# Patient Record
Sex: FEMALE | Race: BLACK OR AFRICAN AMERICAN | HISPANIC OR LATINO | Employment: FULL TIME | URBAN - METROPOLITAN AREA
[De-identification: names, ages, dates, MRNs, and addresses within clinical notes are randomized per-mention and may not be internally consistent; named-entity substitution may affect disease eponyms.]

---

## 2021-05-17 DIAGNOSIS — M54.16 RADICULOPATHY, LUMBAR REGION: Primary | ICD-10-CM

## 2021-05-18 ENCOUNTER — HOSPITAL ENCOUNTER (OUTPATIENT)
Dept: RADIOLOGY | Facility: HOSPITAL | Age: 46
Discharge: HOME | End: 2021-05-18
Attending: PAIN MEDICINE
Payer: COMMERCIAL

## 2021-05-18 DIAGNOSIS — M54.16 LUMBAR RADICULOPATHY: Primary | ICD-10-CM

## 2021-05-18 DIAGNOSIS — M54.16 LUMBAR RADICULOPATHY: ICD-10-CM

## 2021-05-18 PROCEDURE — 72100 X-RAY EXAM L-S SPINE 2/3 VWS: CPT | Mod: TC,PO

## 2021-08-19 DIAGNOSIS — M54.50 LUMBAGO: Primary | ICD-10-CM

## 2021-10-08 ENCOUNTER — HOSPITAL ENCOUNTER (OUTPATIENT)
Dept: RADIOLOGY | Facility: HOSPITAL | Age: 46
Discharge: HOME | End: 2021-10-08
Attending: PAIN MEDICINE
Payer: COMMERCIAL

## 2021-10-08 DIAGNOSIS — M54.50 LUMBAGO: ICD-10-CM

## 2021-10-08 PROCEDURE — 72148 MRI LUMBAR SPINE W/O DYE: CPT | Mod: TC,PO

## 2021-10-18 DIAGNOSIS — M54.16 LUMBAR RADICULOPATHY: Primary | ICD-10-CM

## 2021-10-28 ENCOUNTER — HOSPITAL ENCOUNTER (OUTPATIENT)
Dept: RADIOLOGY | Facility: HOSPITAL | Age: 46
Discharge: HOME | End: 2021-10-28
Attending: PAIN MEDICINE
Payer: COMMERCIAL

## 2021-10-28 DIAGNOSIS — M54.16 LUMBAR RADICULOPATHY: ICD-10-CM

## 2021-10-28 PROCEDURE — A9585 GADOBUTROL INJECTION: HCPCS | Mod: PO | Performed by: PAIN MEDICINE

## 2021-10-28 PROCEDURE — 72158 MRI LUMBAR SPINE W/O & W/DYE: CPT | Mod: TC,PO

## 2021-10-28 PROCEDURE — 25500020 PHARM REV CODE 255: Mod: PO | Performed by: PAIN MEDICINE

## 2021-10-28 RX ORDER — GADOBUTROL 604.72 MG/ML
9 INJECTION INTRAVENOUS
Status: COMPLETED | OUTPATIENT
Start: 2021-10-28 | End: 2021-10-28

## 2021-10-28 RX ADMIN — GADOBUTROL 9 ML: 604.72 INJECTION INTRAVENOUS at 03:10

## 2021-11-09 DIAGNOSIS — G95.9 SPINAL CORD LESION: Primary | ICD-10-CM

## 2021-11-23 ENCOUNTER — HOSPITAL ENCOUNTER (OUTPATIENT)
Dept: RADIOLOGY | Facility: HOSPITAL | Age: 46
Discharge: HOME | End: 2021-11-23
Attending: NEUROLOGICAL SURGERY
Payer: COMMERCIAL

## 2021-11-23 DIAGNOSIS — G95.9 SPINAL CORD LESION: ICD-10-CM

## 2021-11-23 PROCEDURE — 25500020 PHARM REV CODE 255: Mod: PO | Performed by: NEUROLOGICAL SURGERY

## 2021-11-23 PROCEDURE — 70553 MRI BRAIN STEM W/O & W/DYE: CPT | Mod: TC,PO

## 2021-11-23 PROCEDURE — A9585 GADOBUTROL INJECTION: HCPCS | Mod: PO | Performed by: NEUROLOGICAL SURGERY

## 2021-11-23 PROCEDURE — 72156 MRI NECK SPINE W/O & W/DYE: CPT | Mod: TC,PO

## 2021-11-23 RX ORDER — GADOBUTROL 604.72 MG/ML
9 INJECTION INTRAVENOUS
Status: COMPLETED | OUTPATIENT
Start: 2021-11-23 | End: 2021-11-23

## 2021-11-23 RX ADMIN — GADOBUTROL 9 ML: 604.72 INJECTION INTRAVENOUS at 09:11

## 2021-12-01 DIAGNOSIS — G95.9 DISEASE OF SPINAL CORD: Primary | ICD-10-CM

## 2021-12-08 ENCOUNTER — TELEPHONE (OUTPATIENT)
Dept: RADIOLOGY | Facility: HOSPITAL | Age: 46
End: 2021-12-08
Payer: COMMERCIAL

## 2021-12-08 DIAGNOSIS — G37.9 DEMYELINATING DISEASE: Primary | ICD-10-CM

## 2021-12-08 RX ORDER — GABAPENTIN 600 MG/1
600 TABLET ORAL DAILY
COMMUNITY

## 2021-12-08 RX ORDER — CYCLOBENZAPRINE HCL 10 MG
10 TABLET ORAL NIGHTLY
Status: ON HOLD | COMMUNITY
End: 2023-10-17 | Stop reason: HOSPADM

## 2021-12-08 RX ORDER — ACETAMINOPHEN 500 MG
1 TABLET ORAL DAILY
COMMUNITY

## 2021-12-09 DIAGNOSIS — G37.9 DEMYELINATING DISEASE OF CENTRAL NERVOUS SYSTEM, UNSPECIFIED: Primary | ICD-10-CM

## 2021-12-16 ENCOUNTER — HOSPITAL ENCOUNTER (OUTPATIENT)
Dept: RADIOLOGY | Facility: HOSPITAL | Age: 46
Discharge: HOME | End: 2021-12-16
Attending: PSYCHIATRY & NEUROLOGY
Payer: COMMERCIAL

## 2021-12-16 VITALS
TEMPERATURE: 99 F | SYSTOLIC BLOOD PRESSURE: 129 MMHG | HEART RATE: 74 BPM | DIASTOLIC BLOOD PRESSURE: 77 MMHG | RESPIRATION RATE: 17 BRPM

## 2021-12-16 VITALS
RESPIRATION RATE: 16 BRPM | SYSTOLIC BLOOD PRESSURE: 121 MMHG | OXYGEN SATURATION: 99 % | TEMPERATURE: 99 F | DIASTOLIC BLOOD PRESSURE: 77 MMHG | HEART RATE: 70 BPM

## 2021-12-16 DIAGNOSIS — G37.9 DEMYELINATING DISEASE OF CENTRAL NERVOUS SYSTEM, UNSPECIFIED: ICD-10-CM

## 2021-12-16 DIAGNOSIS — G37.9 DEMYELINATING DISEASE OF CENTRAL NERVOUS SYSTEM: Primary | ICD-10-CM

## 2021-12-16 DIAGNOSIS — G37.9 DEMYELINATING DISEASE: ICD-10-CM

## 2021-12-16 LAB
CLARITY CSF: ABNORMAL
COLOR CSF: COLORLESS
GLUCOSE CSF-MCNC: 51 MG/DL (ref 40–70)
LYMPHOCYTES NFR CSF MANUAL: 86 % (ref 40–80)
MISCELLANEOUS TEST NAME: NORMAL
MONOS+MACROS NFR CSF MANUAL: 1 % (ref 15–45)
NEUTROPHILS NFR CSF MANUAL: 13 % (ref 0–6)
PROT CSF-MCNC: 46 MG/DL (ref 15–40)
RBC # CSF: 2000 /CU MM
SARS-COV-2 RDRP RESP QL NAA+PROBE: NEGATIVE
SPECIMEN VOL CSF: 2.5 ML
WBC # CSF: 13 /CU MM (ref 0–5)

## 2021-12-16 PROCEDURE — 87529 HSV DNA AMP PROBE: CPT | Mod: 91 | Performed by: PSYCHIATRY & NEUROLOGY

## 2021-12-16 PROCEDURE — 82042 OTHER SOURCE ALBUMIN QUAN EA: CPT

## 2021-12-16 PROCEDURE — 82784 ASSAY IGA/IGD/IGG/IGM EACH: CPT

## 2021-12-16 PROCEDURE — 87070 CULTURE OTHR SPECIMN AEROBIC: CPT | Performed by: PSYCHIATRY & NEUROLOGY

## 2021-12-16 PROCEDURE — 89051 BODY FLUID CELL COUNT: CPT | Performed by: PSYCHIATRY & NEUROLOGY

## 2021-12-16 PROCEDURE — 36415 COLL VENOUS BLD VENIPUNCTURE: CPT | Performed by: PSYCHIATRY & NEUROLOGY

## 2021-12-16 PROCEDURE — 82040 ASSAY OF SERUM ALBUMIN: CPT | Mod: 91 | Performed by: PSYCHIATRY & NEUROLOGY

## 2021-12-16 PROCEDURE — 87205 SMEAR GRAM STAIN: CPT | Performed by: PSYCHIATRY & NEUROLOGY

## 2021-12-16 PROCEDURE — 25000003 PHARM REV CODE 250: Performed by: RADIOLOGY

## 2021-12-16 PROCEDURE — U0002 COVID-19 LAB TEST NON-CDC: HCPCS | Performed by: RADIOLOGY

## 2021-12-16 PROCEDURE — 83916 OLIGOCLONAL BANDS: CPT

## 2021-12-16 PROCEDURE — 86788 WEST NILE VIRUS AB IGM: CPT | Performed by: PSYCHIATRY & NEUROLOGY

## 2021-12-16 PROCEDURE — 30000890 LABCORP MISCELLANEOUS TEST: Performed by: PSYCHIATRY & NEUROLOGY

## 2021-12-16 PROCEDURE — 87496 CYTOMEG DNA AMP PROBE: CPT | Performed by: PSYCHIATRY & NEUROLOGY

## 2021-12-16 PROCEDURE — 87476 LYME DIS DNA AMP PROBE: CPT | Mod: 91 | Performed by: PSYCHIATRY & NEUROLOGY

## 2021-12-16 PROCEDURE — 82945 GLUCOSE OTHER FLUID: CPT | Performed by: PSYCHIATRY & NEUROLOGY

## 2021-12-16 PROCEDURE — 84157 ASSAY OF PROTEIN OTHER: CPT | Performed by: PSYCHIATRY & NEUROLOGY

## 2021-12-16 PROCEDURE — 30000890 HC MISC. SEND OUT TEST

## 2021-12-16 PROCEDURE — 87798 DETECT AGENT NOS DNA AMP: CPT | Mod: 91 | Performed by: PSYCHIATRY & NEUROLOGY

## 2021-12-16 PROCEDURE — 27201268 FL LUMBAR PUNCTURE DIAGNOSTIC WITH IMAGING

## 2021-12-16 PROCEDURE — 86592 SYPHILIS TEST NON-TREP QUAL: CPT | Performed by: PSYCHIATRY & NEUROLOGY

## 2021-12-16 RX ORDER — LIDOCAINE HYDROCHLORIDE 10 MG/ML
5 INJECTION INFILTRATION; PERINEURAL ONCE
Status: COMPLETED | OUTPATIENT
Start: 2021-12-16 | End: 2021-12-16

## 2021-12-16 RX ADMIN — LIDOCAINE HYDROCHLORIDE 5 ML: 10 INJECTION, SOLUTION EPIDURAL; INFILTRATION; INTRACAUDAL; PERINEURAL at 12:12

## 2021-12-18 LAB — REAGIN AB CSF QL: NON REACTIVE

## 2021-12-20 ENCOUNTER — HOSPITAL ENCOUNTER (OUTPATIENT)
Dept: RADIOLOGY | Facility: HOSPITAL | Age: 46
Discharge: HOME | End: 2021-12-20
Attending: NEUROLOGICAL SURGERY
Payer: COMMERCIAL

## 2021-12-20 DIAGNOSIS — G95.9 DISEASE OF SPINAL CORD: ICD-10-CM

## 2021-12-20 LAB
CMV SPEC QL SHELL VIAL CULT: NO GROWTH
GRAM STN SPEC: NORMAL
GRAM STN SPEC: NORMAL
LABCORP MISC TEST CODE: NORMAL
LABCORP MISC TEST NAME: NORMAL
LABCORP MISCELLANEOUS TEST: NORMAL

## 2021-12-20 PROCEDURE — 25500020 PHARM REV CODE 255: Mod: PO | Performed by: NEUROLOGICAL SURGERY

## 2021-12-20 PROCEDURE — 72157 MRI CHEST SPINE W/O & W/DYE: CPT | Mod: TC,PO

## 2021-12-20 PROCEDURE — A9585 GADOBUTROL INJECTION: HCPCS | Mod: PO | Performed by: NEUROLOGICAL SURGERY

## 2021-12-20 RX ORDER — GADOBUTROL 604.72 MG/ML
9.5 INJECTION INTRAVENOUS
Status: COMPLETED | OUTPATIENT
Start: 2021-12-20 | End: 2021-12-20

## 2021-12-20 RX ADMIN — GADOBUTROL 9.5 ML: 604.72 INJECTION INTRAVENOUS at 11:12

## 2021-12-22 LAB
LABCORP MISC TEST CODE: NORMAL
LABCORP MISC TEST NAME: NORMAL
LABCORP MISCELLANEOUS TEST: NORMAL

## 2021-12-23 LAB
LABCORP MISC TEST CODE: NORMAL
LABCORP MISC TEST NAME: NORMAL
LABCORP MISCELLANEOUS TEST: NORMAL

## 2021-12-24 LAB
LABCORP MISC TEST CODE: NORMAL
LABCORP MISC TEST NAME: NORMAL
LABCORP MISCELLANEOUS TEST: NORMAL

## 2021-12-27 LAB
LABCORP MISC TEST CODE: NORMAL
LABCORP MISC TEST NAME: NORMAL
LABCORP MISCELLANEOUS TEST: NORMAL
WNV IGG SPEC QL IA: NEGATIVE
WNV IGM CSF QL IA: NEGATIVE

## 2021-12-30 LAB
LABCORP MISC TEST CODE: NORMAL
LABCORP MISC TEST NAME: NORMAL
LABCORP MISCELLANEOUS TEST: NORMAL

## 2022-07-15 ENCOUNTER — TELEPHONE (OUTPATIENT)
Dept: NEUROSURGERY | Facility: CLINIC | Age: 47
End: 2022-07-15
Payer: COMMERCIAL

## 2022-07-15 NOTE — TELEPHONE ENCOUNTER
----- Message from Jenifer Mancuso MA sent at 7/15/2022  2:57 PM CDT -----  Regarding: appt  Contact: [phone]  Patient states she is returning a missed call from Koko to schedule an appointment  please call

## 2022-07-15 NOTE — TELEPHONE ENCOUNTER
"Per chart, pt has MS and "severe stenosis L5-S1"    S/W pt. She used to see Valdemar Gage. He was working her up for surgery L5/S1 but has now retired.    Both feet numb for over a year.    Will try to get updated MRI. Will bring EMG report. She Chose 8/31 to be able to get records together.    Appt made. Encouraged patient to call with any further questions or concerns.      ----- Message from Chani Louis sent at 7/15/2022 10:15 AM CDT -----  Regarding: APPOINTMENT  Contact: Self  Pt stated her doctor in [place] is retiring and would like to see Dr Flores for numbness in her legs and feet, trouble walking and nerve pain Provider is not populating in Epic to schedule Pt ask for a call to schedule      Contact info   [phone] (home)         "

## 2022-08-12 ENCOUNTER — TELEPHONE (OUTPATIENT)
Dept: NEUROSURGERY | Facility: CLINIC | Age: 47
End: 2022-08-12
Payer: COMMERCIAL

## 2022-08-12 ENCOUNTER — PATIENT MESSAGE (OUTPATIENT)
Dept: NEUROSURGERY | Facility: CLINIC | Age: 47
End: 2022-08-12
Payer: COMMERCIAL

## 2022-08-12 DIAGNOSIS — G95.9 SPINAL CORD LESION: Primary | ICD-10-CM

## 2022-08-16 DIAGNOSIS — M48.07 LUMBOSACRAL STENOSIS: ICD-10-CM

## 2022-08-16 DIAGNOSIS — M54.16 LUMBAR RADICULOPATHY: Primary | ICD-10-CM

## 2022-08-22 ENCOUNTER — PATIENT MESSAGE (OUTPATIENT)
Dept: NEUROSURGERY | Facility: CLINIC | Age: 47
End: 2022-08-22
Payer: COMMERCIAL

## 2022-08-25 ENCOUNTER — PATIENT MESSAGE (OUTPATIENT)
Dept: NEUROSURGERY | Facility: CLINIC | Age: 47
End: 2022-08-25
Payer: COMMERCIAL

## 2022-09-01 ENCOUNTER — HOSPITAL ENCOUNTER (OUTPATIENT)
Dept: RADIOLOGY | Facility: HOSPITAL | Age: 47
Discharge: HOME | End: 2022-09-01
Attending: FAMILY MEDICINE
Payer: COMMERCIAL

## 2022-09-01 DIAGNOSIS — M48.07 LUMBOSACRAL STENOSIS: ICD-10-CM

## 2022-09-01 DIAGNOSIS — M54.16 LUMBAR RADICULOPATHY: ICD-10-CM

## 2022-09-01 PROCEDURE — 72148 MRI LUMBAR SPINE W/O DYE: CPT | Mod: TC,PO

## 2022-09-14 ENCOUNTER — OFFICE VISIT (OUTPATIENT)
Dept: NEUROSURGERY | Facility: CLINIC | Age: 47
End: 2022-09-14
Payer: COMMERCIAL

## 2022-09-14 VITALS
HEIGHT: 64 IN | DIASTOLIC BLOOD PRESSURE: 79 MMHG | WEIGHT: 214.5 LBS | HEART RATE: 97 BPM | OXYGEN SATURATION: 100 % | BODY MASS INDEX: 36.62 KG/M2 | SYSTOLIC BLOOD PRESSURE: 133 MMHG

## 2022-09-14 DIAGNOSIS — M51.26 HERNIATED LUMBAR INTERVERTEBRAL DISC: Primary | ICD-10-CM

## 2022-09-14 PROCEDURE — 1159F MED LIST DOCD IN RCRD: CPT | Mod: CPTII,S$GLB,, | Performed by: NEUROLOGICAL SURGERY

## 2022-09-14 PROCEDURE — 99204 OFFICE O/P NEW MOD 45 MIN: CPT | Mod: S$GLB,,, | Performed by: NEUROLOGICAL SURGERY

## 2022-09-14 PROCEDURE — 3008F BODY MASS INDEX DOCD: CPT | Mod: CPTII,S$GLB,, | Performed by: NEUROLOGICAL SURGERY

## 2022-09-14 PROCEDURE — 99999 PR PBB SHADOW E&M-EST. PATIENT-LVL IV: ICD-10-PCS | Mod: PBBFAC,,, | Performed by: NEUROLOGICAL SURGERY

## 2022-09-14 PROCEDURE — 3075F SYST BP GE 130 - 139MM HG: CPT | Mod: CPTII,S$GLB,, | Performed by: NEUROLOGICAL SURGERY

## 2022-09-14 PROCEDURE — 99999 PR PBB SHADOW E&M-EST. PATIENT-LVL IV: CPT | Mod: PBBFAC,,, | Performed by: NEUROLOGICAL SURGERY

## 2022-09-14 PROCEDURE — 3078F DIAST BP <80 MM HG: CPT | Mod: CPTII,S$GLB,, | Performed by: NEUROLOGICAL SURGERY

## 2022-09-14 PROCEDURE — 1160F RVW MEDS BY RX/DR IN RCRD: CPT | Mod: CPTII,S$GLB,, | Performed by: NEUROLOGICAL SURGERY

## 2022-09-14 PROCEDURE — 1159F PR MEDICATION LIST DOCUMENTED IN MEDICAL RECORD: ICD-10-PCS | Mod: CPTII,S$GLB,, | Performed by: NEUROLOGICAL SURGERY

## 2022-09-14 PROCEDURE — 3008F PR BODY MASS INDEX (BMI) DOCUMENTED: ICD-10-PCS | Mod: CPTII,S$GLB,, | Performed by: NEUROLOGICAL SURGERY

## 2022-09-14 PROCEDURE — 3075F PR MOST RECENT SYSTOLIC BLOOD PRESS GE 130-139MM HG: ICD-10-PCS | Mod: CPTII,S$GLB,, | Performed by: NEUROLOGICAL SURGERY

## 2022-09-14 PROCEDURE — 1160F PR REVIEW ALL MEDS BY PRESCRIBER/CLIN PHARMACIST DOCUMENTED: ICD-10-PCS | Mod: CPTII,S$GLB,, | Performed by: NEUROLOGICAL SURGERY

## 2022-09-14 PROCEDURE — 3078F PR MOST RECENT DIASTOLIC BLOOD PRESSURE < 80 MM HG: ICD-10-PCS | Mod: CPTII,S$GLB,, | Performed by: NEUROLOGICAL SURGERY

## 2022-09-14 PROCEDURE — 99204 PR OFFICE/OUTPT VISIT, NEW, LEVL IV, 45-59 MIN: ICD-10-PCS | Mod: S$GLB,,, | Performed by: NEUROLOGICAL SURGERY

## 2022-09-14 RX ORDER — PHENTERMINE HYDROCHLORIDE 37.5 MG/1
37.5 TABLET ORAL DAILY
COMMUNITY
Start: 2022-09-06

## 2022-09-14 RX ORDER — OZANIMOD HYDROCHLORIDE 0.92 MG/1
1 CAPSULE ORAL DAILY
COMMUNITY
Start: 2022-02-01

## 2022-09-14 RX ORDER — AMITRIPTYLINE HYDROCHLORIDE 25 MG/1
25 TABLET, FILM COATED ORAL NIGHTLY
COMMUNITY
Start: 2022-06-02 | End: 2023-08-14 | Stop reason: CLARIF

## 2022-09-14 NOTE — PATIENT INSTRUCTIONS
I have personally reviewed the MRI brain with the pt which shows:  1. White matter hyperintensities in both cerebral hemispheres suspicious for demyelinating disease/multiple sclerosis.  2. A small periventricular white matter hyperintensity adjacent to the posterior horn of the left lateral ventricle is associated with mild contrast enhancement, indicating probable active demyelination.  3. Extensive chronic sinusitis.    I have personally reviewed the MRI lumbar with the pt which shows broad-based disc bulge with facet hypertrophy at L4-5 resulting in mild central canal stenosis and moderate lateral recess narrowing. Correlation with bilateral L5 radicular symptoms is recommended. Mild central canal stenosis and foraminal narrowing at L3-4 and mild foraminal narrowing at L5-S1. 16 mm left ovarian cyst.    I will schedule the patient for L3-4, L4-5, and L5-S1 IR discogram and follow up thereafter.

## 2022-09-14 NOTE — PROGRESS NOTES
Subjective:   I, Maria Ines He, attest that this documentation has been prepared under the direction and in the presence of Dennis Flores MD.     Patient ID: Shanthi Escoto is a 47 y.o. female     Chief Complaint: Lumbar Spine Pain (L-Spine)        HPI  Ms. Shanthi Escoto is a 47 y.o. woman recently diagnosed with MS, who presents today to [facility] care. This is a patient who presented to me with low back pain with associated numbness to her feet ongoing for 1.5 years. In March 2021, pt reports waking up with numbness to the lower half of her body. She contacted her PCP and was prescribed medications with no relief. She then consulted Dr. Natividad Hernandez, pain management, and underwent a XR and MRI with new findings of multiple enhancing lesions and posterior disc extrusion at L4-L5 causing severe spinal canal stenosis. She was then referred to neurosurgery and had an additional MRI with EMG nerve conduction study. At that point, pt was diagnosed with MS. Patient had consulted with Dr. Zhu, neurosurgery, to discuss surgical treatment options. However, by the time she was scheduled to undergo the procedure, the surgeon had retired. Since then, pt states she has progressively worsened. Patient reports difficulty with balance and endorses numbness and tingling to the bilateral feet. Described as pins and needles that are exacerbated with movement. Patient states inability to walk straight or stand for long periods of time. Prior to onset, pt reports she was actively exercising on a treadmill and riding a bike daily. She has now ceased exercising due to her pain and gained 40 lbs in weight as a result of her inactivity. She denies any weakness to the BLE. However, she does note numbness to the BLE upon lying down. Additionally, pt endorses increased frequency in urination. MRI brain suggests chronic sinusitis however pt reports she is asymptomatic at this time. Her MS is currently being managed with Dr. Khoobehi,  neurology.    Review of Systems   Constitutional:  Positive for activity change and unexpected weight change. Negative for appetite change, fatigue and fever.   HENT:  Negative for facial swelling.    Eyes: Negative.    Respiratory: Negative.     Cardiovascular: Negative.    Gastrointestinal:  Negative for diarrhea, nausea and vomiting.   Endocrine: Negative.    Genitourinary:  Positive for frequency.   Musculoskeletal:  Positive for arthralgias, back pain and gait problem. Negative for joint swelling, myalgias and neck pain.   Neurological:  Positive for numbness. Negative for dizziness, seizures, weakness and headaches.   Psychiatric/Behavioral: Negative.        Past Medical History:   Diagnosis Date    Demyelinating disease     Numbness and tingling of both feet        Objective:      Vitals:    09/14/22 1301   BP: 133/79   Pulse: 97      Physical Exam  Constitutional:       General: She is not in acute distress.     Appearance: Normal appearance.   HENT:      Head: Normocephalic and atraumatic.   Pulmonary:      Effort: Pulmonary effort is normal.   Musculoskeletal:      Cervical back: Neck supple.   Neurological:      Mental Status: She is alert and oriented to person, place, and time.      GCS: GCS eye subscore is 4. GCS verbal subscore is 5. GCS motor subscore is 6.      Cranial Nerves: No cranial nerve deficit.      Motor: No weakness.      Comments: Intact strength on knee extension. Intact strength on seated hip flexion. Pt cannot rise from a sitting position without pushing off.  Intact foot plantar flexion and extension.     IMAGING:  MRI Brain W WO Contrast (11/23/2022):  1. White matter hyperintensities in both cerebral hemispheres suspicious for demyelinating disease/multiple sclerosis.  2. A small periventricular white matter hyperintensity adjacent to the posterior horn of the left lateral ventricle is associated with mild contrast enhancement, indicating probable active demyelination.  3. Extensive  chronic sinusitis.    MRI Lumbar Spine WO Contrast (9/1/2022):  Broad-based disc bulge with facet hypertrophy at L4-5 resulting in mild central canal stenosis and moderate lateral recess narrowing. Correlation with bilateral L5 radicular symptoms is recommended. Mild central canal stenosis and foraminal narrowing at L3-4 and mild foraminal narrowing at L5-S1. 16 mm left ovarian cyst.    I have personally reviewed the images with the pt.      I, Dr. Dennis Flores, personally performed the services described in this documentation. All medical record entries made by the scribe, Maria Ines He, were at my direction and in my presence.  I have reviewed the chart and agree that the record reflects my personal performance and is accurate and complete. Dennis Flores MD. 09/14/2022    Assessment:       Herniated lumbar disc     Plan:   I have personally reviewed the MRI brain with the pt which shows:  1. White matter hyperintensities in both cerebral hemispheres suspicious for demyelinating disease/multiple sclerosis.  2. A small periventricular white matter hyperintensity adjacent to the posterior horn of the left lateral ventricle is associated with mild contrast enhancement, indicating probable active demyelination.  3. Extensive chronic sinusitis.    I have personally reviewed the MRI lumbar with the pt which shows broad-based disc bulge with facet hypertrophy at L4-5 resulting in mild central canal stenosis and moderate lateral recess narrowing. Correlation with bilateral L5 radicular symptoms is recommended. Mild central canal stenosis and foraminal narrowing at L3-4 and mild foraminal narrowing at L5-S1. 16 mm left ovarian cyst.    I will schedule the patient for L3-4, L4-5, and L5-S1 IR discogram and follow up thereafter.

## 2022-09-16 ENCOUNTER — TELEPHONE (OUTPATIENT)
Dept: NEUROSURGERY | Facility: CLINIC | Age: 47
End: 2022-09-16
Payer: COMMERCIAL

## 2022-09-16 ENCOUNTER — PATIENT MESSAGE (OUTPATIENT)
Dept: NEUROSURGERY | Facility: CLINIC | Age: 47
End: 2022-09-16
Payer: COMMERCIAL

## 2022-09-16 DIAGNOSIS — M51.26 HERNIATED LUMBAR INTERVERTEBRAL DISC: Primary | ICD-10-CM

## 2022-10-03 ENCOUNTER — CLINICAL SUPPORT (OUTPATIENT)
Dept: NEUROSURGERY | Facility: CLINIC | Age: 47
End: 2022-10-03
Payer: COMMERCIAL

## 2022-10-03 DIAGNOSIS — M51.26 HERNIATED LUMBAR INTERVERTEBRAL DISC: Primary | ICD-10-CM

## 2022-10-03 PROCEDURE — 99203 PR OFFICE/OUTPT VISIT, NEW, LEVL III, 30-44 MIN: ICD-10-PCS | Mod: 95,,,

## 2022-10-03 PROCEDURE — 99203 OFFICE O/P NEW LOW 30 MIN: CPT | Mod: 95,,,

## 2022-10-03 NOTE — PROGRESS NOTES
Subjective:       Patient ID: Shanthi Escoto is a 47 y.o. female.    Chief Complaint: Back Pain    Referred by Dr. Flores to discuss discogram.   48 yo F recently diagnosed with MS (Seeing Dr. Khoobehi, Neurology) reports with 1.5 years of LBP with radicular symptoms to the BLE. She reports her pain began after changing a light fixture in her home. Patient reports this is affecting her ability to walk. MRI with new findings of multiple enhancing lesions and posterior disc extrusion at L4-L5 causing spinal canal stenosis. She denies any bowel or bladder incontinence, chest pain, or SOB. After discussing discogram patient would like to proceed with scheduling.      Review of Systems   Constitutional:  Positive for activity change (due to pain). Negative for appetite change, fatigue and fever.   HENT:  Negative for ear pain and facial swelling.    Eyes:  Negative for pain and redness.   Respiratory:  Negative for cough, chest tightness and shortness of breath.    Cardiovascular:  Negative for chest pain and palpitations.   Gastrointestinal:  Negative for abdominal distention and abdominal pain.   Genitourinary:  Negative for bladder incontinence, difficulty urinating and hematuria.   Musculoskeletal:  Positive for arthralgias, back pain and gait problem. Negative for myalgias.   Neurological:  Positive for numbness (BLE). Negative for weakness.   Psychiatric/Behavioral:  Negative for behavioral problems and confusion.        Objective:      Physical Exam  Constitutional:       General: She is not in acute distress.     Appearance: Normal appearance.      Comments: Virtual visit.    HENT:      Head: Normocephalic and atraumatic.      Nose: Nose normal.   Eyes:      Conjunctiva/sclera: Conjunctivae normal.   Pulmonary:      Effort: Pulmonary effort is normal.   Neurological:      General: No focal deficit present.      Mental Status: She is alert.   Psychiatric:         Mood and Affect: Mood normal.         Behavior:  Behavior normal.         MRI L-Spine 9/1/20222    - disc bulge with facet hypertrophy at L4-5. Correlation with bilateral L5 radicular symptoms is recommended   - Mild central canal stenosis and foraminal narrowing at L3-4   - mild foraminal narrowing at L5-S1    Assessment and Plan       Herniated lumbar intervertebral disc  -     IR Discogram Lumbar; Future; Expected date: 10/03/2022    The patient location is: [place]   The chief complaint leading to consultation is: Back pain with radicular symptoms to the BLE.    Visit type: audiovisual    Face to Face time with patient: 20   30 minutes of total time spent on the encounter, which includes face to face time and non-face to face time preparing to see the patient (eg, review of tests), Obtaining and/or reviewing separately obtained history, Documenting clinical information in the electronic or other health record, Independently interpreting results (not separately reported) and communicating results to the patient/family/caregiver, or Care coordination (not separately reported).     Each patient to whom he or she provides medical services by telemedicine is:  (1) informed of the relationship between the physician and patient and the respective role of any other health care provider with respect to management of the patient; and (2) notified that he or she may decline to receive medical services by telemedicine and may withdraw from such care at any time.    Notes:    Discussed how the procedure will be performed, risks (including, but not limited to, pain, bleeding, infection, damage to nearby structures, and the need for additional procedures), benefits, possible complications, pre-post procedure expectations, and alternatives. The patient voices understanding and all questions have been answered.  The patient agrees to proceed as planned. Dr. Dee in room.     Esme Luna PA-C  Interventional Radiology  [phone]

## 2022-10-07 ENCOUNTER — PATIENT MESSAGE (OUTPATIENT)
Dept: NEUROSURGERY | Facility: CLINIC | Age: 47
End: 2022-10-07
Payer: COMMERCIAL

## 2022-10-18 ENCOUNTER — TELEPHONE (OUTPATIENT)
Dept: INTERVENTIONAL RADIOLOGY/VASCULAR | Facility: CLINIC | Age: 47
End: 2022-10-18
Payer: COMMERCIAL

## 2022-10-18 NOTE — TELEPHONE ENCOUNTER
Patient called to canceled her outpatient discogram schedule on 11/1 with Dr. Dee.  Patient stated she will call to r/s beginning of 2023 due to changing insurance company with a lower deductible.

## 2022-11-10 ENCOUNTER — PATIENT MESSAGE (OUTPATIENT)
Dept: NEUROSURGERY | Facility: CLINIC | Age: 47
End: 2022-11-10
Payer: COMMERCIAL

## 2022-12-12 ENCOUNTER — TELEPHONE (OUTPATIENT)
Dept: INTERVENTIONAL RADIOLOGY/VASCULAR | Facility: HOSPITAL | Age: 47
End: 2022-12-12
Payer: COMMERCIAL

## 2022-12-13 ENCOUNTER — HOSPITAL ENCOUNTER (OUTPATIENT)
Dept: INTERVENTIONAL RADIOLOGY/VASCULAR | Facility: HOSPITAL | Age: 47
Discharge: HOME | End: 2022-12-13
Payer: COMMERCIAL

## 2022-12-13 VITALS
SYSTOLIC BLOOD PRESSURE: 136 MMHG | BODY MASS INDEX: 34.15 KG/M2 | HEART RATE: 87 BPM | WEIGHT: 200 LBS | TEMPERATURE: 98 F | HEIGHT: 64 IN | RESPIRATION RATE: 20 BRPM | DIASTOLIC BLOOD PRESSURE: 91 MMHG | OXYGEN SATURATION: 98 %

## 2022-12-13 DIAGNOSIS — M51.26 HERNIATED LUMBAR INTERVERTEBRAL DISC: ICD-10-CM

## 2022-12-13 LAB
B-HCG UR QL: NEGATIVE
CTP QC/QA: YES

## 2022-12-13 PROCEDURE — 99152 MOD SED SAME PHYS/QHP 5/>YRS: CPT | Performed by: RADIOLOGY

## 2022-12-13 PROCEDURE — 99152 MOD SED SAME PHYS/QHP 5/>YRS: CPT | Mod: ,,, | Performed by: RADIOLOGY

## 2022-12-13 PROCEDURE — 25500020 PHARM REV CODE 255: Performed by: RADIOLOGY

## 2022-12-13 PROCEDURE — 99153 MOD SED SAME PHYS/QHP EA: CPT | Performed by: RADIOLOGY

## 2022-12-13 PROCEDURE — 62290 NJX PX DISCOGRAPHY LUMBAR: CPT | Mod: 59,,, | Performed by: RADIOLOGY

## 2022-12-13 PROCEDURE — 99152 PR MOD CONSCIOUS SEDATION, SAME PHYS, 5+ YRS, FIRST 15 MIN: ICD-10-PCS | Mod: ,,, | Performed by: RADIOLOGY

## 2022-12-13 PROCEDURE — A4550 SURGICAL TRAYS: HCPCS

## 2022-12-13 PROCEDURE — 63600175 PHARM REV CODE 636 W HCPCS: Performed by: STUDENT IN AN ORGANIZED HEALTH CARE EDUCATION/TRAINING PROGRAM

## 2022-12-13 PROCEDURE — 72295 X-RAY OF LOWER SPINE DISK: CPT | Mod: TC,59 | Performed by: RADIOLOGY

## 2022-12-13 PROCEDURE — 62290 PR INJECT DISKOGRAM,LUMBAR,EA LEVEL: ICD-10-PCS | Mod: 59,,, | Performed by: RADIOLOGY

## 2022-12-13 PROCEDURE — 81025 URINE PREGNANCY TEST: CPT | Performed by: FAMILY MEDICINE

## 2022-12-13 PROCEDURE — 72295 IR DISCOGRAM LUMBAR: ICD-10-PCS | Mod: 26,59,, | Performed by: RADIOLOGY

## 2022-12-13 PROCEDURE — 62290 NJX PX DISCOGRAPHY LUMBAR: CPT | Mod: 59 | Performed by: RADIOLOGY

## 2022-12-13 RX ORDER — ACETAMINOPHEN 325 MG/1
650 TABLET ORAL EVERY 4 HOURS PRN
Status: CANCELLED | OUTPATIENT
Start: 2022-12-13

## 2022-12-13 RX ORDER — MIDAZOLAM HYDROCHLORIDE 1 MG/ML
INJECTION INTRAMUSCULAR; INTRAVENOUS
Status: COMPLETED | OUTPATIENT
Start: 2022-12-13 | End: 2022-12-13

## 2022-12-13 RX ORDER — LIDOCAINE HYDROCHLORIDE 10 MG/ML
1 INJECTION, SOLUTION EPIDURAL; INFILTRATION; INTRACAUDAL; PERINEURAL ONCE
Status: DISCONTINUED | OUTPATIENT
Start: 2022-12-13 | End: 2022-12-14 | Stop reason: HOSPADM

## 2022-12-13 RX ORDER — SODIUM CHLORIDE 9 MG/ML
INJECTION, SOLUTION INTRAVENOUS CONTINUOUS
Status: DISCONTINUED | OUTPATIENT
Start: 2022-12-13 | End: 2022-12-14 | Stop reason: HOSPADM

## 2022-12-13 RX ORDER — OXYCODONE HYDROCHLORIDE 5 MG/1
5 TABLET ORAL EVERY 4 HOURS PRN
Status: CANCELLED | OUTPATIENT
Start: 2022-12-13

## 2022-12-13 RX ORDER — FENTANYL CITRATE 50 UG/ML
INJECTION, SOLUTION INTRAMUSCULAR; INTRAVENOUS
Status: COMPLETED | OUTPATIENT
Start: 2022-12-13 | End: 2022-12-13

## 2022-12-13 RX ORDER — ONDANSETRON 2 MG/ML
4 INJECTION INTRAMUSCULAR; INTRAVENOUS EVERY 6 HOURS PRN
Status: CANCELLED | OUTPATIENT
Start: 2022-12-13

## 2022-12-13 RX ADMIN — IOHEXOL 5 ML: 180 INJECTION INTRAVENOUS at 01:12

## 2022-12-13 RX ADMIN — FENTANYL CITRATE 25 MCG: 50 INJECTION, SOLUTION INTRAMUSCULAR; INTRAVENOUS at 12:12

## 2022-12-13 RX ADMIN — MIDAZOLAM HYDROCHLORIDE 0.5 MG: 1 INJECTION INTRAMUSCULAR; INTRAVENOUS at 01:12

## 2022-12-13 RX ADMIN — FENTANYL CITRATE 50 MCG: 50 INJECTION, SOLUTION INTRAMUSCULAR; INTRAVENOUS at 01:12

## 2022-12-13 RX ADMIN — MIDAZOLAM HYDROCHLORIDE 1 MG: 1 INJECTION INTRAMUSCULAR; INTRAVENOUS at 12:12

## 2022-12-13 NOTE — NURSING
Pt given discharge instructions and handout.  Family waiting in car outside.  Dsg to back CDI.  IV d/c'd with cath tip intact.  NAD noted.  Pt to DC home with family .

## 2022-12-13 NOTE — PROCEDURES
Radiology Post-Procedure Note    Pre Op Diagnosis: LBP    Post Op Diagnosis: Same    Procedure: Lumbar discogram     Procedure performed by: Myron Dee MD    Written Informed Consent Obtained: Yes    Specimen Removed: NO    Estimated Blood Loss: Minimal    Findings:     Lumbar discogram     Level injected: L1-2, L2-3, L3-4, L4-5, L5-S1  Needle used: 20/22 gauge    Concordant discogenic pain ellicited     Patient tolerated procedure well.        Salma Evans MD     Neuro Endovascular Surgery Fellow   [facility]

## 2022-12-13 NOTE — NURSING
Pt arrived to [facility] bay 6 s/p discogram.  NAD noted.  Report received from Tamica.  DSG to back CDI.  Will continue to monitor.

## 2022-12-13 NOTE — NURSING
Pt arrived to room 4 for discogram for diagnostic purposes. Pt oriented to unit and staff. Plan of care reviewed with patient, patient verbalizes understanding. Comfort measures utilized. Pt safely transferred from stretcher to procedural table. Fall risk reviewed with patient, fall risk interventions maintained with direct observation/attendance. positioner pillows utilized to minimize pressure points. Blankets applied. Pt prepped and draped utilizing standard sterile technique. Patient placed on continuous monitoring, as required by sedation policy. Timeouts completed utilizing standard universal time-out, per department and facility policy. RN to remain at bedside, continuous monitoring maintained. Pt resting comfortably. Denies pain/discomfort. Will continue to monitor. See flow sheets for monitoring, medication administration, and updates.

## 2022-12-13 NOTE — PLAN OF CARE
Discogram complete. Pt tolerated well. VSS. No signs or symptoms of distress noted.  Dressing to back CDI.  Pt will be transferred to ROCU bed and report to be given at bedside.

## 2022-12-13 NOTE — DISCHARGE INSTRUCTIONS
For immediate concerns that are not emergent, you may call our radiology clinic at: [phone]. After hours, for urgent concerns: ask for the radiology resident on call at [phone].    You may remove the dressing over your procedure site the day after your procedure. You may shower the day after your procedure with the procedure site covered, then uncover it after your shower. When not showing, keep the procedure site clean, dry, and open to air. Do not submerge in water (bath, pool, hot tub, ect.) until the site is completely healed.

## 2022-12-13 NOTE — H&P
Radiology History & Physical      SUBJECTIVE:     Chief Complaint: low back pain with radiculopathy     History of Present Illness:  Shanthi Escoto is a 47 y.o. female who presents for diagnostic lumbar discogram to assess origin of pain. Patient report 1.5 year hx of low back pain, which intermittent radiates down the posterior aspect of both legs to her feet. She report constant dull low back pain, 7/10 currently, which is worse with laying down. Patient reports constant numbness in her feet bilaterally.     Past Medical History:   Diagnosis Date    Demyelinating disease     MS (multiple sclerosis)     Numbness and tingling of both feet      Past Surgical History:   Procedure Laterality Date     SECTION      ENDOMETRIAL ABLATION         Home Meds:   Prior to Admission medications    Medication Sig Start Date End Date Taking? Authorizing Provider   Bacillus coagulans/inulin (PROBIOTIC WITH PREBIOTIC ORAL) Take 1 tablet by mouth once daily.   Yes Historical Provider   cholecalciferol, vitamin D3, (VITAMIN D3) 50 mcg (2,000 unit) Cap Take 1 capsule by mouth once daily.   Yes Historical Provider   cyclobenzaprine (FLEXERIL) 10 MG tablet Take 10 mg by mouth every evening.   Yes Historical Provider   gabapentin (NEURONTIN) 600 MG tablet Take 600 mg by mouth once daily.   Yes Historical Provider   ozanimod (ZEPOSIA) 0.92 mg Cap  22  Yes Historical Provider   phentermine (ADIPEX-P) 37.5 mg tablet Take 37.5 mg by mouth once daily. 22  Yes Historical Provider   amitriptyline (ELAVIL) 25 MG tablet Take 25 mg by mouth every evening. 22   Historical Provider     Anticoagulants/Antiplatelets: no anticoagulation    Allergies:   Review of patient's allergies indicates:   Allergen Reactions    Penicillins Anaphylaxis     As an infant     Sedation History:  have not been any systemic reactions    Review of Systems:   Hematological: negative  no known coagulopathies  Respiratory: no cough, shortness of breath,  or wheezing  no shortness of breath  Cardiovascular: no chest pain or dyspnea on exertion  no chest pain  Gastrointestinal: no abdominal pain, change in bowel habits, or black or bloody stools  no abdominal pain  Genito-Urinary: no dysuria, trouble voiding, or hematuria  no dysuria  Musculoskeletal: low back pain with radiculopathy  Neurological: no TIA or stroke symptoms, numbness in feet bilaterally         OBJECTIVE:     Vital Signs (Most Recent)  Temp: 97.3 °F (36.3 °C) (12/13/22 1048)  Pulse: 97 (12/13/22 1048)  Resp: 16 (12/13/22 1048)  BP: 132/83 (12/13/22 1049)  SpO2: 97 % (12/13/22 1048)    Physical Exam:  ASA: 2  Mallampati: 2    General: no acute distress  Mental Status: alert and oriented to person, place and time  HEENT: normocephalic, atraumatic  Chest: unlabored breathing  Heart: regular heart rate  Abdomen: nondistended  Extremity: moves all extremities    Laboratory  Lab Results   Component Value Date    INR 1.0 12/13/2022       Lab Results   Component Value Date    WBC 4.49 12/13/2022    HGB 12.6 12/13/2022    HCT 38.9 12/13/2022    MCV 86 12/13/2022     12/13/2022      Lab Results   Component Value Date    GLU 93 12/13/2022     12/13/2022    K 4.3 12/13/2022     12/13/2022    CO2 28 12/13/2022    BUN 7 12/13/2022    CREATININE 0.7 12/13/2022    CALCIUM 9.9 12/13/2022    ALT 15 12/13/2022    AST 16 12/13/2022    ALBUMIN 3.7 12/13/2022    BILITOT 0.5 12/13/2022     MRI L-Spine 9/1/20222    - disc bulge with facet hypertrophy at L4-5. Correlation with bilateral L5 radicular symptoms is recommended   - Mild central canal stenosis and foraminal narrowing at L3-4   - mild foraminal narrowing at L5-S1    ASSESSMENT/PLAN:     Sedation Plan: Up to moderate  Patient will undergo diagnostic lumbar discogram.      Nas Varela DO

## 2022-12-14 NOTE — DISCHARGE SUMMARY
Radiology Discharge Summary      Hospital Course: No complications    Admit Date: 12/13/2022  Discharge Date: 12/14/2022     Instructions Given to Patient: Yes  Diet: Resume prior diet  Activity: activity as tolerated    Description of Condition on Discharge: Stable  Vital Signs (Most Recent): Temp: 98.2 °F (36.8 °C) (12/13/22 1340)  Pulse: 87 (12/13/22 1440)  Resp: 20 (12/13/22 1440)  BP: (!) 136/91 (12/13/22 1440)  SpO2: 98 % (12/13/22 1440)    Discharge Disposition: Home    Discharge Diagnosis:     S/p Lumbar discogram   Level injected: L1-2, L2-3, L3-4, L4-5, L5-S1  Concordant discogenic pain ellicited      Follow-up: With primary neurosurgeon         Salma Evans MD     Neuro Endovascular Surgery Fellow   [facility]

## 2023-04-28 ENCOUNTER — PATIENT MESSAGE (OUTPATIENT)
Dept: NEUROSURGERY | Facility: CLINIC | Age: 48
End: 2023-04-28
Payer: COMMERCIAL

## 2023-05-31 ENCOUNTER — OFFICE VISIT (OUTPATIENT)
Dept: NEUROSURGERY | Facility: CLINIC | Age: 48
End: 2023-05-31
Payer: COMMERCIAL

## 2023-05-31 ENCOUNTER — TELEPHONE (OUTPATIENT)
Dept: NEUROSURGERY | Facility: CLINIC | Age: 48
End: 2023-05-31
Payer: COMMERCIAL

## 2023-05-31 ENCOUNTER — PATIENT MESSAGE (OUTPATIENT)
Dept: NEUROSURGERY | Facility: CLINIC | Age: 48
End: 2023-05-31

## 2023-05-31 DIAGNOSIS — M47.27 LUMBOSACRAL SPONDYLOSIS WITH RADICULOPATHY: Primary | ICD-10-CM

## 2023-05-31 DIAGNOSIS — M51.26 HERNIATED LUMBAR INTERVERTEBRAL DISC: Primary | ICD-10-CM

## 2023-05-31 DIAGNOSIS — M48.062 SPINAL STENOSIS, LUMBAR REGION, WITH NEUROGENIC CLAUDICATION: ICD-10-CM

## 2023-05-31 PROCEDURE — 1159F MED LIST DOCD IN RCRD: CPT | Mod: CPTII,95,, | Performed by: NEUROLOGICAL SURGERY

## 2023-05-31 PROCEDURE — 1159F PR MEDICATION LIST DOCUMENTED IN MEDICAL RECORD: ICD-10-PCS | Mod: CPTII,95,, | Performed by: NEUROLOGICAL SURGERY

## 2023-05-31 PROCEDURE — 1160F PR REVIEW ALL MEDS BY PRESCRIBER/CLIN PHARMACIST DOCUMENTED: ICD-10-PCS | Mod: CPTII,95,, | Performed by: NEUROLOGICAL SURGERY

## 2023-05-31 PROCEDURE — 99213 PR OFFICE/OUTPT VISIT, EST, LEVL III, 20-29 MIN: ICD-10-PCS | Mod: 95,,, | Performed by: NEUROLOGICAL SURGERY

## 2023-05-31 PROCEDURE — 99213 OFFICE O/P EST LOW 20 MIN: CPT | Mod: 95,,, | Performed by: NEUROLOGICAL SURGERY

## 2023-05-31 PROCEDURE — 1160F RVW MEDS BY RX/DR IN RCRD: CPT | Mod: CPTII,95,, | Performed by: NEUROLOGICAL SURGERY

## 2023-05-31 NOTE — PROGRESS NOTES
The patient location is: [place]  The chief complaint leading to consultation is: follow up    Visit type: audiovisual    Face to Face time with patient: 10 minutes of total time spent on the encounter, which includes face to face time and non-face to face time preparing to see the patient (eg, review of tests), Obtaining and/or reviewing separately obtained history, Documenting clinical information in the electronic or other health record, Independently interpreting results (not separately reported) and communicating results to the patient/family/caregiver, or Care coordination (not separately reported).         Each patient to whom he or she provides medical services by telemedicine is:  (1) informed of the relationship between the physician and patient and the respective role of any other health care provider with respect to management of the patient; and (2) notified that he or she may decline to receive medical services by telemedicine and may withdraw from such care at any time.    Notes:    Subjective:   Maria Ines CHANDRA, attest that this documentation has been prepared under the direction and in the presence of Dennis Flores MD.     Patient ID: Shanthi Escoto is a 48 y.o. female     Chief Complaint: No chief complaint on file.      HPI  Ms. Shanthi Escoto is a 48 y.o. woman recently diagnosed with MS, who presents today for follow up. This is a patient who presented to me with low back pain with BLE paresthesia. In March 2021, pt reports waking up with numbness to the lower half of her body. She was referred to pain management and had imaging with new findings of multiple enhancing lesions and posterior disc extrusion at L4-L5 causing severe spinal canal stenosis. She was then referred to neurosurgery and had an additional MRI with EMG nerve conduction study. At that point, pt was diagnosed with MS. Patient had consulted with  neurosurgery to discuss treatment options. However, by the time she was scheduled  for operation, the surgeon had retired. Since then, pt reports symptoms progressively worsened. Patient reports difficulty with balance and endorses numbness and tingling to the bilateral feet. Described as pins and needles sensation that are exacerbated with movement. Reports inability to walk straight or stand for long periods of time. She denies weakness to the BLE but endorses numbness. Additionally, pt reports increased frequency in urination.    Today the pt reports symptoms are unchanged from last visit. She continues to have back pain with associated BLE paresthesia. She reports her numbness is worsening in the feet. States her pain is rated 8/10 today. I reviewed the discogram with the patient today which was positive at L4/5 and L5/S1.    Review of Systems   Constitutional:  Negative for activity change, appetite change, fatigue, fever and unexpected weight change.   HENT:  Negative for facial swelling.    Eyes: Negative.    Respiratory: Negative.     Cardiovascular: Negative.    Gastrointestinal:  Negative for diarrhea, nausea and vomiting.   Endocrine: Negative.    Genitourinary: Negative.    Musculoskeletal:  Positive for back pain and gait problem. Negative for joint swelling, myalgias and neck pain.   Neurological:  Positive for numbness. Negative for dizziness, seizures, weakness and headaches.   Psychiatric/Behavioral: Negative.        Past Medical History:   Diagnosis Date    Demyelinating disease     MS (multiple sclerosis)     Numbness and tingling of both feet        Objective:    There were no vitals filed for this visit.   Physical Exam  Constitutional:       General: She is not in acute distress.     Appearance: Normal appearance.   HENT:      Head: Normocephalic and atraumatic.   Neurological:      Mental Status: She is alert and oriented to person, place, and time.       IMAGING:  IR Discogram Lumbar (12/13/2023):  Concordant discogenic pain of severity 10/10 elicited prominently at L4-L5  level > L5-S1 level.    I have personally reviewed the images with the pt.      I, Dr. Dennis Flores, personally performed the services described in this documentation. All medical record entries made by the scribe, Maria Ines He, were at my direction and in my presence.  I have reviewed the chart and agree that the record reflects my personal performance and is accurate and complete. Dennis Flores MD. 05/31/2023    Assessment:       Lumbar spondylosis.  HNP Lumbar spine.      Plan:   I have personally reviewed the IR discogram with the pt which shows concordant discogenic pain of severity 10/10 elicited prominently at L4-L5 level > L5-S1 level.    I recommend MIS decompression and fusion using TLIF. I have discussed the risks/benefits, indications, and alternatives for the proposed procedure in detail. I have answered all of their questions and patient wish to proceed with surgery. We will schedule patient.

## 2023-05-31 NOTE — PATIENT INSTRUCTIONS
I have personally reviewed the IR discogram with the pt which shows concordant discogenic pain of severity 10/10 elicited prominently at L4-L5 level > L5-S1 level.    I recommend MIS decompression and fusion. I have discussed the risks/benefits, indications, and alternatives for the proposed procedure in detail. I have answered all of their questions and patient wish to proceed with surgery. We will schedule patient.

## 2023-06-08 ENCOUNTER — TELEPHONE (OUTPATIENT)
Dept: NEUROSURGERY | Facility: CLINIC | Age: 48
End: 2023-06-08
Payer: COMMERCIAL

## 2023-06-08 DIAGNOSIS — Z98.1 STATUS POST LUMBAR SPINAL FUSION: Primary | ICD-10-CM

## 2023-07-31 ENCOUNTER — ANESTHESIA EVENT (OUTPATIENT)
Dept: SURGERY | Facility: HOSPITAL | Age: 48
End: 2023-07-31
Payer: COMMERCIAL

## 2023-08-14 ENCOUNTER — HOSPITAL ENCOUNTER (OUTPATIENT)
Dept: PREADMISSION TESTING | Facility: HOSPITAL | Age: 48
Discharge: HOME | End: 2023-08-14
Attending: NEUROLOGICAL SURGERY
Payer: COMMERCIAL

## 2023-08-14 ENCOUNTER — HOSPITAL ENCOUNTER (OUTPATIENT)
Dept: RADIOLOGY | Facility: HOSPITAL | Age: 48
Discharge: HOME | End: 2023-08-14
Attending: NEUROLOGICAL SURGERY
Payer: COMMERCIAL

## 2023-08-14 VITALS
OXYGEN SATURATION: 98 % | HEART RATE: 77 BPM | TEMPERATURE: 98 F | SYSTOLIC BLOOD PRESSURE: 131 MMHG | DIASTOLIC BLOOD PRESSURE: 89 MMHG | RESPIRATION RATE: 18 BRPM | HEIGHT: 64 IN | BODY MASS INDEX: 36.55 KG/M2 | WEIGHT: 214.06 LBS

## 2023-08-14 DIAGNOSIS — Z01.818 PREOPERATIVE TESTING: Primary | ICD-10-CM

## 2023-08-14 LAB
ALBUMIN SERPL BCP-MCNC: 3.6 G/DL (ref 3.5–5.2)
ALP SERPL-CCNC: 79 U/L (ref 55–135)
ALT SERPL W/O P-5'-P-CCNC: 17 U/L (ref 10–44)
ANION GAP SERPL CALC-SCNC: 8 MMOL/L (ref 8–16)
APTT PPP: 26.5 SEC (ref 21–32)
AST SERPL-CCNC: 19 U/L (ref 10–40)
BASOPHILS # BLD AUTO: 0.01 K/UL (ref 0–0.2)
BASOPHILS NFR BLD: 0.2 % (ref 0–1.9)
BILIRUB SERPL-MCNC: 0.4 MG/DL (ref 0.1–1)
BUN SERPL-MCNC: 7 MG/DL (ref 6–20)
CALCIUM SERPL-MCNC: 9.5 MG/DL (ref 8.7–10.5)
CHLORIDE SERPL-SCNC: 105 MMOL/L (ref 95–110)
CO2 SERPL-SCNC: 26 MMOL/L (ref 23–29)
CREAT SERPL-MCNC: 0.8 MG/DL (ref 0.5–1.4)
DIFFERENTIAL METHOD: ABNORMAL
EOSINOPHIL # BLD AUTO: 0.1 K/UL (ref 0–0.5)
EOSINOPHIL NFR BLD: 1.2 % (ref 0–8)
ERYTHROCYTE [DISTWIDTH] IN BLOOD BY AUTOMATED COUNT: 13 % (ref 11.5–14.5)
EST. GFR  (NO RACE VARIABLE): >60 ML/MIN/1.73 M^2
GLUCOSE SERPL-MCNC: 90 MG/DL (ref 70–110)
HCT VFR BLD AUTO: 39 % (ref 37–48.5)
HGB BLD-MCNC: 12.7 G/DL (ref 12–16)
IMM GRANULOCYTES # BLD AUTO: 0.03 K/UL (ref 0–0.04)
IMM GRANULOCYTES NFR BLD AUTO: 0.6 % (ref 0–0.5)
INR PPP: 1 (ref 0.8–1.2)
LYMPHOCYTES # BLD AUTO: 0.7 K/UL (ref 1–4.8)
LYMPHOCYTES NFR BLD: 13.5 % (ref 18–48)
MCH RBC QN AUTO: 28.8 PG (ref 27–31)
MCHC RBC AUTO-ENTMCNC: 32.6 G/DL (ref 32–36)
MCV RBC AUTO: 88 FL (ref 82–98)
MONOCYTES # BLD AUTO: 0.7 K/UL (ref 0.3–1)
MONOCYTES NFR BLD: 13.3 % (ref 4–15)
NEUTROPHILS # BLD AUTO: 3.5 K/UL (ref 1.8–7.7)
NEUTROPHILS NFR BLD: 71.2 % (ref 38–73)
NRBC BLD-RTO: 0 /100 WBC
PLATELET # BLD AUTO: 343 K/UL (ref 150–450)
PMV BLD AUTO: 10.3 FL (ref 9.2–12.9)
POTASSIUM SERPL-SCNC: 4.1 MMOL/L (ref 3.5–5.1)
PROT SERPL-MCNC: 7.3 G/DL (ref 6–8.4)
PROTHROMBIN TIME: 10.3 SEC (ref 9–12.5)
RBC # BLD AUTO: 4.41 M/UL (ref 4–5.4)
SODIUM SERPL-SCNC: 139 MMOL/L (ref 136–145)
WBC # BLD AUTO: 4.97 K/UL (ref 3.9–12.7)

## 2023-08-14 PROCEDURE — 36415 COLL VENOUS BLD VENIPUNCTURE: CPT | Performed by: NEUROLOGICAL SURGERY

## 2023-08-14 PROCEDURE — 93010 ELECTROCARDIOGRAM REPORT: CPT | Mod: ,,, | Performed by: INTERNAL MEDICINE

## 2023-08-14 PROCEDURE — 85610 PROTHROMBIN TIME: CPT | Performed by: NEUROLOGICAL SURGERY

## 2023-08-14 PROCEDURE — 71046 X-RAY EXAM CHEST 2 VIEWS: CPT | Mod: 26,,, | Performed by: INTERNAL MEDICINE

## 2023-08-14 PROCEDURE — 71046 XR CHEST PA AND LATERAL PRE-OP: ICD-10-PCS | Mod: 26,,, | Performed by: INTERNAL MEDICINE

## 2023-08-14 PROCEDURE — 80053 COMPREHEN METABOLIC PANEL: CPT | Performed by: NEUROLOGICAL SURGERY

## 2023-08-14 PROCEDURE — 93010 EKG 12-LEAD: ICD-10-PCS | Mod: ,,, | Performed by: INTERNAL MEDICINE

## 2023-08-14 PROCEDURE — 85730 THROMBOPLASTIN TIME PARTIAL: CPT | Performed by: NEUROLOGICAL SURGERY

## 2023-08-14 PROCEDURE — 93005 ELECTROCARDIOGRAM TRACING: CPT

## 2023-08-14 PROCEDURE — 85025 COMPLETE CBC W/AUTO DIFF WBC: CPT | Performed by: NEUROLOGICAL SURGERY

## 2023-08-14 PROCEDURE — 71046 X-RAY EXAM CHEST 2 VIEWS: CPT | Mod: TC,FY

## 2023-08-14 NOTE — DISCHARGE INSTRUCTIONS
Before 7 AM, enter through the Emergency Entrance..   After 7 AM enter through the Main Entrance.      Your procedure  is scheduled for _8/28/2023_________.    Call [phone] between 2pm and 5pm on __8/25/2023_____to find out your arrival time for the day of surgery.    You may have two visitors.  No children under 12 years old.     You will be going to the Same Day Surgery Unit on the 2nd floor of the hospital.    Important instructions:  Do not eat anything after midnight.  You may have plain water, non carbonated.  You may also have Gatorade or Powerade after midnight.    Stop all fluids 2 hours before your surgery.    It is okay to brush your teeth.  Do not have gum, candy or mints.    SEE MEDICATION SHEET.   TAKE MEDICATIONS AS DIRECTED WITH SIPS OF WATER.    STOP taking Aspirin, Ibuprofen,  Advil, Motrin, Mobic(meloxicam), Aleve (naproxen), Fish oil, and Vitamin E for at least 7 days before your surgery.     You may take Tylenol if needed which is not a blood thinner.    Please shower the night before and the morning of your surgery.      Use Chlorhexidine soap as instructed by your pre op nurse.   Please place clean linens on your bed the night before surgery. Please wear fresh clean clothing after each shower.    No shaving of procedural area at least 4-5 days before surgery due to increased risk of skin irritation and/or possible infection.    Female patients may be asked for a urine specimen on the morning of the surgery.  Please check with your nurse before using the restroom.    Contact lenses and removable denture work may not be worn during your procedure.    You may wear deodorant only. If you are having breast surgery, do not wear deodorant on the operative side.    Do not wear powder, body lotion, perfume/cologne or make-up.    Do not wear any jewelry or have any metal on your body.    You will be asked to remove any dentures or partials for the procedure.    If you are going home on the  same day of surgery, you must arrange for a family member or a friend to drive you home.  Public transportation is prohibited.  You will not be able to drive home if you were given anesthesia or sedation.    Patients who want to have their Post-op prescriptions filled from our in-house [facility], bring a Credit/Debit Card or cash with you. A co-pay may be required.  The pharmacy closes at 5:30 pm.    Wear loose fitting clothes allowing for bandages.    Please leave money and valuables home.      You may bring your cell phone.    Call the doctor if fever or illness should occur before your surgery.    Call [phone] to contact us here if needed.                            CLOTHES ON DAY OF SURGERY    SHOULDER surgery:  you must have a very oversized shirt.  Very, Very large.  You will probably have a large sling on with your arm strapped to your chest.  You will not be able to put the arm of the operated shoulder into a sleeve.  You can put the arm of the un-operated shoulder into the sleeve, but the shirt will need to be draped over the operated shoulder.       ARM or HAND surgery:  make sure that your sleeves are large and loose enough to pass over large dressings or cast.      BREAST or UNDERARM surgery:  wear a loose, button down shirt so that you can dress without raising your arms over your head.    ABDOMINAL surgery:  wear loose, comfortable clothing.  Nothing tight around the abdomen.  NO JEANS    PENIS or SCROTAL surgery:  loose comfortable clothing.  Large sweat pants, pajama pants or a robe.  ABSOLUTELY NO JEANS      LEG or FOOT surgery:  wear large loose pants that are able to pass over any large dressings or casts.  You could also wear loose shorts or a skirt.

## 2023-08-14 NOTE — ANESTHESIA PREPROCEDURE EVALUATION
2023  Shanthi Escoto is a 48 y.o., female   To undergo Procedure(s) (LRB):  FUSION, SPINE, LUMBAR, TLIF, MINIMALLY INVASIVE L4-5, L5-S1 Sarbjit CURRIE notified (Left)     Denies CP/SOB/GERD/MI/CVA/URI symptoms.  METS > 4  NPO > 8    Past Medical History:  Past Medical History:   Diagnosis Date    Demyelinating disease     MS (multiple sclerosis)     Numbness and tingling of both feet     Obesity, unspecified        Past Surgical History:  Past Surgical History:   Procedure Laterality Date     SECTION      ENDOMETRIAL ABLATION         Social History:  Social History     Socioeconomic History    Marital status: Single   Tobacco Use    Smoking status: Never    Smokeless tobacco: Never   Substance and Sexual Activity    Alcohol use: Yes     Comment: rarely    Drug use: Never       Medications:  No current facility-administered medications on file prior to encounter.     Current Outpatient Medications on File Prior to Encounter   Medication Sig Dispense Refill    Bacillus coagulans/inulin (PROBIOTIC WITH PREBIOTIC ORAL) Take 1 tablet by mouth once daily.      cholecalciferol, vitamin D3, (VITAMIN D3) 50 mcg (2,000 unit) Cap Take 1 capsule by mouth once daily.      cyclobenzaprine (FLEXERIL) 10 MG tablet Take 10 mg by mouth every evening.      gabapentin (NEURONTIN) 600 MG tablet Take 600 mg by mouth once daily.      ozanimod (ZEPOSIA) 0.92 mg Cap Take 1 capsule by mouth Daily.      phentermine (ADIPEX-P) 37.5 mg tablet Take 37.5 mg by mouth once daily.         Allergies:  Review of patient's allergies indicates:   Allergen Reactions    Penicillins Anaphylaxis     As an infant       Active Problems:  There is no problem list on file for this patient.      Diagnostic Studies:   Latest Reference Range & Units 10/09/23 10:30   WBC 3.90 - 12.70 K/uL 4.65   RBC 4.00 - 5.40 M/uL 4.38    Hemoglobin 12.0 - 16.0 g/dL 12.5   Hematocrit 37.0 - 48.5 % 39.0   MCV 82 - 98 fL 89   MCH 27.0 - 31.0 pg 28.5   MCHC 32.0 - 36.0 g/dL 32.1   RDW 11.5 - 14.5 % 12.5   Platelet Count 150 - 450 K/uL 356   MPV 9.2 - 12.9 fL 10.2   Gran % 38.0 - 73.0 % 67.7   Lymph % 18.0 - 48.0 % 17.8 (L)   Mono % 4.0 - 15.0 % 13.5   Eosinophil % 0.0 - 8.0 % 0.4   Basophil % 0.0 - 1.9 % 0.2   Immature Granulocytes 0.0 - 0.5 % 0.4   Gran # (ANC) 1.8 - 7.7 K/uL 3.1   Lymph # 1.0 - 4.8 K/uL 0.8 (L)   Mono # 0.3 - 1.0 K/uL 0.6   Eos # 0.0 - 0.5 K/uL 0.0   Baso # 0.00 - 0.20 K/uL 0.01   Immature Grans (Abs) 0.00 - 0.04 K/uL 0.02   nRBC 0 /100 WBC 0   Differential Method  Automated      Latest Reference Range & Units 10/09/23 10:30   Sodium 136 - 145 mmol/L 139   Potassium 3.5 - 5.1 mmol/L 4.1   Chloride 95 - 110 mmol/L 104   CO2 23 - 29 mmol/L 26   Anion Gap 8 - 16 mmol/L 9   BUN 6 - 20 mg/dL 8   Creatinine 0.5 - 1.4 mg/dL 0.7   eGFR >60 mL/min/1.73 m^2 >60   Glucose 70 - 110 mg/dL 88   Calcium 8.7 - 10.5 mg/dL 9.4     EKG (8/14/23):  NSR    24 Hour Vitals:  Temp:  [36.9 °C (98.4 °F)] 36.9 °C (98.4 °F)  Pulse:  [85] 85  Resp:  [16] 16  SpO2:  [100 %] 100 %  BP: (144)/(79) 144/79   See Nursing Charting For Additional Vitals    Pre-op Assessment    I have reviewed the Patient Summary Reports.     I have reviewed the Nursing Notes. I have reviewed the NPO Status.   I have reviewed the Medications.     Review of Systems  Anesthesia Hx:  No problems with previous Anesthesia  Denies Family Hx of Anesthesia complications.   Denies Personal Hx of Anesthesia complications.   Social:  Non-Smoker, Social Alcohol Use    Hematology/Oncology:  Hematology Normal   Oncology Normal     EENT/Dental:EENT/Dental Normal   Cardiovascular:   Exercise tolerance: good Denies Hypertension.  ECG has been reviewed.    Pulmonary:  Pulmonary Normal    Renal/:  Renal/ Normal     Hepatic/GI:  Hepatic/GI Normal    Musculoskeletal:  Spine Disorders: lumbar     Neurological:   MS, denies weakness or difficulty swallowing/breathing   Endocrine:  Obesity / BMI > 30  Dermatological:  Skin Normal    Psych:  Psychiatric Normal           Physical Exam  General: Well nourished and Cooperative    Airway:  Mallampati: II   Mouth Opening: Normal  TM Distance: Normal      Dental:  Intact    Chest/Lungs:  Clear to auscultation, Normal Respiratory Rate    Heart:  Rate: Normal  Rhythm: Regular Rhythm        Anesthesia Plan  Type of Anesthesia, risks & benefits discussed:    Anesthesia Type: Gen ETT  Intra-op Monitoring Plan: Standard ASA Monitors  Post Op Pain Control Plan: multimodal analgesia and IV/PO Opioids PRN  Induction:  IV  Airway Plan: Direct and Video, Post-Induction  Informed Consent: Informed consent signed with the Patient and all parties understand the risks and agree with anesthesia plan.  All questions answered. Patient consented to blood products? Yes  ASA Score: 2  Anesthesia Plan Notes:   GA with OETT  Standard ASA monitors  Recovery in PACU  PONV: 3    Ready For Surgery From Anesthesia Perspective.     .

## 2023-08-28 ENCOUNTER — ANESTHESIA (OUTPATIENT)
Dept: SURGERY | Facility: HOSPITAL | Age: 48
End: 2023-08-28
Payer: COMMERCIAL

## 2023-08-28 ENCOUNTER — PATIENT MESSAGE (OUTPATIENT)
Dept: NEUROSURGERY | Facility: CLINIC | Age: 48
End: 2023-08-28
Payer: COMMERCIAL

## 2023-10-09 ENCOUNTER — HOSPITAL ENCOUNTER (OUTPATIENT)
Dept: PREADMISSION TESTING | Facility: HOSPITAL | Age: 48
Discharge: HOME | End: 2023-10-09
Attending: NEUROLOGICAL SURGERY
Payer: COMMERCIAL

## 2023-10-09 VITALS
WEIGHT: 215.19 LBS | OXYGEN SATURATION: 100 % | RESPIRATION RATE: 18 BRPM | HEART RATE: 88 BPM | DIASTOLIC BLOOD PRESSURE: 82 MMHG | TEMPERATURE: 97 F | BODY MASS INDEX: 36.74 KG/M2 | SYSTOLIC BLOOD PRESSURE: 128 MMHG | HEIGHT: 64 IN

## 2023-10-09 DIAGNOSIS — Z01.818 PREOP TESTING: Primary | ICD-10-CM

## 2023-10-09 LAB
ANION GAP SERPL CALC-SCNC: 9 MMOL/L (ref 8–16)
APTT PPP: 26.6 SEC (ref 21–32)
BASOPHILS # BLD AUTO: 0.01 K/UL (ref 0–0.2)
BASOPHILS NFR BLD: 0.2 % (ref 0–1.9)
BUN SERPL-MCNC: 8 MG/DL (ref 6–20)
CALCIUM SERPL-MCNC: 9.4 MG/DL (ref 8.7–10.5)
CHLORIDE SERPL-SCNC: 104 MMOL/L (ref 95–110)
CO2 SERPL-SCNC: 26 MMOL/L (ref 23–29)
CREAT SERPL-MCNC: 0.7 MG/DL (ref 0.5–1.4)
DIFFERENTIAL METHOD: ABNORMAL
EOSINOPHIL # BLD AUTO: 0 K/UL (ref 0–0.5)
EOSINOPHIL NFR BLD: 0.4 % (ref 0–8)
ERYTHROCYTE [DISTWIDTH] IN BLOOD BY AUTOMATED COUNT: 12.5 % (ref 11.5–14.5)
EST. GFR  (NO RACE VARIABLE): >60 ML/MIN/1.73 M^2
GLUCOSE SERPL-MCNC: 88 MG/DL (ref 70–110)
HCT VFR BLD AUTO: 39 % (ref 37–48.5)
HGB BLD-MCNC: 12.5 G/DL (ref 12–16)
IMM GRANULOCYTES # BLD AUTO: 0.02 K/UL (ref 0–0.04)
IMM GRANULOCYTES NFR BLD AUTO: 0.4 % (ref 0–0.5)
INR PPP: 1 (ref 0.8–1.2)
LYMPHOCYTES # BLD AUTO: 0.8 K/UL (ref 1–4.8)
LYMPHOCYTES NFR BLD: 17.8 % (ref 18–48)
MCH RBC QN AUTO: 28.5 PG (ref 27–31)
MCHC RBC AUTO-ENTMCNC: 32.1 G/DL (ref 32–36)
MCV RBC AUTO: 89 FL (ref 82–98)
MONOCYTES # BLD AUTO: 0.6 K/UL (ref 0.3–1)
MONOCYTES NFR BLD: 13.5 % (ref 4–15)
NEUTROPHILS # BLD AUTO: 3.1 K/UL (ref 1.8–7.7)
NEUTROPHILS NFR BLD: 67.7 % (ref 38–73)
NRBC BLD-RTO: 0 /100 WBC
PLATELET # BLD AUTO: 356 K/UL (ref 150–450)
PMV BLD AUTO: 10.2 FL (ref 9.2–12.9)
POTASSIUM SERPL-SCNC: 4.1 MMOL/L (ref 3.5–5.1)
PROTHROMBIN TIME: 10.3 SEC (ref 9–12.5)
RBC # BLD AUTO: 4.38 M/UL (ref 4–5.4)
SODIUM SERPL-SCNC: 139 MMOL/L (ref 136–145)
WBC # BLD AUTO: 4.65 K/UL (ref 3.9–12.7)

## 2023-10-09 PROCEDURE — 80048 BASIC METABOLIC PNL TOTAL CA: CPT | Performed by: NEUROLOGICAL SURGERY

## 2023-10-09 PROCEDURE — 85730 THROMBOPLASTIN TIME PARTIAL: CPT | Performed by: NEUROLOGICAL SURGERY

## 2023-10-09 PROCEDURE — 85025 COMPLETE CBC W/AUTO DIFF WBC: CPT | Performed by: NEUROLOGICAL SURGERY

## 2023-10-09 PROCEDURE — 36415 COLL VENOUS BLD VENIPUNCTURE: CPT | Performed by: NEUROLOGICAL SURGERY

## 2023-10-09 PROCEDURE — 85610 PROTHROMBIN TIME: CPT | Performed by: NEUROLOGICAL SURGERY

## 2023-10-09 RX ORDER — ELAGOLIX AND ESTRADIOL AND NORETHISTERONE 300-1-0.5
KIT ORAL
COMMUNITY
Start: 2022-10-01

## 2023-10-09 NOTE — PRE-PROCEDURE INSTRUCTIONS
Before 7 AM, enter through the Emergency Entrance..   After 7 AM enter through the Main Entrance.      Your procedure  is scheduled for __________.    Call [phone] between 2pm and 5pm on _______to find out your arrival time for the day of surgery.    You may use the main entrance to the hospital on the [facility] side, or the entrance that is next to the [facility].    You may have one visitor.  No children allowed.     You will be going to the Same Day Surgery Unit on the 2nd floor of the hospital.    Important instructions:  Do not eat anything after midnight.  You may have plain water, non carbonated.  You may also have Gatorade or Powerade after midnight.    Stop all fluids 2 hours before your surgery.    It is okay to brush your teeth.  Do not have gum, candy or mints.    SEE MEDICATION SHEET.   TAKE MEDICATIONS AS DIRECTED WITH SIPS OF WATER.      Do not take any diabetic medication on the morning of surgery unless instructed to do so by your doctor or pre op nurse.    STOP taking Aspirin, Ibuprofen,  Advil, Motrin, Mobic(meloxicam), Aleve (naproxen), Fish oil, and Vitamin E for at least 7 days before your surgery.     You may take Tylenol if needed which is not a blood thinner.    Please shower the night before and the morning of your surgery.      Follow any Prep Instructions given by your surgeon.    Use Chlorhexidine soap as instructed by your pre op nurse.   Please place clean linens on your bed the night before surgery. Please wear fresh clean clothing after each shower.    No shaving of procedural area at least 4-5 days before surgery due to increased risk of skin irritation and/or possible infection.    Female patients may be asked for a urine specimen on the morning of the surgery.  Please check with your nurse before using the restroom.    Contact lenses and removable denture work may not be worn during your procedure.    You may wear deodorant only. If you are having breast surgery,  do not wear deodorant on the operative side.    Do not wear powder, body lotion, perfume/cologne or make-up.    Do not wear any jewelry or have any metal on your body.    You will be asked to remove any dentures or partials for the procedure.    If you are going home on the same day of surgery, you must arrange for a family member or a friend to drive you home.  Public transportation is prohibited.  You will not be able to drive home if you were given anesthesia or sedation.    Patients who want to have their Post-op prescriptions filled from our in-house [facility], bring a Credit/Debit Card or cash with you. A co-pay may be required.  The pharmacy closes at 5:30 pm.    Wear loose fitting clothes allowing for bandages.    Please leave money and valuables home.      You may bring your cell phone.    Call the doctor if fever or illness should occur before your surgery.    Call [phone] to contact us here if needed.                            CLOTHES ON DAY OF SURGERY    SHOULDER surgery:  you must have a very oversized shirt.  Very, Very large.  You will probably have a large sling on with your arm strapped to your chest.  You will not be able to put the arm of the operated shoulder into a sleeve.  You can put the arm of the un-operated shoulder into the sleeve, but the shirt will need to be draped over the operated shoulder.       ARM or HAND surgery:  make sure that your sleeves are large and loose enough to pass over large dressings or cast.      BREAST or UNDERARM surgery:  wear a loose, button down shirt so that you can dress without raising your arms over your head.    ABDOMINAL surgery:  wear loose, comfortable clothing.  Nothing tight around the abdomen.  NO JEANS    PENIS or SCROTAL surgery:  loose comfortable clothing.  Large sweat pants, pajama pants or a robe.  ABSOLUTELY NO JEANS      LEG or FOOT surgery:  wear large loose pants that are able to pass over any large dressings or casts.  You could  also wear loose shorts or a skirt. Pre-operative instructions, medication directives and pain scales reviewed with patient. All questions the patient had were answered. Re-assurance about surgical procedure and day of surgery routine given as needed, patient verbalized understanding of the pre-op instructions.

## 2023-10-09 NOTE — DISCHARGE INSTRUCTIONS

## 2023-10-13 ENCOUNTER — TELEPHONE (OUTPATIENT)
Dept: SURGERY | Facility: HOSPITAL | Age: 48
End: 2023-10-13
Payer: COMMERCIAL

## 2023-10-16 ENCOUNTER — HOSPITAL ENCOUNTER (OUTPATIENT)
Facility: HOSPITAL | Age: 48
Discharge: HOME | End: 2023-10-17
Attending: NEUROLOGICAL SURGERY | Admitting: NEUROLOGICAL SURGERY
Payer: COMMERCIAL

## 2023-10-16 DIAGNOSIS — G35 MULTIPLE SCLEROSIS: ICD-10-CM

## 2023-10-16 DIAGNOSIS — Z98.1 S/P LUMBAR SPINAL FUSION: Primary | ICD-10-CM

## 2023-10-16 DIAGNOSIS — M48.061 LUMBAR STENOSIS: ICD-10-CM

## 2023-10-16 DIAGNOSIS — Z01.818 PREOPERATIVE TESTING: ICD-10-CM

## 2023-10-16 PROBLEM — M48.062 SPINAL STENOSIS, LUMBAR REGION, WITH NEUROGENIC CLAUDICATION: Status: ACTIVE | Noted: 2023-10-16

## 2023-10-16 LAB
ABO + RH BLD: NORMAL
ANION GAP SERPL CALC-SCNC: 9 MMOL/L (ref 8–16)
B-HCG UR QL: NEGATIVE
BASOPHILS # BLD AUTO: 0.01 K/UL (ref 0–0.2)
BASOPHILS NFR BLD: 0.1 % (ref 0–1.9)
BLD GP AB SCN CELLS X3 SERPL QL: NORMAL
BUN SERPL-MCNC: 6 MG/DL (ref 6–20)
CALCIUM SERPL-MCNC: 8.9 MG/DL (ref 8.7–10.5)
CHLORIDE SERPL-SCNC: 106 MMOL/L (ref 95–110)
CO2 SERPL-SCNC: 24 MMOL/L (ref 23–29)
CREAT SERPL-MCNC: 0.7 MG/DL (ref 0.5–1.4)
CTP QC/QA: YES
DIFFERENTIAL METHOD: ABNORMAL
EOSINOPHIL # BLD AUTO: 0 K/UL (ref 0–0.5)
EOSINOPHIL NFR BLD: 0 % (ref 0–8)
ERYTHROCYTE [DISTWIDTH] IN BLOOD BY AUTOMATED COUNT: 12.3 % (ref 11.5–14.5)
EST. GFR  (NO RACE VARIABLE): >60 ML/MIN/1.73 M^2
GLUCOSE SERPL-MCNC: 157 MG/DL (ref 70–110)
HCT VFR BLD AUTO: 35.9 % (ref 37–48.5)
HGB BLD-MCNC: 12 G/DL (ref 12–16)
IMM GRANULOCYTES # BLD AUTO: 0.08 K/UL (ref 0–0.04)
IMM GRANULOCYTES NFR BLD AUTO: 0.7 % (ref 0–0.5)
LYMPHOCYTES # BLD AUTO: 0.4 K/UL (ref 1–4.8)
LYMPHOCYTES NFR BLD: 3.7 % (ref 18–48)
MCH RBC QN AUTO: 28.4 PG (ref 27–31)
MCHC RBC AUTO-ENTMCNC: 33.4 G/DL (ref 32–36)
MCV RBC AUTO: 85 FL (ref 82–98)
MONOCYTES # BLD AUTO: 0.7 K/UL (ref 0.3–1)
MONOCYTES NFR BLD: 6.2 % (ref 4–15)
NEUTROPHILS # BLD AUTO: 10.2 K/UL (ref 1.8–7.7)
NEUTROPHILS NFR BLD: 89.3 % (ref 38–73)
NRBC BLD-RTO: 0 /100 WBC
PLATELET # BLD AUTO: 287 K/UL (ref 150–450)
PMV BLD AUTO: 9.7 FL (ref 9.2–12.9)
POTASSIUM SERPL-SCNC: 4 MMOL/L (ref 3.5–5.1)
RBC # BLD AUTO: 4.22 M/UL (ref 4–5.4)
SODIUM SERPL-SCNC: 139 MMOL/L (ref 136–145)
WBC # BLD AUTO: 11.38 K/UL (ref 3.9–12.7)

## 2023-10-16 PROCEDURE — 63600175 PHARM REV CODE 636 W HCPCS: Performed by: NURSE ANESTHETIST, CERTIFIED REGISTERED

## 2023-10-16 PROCEDURE — 63600175 PHARM REV CODE 636 W HCPCS: Performed by: PHYSICIAN ASSISTANT

## 2023-10-16 PROCEDURE — 25000003 PHARM REV CODE 250: Performed by: STUDENT IN AN ORGANIZED HEALTH CARE EDUCATION/TRAINING PROGRAM

## 2023-10-16 PROCEDURE — C1713 ANCHOR/SCREW BN/BN,TIS/BN: HCPCS | Performed by: NEUROLOGICAL SURGERY

## 2023-10-16 PROCEDURE — D9220A PRA ANESTHESIA: Mod: CRNA,,, | Performed by: NURSE ANESTHETIST, CERTIFIED REGISTERED

## 2023-10-16 PROCEDURE — 36000711: Performed by: NEUROLOGICAL SURGERY

## 2023-10-16 PROCEDURE — 25000003 PHARM REV CODE 250: Performed by: NURSE ANESTHETIST, CERTIFIED REGISTERED

## 2023-10-16 PROCEDURE — 71000033 HC RECOVERY, INTIAL HOUR: Performed by: NEUROLOGICAL SURGERY

## 2023-10-16 PROCEDURE — 63053 LAM FACTC/FRMT ARTHRD LUM EA: CPT | Mod: ,,, | Performed by: NEUROLOGICAL SURGERY

## 2023-10-16 PROCEDURE — 22842 INSERT SPINE FIXATION DEVICE: CPT | Mod: ,,, | Performed by: NEUROLOGICAL SURGERY

## 2023-10-16 PROCEDURE — 63600175 PHARM REV CODE 636 W HCPCS: Performed by: NEUROLOGICAL SURGERY

## 2023-10-16 PROCEDURE — 36415 COLL VENOUS BLD VENIPUNCTURE: CPT | Performed by: NEUROLOGICAL SURGERY

## 2023-10-16 PROCEDURE — 22853 INSJ BIOMECHANICAL DEVICE: CPT | Mod: ,,, | Performed by: NEUROLOGICAL SURGERY

## 2023-10-16 PROCEDURE — 22630 ARTHRD PST TQ 1NTRSPC LUM: CPT | Mod: 82,,, | Performed by: STUDENT IN AN ORGANIZED HEALTH CARE EDUCATION/TRAINING PROGRAM

## 2023-10-16 PROCEDURE — 22842 PR POSTERIOR SEGMENTAL INSTRUMENTATION 3-6 VRT SEG: ICD-10-PCS | Mod: ,,, | Performed by: NEUROLOGICAL SURGERY

## 2023-10-16 PROCEDURE — 85025 COMPLETE CBC W/AUTO DIFF WBC: CPT | Performed by: STUDENT IN AN ORGANIZED HEALTH CARE EDUCATION/TRAINING PROGRAM

## 2023-10-16 PROCEDURE — 22630 PR ARTHRODESIS POSTERIOR INTERBODY LUMBAR: ICD-10-PCS | Mod: ,,, | Performed by: NEUROLOGICAL SURGERY

## 2023-10-16 PROCEDURE — 20936 SP BONE AGRFT LOCAL ADD-ON: CPT | Mod: ,,, | Performed by: NEUROLOGICAL SURGERY

## 2023-10-16 PROCEDURE — 25000003 PHARM REV CODE 250: Performed by: PHYSICIAN ASSISTANT

## 2023-10-16 PROCEDURE — 71000039 HC RECOVERY, EACH ADD'L HOUR: Performed by: NEUROLOGICAL SURGERY

## 2023-10-16 PROCEDURE — 63052 PR LAMINECT/FACETECT/FORAMINOT, ARTHRODESIS, 1 VERTEBR SEGM: ICD-10-PCS | Mod: 82,,, | Performed by: STUDENT IN AN ORGANIZED HEALTH CARE EDUCATION/TRAINING PROGRAM

## 2023-10-16 PROCEDURE — C1769 GUIDE WIRE: HCPCS | Performed by: NEUROLOGICAL SURGERY

## 2023-10-16 PROCEDURE — 63052 LAM FACETC/FRMT ARTHRD LUM 1: CPT | Mod: ,,, | Performed by: NEUROLOGICAL SURGERY

## 2023-10-16 PROCEDURE — D9220A PRA ANESTHESIA: ICD-10-PCS | Mod: CRNA,,, | Performed by: NURSE ANESTHETIST, CERTIFIED REGISTERED

## 2023-10-16 PROCEDURE — 27800903 OPTIME MED/SURG SUP & DEVICES OTHER IMPLANTS: Performed by: NEUROLOGICAL SURGERY

## 2023-10-16 PROCEDURE — 22632 ARTHRD PST TQ 1NTRSPC LM EA: CPT | Mod: ,,, | Performed by: NEUROLOGICAL SURGERY

## 2023-10-16 PROCEDURE — 22842 PR POSTERIOR SEGMENTAL INSTRUMENTATION 3-6 VRT SEG: ICD-10-PCS | Mod: 82,,, | Performed by: STUDENT IN AN ORGANIZED HEALTH CARE EDUCATION/TRAINING PROGRAM

## 2023-10-16 PROCEDURE — 22630 ARTHRD PST TQ 1NTRSPC LUM: CPT | Mod: ,,, | Performed by: NEUROLOGICAL SURGERY

## 2023-10-16 PROCEDURE — 20936 PR AUTOGRAFT SPINE SURGERY LOCAL FROM SAME INCISION: ICD-10-PCS | Mod: ,,, | Performed by: NEUROLOGICAL SURGERY

## 2023-10-16 PROCEDURE — 25000003 PHARM REV CODE 250: Performed by: ANESTHESIOLOGY

## 2023-10-16 PROCEDURE — 22853 PR INSERT BIOMECH DEV W/INTERBODY ARTHRODESIS, EA CONTIGUOUS DEFECT: ICD-10-PCS | Mod: ,,, | Performed by: NEUROLOGICAL SURGERY

## 2023-10-16 PROCEDURE — 63053 PR LAMINECT/FACETECT/FORAMINOT, ARTHRODESIS, EA ADDTL VERTEBR SEGM: ICD-10-PCS | Mod: 82,,, | Performed by: STUDENT IN AN ORGANIZED HEALTH CARE EDUCATION/TRAINING PROGRAM

## 2023-10-16 PROCEDURE — 63052 LAM FACETC/FRMT ARTHRD LUM 1: CPT | Mod: 82,,, | Performed by: STUDENT IN AN ORGANIZED HEALTH CARE EDUCATION/TRAINING PROGRAM

## 2023-10-16 PROCEDURE — 37000009 HC ANESTHESIA EA ADD 15 MINS: Performed by: NEUROLOGICAL SURGERY

## 2023-10-16 PROCEDURE — 63600175 PHARM REV CODE 636 W HCPCS: Performed by: STUDENT IN AN ORGANIZED HEALTH CARE EDUCATION/TRAINING PROGRAM

## 2023-10-16 PROCEDURE — 63600175 PHARM REV CODE 636 W HCPCS: Performed by: ANESTHESIOLOGY

## 2023-10-16 PROCEDURE — 25000003 PHARM REV CODE 250: Performed by: NEUROLOGICAL SURGERY

## 2023-10-16 PROCEDURE — 27201423 OPTIME MED/SURG SUP & DEVICES STERILE SUPPLY: Performed by: NEUROLOGICAL SURGERY

## 2023-10-16 PROCEDURE — 22853 INSJ BIOMECHANICAL DEVICE: CPT | Mod: 82,,, | Performed by: STUDENT IN AN ORGANIZED HEALTH CARE EDUCATION/TRAINING PROGRAM

## 2023-10-16 PROCEDURE — 22632 ARTHRD PST TQ 1NTRSPC LM EA: CPT | Mod: 82,,, | Performed by: STUDENT IN AN ORGANIZED HEALTH CARE EDUCATION/TRAINING PROGRAM

## 2023-10-16 PROCEDURE — 63053 LAM FACTC/FRMT ARTHRD LUM EA: CPT | Mod: 82,,, | Performed by: STUDENT IN AN ORGANIZED HEALTH CARE EDUCATION/TRAINING PROGRAM

## 2023-10-16 PROCEDURE — 22632 PR ARTHRODESIS POSTERIOR INTERBODY EA ADDL: ICD-10-PCS | Mod: 82,,, | Performed by: STUDENT IN AN ORGANIZED HEALTH CARE EDUCATION/TRAINING PROGRAM

## 2023-10-16 PROCEDURE — 81025 URINE PREGNANCY TEST: CPT | Performed by: NEUROLOGICAL SURGERY

## 2023-10-16 PROCEDURE — 36000710: Performed by: NEUROLOGICAL SURGERY

## 2023-10-16 PROCEDURE — 63052 PR LAMINECT/FACETECT/FORAMINOT, ARTHRODESIS, 1 VERTEBR SEGM: ICD-10-PCS | Mod: ,,, | Performed by: NEUROLOGICAL SURGERY

## 2023-10-16 PROCEDURE — 22842 INSERT SPINE FIXATION DEVICE: CPT | Mod: 82,,, | Performed by: STUDENT IN AN ORGANIZED HEALTH CARE EDUCATION/TRAINING PROGRAM

## 2023-10-16 PROCEDURE — 80048 BASIC METABOLIC PNL TOTAL CA: CPT | Performed by: STUDENT IN AN ORGANIZED HEALTH CARE EDUCATION/TRAINING PROGRAM

## 2023-10-16 PROCEDURE — D9220A PRA ANESTHESIA: Mod: ANES,,, | Performed by: ANESTHESIOLOGY

## 2023-10-16 PROCEDURE — D9220A PRA ANESTHESIA: ICD-10-PCS | Mod: ANES,,, | Performed by: ANESTHESIOLOGY

## 2023-10-16 PROCEDURE — 22630 PR ARTHRODESIS POSTERIOR INTERBODY LUMBAR: ICD-10-PCS | Mod: 82,,, | Performed by: STUDENT IN AN ORGANIZED HEALTH CARE EDUCATION/TRAINING PROGRAM

## 2023-10-16 PROCEDURE — 63053 PR LAMINECT/FACETECT/FORAMINOT, ARTHRODESIS, EA ADDTL VERTEBR SEGM: ICD-10-PCS | Mod: ,,, | Performed by: NEUROLOGICAL SURGERY

## 2023-10-16 PROCEDURE — 37000008 HC ANESTHESIA 1ST 15 MINUTES: Performed by: NEUROLOGICAL SURGERY

## 2023-10-16 PROCEDURE — 86850 RBC ANTIBODY SCREEN: CPT | Performed by: NEUROLOGICAL SURGERY

## 2023-10-16 PROCEDURE — 22632 PR ARTHRODESIS POSTERIOR INTERBODY EA ADDL: ICD-10-PCS | Mod: ,,, | Performed by: NEUROLOGICAL SURGERY

## 2023-10-16 PROCEDURE — 22853 PR INSERT BIOMECH DEV W/INTERBODY ARTHRODESIS, EA CONTIGUOUS DEFECT: ICD-10-PCS | Mod: 82,,, | Performed by: STUDENT IN AN ORGANIZED HEALTH CARE EDUCATION/TRAINING PROGRAM

## 2023-10-16 PROCEDURE — 36415 COLL VENOUS BLD VENIPUNCTURE: CPT | Performed by: STUDENT IN AN ORGANIZED HEALTH CARE EDUCATION/TRAINING PROGRAM

## 2023-10-16 DEVICE — IMPLANTABLE DEVICE: Type: IMPLANTABLE DEVICE | Site: BACK | Status: FUNCTIONAL

## 2023-10-16 DEVICE — SCREW EXT TAB 6.5X45MM: Type: IMPLANTABLE DEVICE | Site: BACK | Status: FUNCTIONAL

## 2023-10-16 DEVICE — ROD SPINAL LORDOTIC 5.5X80MM: Type: IMPLANTABLE DEVICE | Site: BACK | Status: FUNCTIONAL

## 2023-10-16 DEVICE — CAGE SPINAL EXPAND 25X10X13: Type: IMPLANTABLE DEVICE | Site: SPINE LUMBAR | Status: FUNCTIONAL

## 2023-10-16 DEVICE — SCREW LOCKING EXTENDED TAB: Type: IMPLANTABLE DEVICE | Site: BACK | Status: FUNCTIONAL

## 2023-10-16 RX ORDER — SCOLOPAMINE TRANSDERMAL SYSTEM 1 MG/1
PATCH, EXTENDED RELEASE TRANSDERMAL
Status: DISCONTINUED | OUTPATIENT
Start: 2023-10-16 | End: 2023-10-16

## 2023-10-16 RX ORDER — MUPIROCIN 20 MG/G
OINTMENT TOPICAL
Status: DISCONTINUED | OUTPATIENT
Start: 2023-10-16 | End: 2023-10-16

## 2023-10-16 RX ORDER — HALOPERIDOL 5 MG/ML
0.5 INJECTION INTRAMUSCULAR EVERY 10 MIN PRN
Status: DISCONTINUED | OUTPATIENT
Start: 2023-10-16 | End: 2023-10-16 | Stop reason: HOSPADM

## 2023-10-16 RX ORDER — METHOCARBAMOL 750 MG/1
1500 TABLET, FILM COATED ORAL 4 TIMES DAILY
Status: DISCONTINUED | OUTPATIENT
Start: 2023-10-16 | End: 2023-10-17

## 2023-10-16 RX ORDER — METHYLPREDNISOLONE ACETATE 40 MG/ML
INJECTION, SUSPENSION INTRA-ARTICULAR; INTRALESIONAL; INTRAMUSCULAR; SOFT TISSUE
Status: DISCONTINUED | OUTPATIENT
Start: 2023-10-16 | End: 2023-10-16 | Stop reason: HOSPADM

## 2023-10-16 RX ORDER — ACETAMINOPHEN 500 MG
1000 TABLET ORAL ONCE
Status: DISCONTINUED | OUTPATIENT
Start: 2023-10-16 | End: 2023-10-16 | Stop reason: CLARIF

## 2023-10-16 RX ORDER — ACETAMINOPHEN 325 MG/1
650 TABLET ORAL EVERY 4 HOURS PRN
Status: DISCONTINUED | OUTPATIENT
Start: 2023-10-16 | End: 2023-10-17 | Stop reason: HOSPADM

## 2023-10-16 RX ORDER — ONDANSETRON 2 MG/ML
INJECTION INTRAMUSCULAR; INTRAVENOUS
Status: DISCONTINUED | OUTPATIENT
Start: 2023-10-16 | End: 2023-10-16

## 2023-10-16 RX ORDER — FENTANYL CITRATE 50 UG/ML
25 INJECTION, SOLUTION INTRAMUSCULAR; INTRAVENOUS EVERY 5 MIN PRN
Status: COMPLETED | OUTPATIENT
Start: 2023-10-16 | End: 2023-10-16

## 2023-10-16 RX ORDER — FENTANYL CITRATE 50 UG/ML
INJECTION, SOLUTION INTRAMUSCULAR; INTRAVENOUS
Status: DISCONTINUED | OUTPATIENT
Start: 2023-10-16 | End: 2023-10-16

## 2023-10-16 RX ORDER — METOPROLOL TARTRATE 1 MG/ML
INJECTION, SOLUTION INTRAVENOUS
Status: DISCONTINUED | OUTPATIENT
Start: 2023-10-16 | End: 2023-10-16

## 2023-10-16 RX ORDER — GENTAMICIN SULFATE 40 MG/ML
INJECTION, SOLUTION INTRAMUSCULAR; INTRAVENOUS
Status: DISCONTINUED | OUTPATIENT
Start: 2023-10-16 | End: 2023-10-16 | Stop reason: HOSPADM

## 2023-10-16 RX ORDER — MIDAZOLAM HYDROCHLORIDE 1 MG/ML
INJECTION, SOLUTION INTRAMUSCULAR; INTRAVENOUS
Status: DISCONTINUED | OUTPATIENT
Start: 2023-10-16 | End: 2023-10-16

## 2023-10-16 RX ORDER — SODIUM CHLORIDE 0.9 % (FLUSH) 0.9 %
10 SYRINGE (ML) INJECTION
Status: DISCONTINUED | OUTPATIENT
Start: 2023-10-16 | End: 2023-10-16 | Stop reason: HOSPADM

## 2023-10-16 RX ORDER — HYDROMORPHONE HYDROCHLORIDE 1 MG/ML
1 INJECTION, SOLUTION INTRAMUSCULAR; INTRAVENOUS; SUBCUTANEOUS
Status: DISCONTINUED | OUTPATIENT
Start: 2023-10-16 | End: 2023-10-17 | Stop reason: HOSPADM

## 2023-10-16 RX ORDER — ACETAMINOPHEN 500 MG
1000 TABLET ORAL
Status: COMPLETED | OUTPATIENT
Start: 2023-10-16 | End: 2023-10-16

## 2023-10-16 RX ORDER — MUPIROCIN 20 MG/G
1 OINTMENT TOPICAL
Status: DISCONTINUED | OUTPATIENT
Start: 2023-10-16 | End: 2023-10-16

## 2023-10-16 RX ORDER — PROCHLORPERAZINE EDISYLATE 5 MG/ML
5 INJECTION INTRAMUSCULAR; INTRAVENOUS EVERY 6 HOURS PRN
Status: DISCONTINUED | OUTPATIENT
Start: 2023-10-16 | End: 2023-10-17 | Stop reason: HOSPADM

## 2023-10-16 RX ORDER — ONDANSETRON 8 MG/1
8 TABLET, ORALLY DISINTEGRATING ORAL EVERY 6 HOURS PRN
Status: DISCONTINUED | OUTPATIENT
Start: 2023-10-16 | End: 2023-10-17 | Stop reason: HOSPADM

## 2023-10-16 RX ORDER — SUCCINYLCHOLINE CHLORIDE 20 MG/ML
INJECTION INTRAMUSCULAR; INTRAVENOUS
Status: DISCONTINUED | OUTPATIENT
Start: 2023-10-16 | End: 2023-10-16

## 2023-10-16 RX ORDER — GABAPENTIN 300 MG/1
600 CAPSULE ORAL 3 TIMES DAILY
Status: DISCONTINUED | OUTPATIENT
Start: 2023-10-16 | End: 2023-10-17 | Stop reason: HOSPADM

## 2023-10-16 RX ORDER — LIDOCAINE HYDROCHLORIDE 10 MG/ML
INJECTION INFILTRATION; PERINEURAL
Status: DISCONTINUED | OUTPATIENT
Start: 2023-10-16 | End: 2023-10-16 | Stop reason: HOSPADM

## 2023-10-16 RX ORDER — PROPOFOL 10 MG/ML
VIAL (ML) INTRAVENOUS
Status: DISCONTINUED | OUTPATIENT
Start: 2023-10-16 | End: 2023-10-16

## 2023-10-16 RX ORDER — SODIUM CHLORIDE 9 MG/ML
INJECTION, SOLUTION INTRAVENOUS CONTINUOUS
Status: DISCONTINUED | OUTPATIENT
Start: 2023-10-16 | End: 2023-10-16

## 2023-10-16 RX ORDER — ONDANSETRON 2 MG/ML
4 INJECTION INTRAMUSCULAR; INTRAVENOUS DAILY PRN
Status: DISCONTINUED | OUTPATIENT
Start: 2023-10-16 | End: 2023-10-16 | Stop reason: HOSPADM

## 2023-10-16 RX ORDER — BUPIVACAINE HYDROCHLORIDE AND EPINEPHRINE 5; 5 MG/ML; UG/ML
INJECTION, SOLUTION EPIDURAL; INTRACAUDAL; PERINEURAL
Status: DISCONTINUED | OUTPATIENT
Start: 2023-10-16 | End: 2023-10-16 | Stop reason: HOSPADM

## 2023-10-16 RX ORDER — SODIUM CHLORIDE, SODIUM LACTATE, POTASSIUM CHLORIDE, CALCIUM CHLORIDE 600; 310; 30; 20 MG/100ML; MG/100ML; MG/100ML; MG/100ML
INJECTION, SOLUTION INTRAVENOUS CONTINUOUS
Status: DISCONTINUED | OUTPATIENT
Start: 2023-10-16 | End: 2023-10-16

## 2023-10-16 RX ORDER — VANCOMYCIN HYDROCHLORIDE 1 G/20ML
INJECTION, POWDER, LYOPHILIZED, FOR SOLUTION INTRAVENOUS
Status: DISCONTINUED | OUTPATIENT
Start: 2023-10-16 | End: 2023-10-16 | Stop reason: HOSPADM

## 2023-10-16 RX ORDER — AMOXICILLIN 250 MG
2 CAPSULE ORAL NIGHTLY PRN
Status: DISCONTINUED | OUTPATIENT
Start: 2023-10-16 | End: 2023-10-17 | Stop reason: HOSPADM

## 2023-10-16 RX ORDER — LIDOCAINE HYDROCHLORIDE 10 MG/ML
1 INJECTION, SOLUTION EPIDURAL; INFILTRATION; INTRACAUDAL; PERINEURAL ONCE
Status: DISCONTINUED | OUTPATIENT
Start: 2023-10-16 | End: 2023-10-16 | Stop reason: HOSPADM

## 2023-10-16 RX ORDER — PROPOFOL 10 MG/ML
VIAL (ML) INTRAVENOUS CONTINUOUS PRN
Status: DISCONTINUED | OUTPATIENT
Start: 2023-10-16 | End: 2023-10-16

## 2023-10-16 RX ORDER — HYDROMORPHONE HYDROCHLORIDE 2 MG/ML
0.2 INJECTION, SOLUTION INTRAMUSCULAR; INTRAVENOUS; SUBCUTANEOUS EVERY 5 MIN PRN
Status: COMPLETED | OUTPATIENT
Start: 2023-10-16 | End: 2023-10-16

## 2023-10-16 RX ORDER — DEXAMETHASONE SODIUM PHOSPHATE 4 MG/ML
INJECTION, SOLUTION INTRA-ARTICULAR; INTRALESIONAL; INTRAMUSCULAR; INTRAVENOUS; SOFT TISSUE
Status: DISCONTINUED | OUTPATIENT
Start: 2023-10-16 | End: 2023-10-16

## 2023-10-16 RX ORDER — LIDOCAINE HYDROCHLORIDE 20 MG/ML
INJECTION INTRAVENOUS
Status: DISCONTINUED | OUTPATIENT
Start: 2023-10-16 | End: 2023-10-16

## 2023-10-16 RX ORDER — GENTAMICIN SULFATE 40 MG/ML
240 INJECTION, SOLUTION INTRAMUSCULAR; INTRAVENOUS ONCE
Status: DISCONTINUED | OUTPATIENT
Start: 2023-10-16 | End: 2023-10-16 | Stop reason: HOSPADM

## 2023-10-16 RX ORDER — MAG HYDROX/ALUMINUM HYD/SIMETH 200-200-20
30 SUSPENSION, ORAL (FINAL DOSE FORM) ORAL EVERY 4 HOURS PRN
Status: DISCONTINUED | OUTPATIENT
Start: 2023-10-16 | End: 2023-10-17 | Stop reason: HOSPADM

## 2023-10-16 RX ORDER — MUPIROCIN 20 MG/G
OINTMENT TOPICAL 2 TIMES DAILY
Status: DISCONTINUED | OUTPATIENT
Start: 2023-10-16 | End: 2023-10-17 | Stop reason: HOSPADM

## 2023-10-16 RX ORDER — OXYCODONE AND ACETAMINOPHEN 5; 325 MG/1; MG/1
2 TABLET ORAL EVERY 4 HOURS PRN
Status: DISCONTINUED | OUTPATIENT
Start: 2023-10-16 | End: 2023-10-17

## 2023-10-16 RX ADMIN — HYDROMORPHONE HYDROCHLORIDE 1 MG: 1 INJECTION, SOLUTION INTRAMUSCULAR; INTRAVENOUS; SUBCUTANEOUS at 09:10

## 2023-10-16 RX ADMIN — METOPROLOL TARTRATE 2.5 MG: 5 INJECTION, SOLUTION INTRAVENOUS at 03:10

## 2023-10-16 RX ADMIN — REMIFENTANIL HYDROCHLORIDE 0.1 MCG/KG/MIN: 1 INJECTION, POWDER, LYOPHILIZED, FOR SOLUTION INTRAVENOUS at 11:10

## 2023-10-16 RX ADMIN — PROPOFOL 50 MG: 10 INJECTION, EMULSION INTRAVENOUS at 11:10

## 2023-10-16 RX ADMIN — FENTANYL CITRATE 25 MCG: 50 INJECTION, SOLUTION INTRAMUSCULAR; INTRAVENOUS at 02:10

## 2023-10-16 RX ADMIN — MIDAZOLAM HYDROCHLORIDE 2 MG: 1 INJECTION, SOLUTION INTRAMUSCULAR; INTRAVENOUS at 11:10

## 2023-10-16 RX ADMIN — ACETAMINOPHEN 1000 MG: 500 TABLET ORAL at 09:10

## 2023-10-16 RX ADMIN — MUPIROCIN: 20 OINTMENT TOPICAL at 09:10

## 2023-10-16 RX ADMIN — PROPOFOL 150 MG: 10 INJECTION, EMULSION INTRAVENOUS at 11:10

## 2023-10-16 RX ADMIN — FENTANYL CITRATE 25 MCG: 50 INJECTION, SOLUTION INTRAMUSCULAR; INTRAVENOUS at 05:10

## 2023-10-16 RX ADMIN — HYDROMORPHONE HYDROCHLORIDE 0.2 MG: 2 INJECTION INTRAMUSCULAR; INTRAVENOUS; SUBCUTANEOUS at 04:10

## 2023-10-16 RX ADMIN — FENTANYL CITRATE 25 MCG: 50 INJECTION, SOLUTION INTRAMUSCULAR; INTRAVENOUS at 03:10

## 2023-10-16 RX ADMIN — HYDROMORPHONE HYDROCHLORIDE 0.2 MG: 2 INJECTION INTRAMUSCULAR; INTRAVENOUS; SUBCUTANEOUS at 03:10

## 2023-10-16 RX ADMIN — SCOPALAMINE 1 PATCH: 1 PATCH, EXTENDED RELEASE TRANSDERMAL at 10:10

## 2023-10-16 RX ADMIN — OXYCODONE HYDROCHLORIDE AND ACETAMINOPHEN 2 TABLET: 5; 325 TABLET ORAL at 06:10

## 2023-10-16 RX ADMIN — PHENYLEPHRINE HYDROCHLORIDE 30 MCG/MIN: 10 INJECTION INTRAVENOUS at 12:10

## 2023-10-16 RX ADMIN — FENTANYL CITRATE 100 MCG: 50 INJECTION, SOLUTION INTRAMUSCULAR; INTRAVENOUS at 11:10

## 2023-10-16 RX ADMIN — DEXAMETHASONE SODIUM PHOSPHATE 4 MG: 4 INJECTION, SOLUTION INTRAMUSCULAR; INTRAVENOUS at 12:10

## 2023-10-16 RX ADMIN — LIDOCAINE HYDROCHLORIDE 100 MG: 20 INJECTION, SOLUTION INTRAVENOUS at 11:10

## 2023-10-16 RX ADMIN — METHOCARBAMOL 1500 MG: 750 TABLET ORAL at 09:10

## 2023-10-16 RX ADMIN — GLYCOPYRROLATE 0.2 MG: 0.2 INJECTION, SOLUTION INTRAMUSCULAR; INTRAVITREAL at 11:10

## 2023-10-16 RX ADMIN — ONDANSETRON 4 MG: 2 INJECTION, SOLUTION INTRAMUSCULAR; INTRAVENOUS at 03:10

## 2023-10-16 RX ADMIN — VANCOMYCIN HYDROCHLORIDE 1500 MG: 1.5 INJECTION, POWDER, LYOPHILIZED, FOR SOLUTION INTRAVENOUS at 11:10

## 2023-10-16 RX ADMIN — PROPOFOL 150 MCG/KG/MIN: 10 INJECTION, EMULSION INTRAVENOUS at 11:10

## 2023-10-16 RX ADMIN — GABAPENTIN 600 MG: 300 CAPSULE ORAL at 09:10

## 2023-10-16 RX ADMIN — SUCCINYLCHOLINE CHLORIDE 100 MG: 20 INJECTION, SOLUTION INTRAMUSCULAR; INTRAVENOUS at 11:10

## 2023-10-16 RX ADMIN — SODIUM CHLORIDE, SODIUM LACTATE, POTASSIUM CHLORIDE, AND CALCIUM CHLORIDE: .6; .31; .03; .02 INJECTION, SOLUTION INTRAVENOUS at 12:10

## 2023-10-16 NOTE — H&P
H&P completed on 5/31/2023 has been reviewed, the patient has been examined and:  I concur with the findings and no changes have occurred since H&P was written.    There are no hospital problems to display for this patient.

## 2023-10-16 NOTE — ANESTHESIA PROCEDURE NOTES
Intubation    Date/Time: 10/16/2023 11:47 AM    Performed by: Erinn Pitts  Authorized by: Demarcus Garcia MD    Intubation:     Induction:  Intravenous    Intubated:  Postinduction    Mask Ventilation:  Easy with oral airway    Attempts:  1    Attempted By:  Student    Method of Intubation:  Video laryngoscopy    Blade:  Steele 3    Laryngeal View Grade: Grade I - full view of cords      Difficult Airway Encountered?: No      Complications:  None    Airway Device:  Oral endotracheal tube    Airway Device Size:  7.0    Style/Cuff Inflation:  Cuffed (inflated to minimal occlusive pressure)    Tube secured:  21    Secured at:  The lips    Placement Verified By:  Capnometry    Complicating Factors:  Small mouth    Findings Post-Intubation:  BS equal bilateral and atraumatic/condition of teeth unchanged

## 2023-10-16 NOTE — PLAN OF CARE
Pre-op plan of care reviewed with patient.. Admit assessment complete. Questions encouraged and answered. Post-op education begun with pt. Pt ready to proceed.

## 2023-10-16 NOTE — OP NOTE
DATE OF PROCEDURE:  10/16/2023     SURGEON:  Dennis Flores M.D., Ph.D.     ASSISTANT:  Yuval Patel M.D.  (a qualified Neurosurgery resident was not available at the time of surgery)     PREOPERATIVE DIAGNOSES:  L4-S1 spondylosis with associated radiculopathy.   Intractable low back pain.    POSTOPERATIVE DIAGNOSES:  L4-S1 spondylosis with associated radiculopathy.   Intractable low back pain.     PROCEDURES PERFORMED:  1.  Minimally invasive approach.  2.  Left-sided L4-5 complete facetectomy, laminectomy and diskectomy with decompression of the thecal sac and nerve roots.  3.  Placement of expandable interbody cage (Leva, 25 x 13 x 10 mm) at L4-5.  4.  L4-5 autograft with associated fusion.  5.  Left-sided L5-S1 complete facetectomy, laminectomy and diskectomy with decompression of the thecal sac and nerve roots.  6.  Placement of expandable interbody cage (Leva, 25 x 12 x 10 mm) at L5-S1.  7.  L5-S1 autograft with associated fusion.  8.  Placement of percutaneous pedicle screws and rods from L4-S1.   9.  Neuromonitoring with SSEPs, EMG and pedicle screw stimulation.     INDICATIONS IN DETAIL:   Ms. Shanthi Escoto is a 48 y.o. woman recently diagnosed with MS, who presents today for follow up. This is a patient who presented to me with low back pain with BLE paresthesia. In March 2021, pt reports waking up with numbness to the lower half of her body. She was referred to pain management and had imaging with new findings of multiple enhancing lesions and posterior disc extrusion at L4-L5 causing severe spinal canal stenosis. She was then referred to neurosurgery and had an additional MRI with EMG nerve conduction study. At that point, pt was diagnosed with MS. Patient had consulted with  neurosurgery to discuss treatment options. However, by the time she was scheduled for operation, the surgeon had retired. Since then, pt reports symptoms progressively worsened. Patient reports difficulty with balance and  endorses numbness and tingling to the bilateral feet. Described as pins and needles sensation that are exacerbated with movement. Reports inability to walk straight or stand for long periods of time. She denies weakness to the BLE but endorses numbness. Additionally, pt reports increased frequency in urination.  She continues to have back pain with associated BLE paresthesia. She reports her numbness is worsening in the feet. States her pain is rated 8/10 today. I reviewed the discogram with the patient today which was positive at L4/5 and L5/S1.  I recommended MIS decompression and fusion using TLIF. I have discussed the risks/benefits, indications, and alternatives for the proposed procedure in detail. I have answered all of their questions and patient wished to proceed .       PROCEDURE IN DETAIL:    The patient was seen in the pretreatment area and the risks, benefits and alternatives were again discussed.  The patient wished to proceed.  The patient was brought to the Operating Room and a general anesthetic was administered.  All proper lines were placed.  The patient was placed in the prone position on a Adam table and her back was cleaned, prepped and draped in usual manner.  AP and lateral fluoroscopy was performed and the L4-5 level could be seen. A line of approximately 2.5 cm was drawn just lateral to the shadow of the facet at L4-5 on the left.  A lidocaine-bupivacaine mix was infiltrated under the skin. An incision was made through the skin and down to the fascia.  A minimally invasive approach was then made using the METRx set down to the junction of the lamina and the facet at L4-5. The soft tissue was removed using Bovie cautery and then using a high-speed Wriggle drill with a matchstick hira, a complete facetectomy was performed.  The bone dust from this drilling was collected using the Resonate bone press.  We then proceeded to drill the lamina and completely decompress from the foramen to the  thecal sac.  Once this was performed, we dissected and retracted the thecal sac and exiting nerve root.  Once this was medially retracted, the annulus of the L4-5 disk was cut and diskectomy was performed in the usual fashion using curettes and rongeurs.  Once this was performed, we then prepared the disk for fusion using disk luis.   We then placed a trial cage in this area and then placed a 13mm  Leva cage at this level. Upon placement of the cage, there was distraction of the disk.  Once the cage was in place, we then placed the bone from the Hensler bone press in the cage. This will allow for anterior fusion.  Once this was complete, the retractors were removed.  The wound was irrigated copiously and all bleeding points were coagulated.  We then made to the L5-S1 level using the exact same technique.  We performed laminectomy, facetectomy, and diskectomy as we had at the previous. A 12 mm Leva cage was placed and filled with bone graft.        We then turned our attention to placement of percutaneous pedicle screws.  Jamshidi needles were used to penetrate the pedicles at in the usual fashion.  This was done using AP fluoroscopy and the Jamshidi needles were placed in a lateral to medial fashion.  Once we had placed the Jamshidi needles, we then stimulated them.  All CMAPs were greater than 32 milliamperes.  Once this was performed, we then placed K-wires and tapped each of the pedicles.  Then, 6.5 x 45.0 mm screws were placed at L5 and 6.5 x 40.0 mm screws were placed at S1. We then obtained rods of the appropriate size and placed them into the towers.  The rods were reduced into the screw heads and end caps were placed.  Once this was complete, we final tightened the end caps.  The wound was irrigated copiously using a Pulsavac and the soft tissue was then closed in layers after vancomycin powder   was placed over the hardware.  Once this was complete, a clean dressing was placed.  The patient was turned to  the supine position on a gurney.  The patient was awakened by the Anesthesia staff.  At the point the patient was awake and following commands, she was extubated.     EBL was approximately 125 mL.     There were no intraprocedural complications.     All counts were correct at the end of surgery.     Dr. Dennis Flores was present during the entire procedure.

## 2023-10-16 NOTE — TRANSFER OF CARE
Anesthesia Transfer of Care Note    Patient: Shanthi Escoto    Procedure(s) Performed: Procedure(s) (LRB):  FUSION, SPINE, LUMBAR, TLIF, MINIMALLY INVASIVE L4-5, L5-S1 Sarbjit CURRIE notified (Left)    Patient location: PACU    Anesthesia Type: general    Transport from OR: Transported from OR on 100% O2 by closed face mask with adequate spontaneous ventilation    Post pain: adequate analgesia    Post assessment: no apparent anesthetic complications and tolerated procedure well    Post vital signs: stable    Level of consciousness: sedated and responds to stimulation    Nausea/Vomiting: no nausea/vomiting    Complications: none    Transfer of care protocol was followed      Last vitals:   Visit Vitals  /81 (BP Location: Right arm)   Pulse 90   Temp 36.7 °C (98.1 °F) (Temporal)   Resp 15   Wt 97.6 kg (215 lb 2 oz)   SpO2 99%   BMI 36.93 kg/m²

## 2023-10-17 VITALS
OXYGEN SATURATION: 95 % | DIASTOLIC BLOOD PRESSURE: 65 MMHG | BODY MASS INDEX: 37.26 KG/M2 | WEIGHT: 218.25 LBS | SYSTOLIC BLOOD PRESSURE: 124 MMHG | TEMPERATURE: 98 F | RESPIRATION RATE: 19 BRPM | HEIGHT: 64 IN | HEART RATE: 98 BPM

## 2023-10-17 PROBLEM — Z98.1 S/P LUMBAR SPINAL FUSION: Status: ACTIVE | Noted: 2023-10-17

## 2023-10-17 PROCEDURE — 97535 SELF CARE MNGMENT TRAINING: CPT

## 2023-10-17 PROCEDURE — 97165 OT EVAL LOW COMPLEX 30 MIN: CPT

## 2023-10-17 PROCEDURE — 97161 PT EVAL LOW COMPLEX 20 MIN: CPT

## 2023-10-17 PROCEDURE — 25000003 PHARM REV CODE 250: Performed by: PHYSICIAN ASSISTANT

## 2023-10-17 PROCEDURE — 25000003 PHARM REV CODE 250: Performed by: STUDENT IN AN ORGANIZED HEALTH CARE EDUCATION/TRAINING PROGRAM

## 2023-10-17 PROCEDURE — 94760 N-INVAS EAR/PLS OXIMETRY 1: CPT

## 2023-10-17 PROCEDURE — 63600175 PHARM REV CODE 636 W HCPCS: Performed by: STUDENT IN AN ORGANIZED HEALTH CARE EDUCATION/TRAINING PROGRAM

## 2023-10-17 RX ORDER — METHOCARBAMOL 750 MG/1
1500 TABLET, FILM COATED ORAL NIGHTLY
Status: DISCONTINUED | OUTPATIENT
Start: 2023-10-17 | End: 2023-10-17 | Stop reason: HOSPADM

## 2023-10-17 RX ORDER — TAMSULOSIN HYDROCHLORIDE 0.4 MG/1
0.8 CAPSULE ORAL DAILY
Qty: 14 CAPSULE | Refills: 0 | Status: SHIPPED | OUTPATIENT
Start: 2023-10-17 | End: 2024-10-16

## 2023-10-17 RX ORDER — HEPARIN SODIUM 5000 [USP'U]/ML
5000 INJECTION, SOLUTION INTRAVENOUS; SUBCUTANEOUS EVERY 8 HOURS
Status: DISCONTINUED | OUTPATIENT
Start: 2023-10-17 | End: 2023-10-17 | Stop reason: HOSPADM

## 2023-10-17 RX ORDER — TAMSULOSIN HYDROCHLORIDE 0.4 MG/1
0.8 CAPSULE ORAL DAILY
Status: DISCONTINUED | OUTPATIENT
Start: 2023-10-17 | End: 2023-10-17 | Stop reason: HOSPADM

## 2023-10-17 RX ORDER — OXYCODONE AND ACETAMINOPHEN 10; 325 MG/1; MG/1
1 TABLET ORAL EVERY 4 HOURS PRN
Status: DISCONTINUED | OUTPATIENT
Start: 2023-10-17 | End: 2023-10-17 | Stop reason: HOSPADM

## 2023-10-17 RX ORDER — OXYCODONE AND ACETAMINOPHEN 10; 325 MG/1; MG/1
1 TABLET ORAL EVERY 4 HOURS PRN
Qty: 42 TABLET | Refills: 0 | Status: SHIPPED | OUTPATIENT
Start: 2023-10-17 | End: 2023-10-24 | Stop reason: SDUPTHER

## 2023-10-17 RX ORDER — OXYCODONE AND ACETAMINOPHEN 5; 325 MG/1; MG/1
1 TABLET ORAL EVERY 4 HOURS PRN
Status: DISCONTINUED | OUTPATIENT
Start: 2023-10-17 | End: 2023-10-17 | Stop reason: HOSPADM

## 2023-10-17 RX ORDER — ONDANSETRON 4 MG/1
4 TABLET, ORALLY DISINTEGRATING ORAL EVERY 6 HOURS PRN
Qty: 15 TABLET | Refills: 0 | Status: SHIPPED | OUTPATIENT
Start: 2023-10-17

## 2023-10-17 RX ORDER — METHOCARBAMOL 750 MG/1
750 TABLET, FILM COATED ORAL EVERY 8 HOURS
Qty: 90 TABLET | Refills: 0 | Status: SHIPPED | OUTPATIENT
Start: 2023-10-17 | End: 2023-11-16 | Stop reason: SDUPTHER

## 2023-10-17 RX ORDER — POLYETHYLENE GLYCOL 3350 17 G/17G
17 POWDER, FOR SOLUTION ORAL DAILY
Status: DISCONTINUED | OUTPATIENT
Start: 2023-10-17 | End: 2023-10-17 | Stop reason: HOSPADM

## 2023-10-17 RX ORDER — METHOCARBAMOL 750 MG/1
750 TABLET, FILM COATED ORAL 2 TIMES DAILY WITH MEALS
Status: DISCONTINUED | OUTPATIENT
Start: 2023-10-17 | End: 2023-10-17 | Stop reason: HOSPADM

## 2023-10-17 RX ADMIN — MUPIROCIN: 20 OINTMENT TOPICAL at 10:10

## 2023-10-17 RX ADMIN — OXYCODONE AND ACETAMINOPHEN 1 TABLET: 10; 325 TABLET ORAL at 12:10

## 2023-10-17 RX ADMIN — GABAPENTIN 600 MG: 300 CAPSULE ORAL at 10:10

## 2023-10-17 RX ADMIN — VANCOMYCIN HYDROCHLORIDE 1000 MG: 1 INJECTION, POWDER, LYOPHILIZED, FOR SOLUTION INTRAVENOUS at 12:10

## 2023-10-17 RX ADMIN — HYDROMORPHONE HYDROCHLORIDE 1 MG: 1 INJECTION, SOLUTION INTRAMUSCULAR; INTRAVENOUS; SUBCUTANEOUS at 12:10

## 2023-10-17 RX ADMIN — METHOCARBAMOL 750 MG: 750 TABLET ORAL at 10:10

## 2023-10-17 RX ADMIN — TAMSULOSIN HYDROCHLORIDE 0.8 MG: 0.4 CAPSULE ORAL at 01:10

## 2023-10-17 RX ADMIN — HYDROMORPHONE HYDROCHLORIDE 1 MG: 1 INJECTION, SOLUTION INTRAMUSCULAR; INTRAVENOUS; SUBCUTANEOUS at 06:10

## 2023-10-17 RX ADMIN — GABAPENTIN 600 MG: 300 CAPSULE ORAL at 02:10

## 2023-10-17 RX ADMIN — POLYETHYLENE GLYCOL 3350 17 G: 17 POWDER, FOR SOLUTION ORAL at 10:10

## 2023-10-17 RX ADMIN — OXYCODONE HYDROCHLORIDE AND ACETAMINOPHEN 2 TABLET: 5; 325 TABLET ORAL at 12:10

## 2023-10-17 NOTE — PT/OT/SLP EVAL
Physical Therapy Evaluation    Patient Name:  Shanthi Escoto   MRN:  [id number]    Recommendations:     Discharge Recommendations: No Therapy Indicated; Home with caregiver support  Discharge Equipment Recommendations: walker, rolling   Barriers to discharge: None    Assessment:     Shanthi Escoto is a 48 y.o. female admitted with a medical diagnosis of Spinal stenosis, lumbar region, with neurogenic claudication.  She presents with the following impairments/functional limitations: weakness, impaired sensation, impaired self care skills, impaired functional mobility, gait instability, impaired balance, decreased lower extremity function, decreased safety awareness, pain, impaired skin, orthopedic precautions.    Rehab Prognosis: Good; patient would benefit from acute skilled PT services to address these deficits and reach maximum level of function.    Recent Surgery: Procedure(s) (LRB):  FUSION, SPINE, LUMBAR, TLIF, MINIMALLY INVASIVE L4-5, L5-S1 Sarbjit CURRIE notified (Left) 1 Day Post-Op    Plan:     During this hospitalization, patient to be seen daily to address the identified rehab impairments via gait training, therapeutic activities and progress toward the following goals:    Plan of Care Expires:  10/31/23    Subjective     Chief Complaint: back pain  Patient/Family Comments/goals: Pt agreeable to therapy, would like to go home today.   Pain/Comfort:  Pain Rating 1: 7/10  Location 1: back  Pain Addressed 1: Pre-medicate for activity, Reposition, Distraction, Cessation of Activity  Pain Rating Post-Intervention 1: 9/10  Pain Addressed 2: Nurse notified    Living Environment:  Pt lives with s.o. in a [facility] with no concerns.  Pt's dtr in [facility] from [place] to assist pt.   Prior to admission, patients level of function was independent, working, and driving.  Equipment at home: cane, straight, shower chair.  Upon discharge, patient will have assistance from s.o. and dtr.    Objective:     Patient found HOB elevated  with peripheral IV (POLI hose) upon PT entry to room.    General Precautions: Standard, fall  Orthopedic Precautions:spinal precautions   Braces: LSO  Respiratory Status: Room air    Exams:  Cognitive Exam:  Patient was able to follow multiple commands.   Gross Motor Coordination:  WFL  Postural Exam:  Patient presented with the following abnormalities:    -       No postural abnormalities identified  Sensation:    -       Intact light/touch BLE, except numbness to B feet.  Pt reported generalized numbness to BLE equally prior to spinal sx, stated that the numbness is now more isolated to her feet.    Skin Integrity/Edema:      -       Skin integrity: Visible skin intact and low back dressings with mod bloody drainage  -       Edema: None noted BLE  BLE ROM: WNL  BLE Strength: WNL; Pt reported no LE weakness prior or after spinal sx.      Functional Mobility:  Bed Mobility:     Rolling Right: stand by assistance and contact guard assistance  Scooting: stand by assistance and contact guard assistance  Supine to Sit: stand by assistance and contact guard assistance; Pt educated on donning of LSO brace while sitting EOB.    Transfers:     Sit to Stand: minimum assistance with rolling walker x2 trials   Bed to Chair: CGA with  rolling walker  using  Step Transfer  Gait: Pt ambulated ~250 ft with CGA using RW and LSO brace.  Pt with increased BUE weight bearing into RW, reported 2* back pain.  Pt with decreased step length and donnell.    Balance: Pt with fair dynamic standing balance.       AM-PAC 6 CLICK MOBILITY  Total Score:20       Treatment & Education:  Pt educated on spinal precautions via handout: LSO brace when OOB, log roll for bed mobility, no twisting, no bending, and no lifting >5-10lbs.  Pt encouraged OOB>chair ~1-2 hrs at a time with nursing assistance while in the hospital.  Pt verbalized good understanding.     Patient left up in chair reclined with all lines intact, call button in reach, and nurse  Veronika notified.  Tray table close by.     GOALS:   Multidisciplinary Problems       Physical Therapy Goals          Problem: Physical Therapy    Goal Priority Disciplines Outcome Goal Variances Interventions   Physical Therapy Goal     PT, PT/OT Ongoing, Progressing     Description: Goals to be met by: 10/31/23     Patient will increase functional independence with mobility by performin. Supine to sit with Modified [place]  2. Rolling to Left and Right with Modified [place]  3. Sit to stand transfer with Modified [place] using RW  4. Bed to chair transfer with Modified [place] using Rolling Walker  5. Gait x500 feet with Modified [place] using Rolling Walker and LSO brace                         History:     Past Medical History:   Diagnosis Date    Demyelinating disease     MS (multiple sclerosis)     Numbness and tingling of both feet     Obesity, unspecified        Past Surgical History:   Procedure Laterality Date     SECTION      ENDOMETRIAL ABLATION         Time Tracking:     PT Received On: 10/17/23  PT Start Time: 934     PT Stop Time: 958  PT Total Time (min): 24 min     Billable Minutes: Evaluation 16 min co-eval with OT      10/17/2023

## 2023-10-17 NOTE — PT/OT/SLP EVAL
Occupational Therapy   Evaluation    Name: Shanthi Escoto  MRN: [id number]  Admitting Diagnosis: Spinal stenosis, lumbar region, with neurogenic claudication  Recent Surgery: Procedure(s) (LRB):  FUSION, SPINE, LUMBAR, TLIF, MINIMALLY INVASIVE L4-5, L5-S1 Sarbjit ROSEY notified (Left) 1 Day Post-Op    Recommendations:     Discharge Recommendations: No Therapy Indicated (with caregiver support)  Discharge Equipment Recommendations:  walker, rolling  Barriers to discharge:  None    Assessment:     Shanthi Escoto is a 48 y.o. female with a medical diagnosis of Spinal stenosis, lumbar region, with neurogenic claudication. Performance deficits affecting function: weakness, orthopedic precautions, impaired self care skills, impaired functional mobility, decreased lower extremity function, impaired sensation, impaired balance, gait instability, pain, impaired skin.      Rehab Prognosis: Good; patient would benefit from acute skilled OT services to address these deficits and reach maximum level of function.       Plan:     Patient to be seen 3 x/week to address the above listed problems via self-care/home management, therapeutic activities, therapeutic exercises  Plan of Care Expires: 10/24/23  Plan of Care Reviewed with: patient    Subjective     Chief Complaint: pain, dry mouth   Patient/Family Comments/goals: agreeable to session; hoping to go home today; feels safer walking with RW     Occupational Profile:  Living Environment: pt lives with her significant other (and dtr came in from [place], [place] for recovery) in a [facility] with 0 REILLY. Bathroom set-up: walk-in shower with shower chair   Previous level of function: independent with ADLs and mobility. Pt reports pre-surgery, she was having numbness to B/L LE; post surgery, pt reports numbness has isolated to B/L feet.   Roles and Routines: works (office job); drives   Equipment Used at Home: cane, straight, shower chair  Assistance upon Discharge: significant other, daughter      Pain/Comfort:  Pain Rating 1: 7/10  Location 1: back  Pain Addressed 1: Pre-medicate for activity, Reposition, Distraction, Nurse notified, Cessation of Activity  Pain Rating Post-Intervention 1: 9/10    Patients cultural, spiritual, Oriental orthodox conflicts given the current situation: no    Objective:     Patient found HOB elevated with peripheral IV upon OT entry to room.    General Precautions: Standard, fall  Orthopedic Precautions: spinal precautions  Braces: LSO (when out of bed)  Respiratory Status: Room air    Occupational Performance:    Bed Mobility:    Patient completed Scooting anteriorly with stand by assistance  Patient completed Supine to Sit via log roll technique with stand by assistance, with side rail, and HOB elevated     Functional Mobility/Transfers:  Patient completed Sit <> Stand Transfer with minimum assistance  with  rolling walker from the bed and the chair   Functional Mobility: Gait belt donned prior to transfer for safety during mobility/transfers. LSO donned. Pt completed ~250 ft using RW with CGA. After seated rest break, pt completed in-room functional mobility to complete grooming ADLs standing at the sink with verbal cueing for RW placement and CGA.     Activities of Daily Living:  Feeding:  modified independence to drink water seated and standing   Grooming: contact guard assistance for dynamic standing balance while pt used BUE to open items then brush teeth, use mouthwash, clean face/hands   Upper Body Dressing: minimal assist to don back gown while seated unsupported at EOB; after demo of donning LSO, pt donned LSO brace with min A then re-adjusted brace with supervision seated     Lower Body Dressing: total assistance for socks     Cognitive/Visual Perceptual:  Cognitive/Psychosocial Skills:     -       Follows Commands/attention:Follows multistep  commands  -       Communication: clear/fluent  -       Memory: No Deficits noted  -       Safety awareness/insight to disability:  intact   -       Mood/Affect/Coping skills/emotional control: Cooperative and Pleasant  Visual/Perceptual:      -Intact  R/L discrimination      Physical Exam:  Balance:    -       seated: MOD I; standing: CGA with RW   Postural examination/scapula alignment:    -       Forward head  Skin integrity: B/L lower back incisional dressing with moderate bloody drainage   Edema:  no BUE edema noted   Sensation:    -       Intact  light/touch BUE  Dominant hand:    -       Right   Upper Extremity Range of Motion:     -       Right Upper Extremity: WFL  -       Left Upper Extremity: WFL  Upper Extremity Strength:    -       Right Upper Extremity: WFL  -       Left Upper Extremity: WFL   Strength:    -       Right Upper Extremity: WFL  -       Left Upper Extremity: WFL  Fine Motor Coordination:    -       Intact  Left hand, manipulation of objects and Right hand, manipulation of objects  Gross motor coordination:   WFL    AMPAC 6 Click ADL:  AMPAC Total Score: 22    Treatment & Education:  Pt educated on OT role/POC.   Importance of OOB activity with staff assistance. Edu on benefit of OOB for 6 hours/day but broken up into ~1-2 hour increments  Edu on importance of LSO on if OOB; demo on donning then practiced with emphasis to avoid twisting while completing   Edu with handout and demo on spinal precautions: no bending, twisting, nor lifting >5 lbs; edu with practice on log roll technique   Edu on benefit of long-handled sponge while seated on shower chair; discussed safety with ADLs   Safety during functional t/f and mobility   Multiple self-care tasks/functional mobility completed- assistance level noted above   All questions/concerns answered within OT scope of practice       Patient left  reclined in the chair wearing LSO with tray table in front of pt  with all lines intact, call button in reach, nurseVeronika, notified, and all needs met/within reach.     GOALS:   Multidisciplinary Problems       Occupational  Therapy Goals          Problem: Occupational Therapy    Goal Priority Disciplines Outcome Interventions   Occupational Therapy Goal     OT, PT/OT Ongoing, Progressing    Description: Goals to be met by: 10/24/23     Patient will increase functional independence with ADLs by performing:    UE Dressing with Modified [place].  LE Dressing with Supervision.  Grooming while standing at sink with Supervision.  Toileting from toilet with Supervision for hygiene and clothing management.   Supine to sit with Modified [place].  Step transfer with Supervision  Toilet transfer to toilet with Supervision.                         History:     Past Medical History:   Diagnosis Date    Demyelinating disease     MS (multiple sclerosis)     Numbness and tingling of both feet     Obesity, unspecified          Past Surgical History:   Procedure Laterality Date     SECTION      ENDOMETRIAL ABLATION         Time Tracking:     OT Date of Treatment: 10/17/23  OT Start Time: 934  OT Stop Time: 958  OT Total Time (min): 24 min    Billable Minutes:Evaluation 15 min  Self Care/Home Management 9 min  Total Time 24 min (co-eval with PT)    10/17/2023

## 2023-10-17 NOTE — ANESTHESIA POSTPROCEDURE EVALUATION
Anesthesia Post Evaluation    Patient: Shanthi Escoto    Procedure(s) Performed: Procedure(s) (LRB):  FUSION, SPINE, LUMBAR, TLIF, MINIMALLY INVASIVE L4-5, L5-S1 Sarbjit CURRIE notified (Left)    Final Anesthesia Type: general      Patient location during evaluation: [facility]  Patient participation: Yes- Able to Participate  Level of consciousness: awake and alert  Post-procedure vital signs: reviewed and stable  Pain management: adequate  Airway patency: patent    PONV status at discharge: No PONV  Anesthetic complications: no      Cardiovascular status: hemodynamically stable and blood pressure returned to baseline  Respiratory status: room air and spontaneous ventilation  Hydration status: euvolemic  Follow-up not needed.          Vitals Value Taken Time   /65 10/17/23 1107   Temp 36.8 °C (98.2 °F) 10/17/23 1107   Pulse 98 10/17/23 1107   Resp 19 10/17/23 1235   SpO2 95 % 10/17/23 1107         Event Time   Out of Recovery 19:10:04         Pain/Phi Score: Pain Rating Prior to Med Admin: 7 (10/17/2023 12:35 PM)  Pain Rating Post Med Admin: 3 (10/17/2023  1:28 AM)  Phi Score: 10 (10/17/2023 12:24 AM)

## 2023-10-17 NOTE — PLAN OF CARE
Problem: Physical Therapy  Goal: Physical Therapy Goal  Description: Goals to be met by: 10/31/23     Patient will increase functional independence with mobility by performin. Supine to sit with Modified [place]  2. Rolling to Left and Right with Modified [place]  3. Sit to stand transfer with Modified [place] using RW  4. Bed to chair transfer with Modified [place] using Rolling Walker  5. Gait x500 feet with Modified [place] using Rolling Walker and LSO brace    Outcome: Ongoing, Progressing     Pt ambulated ~250 ft with CGA using RW and LSO brace.

## 2023-10-17 NOTE — PLAN OF CARE
Problem: Adult Inpatient Plan of Care  Goal: Plan of Care Review  Outcome: Ongoing, Progressing  Flowsheets (Taken 10/17/2023 0212)  Plan of Care Reviewed With: patient

## 2023-10-17 NOTE — NURSING
[facility]  Nurses Note -- 4 Eyes      10/17/2023       Skin assessed on: Q Shift      [x] No Pressure Injuries Present    []Prevention Measures Documented    [] Yes LDA  for Pressure Injury Previously documented     [] Yes New Pressure Injury Discovered   [] LDA for New Pressure Injury Added      Attending :  Veronika Joaquin LPN     Second RN:  Tara Escudero RN

## 2023-10-17 NOTE — PLAN OF CARE
Problem: Occupational Therapy  Goal: Occupational Therapy Goal  Description: Goals to be met by: 10/24/23     Patient will increase functional independence with ADLs by performing:    UE Dressing with Modified [place].  LE Dressing with Supervision.  Grooming while standing at sink with Supervision.  Toileting from toilet with Supervision for hygiene and clothing management.   Supine to sit with Modified [place].  Step transfer with Supervision  Toilet transfer to toilet with Supervision.    Outcome: Ongoing, Progressing     OT rec home with caregiver support with RW at d/c.

## 2023-10-17 NOTE — DISCHARGE INSTRUCTIONS
Please follow ONLY the instructions that are checked below.    Activity Restrictions:  [x]  Return to work will be determined on an individual basis.  [x]  No lifting greater than 10 pounds.  [x]  Avoid bending and twisting the area of your surgery more than 45 degrees from neutral position in any direction.  [x]  No driving or operating machinery:  [x]  until cleared by your surgeon.  [x]  while taking narcotic pain medications or muscle relaxants.    [x]  Wear brace/collar at all times except when lying flat in bed     [x]  Increase ambulation over the next 2 weeks so that you are walking 2 miles per day at 2 weeks post-operatively.  [x]  Make sure to take two dedicated 5-10 minute walks per day, along with short walks every 1-2 hours, even if just to walk to the restroom and back.   [x]  Walk on paved surfaces only. It is okay to walk up and down stairs while holding onto a side rail.  [x]  No sexual activity for 2-3 weeks.    Discharge Medication/Follow-up:  [x]  Please refer to discharge medication reconciliation form.  [x]  For the first week after surgery: Check your blood pressure before taking any blood pressure medications at home. If BP is below 120/80, do not take your blood pressure medication.   [x]  Do not take ANY non-steroidal anti-inflammatory drugs (NSAIDS), including the following: ibuprofen, naprosyn, Aleve, Advil, Indocin, Mobic, or Celebrex for:  [x]  8 weeks  [x]  Prescriptions for appropriate medication will be given upon discharge.   [x]  Pain control: Percocet for severe pain. Over the counter tylenol for mild pain   [x]  Muscle relaxer: Robaxin for muscle spasms. Can take every 8 hours if needed, but most helpful at bedtime.  **Tamsulosin (flomax): take once daily for one week for urinary retention.       [x]  Take docusate (Colace 100 mg) and miralax daily until bowel activity returns to amairani. You can get this over the counter.  [x]  If you have not had a bowel movement by day 3 after  surgery, try a fleet's enema or suppository, both over the counter. Call neurosurgery if you have not had a bowel movement by day 5 after surgery.   [x]  Follow-up appointment:  [x]  10-14 days post-op for wound check by PA/nurse  [x]  4-6 weeks with MD    Wound Care:  [x]  Remove dressing or bandaid in  2  days.  [x]  No bandage required once removed. Keep your incision open to the air.  [x]  You may shower on the 3rd day after your surgery. Have the force of water hit you opposite from the incision. Pat the incision dry after your shower; do not scrub the incision. Do not use Hibiclens after surgery.  [x]  You wound is closed with one of the following: skin glue, steri strips, OR staples. Allow skin glue/steri strips to fall off on their own over time (if applicable). If you have staples, these will be removed at your clinic appt in 2 weeks.   [x]  You cannot take a bath until 8 weeks after surgery.    Call your doctor or go to the Emergency Room for any signs of infection, including: increased redness, drainage, pain, or fever (temperature ?101.5 for 24 hours). Call your doctor or go to the Emergency Room if there are any localized neurological changes; problems with speech, vision, numbness, tingling, weakness, or severe headache; or for other concerns.    Special Instructions:  [x]  No use of tobacco products.  [x]  Diet: Please eat a regular diet as tolerated.  []  Other diet:              Specific physician instructions:           Physicians need 3 days' notice for pain medicine to be refilled. Pain medicine will only be refilled between 8 AM and 5 PM, Monday through Friday, due to Food and Drug Administration regulation of documentation.    If you have any questions about this form, please call [phone].

## 2023-10-17 NOTE — SUBJECTIVE & OBJECTIVE
Interval History: jesica removed this am. Pain moderately controlled. Has not ambulated since surgery. Reporting no pain in her legs and slight improvement in the numbness in her feet. No flatus since surgery. Tolerating regular diet.     Medications:  Continuous Infusions:  Scheduled Meds:   gabapentin  600 mg Oral TID    heparin (porcine)  5,000 Units Subcutaneous Q8H    methocarbamoL  1,500 mg Oral QHS    methocarbamoL  750 mg Oral BID WM    mupirocin   Nasal BID    ozanimod  1 capsule Oral Daily    polyethylene glycol  17 g Oral Daily    vancomycin (VANCOCIN) IV (PEDS and ADULTS)  1,000 mg Intravenous Q12H     PRN Meds:acetaminophen, aluminum-magnesium hydroxide-simethicone, HYDROmorphone, ondansetron, oxyCODONE-acetaminophen, oxyCODONE-acetaminophen, prochlorperazine, senna-docusate 8.6-50 mg     Review of Systems  Objective:     Weight: 99 kg (218 lb 4.1 oz)  Body mass index is 37.46 kg/m².  Vital Signs (Most Recent):  Temp: 98.5 °F (36.9 °C) (10/17/23 0732)  Pulse: 92 (10/17/23 0732)  Resp: 19 (10/17/23 0732)  BP: 122/61 (10/17/23 0732)  SpO2: 99 % (10/17/23 0800) Vital Signs (24h Range):  Temp:  [97.8 °F (36.6 °C)-98.5 °F (36.9 °C)] 98.5 °F (36.9 °C)  Pulse:  [] 92  Resp:  [10-19] 19  SpO2:  [97 %-100 %] 99 %  BP: ()/(61-81) 122/61          Physical Exam         Neurosurgery Physical Exam  General: well developed, well nourished, no distress.   Head: normocephalic, atraumatic  Neurologic: Awake, Alert, Oriented x 4. Thought content appropriate.  GCS: Motor: 6/Verbal: 5/Eyes: 4 GCS Total: 15  Language: No aphasia  Speech: No dysarthria  Cranial nerves: face symmetric, tongue midline, CN II-XII grossly intact.   Eyes: pupils equal, round, reactive to light with accomodation, EOMI.  Pulmonary: normal respirations, no signs of respiratory distress  Abdomen: soft, non-distended, not tender to palpation    Sensory: intact to light touch throughout though decreased to bilateral feet in all dermatomes  "    Motor Strength: Moves all extremities spontaneously with good tone.    Strength  Iliopsoas Tibialis  anterior Gastro- cnemius EHL Hand     R 5/5 5/5 5/5 5/5 5/5    L 5/5 5/5 5/5 5/5 5/5     Davis: present on left     Skin: Skin is warm, dry and intact.    Incision: bilateral lumbar incisions closed with staples and covered with telfa and tegaderm. Sanguinous drainage to bilateral dressings (R>L).       Significant Labs:  Recent Labs   Lab 10/16/23  2251   *      K 4.0      CO2 24   BUN 6   CREATININE 0.7   CALCIUM 8.9     Recent Labs   Lab 10/16/23  2251   WBC 11.38   HGB 12.0   HCT 35.9*        No results for input(s): "LABPT", "INR", "APTT" in the last 48 hours.  Microbiology Results (last 7 days)       ** No results found for the last 168 hours. **              Significant Diagnostics:  no new imaging for review   "

## 2023-10-17 NOTE — PLAN OF CARE
Message sent to Jarrod with [facility] to advise of order for rolling walker for pt in preparation of discharge.  [facility] information entered into follow up tab to print on AVS at time of discharge

## 2023-10-17 NOTE — ASSESSMENT & PLAN NOTE
Shanthi Escoto is a  48 y.o.  female who is s/p MIS L4-5 and L5-S1 TLIF with Dr. Flores, POD#1.      -Post-op xrays pending  -Neurologically stable  -PT/OT/OOB x 6hours per day; can be divided into 2 hour intervals in the chair  ---PT recs pending  -TEDs/SCDs/IS  -SQH to start at 24 hrs after surgery  -Regular diet  -Percocet prn for pain with IV dilaudid for breakthrough   -Robaxin scheduled for muscle spasms. 750mg bid with meals and 1500mg nightly   -LSO brace when out of bed       Dispo: Hopeful for discharge home this afternoon, pending PT/OT, xrays, urination, and pain control

## 2023-10-17 NOTE — PLAN OF CARE
10/17/23 1321   Final Note   Assessment Type Final Discharge Note   Anticipated Discharge Disposition Home   Hospital Resources/Appts/Education Provided Appointments scheduled and added to AVS;Post-Acute resouces added to AVS   Post-Acute Status   Post-Acute Authorization HME   E Status Set-up Complete/Auth obtained     Pts nurse Banda advised that once RW delivered and d/c order written pt can d/c from CM standpoint

## 2023-10-17 NOTE — PLAN OF CARE
Problem: Adult Inpatient Plan of Care  Goal: Plan of Care Review  Outcome: Adequate for Care Transition  Goal: Patient-Specific Goal (Individualized)  Outcome: Adequate for Care Transition  Goal: Absence of Hospital-Acquired Illness or Injury  Outcome: Adequate for Care Transition  Goal: Optimal Comfort and Wellbeing  Outcome: Adequate for Care Transition  Goal: Readiness for Transition of Care  Outcome: Adequate for Care Transition     Problem: Infection  Goal: Absence of Infection Signs and Symptoms  Outcome: Adequate for Care Transition     Problem: Bleeding (Spinal Surgery)  Goal: Absence of Bleeding  Outcome: Adequate for Care Transition     Problem: Bowel Motility Impaired (Spinal Surgery)  Goal: Effective Bowel Elimination  Outcome: Adequate for Care Transition     Problem: Fluid and Electrolyte Imbalance (Spinal Surgery)  Goal: Fluid and Electrolyte Balance  Outcome: Adequate for Care Transition     Problem: Functional Ability Impaired (Spinal Surgery)  Goal: Optimal Functional Ability  Outcome: Adequate for Care Transition     Problem: Infection (Spinal Surgery)  Goal: Absence of Infection Signs and Symptoms  Outcome: Adequate for Care Transition     Problem: Neurologic Impairment (Spinal Surgery)  Goal: Optimal Neurologic Function  Outcome: Adequate for Care Transition     Problem: Ongoing Anesthesia Effects (Spinal Surgery)  Goal: Anesthesia/Sedation Recovery  Outcome: Adequate for Care Transition     Problem: Pain (Spinal Surgery)  Goal: Acceptable Pain Control  Outcome: Adequate for Care Transition     Problem: Postoperative Nausea and Vomiting (Spinal Surgery)  Goal: Nausea and Vomiting Relief  Outcome: Adequate for Care Transition     Problem: Postoperative Urinary Retention (Spinal Surgery)  Goal: Effective Urinary Elimination  Outcome: Adequate for Care Transition     Problem: Respiratory Compromise (Spinal Surgery)  Goal: Effective Oxygenation and Ventilation  Outcome: Adequate for Care  Transition     Problem: Fall Injury Risk  Goal: Absence of Fall and Fall-Related Injury  Outcome: Adequate for Care Transition     Problem: Skin Injury Risk Increased  Goal: Skin Health and Integrity  Outcome: Adequate for Care Transition

## 2023-10-17 NOTE — DISCHARGE SUMMARY
[facility]  Neurosurgery  Discharge Summary      Patient Name: Shanthi Escoto  MRN: [id number]  Admission Date: 10/16/2023  Hospital Length of Stay: 0 days  Discharge Date: 10/17/  Attending Physician: Dennis Flores MD   Discharging Provider: Lauren Naidu PA-C  Primary Care Provider: Arin Albarran MD    HPI:   Ms. Shanthi Escoto is a 48 y.o. woman recently diagnosed with MS, who presents today for follow up. This is a patient who presented to me with low back pain with BLE paresthesia. In March 2021, pt reports waking up with numbness to the lower half of her body. She was referred to pain management and had imaging with new findings of multiple enhancing lesions and posterior disc extrusion at L4-L5 causing severe spinal canal stenosis. She was then referred to neurosurgery and had an additional MRI with EMG nerve conduction study. At that point, pt was diagnosed with MS. Patient had consulted with  neurosurgery to discuss treatment options. However, by the time she was scheduled for operation, the surgeon had retired. Since then, pt reports symptoms progressively worsened. Patient reports difficulty with balance and endorses numbness and tingling to the bilateral feet. Described as pins and needles sensation that are exacerbated with movement. Reports inability to walk straight or stand for long periods of time. She denies weakness to the BLE but endorses numbness. Additionally, pt reports increased frequency in urination.     Today the pt reports symptoms are unchanged from last visit. She continues to have back pain with associated BLE paresthesia. She reports her numbness is worsening in the feet. States her pain is rated 8/10 today. I reviewed the discogram with the patient today which was positive at L4/5 and L5/S1.         Procedure(s) (LRB):  FUSION, SPINE, LUMBAR, TLIF, MINIMALLY INVASIVE L4-5, L5-S1 Sarbjit CURRIE notified (Left)     Hospital Course: 10/16: MIS L4-5 and L5-S1  "TLIF with Dr. Flores. Tolerated procedure well. Mooney catheter overnight. LSO when out of bed.   10/17: mooney removed this am. Pain moderately controlled. Has not ambulated since surgery. Reporting no pain in her legs and slight improvement in the numbness in her feet. No flatus since surgery. Tolerating regular diet. PT/OT recommend home with RW. Able to urinate independently this afternoon. Discharge instructions reviewed with patient. All questions answered. Prescriptions sent to hospital pharmacy for bedside delivery. Discharged home.         Goals of Care Treatment Preferences:  Code Status: Full Code        Pending Diagnostic Studies:     None        Final Active Diagnoses:    Diagnosis Date Noted POA    PRINCIPAL PROBLEM:  S/P lumbar spinal fusion [Z98.1] 10/17/2023 Not Applicable    Spinal stenosis, lumbar region, with neurogenic claudication [M48.062] 10/16/2023 Yes      Problems Resolved During this Admission:      Discharged Condition: stable     Disposition: Home or Self Care    Follow Up:   Follow-up Information     Lauren Naidu PA-C Follow up on 10/31/2023.    Specialty: Neurosurgery  Why: For wound re-check at 10:40am  Contact information:  [address]  [address] [place] [address]  [phone]             [facility] Follow up.    Why: DME- provider of rolling walker  Contact information:  [address] [place] [address] [phone]                     Patient Instructions:      WALKER FOR HOME USE     Order Specific Question Answer Comments   Type of Walker: Adult (5'4"-6'6")    With wheels? Yes    Height: 5' 4" (1.626 m)    Weight: 99 kg (218 lb 4.1 oz)    Length of need (1-99 months): 99    Does patient have medical equipment at home? cane, straight    Does patient have medical equipment at home? shower chair    Please check all that apply: Patient is unable to safely ambulate without equipment.    Please check all that apply: Patient's condition " impairs ambulation.    Please check all that apply: Patient needs help to get in and out of chair.    Please check all that apply: Walker will be used for gait training.    Please check all that apply: Altered sensory perception.      Diet Adult Regular     Lifting restrictions     Other restrictions (specify):     No driving until:     Notify your health care provider if you experience any of the following:  temperature >100.4     Notify your health care provider if you experience any of the following:  persistent nausea and vomiting or diarrhea     Notify your health care provider if you experience any of the following:  severe uncontrolled pain     Notify your health care provider if you experience any of the following:  redness, tenderness, or signs of infection (pain, swelling, redness, odor or green/yellow discharge around incision site)     Notify your health care provider if you experience any of the following:  difficulty breathing or increased cough     Notify your health care provider if you experience any of the following:  severe persistent headache     Notify your health care provider if you experience any of the following:  worsening rash     Notify your health care provider if you experience any of the following:  persistent dizziness, light-headedness, or visual disturbances     Notify your health care provider if you experience any of the following:  increased confusion or weakness     Remove dressing in 48 hours     Shower on day dressing removed (No bath)     Medications:  Reconciled Home Medications:      Medication List      START taking these medications    methocarbamoL 750 MG Tab  Commonly known as: ROBAXIN  Take 1-2 tablets every 8 hours as needed for muscle spasms     ondansetron 4 MG Tbdl  Commonly known as: ZOFRAN-ODT  Dissolve 1 tablet (4 mg total) by mouth every 6 (six) hours as needed (nausea).     oxyCODONE-acetaminophen  mg per tablet  Commonly known as: PERCOCET  Take 1  tablet by mouth every 4 (four) hours as needed (7-10/10 pain).     tamsulosin 0.4 mg Cap  Commonly known as: FLOMAX  Take 2 capsules (0.8 mg total) by mouth once daily.        CONTINUE taking these medications    cholecalciferol (vitamin D3) 50 mcg (2,000 unit) Cap capsule  Commonly known as: VITAMIN D3  Take 1 capsule by mouth once daily.     gabapentin 600 MG tablet  Commonly known as: NEURONTIN  Take 600 mg by mouth once daily.     ORIAHNN 300-1-0.5mg(AM) /300 mg(PM) Cpsq  Generic drug: elagolix-estradiol-norethindrn     phentermine 37.5 mg tablet  Commonly known as: ADIPEX-P  Take 37.5 mg by mouth once daily.     PROBIOTIC WITH PREBIOTIC ORAL  Take 1 tablet by mouth once daily.     ZEPOSIA 0.92 mg Cap  Generic drug: ozanimod  Take 1 capsule by mouth Daily.        STOP taking these medications    cyclobenzaprine 10 MG tablet  Commonly known as: MARY Naidu PA-C  Neurosurgery  [facility] - Telemetry

## 2023-10-17 NOTE — ANESTHESIA POSTPROCEDURE EVALUATION
Anesthesia Post Evaluation    Patient: Shanthi Escoto    Procedure(s) Performed: Procedure(s) (LRB):  FUSION, SPINE, LUMBAR, TLIF, MINIMALLY INVASIVE L4-5, L5-S1 Sarbjit CURRIE notified (Left)    [facility] Anesthesia Post Op Evaluation      Vitals Value Taken Time   /65 10/17/23 1107   Temp 36.8 °C (98.2 °F) 10/17/23 1107   Pulse 98 10/17/23 1107   Resp 19 10/17/23 1235   SpO2 95 % 10/17/23 1107         Event Time   Out of Recovery 19:10:04         Pain/Phi Score: Pain Rating Prior to Med Admin: 7 (10/17/2023 12:35 PM)  Pain Rating Post Med Admin: 3 (10/17/2023  1:28 AM)  Phi Score: 10 (10/17/2023 12:24 AM)

## 2023-10-17 NOTE — PROGRESS NOTES
[facility]  Neurosurgery  Progress Note    Subjective:     History of Present Illness: Ms. Shanthi Escoto is a 48 y.o. woman recently diagnosed with MS, who presents today for follow up. This is a patient who presented to me with low back pain with BLE paresthesia. In March 2021, pt reports waking up with numbness to the lower half of her body. She was referred to pain management and had imaging with new findings of multiple enhancing lesions and posterior disc extrusion at L4-L5 causing severe spinal canal stenosis. She was then referred to neurosurgery and had an additional MRI with EMG nerve conduction study. At that point, pt was diagnosed with MS. Patient had consulted with  neurosurgery to discuss treatment options. However, by the time she was scheduled for operation, the surgeon had retired. Since then, pt reports symptoms progressively worsened. Patient reports difficulty with balance and endorses numbness and tingling to the bilateral feet. Described as pins and needles sensation that are exacerbated with movement. Reports inability to walk straight or stand for long periods of time. She denies weakness to the BLE but endorses numbness. Additionally, pt reports increased frequency in urination.     Today the pt reports symptoms are unchanged from last visit. She continues to have back pain with associated BLE paresthesia. She reports her numbness is worsening in the feet. States her pain is rated 8/10 today. I reviewed the discogram with the patient today which was positive at L4/5 and L5/S1.         Post-Op Info:  Procedure(s) (LRB):  FUSION, SPINE, LUMBAR, TLIF, MINIMALLY INVASIVE L4-5, L5-S1 Sarbjit CURRIE notified (Left)   1 Day Post-Op     Interval History: jesica removed this am. Pain moderately controlled. Has not ambulated since surgery. Reporting no pain in her legs and slight improvement in the numbness in her feet. No flatus since surgery. Tolerating regular diet.      Medications:  Continuous Infusions:  Scheduled Meds:   gabapentin  600 mg Oral TID    heparin (porcine)  5,000 Units Subcutaneous Q8H    methocarbamoL  1,500 mg Oral QHS    methocarbamoL  750 mg Oral BID WM    mupirocin   Nasal BID    ozanimod  1 capsule Oral Daily    polyethylene glycol  17 g Oral Daily    vancomycin (VANCOCIN) IV (PEDS and ADULTS)  1,000 mg Intravenous Q12H     PRN Meds:acetaminophen, aluminum-magnesium hydroxide-simethicone, HYDROmorphone, ondansetron, oxyCODONE-acetaminophen, oxyCODONE-acetaminophen, prochlorperazine, senna-docusate 8.6-50 mg     Review of Systems  Objective:     Weight: 99 kg (218 lb 4.1 oz)  Body mass index is 37.46 kg/m².  Vital Signs (Most Recent):  Temp: 98.5 °F (36.9 °C) (10/17/23 0732)  Pulse: 92 (10/17/23 0732)  Resp: 19 (10/17/23 0732)  BP: 122/61 (10/17/23 0732)  SpO2: 99 % (10/17/23 0800) Vital Signs (24h Range):  Temp:  [97.8 °F (36.6 °C)-98.5 °F (36.9 °C)] 98.5 °F (36.9 °C)  Pulse:  [] 92  Resp:  [10-19] 19  SpO2:  [97 %-100 %] 99 %  BP: ()/(61-81) 122/61          Physical Exam         Neurosurgery Physical Exam  General: well developed, well nourished, no distress.   Head: normocephalic, atraumatic  Neurologic: Awake, Alert, Oriented x 4. Thought content appropriate.  GCS: Motor: 6/Verbal: 5/Eyes: 4 GCS Total: 15  Language: No aphasia  Speech: No dysarthria  Cranial nerves: face symmetric, tongue midline, CN II-XII grossly intact.   Eyes: pupils equal, round, reactive to light with accomodation, EOMI.  Pulmonary: normal respirations, no signs of respiratory distress  Abdomen: soft, non-distended, not tender to palpation    Sensory: intact to light touch throughout though decreased to bilateral feet in all dermatomes     Motor Strength: Moves all extremities spontaneously with good tone.    Strength  Iliopsoas Tibialis  anterior Gastro- cnemius EHL Hand     R 5/5 5/5 5/5 5/5 5/5    L 5/5 5/5 5/5 5/5 5/5     Davis: present on left  "    Skin: Skin is warm, dry and intact.    Incision: bilateral lumbar incisions closed with staples and covered with telfa and tegaderm. Sanguinous drainage to bilateral dressings (R>L).       Significant Labs:  Recent Labs   Lab 10/16/23  2251   *      K 4.0      CO2 24   BUN 6   CREATININE 0.7   CALCIUM 8.9     Recent Labs   Lab 10/16/23  2251   WBC 11.38   HGB 12.0   HCT 35.9*        No results for input(s): "LABPT", "INR", "APTT" in the last 48 hours.  Microbiology Results (last 7 days)       ** No results found for the last 168 hours. **              Significant Diagnostics:  no new imaging for review     Assessment/Plan:     * Spinal stenosis, lumbar region, with neurogenic claudication  Shanthi Escoto is a  48 y.o.  female who is s/p MIS L4-5 and L5-S1 TLIF with Dr. Flores, POD#1.      -Post-op xrays pending  -Neurologically stable  -PT/OT/OOB x 6hours per day; can be divided into 2 hour intervals in the chair  ---PT recs pending  -TEDs/SCDs/IS  -SQH to start at 24 hrs after surgery  -Regular diet  -Percocet prn for pain with IV dilaudid for breakthrough   -Robaxin scheduled for muscle spasms. 750mg bid with meals and 1500mg nightly   -LSO brace when out of bed       Dispo: Hopeful for discharge home this afternoon, pending PT/OT, xrays, urination, and pain control            Lauern Naidu PA-C  Neurosurgery  [facility] - Telemetry  "

## 2023-10-17 NOTE — PROGRESS NOTES
NEUROSURGERY UPDATE    Patient is cleared for discharge from PT/OT standpoint. Xrays show satisfactory placement of hardware. She has not urinated independently since mooney catheter removal this AM. She feels the urge to urinate and is attempting to do so on the bedside commode. Will add flomax. If able to urinate prior to 2pm, she may discharge home today. Otherwise, she will require a bladder scan and in/out catheterization. If still unable to urinate, will consult urology and keep overnight.       Lauren Naidu PA-C  [facility]  Department of Neurosurgery  [phone]

## 2023-10-17 NOTE — HOSPITAL COURSE
10/16: MIS L4-5 and L5-S1 TLIF with Dr. Flores. Tolerated procedure well. Mooney catheter overnight. LSO when out of bed.   10/17: mooney removed this am. Pain moderately controlled. Has not ambulated since surgery. Reporting no pain in her legs and slight improvement in the numbness in her feet. No flatus since surgery. Tolerating regular diet. PT/OT recommend home with RW. Able to urinate independently this afternoon. Discharge instructions reviewed with patient. All questions answered. Prescriptions sent to hospital pharmacy for bedside delivery. Discharged home.

## 2023-10-17 NOTE — HPI
Ms. Shanthi Escoto is a 48 y.o. woman recently diagnosed with MS, who presents today for follow up. This is a patient who presented to me with low back pain with BLE paresthesia. In March 2021, pt reports waking up with numbness to the lower half of her body. She was referred to pain management and had imaging with new findings of multiple enhancing lesions and posterior disc extrusion at L4-L5 causing severe spinal canal stenosis. She was then referred to neurosurgery and had an additional MRI with EMG nerve conduction study. At that point, pt was diagnosed with MS. Patient had consulted with  neurosurgery to discuss treatment options. However, by the time she was scheduled for operation, the surgeon had retired. Since then, pt reports symptoms progressively worsened. Patient reports difficulty with balance and endorses numbness and tingling to the bilateral feet. Described as pins and needles sensation that are exacerbated with movement. Reports inability to walk straight or stand for long periods of time. She denies weakness to the BLE but endorses numbness. Additionally, pt reports increased frequency in urination.     Today the pt reports symptoms are unchanged from last visit. She continues to have back pain with associated BLE paresthesia. She reports her numbness is worsening in the feet. States her pain is rated 8/10 today. I reviewed the discogram with the patient today which was positive at L4/5 and L5/S1.

## 2023-10-20 ENCOUNTER — PATIENT MESSAGE (OUTPATIENT)
Dept: NEUROSURGERY | Facility: CLINIC | Age: 48
End: 2023-10-20
Payer: COMMERCIAL

## 2023-10-20 DIAGNOSIS — K59.00 CONSTIPATION, UNSPECIFIED CONSTIPATION TYPE: Primary | ICD-10-CM

## 2023-10-20 DIAGNOSIS — Z98.1 S/P LUMBAR SPINAL FUSION: ICD-10-CM

## 2023-10-24 RX ORDER — NALOXONE HYDROCHLORIDE 4 MG/.1ML
SPRAY NASAL
Qty: 1 EACH | Refills: 1 | Status: SHIPPED | OUTPATIENT
Start: 2023-10-24

## 2023-10-24 RX ORDER — OXYCODONE AND ACETAMINOPHEN 10; 325 MG/1; MG/1
1 TABLET ORAL EVERY 4 HOURS PRN
Qty: 42 TABLET | Refills: 0 | Status: SHIPPED | OUTPATIENT
Start: 2023-10-24

## 2023-10-30 ENCOUNTER — TELEPHONE (OUTPATIENT)
Dept: SURGERY | Facility: CLINIC | Age: 48
End: 2023-10-30
Payer: COMMERCIAL

## 2023-11-02 ENCOUNTER — TELEPHONE (OUTPATIENT)
Dept: NEUROSURGERY | Facility: CLINIC | Age: 48
End: 2023-11-02
Payer: COMMERCIAL

## 2023-11-03 ENCOUNTER — OFFICE VISIT (OUTPATIENT)
Dept: NEUROSURGERY | Facility: CLINIC | Age: 48
End: 2023-11-03
Payer: COMMERCIAL

## 2023-11-03 VITALS
HEART RATE: 96 BPM | TEMPERATURE: 98 F | WEIGHT: 212.75 LBS | DIASTOLIC BLOOD PRESSURE: 83 MMHG | SYSTOLIC BLOOD PRESSURE: 132 MMHG | BODY MASS INDEX: 36.32 KG/M2 | OXYGEN SATURATION: 98 % | HEIGHT: 64 IN

## 2023-11-03 DIAGNOSIS — Z98.1 S/P LUMBAR SPINAL FUSION: Primary | ICD-10-CM

## 2023-11-03 PROCEDURE — 1160F PR REVIEW ALL MEDS BY PRESCRIBER/CLIN PHARMACIST DOCUMENTED: ICD-10-PCS | Mod: CPTII,S$GLB,, | Performed by: PHYSICIAN ASSISTANT

## 2023-11-03 PROCEDURE — 99024 POSTOP FOLLOW-UP VISIT: CPT | Mod: S$GLB,,, | Performed by: PHYSICIAN ASSISTANT

## 2023-11-03 PROCEDURE — 99024 PR POST-OP FOLLOW-UP VISIT: ICD-10-PCS | Mod: S$GLB,,, | Performed by: PHYSICIAN ASSISTANT

## 2023-11-03 PROCEDURE — 1159F PR MEDICATION LIST DOCUMENTED IN MEDICAL RECORD: ICD-10-PCS | Mod: CPTII,S$GLB,, | Performed by: PHYSICIAN ASSISTANT

## 2023-11-03 PROCEDURE — 3079F DIAST BP 80-89 MM HG: CPT | Mod: CPTII,S$GLB,, | Performed by: PHYSICIAN ASSISTANT

## 2023-11-03 PROCEDURE — 1159F MED LIST DOCD IN RCRD: CPT | Mod: CPTII,S$GLB,, | Performed by: PHYSICIAN ASSISTANT

## 2023-11-03 PROCEDURE — 3075F SYST BP GE 130 - 139MM HG: CPT | Mod: CPTII,S$GLB,, | Performed by: PHYSICIAN ASSISTANT

## 2023-11-03 PROCEDURE — 3075F PR MOST RECENT SYSTOLIC BLOOD PRESS GE 130-139MM HG: ICD-10-PCS | Mod: CPTII,S$GLB,, | Performed by: PHYSICIAN ASSISTANT

## 2023-11-03 PROCEDURE — 1160F RVW MEDS BY RX/DR IN RCRD: CPT | Mod: CPTII,S$GLB,, | Performed by: PHYSICIAN ASSISTANT

## 2023-11-03 PROCEDURE — 3079F PR MOST RECENT DIASTOLIC BLOOD PRESSURE 80-89 MM HG: ICD-10-PCS | Mod: CPTII,S$GLB,, | Performed by: PHYSICIAN ASSISTANT

## 2023-11-03 NOTE — PROGRESS NOTES
Wound Check   Neurosurgery     Shanthi Escoto is a 48 y.o. female who presents to clinic today for 2 week wound check, s/p MIS L4-5 and L5-S1 TLIF with Dr. Flores.  Denies fevers, chills, night sweats or N/V. Further denies wound drainage or swelling. Pt has been taking Percocet nightly and using Tylenol during the day.  She states the 1st 2 weeks after surgery were quite rough, but seemed to be improving.  Her biggest complaint is of severe muscle spasms that occur in the middle of the night, usually around 3:00 a.m., and disrupt her sleep.  Her legs are feeling better than before surgery.  The numbness in her feet is still present, but she is feeling more tingling than she did before surgery and believes his sensation is improving.  She is having regular bowel movements and urinating without difficulty.       Physical Exam:   General: well developed, well nourished, no distress  Neurologic: Alert and oriented. Thought content appropriate.   GCS: Motor: 6/Verbal: 5/Eyes: 4 GCS Total: 15   Mental Status: Awake, Alert, Oriented x3   Cranial nerves: face symmetric, tongue midline, pupils equal, round, reactive to light with accomodation, EOMI.   Motor Strength: moves all extremities with good strength and tone 5/5 BLE  Sensation: response to light touch throughout; decreased to b/l feet  Ambulating with a rolling walker    Incision is clean, dry and intact with no signs of erythema, swelling or purulent drainage. Staples are intact on exam and removed in clinic. All skin edges are completely approximated.       Vitals:    11/03/23 1038   BP: 132/83   Pulse: 96   Temp: 98 °F (36.7 °C)             Assessment/Plan:   Shanthi Escoto is a 48 y.o. female who presents for 2 week wound check, s/p MIS L4-5 and L5-S1 TLIF with Dr. Flores.  Recovering well from surgery with her primary complaint being significant back spasms that occur in the middle of the night.  Overall, her postop pain is starting to improve.  She continues  to ambulate with a rolling walker as a precaution.  Using LSO brace and bone growth stimulator as advised.    -Keep incision open to air   -Avoid anti-inflammatories for anther 6 weeks. These include ibuprofen, excedrin, mobic, celebrex, aleve, naproxen, etc. Tylenol may be taken for mild-moderate pain.   -OK to resume home blood thinner, if applicable.   -continue lso brace when out of bed   -Call if you need a refill of pain medication  -Can shower and get incision wet, just pat dry and no vigorous scrubbing. Do not submerge incision for another 4 weeks.   -No lifting more than 10 lbs or excessive bending/twisting.   -Can drive after 4 weeks when no longer taking narcotics   -Follow up with Dr. Flores in 4 weeks with new xrays  -Encouraged patient to call if they have any questions or concerns prior to next follow up appt        Lauren Naidu PA-C  [facility]  Department of Neurosurgery  [phone]

## 2023-11-03 NOTE — PATIENT INSTRUCTIONS
-Keep incision open to air   -Avoid anti-inflammatories for anther 6 weeks. These include ibuprofen, excedrin, mobic, celebrex, aleve, naproxen, etc. Tylenol may be taken for mild-moderate pain.   -OK to resume home blood thinner, if applicable.   -continue lso brace when out of bed   -Call if you need a refill of pain medication  -Can shower and get incision wet, just pat dry and no vigorous scrubbing. Do not submerge incision for another 4 weeks.   -No lifting more than 10 lbs or excessive bending/twisting.   -Can drive after 4 weeks when no longer taking narcotics   -Follow up with Dr. Flores in 4 weeks with new xrays  -Please call with any questions or concerns prior to your next appointment.

## 2023-11-14 ENCOUNTER — PATIENT MESSAGE (OUTPATIENT)
Dept: NEUROSURGERY | Facility: CLINIC | Age: 48
End: 2023-11-14
Payer: COMMERCIAL

## 2023-11-15 DIAGNOSIS — G35 MULTIPLE SCLEROSIS: Primary | ICD-10-CM

## 2023-11-16 DIAGNOSIS — Z98.1 S/P LUMBAR SPINAL FUSION: Primary | ICD-10-CM

## 2023-11-16 DIAGNOSIS — Z98.1 STATUS POST LUMBAR SPINAL FUSION: ICD-10-CM

## 2023-11-16 RX ORDER — METHOCARBAMOL 750 MG/1
750 TABLET, FILM COATED ORAL EVERY 8 HOURS
Qty: 90 TABLET | Refills: 0 | Status: SHIPPED | OUTPATIENT
Start: 2023-11-16

## 2023-11-29 ENCOUNTER — TELEPHONE (OUTPATIENT)
Dept: NEUROSURGERY | Facility: CLINIC | Age: 48
End: 2023-11-29
Payer: COMMERCIAL

## 2023-11-29 ENCOUNTER — HOSPITAL ENCOUNTER (OUTPATIENT)
Dept: RADIOLOGY | Facility: HOSPITAL | Age: 48
Discharge: HOME | End: 2023-11-29
Attending: NEUROLOGICAL SURGERY
Payer: COMMERCIAL

## 2023-11-29 ENCOUNTER — PATIENT MESSAGE (OUTPATIENT)
Dept: NEUROSURGERY | Facility: CLINIC | Age: 48
End: 2023-11-29

## 2023-11-29 ENCOUNTER — OFFICE VISIT (OUTPATIENT)
Dept: NEUROSURGERY | Facility: CLINIC | Age: 48
End: 2023-11-29
Attending: NEUROLOGICAL SURGERY
Payer: COMMERCIAL

## 2023-11-29 VITALS
OXYGEN SATURATION: 96 % | WEIGHT: 213.38 LBS | HEIGHT: 64 IN | BODY MASS INDEX: 36.43 KG/M2 | TEMPERATURE: 99 F | SYSTOLIC BLOOD PRESSURE: 138 MMHG | HEART RATE: 83 BPM | DIASTOLIC BLOOD PRESSURE: 80 MMHG

## 2023-11-29 DIAGNOSIS — Z98.1 STATUS POST LUMBAR SPINAL FUSION: ICD-10-CM

## 2023-11-29 DIAGNOSIS — Z98.1 S/P LUMBAR SPINAL FUSION: Primary | ICD-10-CM

## 2023-11-29 DIAGNOSIS — M47.27 LUMBOSACRAL SPONDYLOSIS WITH RADICULOPATHY: ICD-10-CM

## 2023-11-29 PROCEDURE — 1159F MED LIST DOCD IN RCRD: CPT | Mod: CPTII,S$GLB,, | Performed by: NEUROLOGICAL SURGERY

## 2023-11-29 PROCEDURE — 1159F PR MEDICATION LIST DOCUMENTED IN MEDICAL RECORD: ICD-10-PCS | Mod: CPTII,S$GLB,, | Performed by: NEUROLOGICAL SURGERY

## 2023-11-29 PROCEDURE — 1160F RVW MEDS BY RX/DR IN RCRD: CPT | Mod: CPTII,S$GLB,, | Performed by: NEUROLOGICAL SURGERY

## 2023-11-29 PROCEDURE — 1160F PR REVIEW ALL MEDS BY PRESCRIBER/CLIN PHARMACIST DOCUMENTED: ICD-10-PCS | Mod: CPTII,S$GLB,, | Performed by: NEUROLOGICAL SURGERY

## 2023-11-29 PROCEDURE — 3075F PR MOST RECENT SYSTOLIC BLOOD PRESS GE 130-139MM HG: ICD-10-PCS | Mod: CPTII,S$GLB,, | Performed by: NEUROLOGICAL SURGERY

## 2023-11-29 PROCEDURE — 99024 PR POST-OP FOLLOW-UP VISIT: ICD-10-PCS | Mod: S$GLB,,, | Performed by: NEUROLOGICAL SURGERY

## 2023-11-29 PROCEDURE — 72100 X-RAY EXAM L-S SPINE 2/3 VWS: CPT | Mod: 26,,, | Performed by: RADIOLOGY

## 2023-11-29 PROCEDURE — 3075F SYST BP GE 130 - 139MM HG: CPT | Mod: CPTII,S$GLB,, | Performed by: NEUROLOGICAL SURGERY

## 2023-11-29 PROCEDURE — 99024 POSTOP FOLLOW-UP VISIT: CPT | Mod: S$GLB,,, | Performed by: NEUROLOGICAL SURGERY

## 2023-11-29 PROCEDURE — 3079F DIAST BP 80-89 MM HG: CPT | Mod: CPTII,S$GLB,, | Performed by: NEUROLOGICAL SURGERY

## 2023-11-29 PROCEDURE — 72100 XR LUMBAR SPINE AP AND LATERAL: ICD-10-PCS | Mod: 26,,, | Performed by: RADIOLOGY

## 2023-11-29 PROCEDURE — 3079F PR MOST RECENT DIASTOLIC BLOOD PRESSURE 80-89 MM HG: ICD-10-PCS | Mod: CPTII,S$GLB,, | Performed by: NEUROLOGICAL SURGERY

## 2023-11-29 PROCEDURE — 72100 X-RAY EXAM L-S SPINE 2/3 VWS: CPT | Mod: TC,FY

## 2023-11-29 NOTE — PROGRESS NOTES
Subjective:   I, Maria Ines He, attest that this documentation has been prepared under the direction and in the presence of Dennis Flores MD.     Patient ID: Shanthi Escoto is a 48 y.o. female     Chief Complaint: Post-op Evaluation (6 week P/O TLIF w/XR)      HPI  Ms. Shanthi Escoto is a 48 y.o. woman with MS, who presents today for 6 week PO follow up of MIS L4-S1 TLIF done on 10/16/2023. This is a patient who presented to me with low back pain with BLE paresthesia. In March 2021, pt reports waking up with numbness to the lower half of her body. She was referred to pain management and had imaging with new findings of multiple enhancing lesions and posterior disc extrusion at L4-L5 causing severe spinal canal stenosis. She was then referred to neurosurgery and had an additional MRI with EMG nerve conduction study. At that point, pt was diagnosed with MS. Patient had consulted with  neurosurgery to discuss treatment options. However, by the time she was scheduled for operation, the surgeon had retired. Since then, pt reports symptoms progressively worsened. Patient reports difficulty with balance and endorses numbness and tingling to the feet. Reports inability to walk straight or stand for long periods of time. Additionally, pt reports increased frequency in urination. States her pain is rated 8/10 today. I reviewed the discogram with the patient today which was positive at L4/5 and L5/S1.  I recommended MIS TLIF and patient wished to proceed.    Today the pt reports she is doing very well postoperatively. She is ambulating daily without complications. Pt is compliant with a back brace. She has yet to begin physical therapy since her hospital discharge.    Review of Systems   Constitutional:  Negative for activity change, appetite change, fatigue, fever and unexpected weight change.   HENT:  Negative for facial swelling.    Eyes: Negative.    Respiratory: Negative.     Cardiovascular: Negative.     Gastrointestinal:  Negative for diarrhea, nausea and vomiting.   Endocrine: Negative.    Genitourinary: Negative.    Musculoskeletal:  Negative for back pain, joint swelling, myalgias and neck pain.   Neurological:  Negative for dizziness, seizures, weakness, numbness and headaches.   Psychiatric/Behavioral: Negative.          Past Medical History:   Diagnosis Date    Demyelinating disease     MS (multiple sclerosis)     Numbness and tingling of both feet     Obesity, unspecified        Objective:      Vitals:    11/29/23 1213   BP: 138/80   Pulse: 83   Temp: 98.7 °F (37.1 °C)      Physical Exam  Constitutional:       General: She is not in acute distress.     Appearance: Normal appearance.   HENT:      Head: Normocephalic and atraumatic.   Pulmonary:      Effort: Pulmonary effort is normal.   Musculoskeletal:      Cervical back: Neck supple.   Neurological:      Mental Status: She is alert and oriented to person, place, and time.      GCS: GCS eye subscore is 4. GCS verbal subscore is 5. GCS motor subscore is 6.      Cranial Nerves: No cranial nerve deficit.      Comments: Incision site is dry, clean, and intact.           IMAGING:  XR Lumbar Spine (11/29/2023):  Patient has had a previous interbody fusion at the L4-L5 and L5-S1 levels with pedicle screws and stabilization bars alignment is satisfactory.      I have personally reviewed the images with the pt.      I, Dr. Dennis Flores, personally performed the services described in this documentation. All medical record entries made by the scribe, Maria Ines He, were at my direction and in my presence.  I have reviewed the chart and agree that the record reflects my personal performance and is accurate and complete. Dennis Flores MD. 11/29/2023    Assessment:       Lumbar spondylosis.  S/p Lumbar fusion.      Plan:   I have personally reviewed the XR lumbar spine with the pt which shows patient has had a previous interbody fusion at the L4-L5 and L5-S1 levels with  pedicle screws and stabilization bars alignment is satisfactory.    A course of physical therapy will be ordered. I will schedule the patient for 6 month follow up with CT lumbar spine.

## 2023-11-29 NOTE — PATIENT INSTRUCTIONS
I have personally reviewed the XR lumbar spine with the pt which shows patient has had a previous interbody fusion at the L4-L5 and L5-S1 levels with pedicle screws and stabilization bars alignment is satisfactory.    A course of physical therapy will be ordered. I will schedule the patient for 6 month follow up with CT lumbar spine.

## 2023-12-05 ENCOUNTER — HOSPITAL ENCOUNTER (OUTPATIENT)
Dept: RADIOLOGY | Facility: HOSPITAL | Age: 48
Discharge: HOME | End: 2023-12-05
Attending: PSYCHIATRY & NEUROLOGY
Payer: COMMERCIAL

## 2023-12-05 DIAGNOSIS — G35 MULTIPLE SCLEROSIS: ICD-10-CM

## 2023-12-05 PROCEDURE — A9585 GADOBUTROL INJECTION: HCPCS | Mod: PO | Performed by: PSYCHIATRY & NEUROLOGY

## 2023-12-05 PROCEDURE — 25500020 PHARM REV CODE 255: Mod: PO | Performed by: PSYCHIATRY & NEUROLOGY

## 2023-12-05 PROCEDURE — 72156 MRI NECK SPINE W/O & W/DYE: CPT | Mod: TC,PO

## 2023-12-05 PROCEDURE — 70553 MRI BRAIN STEM W/O & W/DYE: CPT | Mod: TC,PO

## 2023-12-05 PROCEDURE — 72157 MRI CHEST SPINE W/O & W/DYE: CPT | Mod: TC,PO

## 2023-12-05 RX ORDER — GADOBUTROL 604.72 MG/ML
9.5 INJECTION INTRAVENOUS
Status: COMPLETED | OUTPATIENT
Start: 2023-12-05 | End: 2023-12-05

## 2023-12-05 RX ADMIN — GADOBUTROL 9.5 ML: 604.72 INJECTION INTRAVENOUS at 04:12

## 2023-12-06 ENCOUNTER — CLINICAL SUPPORT (OUTPATIENT)
Dept: REHABILITATION | Facility: HOSPITAL | Age: 48
End: 2023-12-06
Attending: NEUROLOGICAL SURGERY
Payer: COMMERCIAL

## 2023-12-06 DIAGNOSIS — R53.1 DECREASED STRENGTH: Primary | ICD-10-CM

## 2023-12-06 DIAGNOSIS — Z98.1 S/P LUMBAR SPINAL FUSION: ICD-10-CM

## 2023-12-06 DIAGNOSIS — R26.89 DECREASED FUNCTIONAL MOBILITY: ICD-10-CM

## 2023-12-06 DIAGNOSIS — M25.60 DECREASED RANGE OF MOTION: ICD-10-CM

## 2023-12-06 DIAGNOSIS — M62.89 MUSCLE TIGHTNESS: ICD-10-CM

## 2023-12-06 DIAGNOSIS — R26.9 GAIT ABNORMALITY: ICD-10-CM

## 2023-12-06 PROCEDURE — 97161 PT EVAL LOW COMPLEX 20 MIN: CPT | Mod: PN

## 2023-12-06 PROCEDURE — 97110 THERAPEUTIC EXERCISES: CPT | Mod: PN

## 2023-12-06 NOTE — PLAN OF CARE
[facility]  Physical Therapy Initial Evaluation    Name: Shanthi Escoto  Clinic Number: [id number]    Therapy Diagnosis:   Encounter Diagnoses   Name Primary?    S/P lumbar spinal fusion     Decreased strength Yes    Decreased range of motion     Muscle tightness     Gait abnormality     Decreased functional mobility      Physician: Dennis Flores MD   Physician Orders: PT Eval and Treat  Medical Diagnosis from Referral: Z98.1 (ICD-10-CM) - S/P lumbar spinal fusion  Evaluation Date: 12/6/2023  Authorization Period Expiration: 12/29/2023  Plan of Care Expiration: 3/30/2024    Progress Update: 1/6/2024  FOTO: 1 / 3   Visit # / Visits authorized: 1 / 1       PRECAUTIONS: Standard Precautions and Orthopedic: Lumbar Fusion precautions     Time In: 0700  Time Out: 0755  Total Appointment Time (timed & untimed codes): 55 minutes    SUBJECTIVE     Chief complaint:  s/p L4/L5 and L5/S1 Lumbar Fusion   DOS:  10/16/2023    History of current condition:  s/p L4/L5 and L5/S1 Lumbar Fusion   DOS:  10/16/2023;  B foot pain numbness.  States that she was instructed to wear the brace for 6 months until her next f/u.   States that she has transition from her rolling walker to her straight cane.    Previous episode:  none    Aggravating Factors:   standing, walking  Easing Factors: tylenol, rest    Imaging:  See epic.    Prior Therapy: Yes  Social History: Pt lives with their spouse  Occupation: Pt is an associate for insurance.    Prior Level of Function: Independent with all ADLs  Current Level of Function: 50% of PLOF    Pain:  Current 2 /10, worst 5 /10, best 2 /10   Description: Aching, Dull, and Throbbing with intermittent sharp shooting pain down R buttock    Pts goals: Pt reported goal is to be able to walk without pain.    _______________________________________________________  Medical History:   Past Medical History:   Diagnosis Date    Demyelinating disease     MS (multiple sclerosis)      Numbness and tingling of both feet     Obesity, unspecified        Surgical History:   Shanthi Escoto  has a past surgical history that includes  section; Endometrial ablation; and Minimally invasive transforaminal lumbar interbody fusion (TLIF) (Left, 10/16/2023).    Medications:   Shanthi has a current medication list which includes the following prescription(s): bacillus coagulans/inulin, cholecalciferol (vitamin d3), oriahnn, gabapentin, linaclotide, methocarbamol, naloxone, ondansetron, oxycodone-acetaminophen, zeposia, phentermine, and tamsulosin.    Allergies:   Review of patient's allergies indicates:   Allergen Reactions    Penicillins Anaphylaxis     As an infant        OBJECTIVE   (x = not tested due to pain and/or inability to obtain test position)    RANGE OF MOTION:    Lumbar AROM/PROM Right  (spine)  2023 Left    2023 Goal   Lumbar Flexion (60) NT --- 55     Lumbar Extension (30) NT --- 30     Lumbar Side Bending (25) NT NT 20     Lumbar Rotation NT NT 45         Hip AROM/PROM Right  2023 Left  2023 Goal   Hip Flexion (120) wfl wfl 115     Hip IR (45) wfl wfl 40     Hip ER (45) wfl wfl 40         Knee AROM/PROM Right  2023 Left  2023 Goal   Hyper - Zero - Flexion wfl wfl 0-0-135         STRENGTH:    L/E MMT Right  2023 Goal   Hip Flexion  4-/5 5/5 B    Hip Extension  4-/5 5/5 B   Hip Abduction  4-/5 5/5 B   Hip IR 4-/5 5/5 B   Hip ER 4-/5 5/5 B   Knee Flexion 4-/5 5/5 B   Knee Extension 4-/5 5/5 B   Ankle DF 4-/5 5/5 B   Ankle PF 4-/5 5/5 B   Ankle Inversion 4-/5 5/5 B   Ankle Eversion 4-/5 5/5 B   Big Toe Extension 4-/5 5/5 B     L/E MMT Left  2023 Goal   Hip Flexion  4-/5 5/5 B    Hip Extension  4-/5 5/5 B   Hip Abduction  4-/5 5/5 B   Hip IR 4-/5 5/5 B   Hip ER 4-/5 5/5 B   Knee Flexion  B   Knee Extension  B   Ankle DF  B   Ankle PF  B   Ankle Inversion  B   Ankle Eversion  B   Big Toe Extension   5/5 B       MUSCLE LENGTH:     Muscle Tested  Right  12/6/2023 Left   12/6/2023 Goal   Hip Flexors  decreased decreased Normal B   Quadriceps normal normal Normal B   Hamstrings  decreased decreased Normal B   Piriformis  normal normal Normal B   Gastrocnemius  decreased decreased Normal B   Soleus  decreased decreased Normal B     Sensation:  Sensation is impaired to light touch along B feet     Palpation:  No TTP.    Posture:  Pt presents with postural abnormalities which include: forward head and rounded shoulders    Gait Analysis: The patient ambulated with the following assistive device: straight cane; the pt presents with the following gait abnormalities: decreased step length, donnell, and arm swing; increased forward flexed posture, trunk sway     Movement Analysis:   Functional Test  Outcome   Double Leg Squat 1/4 squat with pain   Single Leg Step Down NT       TREATMENT   Total Treatment time separate from Evaluation: (10) minutes    Shanthi received therapeutic exercises to develop strength, endurance, ROM, flexibility, and posture for (10) minutes including: x = exercises performed     TherEx 12/6/2023    SLRs x 3x10   Quad sets x 3x10   Seated marches x 3x10   Ankle pumps x 3x10     Plan for Next Visit:     Recumbent bike x 5 mins   SLRs  2x10 Bilateral   TrA sets with PB 2x10     Supine heel slides  x 20 B  BKFOs  red theraband 2x10 Bilateral      Hip adduction with ball  3x10  Glute squeezes x 20   (Progress to bridging)  LAQs  2#  2x10 B   SAQs  2#  2x10 B     Standing gastroc stretch 3x30 secs Bilateral  Seated marches 2#  x 20 Bilateral         Seated HR/TR   3x10  Scapular squeezes  3x10  OH pulleys   (flexion/abduction)  x 2 mins each    Rows with red tb  x 10  Standing hip abduction with support x 20 B  UBE 2/2          Home Exercises and Patient Education Provided    Education/Self-Care provided:  Patient educated on the impairments noted above and the effects of physical therapy intervention  to improve overall condition and quality of life.   Patient was educated on all the above exercise prior/during/after for proper posture, positioning, and execution for safe performance with home exercise program.     Written Home Exercises Provided: yes. Prefers: Electronic Copies  Exercises were reviewed and Shanthi was able to demonstrate them prior to the end of the session.  Shanthi demonstrated good understanding of the education provided.     See EMR under Patient Instructions for exercises provided during therapy sessions.    ASSESSMENT   Shanthi is a 48 y.o. female referred to outpatient Physical Therapy with a medical diagnosis of Z98.1 (ICD-10-CM) - S/P lumbar spinal fusion. Shanthi presents with clinical signs and symptoms that support this diagnosis with decreased Lumbar ROM, decreased lower extremity strength, Lumbar joint(s) hypomobility, lower extremity neural tension, and impaired functional mobility. Radicular symptoms are present from the lumbar spine down into B feet.    The above impairments will be addressed through manual therapy techniques, therapeutic exercises, functional training, and modalities as necessary. Patient was treated and educated on exercises for home program, progression of therapy, and benefits of therapy to achieve full functional mobility. Patient will benefit from skilled physical therapy to meet short and long term goals and return to prior level of function.    Pt prognosis is Good.   Pt will benefit from skilled outpatient Physical Therapy to address the deficits stated above and in the chart below, provide pt/family education, and to maximize pt's level of independence.     Plan of care discussed with patient: Yes  Pt's spiritual, cultural and educational needs considered and patient is agreeable to the plan of care and goals as stated below:     Anticipated Barriers for therapy: none    Medical Necessity is demonstrated by the following:   History  Co-morbidities and  "personal factors that may impact the plan of care Co-morbidities:   advanced age and high BMI    Personal Factors:   no deficits     low   Examination  Body Structures and Functions, activity limitations and participation restrictions that may impact the plan of care Body Regions:   back  lower extremities    Body Systems:    gross symmetry  ROM  strength  gross coordinated movement  balance  gait  motor control  motor learning    Participation Restrictions:   See above in "Current Level of Function"     Activity limitations:   Learning and applying knowledge  no deficits    General Tasks and Commands  no deficits    Communication  no deficits    Mobility  no deficits    Self care  no deficits    Domestic Life  no deficits    Interactions/Relationships  no deficits    Life Areas  no deficits    Community and Social Life  community life  recreation and leisure  no deficits         low   Clinical Presentation stable and uncomplicated low   Decision Making/ Complexity Score: low       GOALS:  SHORT TERM GOALS: 4 weeks 12/6/2023   Recent signs and systems trend is improving in order to progress towards LTG's.    Patient will be independent with HEP in order to further progress and return to maximal function.    Pain rating at Worst: 5/10 in order to progress towards increased independence with activity.    Patient will be able to correct postural deviations in sitting and standing, to decrease pain and promote postural awareness for injury prevention.       LONG TERM GOALS: 8 weeks 12/6/2023   Patient will return to normal ADL, recreational, and work related activities with less pain and limitation.     Patient will improve AROM to stated goals in order to return to maximal functional potential.     Patient will improve Strength to stated goals of appropriate musculature in order to improve functional independence.     Pain Rating at Best: 1/10 to improve Quality of Life.     Patient will meet predicted functional " outcome (FOTO) score: 80% to increase self-worth & perceived functional ability.    Patient will have met/partially met personal goal of being able to walk with no pain.          PLAN   Plan of care Certification: 12/6/2023 to 3/30/2024    Outpatient Physical Therapy 2 times weekly for 8 weeks to include any combination of the following interventions: virtual visits, dry needling, modalities, electrical stimulation (IFC, Pre-Mod, Attended with Functional Dry Needling), Manual Therapy, Moist Heat/ Ice, Neuromuscular Re-ed, Patient Education, Self Care, Therapeutic Exercise, Functional Training, and Therapeutic Activites     Thank you for this referral.    These services are reasonable and necessary for the conditions set forth above while under my care.    Hilario Ellis, PT, DPT

## 2023-12-11 NOTE — PROGRESS NOTES
[facility]   Physical Therapy Treatment Note      Name: Shanthi Escoto  Clinic Number: [id number]    Therapy Diagnosis:   Encounter Diagnoses   Name Primary?    S/P lumbar spinal fusion Yes    Decreased strength     Decreased range of motion     Muscle tightness      Physician: Dennis Flores MD    Visit Date: 12/12/2023    Physician: Dennis Flores MD         Physician Orders: PT Eval and Treat  Medical Diagnosis from Referral: Z98.1 (ICD-10-CM) - S/P lumbar spinal fusion  Evaluation Date: 12/6/2023  Authorization Period Expiration: 12/29/2023  Plan of Care Expiration: 3/30/2024                Progress Update: 1/6/2024               FOTO: 1 / 3   Visit # / Visits authorized: 1 / 20                          PRECAUTIONS: Standard Precautions and Orthopedic: Lumbar Fusion precautions      Time In: 0700  Time Out: 0745  Total Appointment Time (timed & untimed codes): 45 minutes     PTA Visit #: 1/5       Subjective     Patient reports: wants to get rid of the cane soon, walks short distance without it. Only needs to wear the brace at therapy and if she is being active.   She was compliant with home exercise program.  Response to previous treatment: no issues   Functional change: ongoing     Pain: 4/10  Location: bilateral back      Objective      Objective Measures updated at progress report unless specified.     Treatment     Shanthi received the treatments listed below:      therapeutic exercises to develop strength, endurance, ROM, flexibility, posture, and core stabilization for 30 minutes including:    Recumbent bike x 5 mins   SLRs  2x10 Bilateral   TrA sets with PB 2x10     Supine heel slides  x 20 Bilateral   BKFOs red theraband 2x10 Bilateral      Hip adduction with ball  3x10  Glute squeezes x 20   (Progress to bridging)  LAQs  2#  2x10 B   SAQs  2#  2x10 B     Standing gastroc stretch 2x30 secs Bilateral  Seated marches 2#  x 20 Bilateral  - np        neuromuscular  re-education activities to improve: Posture for 0 minutes. The following activities were included:      therapeutic activities to improve functional performance for 15  minutes, including:    Seated HR/TR   3x10  Scapular squeezes  3x10  OH pulleys   (flexion/abduction)  x 2 mins each    Rows with red tb  2 x 10  Standing hip abduction with support x 10 B  UBE 2/2        Patient Education and Home Exercises       Education provided:   - home exercises, posture, exercise tolerance     Written Home Exercises Provided: Patient instructed to cont prior HEP. Exercises were reviewed and Shanthi was able to demonstrate them prior to the end of the session.  Shanthi demonstrated good  understanding of the education provided. See Electronic Medical Record under Patient Instructions for exercises provided during therapy sessions    Assessment     Patient presents with gait instability due to muscle tightness and weakness from post op. Challenged with overall mobility and endurance. Shanthi ambulates with single point cane and LSO brace. Progress as tolerated.     Shanthi Is progressing well towards her goals.   Patient prognosis is Good.     Patient will continue to benefit from skilled outpatient physical therapy to address the deficits listed in the problem list box on initial evaluation, provide pt/family education and to maximize pt's level of independence in the home and community environment.     Patient's spiritual, cultural and educational needs considered and pt agreeable to plan of care and goals.     Anticipated barriers to physical therapy: none     Goals:   SHORT TERM GOALS: 4 weeks 12/12/2023     Recent signs and systems trend is improving in order to progress towards LTG's.  Ongoing   Patient will be independent with HEP in order to further progress and return to maximal function.  Ongoing   Pain rating at Worst: 5/10 in order to progress towards increased independence with activity.  Ongoing   Patient will be  able to correct postural deviations in sitting and standing, to decrease pain and promote postural awareness for injury prevention.   Ongoing      LONG TERM GOALS: 8 weeks 12/12/2023     Patient will return to normal ADL, recreational, and work related activities with less pain and limitation.   Ongoing   Patient will improve AROM to stated goals in order to return to maximal functional potential.   Ongoing   Patient will improve Strength to stated goals of appropriate musculature in order to improve functional independence.   Ongoing   Pain Rating at Best: 1/10 to improve Quality of Life.   Ongoing   Patient will meet predicted functional outcome (FOTO) score: 80% to increase self-worth & perceived functional ability.  Ongoing   Patient will have met/partially met personal goal of being able to walk with no pain.  Ongoing            Plan     Continue per Plan of Care     Elo Melgoza, PTA

## 2023-12-12 ENCOUNTER — CLINICAL SUPPORT (OUTPATIENT)
Dept: REHABILITATION | Facility: HOSPITAL | Age: 48
End: 2023-12-12
Payer: COMMERCIAL

## 2023-12-12 DIAGNOSIS — R53.1 DECREASED STRENGTH: ICD-10-CM

## 2023-12-12 DIAGNOSIS — Z98.1 S/P LUMBAR SPINAL FUSION: Primary | ICD-10-CM

## 2023-12-12 DIAGNOSIS — M25.60 DECREASED RANGE OF MOTION: ICD-10-CM

## 2023-12-12 DIAGNOSIS — M62.89 MUSCLE TIGHTNESS: ICD-10-CM

## 2023-12-12 PROCEDURE — 97530 THERAPEUTIC ACTIVITIES: CPT | Mod: PN,CQ

## 2023-12-12 PROCEDURE — 97110 THERAPEUTIC EXERCISES: CPT | Mod: PN,CQ

## 2023-12-14 ENCOUNTER — CLINICAL SUPPORT (OUTPATIENT)
Dept: REHABILITATION | Facility: HOSPITAL | Age: 48
End: 2023-12-14
Payer: COMMERCIAL

## 2023-12-14 DIAGNOSIS — M25.60 DECREASED RANGE OF MOTION: Primary | ICD-10-CM

## 2023-12-14 DIAGNOSIS — M62.89 MUSCLE TIGHTNESS: ICD-10-CM

## 2023-12-14 DIAGNOSIS — R26.9 GAIT ABNORMALITY: ICD-10-CM

## 2023-12-14 DIAGNOSIS — R26.89 DECREASED FUNCTIONAL MOBILITY: ICD-10-CM

## 2023-12-14 DIAGNOSIS — R53.1 DECREASED STRENGTH: ICD-10-CM

## 2023-12-14 PROCEDURE — 97110 THERAPEUTIC EXERCISES: CPT | Mod: PN

## 2023-12-14 PROCEDURE — 97530 THERAPEUTIC ACTIVITIES: CPT | Mod: PN

## 2023-12-14 PROCEDURE — 97112 NEUROMUSCULAR REEDUCATION: CPT | Mod: PN

## 2023-12-14 NOTE — PROGRESS NOTES
[facility]   Physical Therapy Treatment Note      Name: Shanthi Escoto  Clinic Number: [id number]    Therapy Diagnosis:   Encounter Diagnoses   Name Primary?    Decreased range of motion Yes    Muscle tightness     Decreased strength     Gait abnormality     Decreased functional mobility        Physician: Dennis Flores MD    Visit Date: 12/14/2023    Physician: Dennis Flores MD         Physician Orders: PT Eval and Treat  Medical Diagnosis from Referral: Z98.1 (ICD-10-CM) - S/P lumbar spinal fusion  Evaluation Date: 12/6/2023  Authorization Period Expiration: 12/29/2023  Plan of Care Expiration: 3/30/2024                Progress Update: 1/6/2024               FOTO: 1 / 3   Visit # / Visits authorized: 1 / 20                          PRECAUTIONS: Standard Precautions and Orthopedic: Lumbar Fusion precautions      Time In: 0700  Time Out: 0755  Total Appointment Time (timed & untimed codes): 55 minutes     PTA Visit #: 1/5       Subjective     Patient reports: felt pretty good after last session.     She was compliant with home exercise program.  Response to previous treatment: no issues   Functional change: ongoing     Pain: 4/10  Location: bilateral back      Objective      Objective Measures updated at progress report unless specified.     Treatment     Shanthi received the treatments listed below:      therapeutic exercises to develop strength, endurance, ROM, flexibility, posture, and core stabilization for 8 minutes including:    Recumbent bike x 5 mins   SLRs  2x10 Bilateral     neuromuscular re-education activities to improve: Posture for 8 minutes. The following activities were included:    TrA sets with PB 2x10     Supine heel slides  x 20 Bilateral   Glute squeezes x 20   (Progress to bridging)    Standing gastroc stretch 2x30 secs Bilateral - np      therapeutic activities to improve functional performance for 39 minutes, including:    Seated HR/TR   3x10  Scapular  squeezes  3x10  OH pulleys   (flexion/abduction)  x 2 mins each    Rows with red tb  2 x 10  Standing hip abduction with support x 10 B - np  UBE 2/2    BKFOs red theraband 2x10 Bilateral      Hip adduction with ball  3x10  LAQs  2#  2x10 B   SAQs  2#  2x10 B    Seated marches 2#  x 20 Bilateral  - np     Patient Education and Home Exercises       Education provided:   - home exercises, posture, exercise tolerance     Written Home Exercises Provided: Patient instructed to cont prior HEP. Exercises were reviewed and Shanthi was able to demonstrate them prior to the end of the session.  Shanthi demonstrated good  understanding of the education provided. See Electronic Medical Record under Patient Instructions for exercises provided during therapy sessions    Assessment     Patient presents with gait instability due to muscle tightness and weakness from post op. Challenged with overall mobility and endurance. Shanthi ambulates with single point cane and LSO brace. Progress as tolerated.     Shanthi Is progressing well towards her goals.   Patient prognosis is Good.     Patient will continue to benefit from skilled outpatient physical therapy to address the deficits listed in the problem list box on initial evaluation, provide pt/family education and to maximize pt's level of independence in the home and community environment.     Patient's spiritual, cultural and educational needs considered and pt agreeable to plan of care and goals.     Anticipated barriers to physical therapy: none     Goals:   SHORT TERM GOALS: 4 weeks 12/14/2023     Recent signs and systems trend is improving in order to progress towards LTG's.  Ongoing   Patient will be independent with HEP in order to further progress and return to maximal function.  Ongoing   Pain rating at Worst: 5/10 in order to progress towards increased independence with activity.  Ongoing   Patient will be able to correct postural deviations in sitting and standing, to  decrease pain and promote postural awareness for injury prevention.   Ongoing      LONG TERM GOALS: 8 weeks 12/14/2023     Patient will return to normal ADL, recreational, and work related activities with less pain and limitation.   Ongoing   Patient will improve AROM to stated goals in order to return to maximal functional potential.   Ongoing   Patient will improve Strength to stated goals of appropriate musculature in order to improve functional independence.   Ongoing   Pain Rating at Best: 1/10 to improve Quality of Life.   Ongoing   Patient will meet predicted functional outcome (FOTO) score: 80% to increase self-worth & perceived functional ability.  Ongoing   Patient will have met/partially met personal goal of being able to walk with no pain.  Ongoing            Plan     Continue per Plan of Care     Hilario Ellis, PT

## 2023-12-20 ENCOUNTER — CLINICAL SUPPORT (OUTPATIENT)
Dept: REHABILITATION | Facility: HOSPITAL | Age: 48
End: 2023-12-20
Payer: COMMERCIAL

## 2023-12-20 DIAGNOSIS — M25.60 DECREASED RANGE OF MOTION: ICD-10-CM

## 2023-12-20 DIAGNOSIS — R53.1 DECREASED STRENGTH: ICD-10-CM

## 2023-12-20 DIAGNOSIS — M62.89 MUSCLE TIGHTNESS: ICD-10-CM

## 2023-12-20 DIAGNOSIS — R26.9 GAIT ABNORMALITY: Primary | ICD-10-CM

## 2023-12-20 DIAGNOSIS — R26.89 DECREASED FUNCTIONAL MOBILITY: ICD-10-CM

## 2023-12-20 PROCEDURE — 97112 NEUROMUSCULAR REEDUCATION: CPT | Mod: PN

## 2023-12-20 PROCEDURE — 97110 THERAPEUTIC EXERCISES: CPT | Mod: PN

## 2023-12-20 PROCEDURE — 97530 THERAPEUTIC ACTIVITIES: CPT | Mod: PN

## 2023-12-20 NOTE — PROGRESS NOTES
[facility]   Physical Therapy Treatment Note      Name: Shanthi Escoto  Clinic Number: [id number]    Therapy Diagnosis:   Encounter Diagnoses   Name Primary?    Gait abnormality Yes    Decreased range of motion     Muscle tightness     Decreased strength     Decreased functional mobility          Physician: Dennis Flores MD    Visit Date: 12/20/2023    Physician: Dennis Flores MD         Physician Orders: PT Eval and Treat  Medical Diagnosis from Referral: Z98.1 (ICD-10-CM) - S/P lumbar spinal fusion  Evaluation Date: 12/6/2023  Authorization Period Expiration: 12/29/2023  Plan of Care Expiration: 3/30/2024                Progress Update: 1/6/2024               FOTO: 1 / 3   Visit # / Visits authorized: 1 / 20                          PRECAUTIONS: Standard Precautions and Orthopedic: Lumbar Fusion precautions      Time In: 0700  Time Out: 0756  Total Appointment Time (timed & untimed codes): 56 minutes     PTA Visit #: 1/5       Subjective     Patient reports: felt pretty good after last session.     She was compliant with home exercise program.  Response to previous treatment: no issues   Functional change: ongoing     Pain: 3/10  Location: bilateral back      Objective      Objective Measures updated at progress report unless specified.     Treatment     Shanthi received the treatments listed below:      therapeutic exercises to develop strength, endurance, ROM, flexibility, posture, and core stabilization for 10 minutes including:    Nustep x 10 mins  SLRs  3x10 Bilateral     neuromuscular re-education activities to improve: Posture for 8 minutes. The following activities were included:    TrA sets with PB 2x10     Supine heel slides  x 20 Bilateral   Bridging x 10    Standing gastroc stretch 2x30 secs Bilateral - np    therapeutic activities to improve functional performance for 38 minutes, including:    Seated HR/TR   3x10 - np  Scapular squeezes  3x10  OH pulleys    "(flexion/abduction)  x 2 mins each  - np  Rows with red tb  2 x 10 - np  Standing hip abduction with support x 10 B   UBE 2/2    BKFOs red theraband 2x10 Bilateral      Hip adduction with ball  3x10 - np  LAQs  2#  2x10 B   SAQs  2#  2x10 B  Step ups on 4" box x10 B    Seated marches 2#  x 20 Bilateral      Patient Education and Home Exercises       Education provided:   - home exercises, posture, exercise tolerance     Written Home Exercises Provided: Patient instructed to cont prior HEP. Exercises were reviewed and Shanthi was able to demonstrate them prior to the end of the session.  Shanthi demonstrated good  understanding of the education provided. See Electronic Medical Record under Patient Instructions for exercises provided during therapy sessions    Assessment     Patient presents with gait instability due to muscle tightness and weakness from post op. Challenged with overall mobility and endurance. Shanthi ambulates with single point cane and LSO brace. Progress as tolerated.     Shanthi Is progressing well towards her goals.   Patient prognosis is Good.     Patient will continue to benefit from skilled outpatient physical therapy to address the deficits listed in the problem list box on initial evaluation, provide pt/family education and to maximize pt's level of independence in the home and community environment.     Patient's spiritual, cultural and educational needs considered and pt agreeable to plan of care and goals.     Anticipated barriers to physical therapy: none     Goals:   SHORT TERM GOALS: 4 weeks 12/20/2023     Recent signs and systems trend is improving in order to progress towards LTG's.  Ongoing   Patient will be independent with HEP in order to further progress and return to maximal function.  Ongoing   Pain rating at Worst: 5/10 in order to progress towards increased independence with activity.  Ongoing   Patient will be able to correct postural deviations in sitting and standing, to " decrease pain and promote postural awareness for injury prevention.   Ongoing      LONG TERM GOALS: 8 weeks 12/20/2023     Patient will return to normal ADL, recreational, and work related activities with less pain and limitation.   Ongoing   Patient will improve AROM to stated goals in order to return to maximal functional potential.   Ongoing   Patient will improve Strength to stated goals of appropriate musculature in order to improve functional independence.   Ongoing   Pain Rating at Best: 1/10 to improve Quality of Life.   Ongoing   Patient will meet predicted functional outcome (FOTO) score: 80% to increase self-worth & perceived functional ability.  Ongoing   Patient will have met/partially met personal goal of being able to walk with no pain.  Ongoing            Plan     Continue per Plan of Care     Hilario Ellis, PT

## 2023-12-22 ENCOUNTER — CLINICAL SUPPORT (OUTPATIENT)
Dept: REHABILITATION | Facility: HOSPITAL | Age: 48
End: 2023-12-22
Payer: COMMERCIAL

## 2023-12-22 DIAGNOSIS — Z98.1 S/P LUMBAR SPINAL FUSION: ICD-10-CM

## 2023-12-22 DIAGNOSIS — M25.60 DECREASED RANGE OF MOTION: Primary | ICD-10-CM

## 2023-12-22 DIAGNOSIS — M62.89 MUSCLE TIGHTNESS: ICD-10-CM

## 2023-12-22 PROCEDURE — 97110 THERAPEUTIC EXERCISES: CPT | Mod: PN,CQ

## 2023-12-22 PROCEDURE — 97112 NEUROMUSCULAR REEDUCATION: CPT | Mod: PN,CQ

## 2023-12-22 PROCEDURE — 97530 THERAPEUTIC ACTIVITIES: CPT | Mod: PN,CQ

## 2023-12-22 NOTE — PROGRESS NOTES
[facility]   Physical Therapy Treatment Note      Name: Shanthi Escoto  Clinic Number: [id number]    Therapy Diagnosis:   Encounter Diagnoses   Name Primary?    Decreased range of motion Yes    Muscle tightness     S/P lumbar spinal fusion        Physician: Dennis Flores MD    Visit Date: 12/22/2023    Physician: Dennis Flores MD         Physician Orders: PT Eval and Treat  Medical Diagnosis from Referral: Z98.1 (ICD-10-CM) - S/P lumbar spinal fusion  Evaluation Date: 12/6/2023  Authorization Period Expiration: 12/29/2023  Plan of Care Expiration: 3/30/2024                Progress Update: 1/6/2024               FOTO: 1 / 3   Visit # / Visits authorized: 4 / 20                          PRECAUTIONS: Standard Precautions and Orthopedic: Lumbar Fusion precautions      Time In: 0700  Time Out: 0756  Total Appointment Time (timed & untimed codes): 56 minutes     PTA Visit #: 1/5       Subjective     Patient reports: did too much the last session, but feels alright upon arrival.   She was compliant with home exercise program.  Response to previous treatment: no issues   Functional change: ongoing     Pain: 3/10  Location: bilateral back      Objective      Objective Measures updated at progress report unless specified.     Treatment     Shanthi received the treatments listed below:      therapeutic exercises to develop strength, endurance, ROM, flexibility, posture, and core stabilization for 10 minutes including:    Nustep x 10 mins  SLRs  2x10 Bilateral     neuromuscular re-education activities to improve: Posture for 8 minutes. The following activities were included:    TrA sets with PB 2x10     Supine heel slides  x 20 Bilateral   Bridging x 10    Standing gastroc stretch 2x30 secs Bilateral - np    therapeutic activities to improve functional performance for 38 minutes, including:    Seated HR/TR   3x10 - np  Scapular squeezes  3x10  OH pulleys   (flexion/abduction)  x 2 mins each   "- np  Rows with red tb  2 x 10 - np  Standing hip abduction with support x 10 Bilateral    UBE 2/2    BKFOs red theraband 2x10 Bilateral      Hip adduction with ball  3x10 - np  LAQs  2#  2x10 Bilateral    SAQs  2#  2x10 Bilateral   Step ups on 4" box x10 B    Seated marches 2#  x 20 Bilateral      Patient Education and Home Exercises       Education provided:   - home exercises, posture, exercise tolerance     Written Home Exercises Provided: Patient instructed to cont prior HEP. Exercises were reviewed and Shanthi was able to demonstrate them prior to the end of the session.  Shanthi demonstrated good  understanding of the education provided. See Electronic Medical Record under Patient Instructions for exercises provided during therapy sessions    Assessment     Patient presents with gait instability due to muscle tightness and weakness. Challenged with overall mobility and endurance but is progressing slowly. Cueing to increase core facilitation during exercises to reduce strain on lumbar. Shanthi ambulates with single point cane and LSO brace. Progress as tolerated.     Shanthi Is progressing well towards her goals.   Patient prognosis is Good.     Patient will continue to benefit from skilled outpatient physical therapy to address the deficits listed in the problem list box on initial evaluation, provide pt/family education and to maximize pt's level of independence in the home and community environment.     Patient's spiritual, cultural and educational needs considered and pt agreeable to plan of care and goals.     Anticipated barriers to physical therapy: none     Goals:   SHORT TERM GOALS: 4 weeks 12/22/2023     Recent signs and systems trend is improving in order to progress towards LTG's.  Ongoing   Patient will be independent with HEP in order to further progress and return to maximal function.  Ongoing   Pain rating at Worst: 5/10 in order to progress towards increased independence with activity.  " Ongoing   Patient will be able to correct postural deviations in sitting and standing, to decrease pain and promote postural awareness for injury prevention.   Ongoing      LONG TERM GOALS: 8 weeks 12/22/2023     Patient will return to normal ADL, recreational, and work related activities with less pain and limitation.   Ongoing   Patient will improve AROM to stated goals in order to return to maximal functional potential.   Ongoing   Patient will improve Strength to stated goals of appropriate musculature in order to improve functional independence.   Ongoing   Pain Rating at Best: 1/10 to improve Quality of Life.   Ongoing   Patient will meet predicted functional outcome (FOTO) score: 80% to increase self-worth & perceived functional ability.  Ongoing   Patient will have met/partially met personal goal of being able to walk with no pain.  Ongoing            Plan     Continue per Plan of Care     Elo Melgoza, PTA

## 2023-12-26 ENCOUNTER — CLINICAL SUPPORT (OUTPATIENT)
Dept: REHABILITATION | Facility: HOSPITAL | Age: 48
End: 2023-12-26
Payer: COMMERCIAL

## 2023-12-26 DIAGNOSIS — M62.89 MUSCLE TIGHTNESS: ICD-10-CM

## 2023-12-26 DIAGNOSIS — Z98.1 S/P LUMBAR SPINAL FUSION: Primary | ICD-10-CM

## 2023-12-26 DIAGNOSIS — M25.60 DECREASED RANGE OF MOTION: ICD-10-CM

## 2023-12-26 PROCEDURE — 97112 NEUROMUSCULAR REEDUCATION: CPT | Mod: PN

## 2023-12-26 PROCEDURE — 97530 THERAPEUTIC ACTIVITIES: CPT | Mod: PN

## 2023-12-26 PROCEDURE — 97110 THERAPEUTIC EXERCISES: CPT | Mod: PN

## 2023-12-26 NOTE — PROGRESS NOTES
[facility]   Physical Therapy Treatment Note      Name: Shanthi Escoto  Clinic Number: [id number]    Therapy Diagnosis:   Encounter Diagnoses   Name Primary?    S/P lumbar spinal fusion Yes    Decreased range of motion     Muscle tightness        Physician: Dennis Flores MD    Visit Date: 12/26/2023    Physician: Dennis Flores MD         Physician Orders: PT Eval and Treat  Medical Diagnosis from Referral: Z98.1 (ICD-10-CM) - S/P lumbar spinal fusion  Evaluation Date: 12/6/2023  Authorization Period Expiration: 12/29/2023  Plan of Care Expiration: 3/30/2024                Progress Update: 1/6/2024               FOTO: 1 / 3   Visit # / Visits authorized: 4 / 20                          PRECAUTIONS: Standard Precautions and Orthopedic: Lumbar Fusion precautions      Time In: 1645  (Pnt arrived early)  Time Out: 1740  Total Appointment Time (timed & untimed codes): 55 minutes     PTA Visit #: 1/5       Subjective     Patient reports: that she feels ok today, no complaints.    She was compliant with home exercise program.  Response to previous treatment: no issues   Functional change: ongoing     Pain: 3/10  Location: bilateral back      Objective      Objective Measures updated at progress report unless specified.     Treatment     hSanthi received the treatments listed below:      therapeutic exercises to develop strength, endurance, ROM, flexibility, posture, and core stabilization for 10 minutes including:    Nustep x 10 mins    neuromuscular re-education activities to improve: Posture for 8 minutes. The following activities were included:    TrA sets with PB 2x10     Supine heel slides  x 20 Bilateral   Bridging x 10  SLRs  2x10 Bilateral     Standing gastroc stretch 2x30 secs Bilateral - np    therapeutic activities to improve functional performance for 38 minutes, including:    Seated HR/TR   3x10   Scapular squeezes  3x10  OH pulleys   (flexion/abduction)  x 2 mins each   "  Rows with red tb  2 x 10 - np  Standing hip abduction with support x 10 Bilateral    UBE 2/2 - np    SLRs  3x10  BKFOs red theraband 2x10 Bilateral      Hip adduction with ball  3x10   LAQs  2#  2x10 Bilateral    SAQs  2#  2x10 Bilateral   Step ups on 4" box x10 B    Seated marches 2#  x 20 Bilateral      Patient Education and Home Exercises       Education provided:   - home exercises, posture, exercise tolerance     Written Home Exercises Provided: Patient instructed to cont prior HEP. Exercises were reviewed and Shanthi was able to demonstrate them prior to the end of the session.  Shanthi demonstrated good  understanding of the education provided. See Electronic Medical Record under Patient Instructions for exercises provided during therapy sessions    Assessment     Patient presents with gait instability due to muscle tightness and weakness. Challenged with overall mobility and endurance but is progressing slowly. Cueing to increase core facilitation during exercises to reduce strain on lumbar. Shanthi ambulates with single point cane and LSO brace. Progress as tolerated.     Shanthi Is progressing well towards her goals.   Patient prognosis is Good.     Patient will continue to benefit from skilled outpatient physical therapy to address the deficits listed in the problem list box on initial evaluation, provide pt/family education and to maximize pt's level of independence in the home and community environment.     Patient's spiritual, cultural and educational needs considered and pt agreeable to plan of care and goals.     Anticipated barriers to physical therapy: none     Goals:   SHORT TERM GOALS: 4 weeks 12/26/2023     Recent signs and systems trend is improving in order to progress towards LTG's.  Ongoing   Patient will be independent with HEP in order to further progress and return to maximal function.  Ongoing   Pain rating at Worst: 5/10 in order to progress towards increased independence with " activity.  Ongoing   Patient will be able to correct postural deviations in sitting and standing, to decrease pain and promote postural awareness for injury prevention.   Ongoing      LONG TERM GOALS: 8 weeks 12/26/2023     Patient will return to normal ADL, recreational, and work related activities with less pain and limitation.   Ongoing   Patient will improve AROM to stated goals in order to return to maximal functional potential.   Ongoing   Patient will improve Strength to stated goals of appropriate musculature in order to improve functional independence.   Ongoing   Pain Rating at Best: 1/10 to improve Quality of Life.   Ongoing   Patient will meet predicted functional outcome (FOTO) score: 80% to increase self-worth & perceived functional ability.  Ongoing   Patient will have met/partially met personal goal of being able to walk with no pain.  Ongoing            Plan     Continue per Plan of Care     Hilario Ellis, PT

## 2023-12-29 ENCOUNTER — CLINICAL SUPPORT (OUTPATIENT)
Dept: REHABILITATION | Facility: HOSPITAL | Age: 48
End: 2023-12-29
Payer: COMMERCIAL

## 2023-12-29 DIAGNOSIS — M62.89 MUSCLE TIGHTNESS: ICD-10-CM

## 2023-12-29 DIAGNOSIS — Z98.1 S/P LUMBAR SPINAL FUSION: Primary | ICD-10-CM

## 2023-12-29 DIAGNOSIS — M25.60 DECREASED RANGE OF MOTION: ICD-10-CM

## 2023-12-29 PROCEDURE — 97530 THERAPEUTIC ACTIVITIES: CPT | Mod: PN,CQ

## 2023-12-29 PROCEDURE — 97112 NEUROMUSCULAR REEDUCATION: CPT | Mod: PN,CQ

## 2023-12-29 PROCEDURE — 97110 THERAPEUTIC EXERCISES: CPT | Mod: PN,CQ

## 2023-12-29 NOTE — PROGRESS NOTES
[facility]   Physical Therapy Treatment Note      Name: Shanthi Escoto  Clinic Number: [id number]    Therapy Diagnosis:   Encounter Diagnoses   Name Primary?    S/P lumbar spinal fusion Yes    Decreased range of motion     Muscle tightness        Physician: Dennis Flores MD    Visit Date: 12/29/2023    Physician: Dennis Flores MD         Physician Orders: PT Eval and Treat  Medical Diagnosis from Referral: Z98.1 (ICD-10-CM) - S/P lumbar spinal fusion  Evaluation Date: 12/6/2023  Authorization Period Expiration: 12/29/2023  Plan of Care Expiration: 3/30/2024                Progress Update: 1/6/2024               FOTO: 1 / 3   Visit # / Visits authorized: 5 / 20                          PRECAUTIONS: Standard Precautions and Orthopedic: Lumbar Fusion precautions      Time In: 1230  Time Out: 125  Total Appointment Time (timed & untimed codes): 55 minutes     PTA Visit #: 1/5       Subjective     Patient reports: doing alright, about the same so far.     She was compliant with home exercise program.  Response to previous treatment: no issues   Functional change: ongoing     Pain: 3/10  Location: bilateral back      Objective      Objective Measures updated at progress report unless specified.     Treatment     Shanthi received the treatments listed below:      therapeutic exercises to develop strength, endurance, ROM, flexibility, posture, and core stabilization for 10 minutes including:    Nustep x 10 mins    neuromuscular re-education activities to improve: Posture for 8 minutes. The following activities were included:    TrA sets with Physioball  2x10     Bridging x 10  SLRs  2x10 Bilateral     Standing gastroc stretch 2x30 secs Bilateral     therapeutic activities to improve functional performance for 38 minutes, including:    Seated HR/TR   3x10   Scapular squeezes  3x10  OH pulleys   (flexion/abduction)  x 2 mins each    Rows with red tb  2 x 10 - np  Standing hip abduction with  "support x 10 Bilateral    UBE 2/2 - np    SLRs  3x10  BKFOs red theraband 3x10 Bilateral      Hip adduction with ball  3x10   LAQs  2#  2x10 Bilateral    SAQs  2#  2x10 Bilateral   Step ups on 4" box 2 x 10 Bilateral     Seated marches 2#  x 20 Bilateral      Patient Education and Home Exercises       Education provided:   - home exercises, posture, exercise tolerance     Written Home Exercises Provided: Patient instructed to cont prior HEP. Exercises were reviewed and Shanthi was able to demonstrate them prior to the end of the session.  Shanthi demonstrated good  understanding of the education provided. See Electronic Medical Record under Patient Instructions for exercises provided during therapy sessions    Assessment     Shanthi is showing great progress with strength and endurance. She continues to have slight gait instability, requiring need for SPC, but is showing signs of reduced reliance on it. Continue progress as tolerated.     Shanthi Is progressing well towards her goals.   Patient prognosis is Good.     Patient will continue to benefit from skilled outpatient physical therapy to address the deficits listed in the problem list box on initial evaluation, provide pt/family education and to maximize pt's level of independence in the home and community environment.     Patient's spiritual, cultural and educational needs considered and pt agreeable to plan of care and goals.     Anticipated barriers to physical therapy: none     Goals:   SHORT TERM GOALS: 4 weeks 12/29/2023     Recent signs and systems trend is improving in order to progress towards LTG's.  Ongoing   Patient will be independent with HEP in order to further progress and return to maximal function.  Ongoing   Pain rating at Worst: 5/10 in order to progress towards increased independence with activity.  Ongoing   Patient will be able to correct postural deviations in sitting and standing, to decrease pain and promote postural awareness for " injury prevention.   Ongoing      LONG TERM GOALS: 8 weeks 12/29/2023     Patient will return to normal ADL, recreational, and work related activities with less pain and limitation.   Ongoing   Patient will improve AROM to stated goals in order to return to maximal functional potential.   Ongoing   Patient will improve Strength to stated goals of appropriate musculature in order to improve functional independence.   Ongoing   Pain Rating at Best: 1/10 to improve Quality of Life.   Ongoing   Patient will meet predicted functional outcome (FOTO) score: 80% to increase self-worth & perceived functional ability.  Ongoing   Patient will have met/partially met personal goal of being able to walk with no pain.  Ongoing            Plan     Continue per Plan of Care     Elo Melgoza, ISAEL

## 2024-01-09 ENCOUNTER — PATIENT MESSAGE (OUTPATIENT)
Dept: NEUROSURGERY | Facility: CLINIC | Age: 49
End: 2024-01-09
Payer: COMMERCIAL

## 2024-01-10 ENCOUNTER — CLINICAL SUPPORT (OUTPATIENT)
Dept: REHABILITATION | Facility: HOSPITAL | Age: 49
End: 2024-01-10
Payer: COMMERCIAL

## 2024-01-10 DIAGNOSIS — Z98.1 S/P LUMBAR SPINAL FUSION: Primary | ICD-10-CM

## 2024-01-10 DIAGNOSIS — M62.89 MUSCLE TIGHTNESS: ICD-10-CM

## 2024-01-10 DIAGNOSIS — M25.60 DECREASED RANGE OF MOTION: ICD-10-CM

## 2024-01-10 PROCEDURE — 97530 THERAPEUTIC ACTIVITIES: CPT | Mod: PN

## 2024-01-10 PROCEDURE — 97112 NEUROMUSCULAR REEDUCATION: CPT | Mod: PN

## 2024-01-10 PROCEDURE — 97110 THERAPEUTIC EXERCISES: CPT | Mod: PN

## 2024-01-10 NOTE — PROGRESS NOTES
[facility]   Physical Therapy Treatment Note      Name: Shanthi Escoto  Clinic Number: [id number]    Therapy Diagnosis:   Encounter Diagnoses   Name Primary?    S/P lumbar spinal fusion Yes    Decreased range of motion     Muscle tightness        Physician: Dennis Flores MD    Visit Date: 1/10/2024    Physician: Dennis Flores MD         Physician Orders: PT Eval and Treat  Medical Diagnosis from Referral: Z98.1 (ICD-10-CM) - S/P lumbar spinal fusion  Evaluation Date: 12/6/2023  Authorization Period Expiration: 12/29/2023  Plan of Care Expiration: 3/30/2024                Progress Update: 1/6/2024               FOTO: 1 / 3   Visit # / Visits authorized: 6 / 20                          PRECAUTIONS: Standard Precautions and Orthopedic: Lumbar Fusion precautions      Time In: 1655  (Pnt arrived early)  Time Out: 1740  Total Appointment Time (timed & untimed codes): 45 minutes     PTA Visit #: 1/5       Subjective     Patient reports: that she is doing great.  States that she has not used the cane today.  She was compliant with home exercise program.  Response to previous treatment: no issues   Functional change: ongoing     Pain: 2/10  Location: bilateral back      Objective      Objective Measures updated at progress report unless specified.     Treatment     Shanthi received the treatments listed below:      therapeutic exercises to develop strength, endurance, ROM, flexibility, posture, and core stabilization for 10 minutes including:    Nustep x 10 mins    neuromuscular re-education activities to improve: Posture for 8 minutes. The following activities were included:    TrA sets with Physioball  2x10     Bridging x 10  SLRs  2x10 Bilateral     Standing gastroc stretch 2x30 secs Bilateral     therapeutic activities to improve functional performance for 27 minutes, including:    Seated HR/TR   3x10   Scapular squeezes  3x10  OH pulleys   (flexion/abduction)  x 2 mins each    Rows  "with red tb  2 x 10 - np  Standing hip abduction with support x 10 Bilateral  - np  UBE 2/2 - np    SLRs  3x10  BKFOs green theraband 3x10 Bilateral      Hip adduction with ball  3x10   LAQs  2#  2x10 Bilateral    SAQs  2#  2x10 Bilateral   Step ups on 4" box 2 x 10 Bilateral     Seated marches 2#  x 20 Bilateral      Patient Education and Home Exercises       Education provided:   - home exercises, posture, exercise tolerance     Written Home Exercises Provided: Patient instructed to cont prior HEP. Exercises were reviewed and Shanthi was able to demonstrate them prior to the end of the session.  Shanthi demonstrated good  understanding of the education provided. See Electronic Medical Record under Patient Instructions for exercises provided during therapy sessions    Assessment     Shanthi is showing great progress with strength and endurance. She continues to have slight gait instability, requiring need for SPC, but is showing signs of reduced reliance on it. Continue progress as tolerated.     Shanthi Is progressing well towards her goals.   Patient prognosis is Good.     Patient will continue to benefit from skilled outpatient physical therapy to address the deficits listed in the problem list box on initial evaluation, provide pt/family education and to maximize pt's level of independence in the home and community environment.     Patient's spiritual, cultural and educational needs considered and pt agreeable to plan of care and goals.     Anticipated barriers to physical therapy: none     Goals:   SHORT TERM GOALS: 4 weeks 1/10/2024     Recent signs and systems trend is improving in order to progress towards LTG's.  Ongoing   Patient will be independent with HEP in order to further progress and return to maximal function.  Ongoing   Pain rating at Worst: 5/10 in order to progress towards increased independence with activity.  Ongoing   Patient will be able to correct postural deviations in sitting and " standing, to decrease pain and promote postural awareness for injury prevention.   Ongoing      LONG TERM GOALS: 8 weeks 1/10/2024     Patient will return to normal ADL, recreational, and work related activities with less pain and limitation.   Ongoing   Patient will improve AROM to stated goals in order to return to maximal functional potential.   Ongoing   Patient will improve Strength to stated goals of appropriate musculature in order to improve functional independence.   Ongoing   Pain Rating at Best: 1/10 to improve Quality of Life.   Ongoing   Patient will meet predicted functional outcome (FOTO) score: 80% to increase self-worth & perceived functional ability.  Ongoing   Patient will have met/partially met personal goal of being able to walk with no pain.  Ongoing            Plan     Continue per Plan of Care     Hilario Ellis, PT

## 2024-01-19 ENCOUNTER — DOCUMENTATION ONLY (OUTPATIENT)
Dept: REHABILITATION | Facility: HOSPITAL | Age: 49
End: 2024-01-19

## 2024-01-19 ENCOUNTER — CLINICAL SUPPORT (OUTPATIENT)
Dept: REHABILITATION | Facility: HOSPITAL | Age: 49
End: 2024-01-19
Payer: COMMERCIAL

## 2024-01-19 DIAGNOSIS — M62.89 MUSCLE TIGHTNESS: ICD-10-CM

## 2024-01-19 DIAGNOSIS — M25.60 DECREASED RANGE OF MOTION: ICD-10-CM

## 2024-01-19 DIAGNOSIS — Z98.1 S/P LUMBAR SPINAL FUSION: Primary | ICD-10-CM

## 2024-01-19 PROCEDURE — 97530 THERAPEUTIC ACTIVITIES: CPT | Mod: PN,CQ

## 2024-01-19 PROCEDURE — 97112 NEUROMUSCULAR REEDUCATION: CPT | Mod: PN,CQ

## 2024-01-19 PROCEDURE — 97110 THERAPEUTIC EXERCISES: CPT | Mod: PN,CQ

## 2024-01-19 NOTE — PROGRESS NOTES
[facility]   Physical Therapy Treatment Note      Name: Shanthi Escoto  Clinic Number: [id number]    Therapy Diagnosis:   Encounter Diagnoses   Name Primary?    S/P lumbar spinal fusion Yes    Decreased range of motion     Muscle tightness        Physician: Dennis Flores MD    Visit Date: 1/19/2024    Physician: Dennis Flores MD         Physician Orders: PT Eval and Treat  Medical Diagnosis from Referral: Z98.1 (ICD-10-CM) - S/P lumbar spinal fusion  Evaluation Date: 12/6/2023  Authorization Period Expiration: 12/29/2023  Plan of Care Expiration: 3/30/2024                Progress Update: 1/6/2024               FOTO: 1 / 3   Visit # / Visits authorized: 8 / 20                          PRECAUTIONS: Standard Precautions and Orthopedic: Lumbar Fusion precautions      Time In: 405p  Time Out: 459p  Total Appointment Time (timed & untimed codes): 54 minutes         Subjective     Patient reports: continues to ambulate without her cane  She was compliant with home exercise program.  Response to previous treatment: no issues   Functional change: ongoing     Pain: 1/10  Location: bilateral back      Objective      Objective Measures updated at progress report unless specified.     Treatment     Shanthi received the treatments listed below:      therapeutic exercises to develop strength, endurance, ROM, flexibility, posture, and core stabilization for 10 minutes including:    Nustep x 10 mins    neuromuscular re-education activities to improve: Posture for 29 minutes. The following activities were included:    TrA sets with Physioball  2x10     Bridging x 10 - toes up to decrease Low back pain   SLRs  2x10 Bilateral   Clamshell green theraband 3x10    Hip adduction with ball  3x10   NOT PERFORMED    LAQs  2#  2x10 Bilateral    SAQs  2#  2x10 Bilateral     Standing gastroc stretch 2x30 secs Bilateral     therapeutic activities to improve functional performance for 15 minutes,  "including:    Step ups on 6" box 2 x 10 Bilateral   Single Leg Stance 3 x 20 sec Bilateral     Seated HR/TR   3x10   Scapular squeezes  3x10  OH pulleys   (flexion/abduction)  x 2 mins each    Rows with red tb  2 x 10 - np  Standing hip abduction with support x 10 Bilateral  - np  UBE 2/2 - np    Seated marches 2#  x 20 Bilateral            Patient Education and Home Exercises       Education provided:   - home exercises, posture, exercise tolerance     Written Home Exercises Provided: Patient instructed to cont prior HEP. Exercises were reviewed and Shanthi was able to demonstrate them prior to the end of the session.  Shanthi demonstrated good  understanding of the education provided. See Electronic Medical Record under Patient Instructions for exercises provided during therapy sessions    Assessment     Shanthi presents with decreased endurance and pain with some exercises.  She was able to perform 10 bridges after she lifted up her toes.  Rest breaks needed.  She puts forth good effort with activity and is eager to return to her PLOF.      Shanthi Is progressing well towards her goals.   Patient prognosis is Good.     Patient will continue to benefit from skilled outpatient physical therapy to address the deficits listed in the problem list box on initial evaluation, provide pt/family education and to maximize pt's level of independence in the home and community environment.     Patient's spiritual, cultural and educational needs considered and pt agreeable to plan of care and goals.     Anticipated barriers to physical therapy: none     Goals:   SHORT TERM GOALS: 4 weeks 1/19/2024     Recent signs and systems trend is improving in order to progress towards LTG's.  Ongoing   Patient will be independent with HEP in order to further progress and return to maximal function.  Ongoing   Pain rating at Worst: 5/10 in order to progress towards increased independence with activity.  Ongoing   Patient will be able to " correct postural deviations in sitting and standing, to decrease pain and promote postural awareness for injury prevention.   Ongoing      LONG TERM GOALS: 8 weeks 1/19/2024     Patient will return to normal ADL, recreational, and work related activities with less pain and limitation.   Ongoing   Patient will improve AROM to stated goals in order to return to maximal functional potential.   Ongoing   Patient will improve Strength to stated goals of appropriate musculature in order to improve functional independence.   Ongoing   Pain Rating at Best: 1/10 to improve Quality of Life.   Ongoing   Patient will meet predicted functional outcome (FOTO) score: 80% to increase self-worth & perceived functional ability.  Ongoing   Patient will have met/partially met personal goal of being able to walk with no pain.  Ongoing            Plan     Continue per Plan of Care     Maritza Parker, PTA

## 2024-01-19 NOTE — PROGRESS NOTES
PT/PTA met face to face to discuss pt's treatment plan and progress towards established goals. Pt will be seen by a physical therapist minimally every 6th visit or every 30 days.    Maritza Parker PTA

## 2024-01-22 ENCOUNTER — CLINICAL SUPPORT (OUTPATIENT)
Dept: REHABILITATION | Facility: HOSPITAL | Age: 49
End: 2024-01-22
Payer: COMMERCIAL

## 2024-01-22 DIAGNOSIS — M25.60 DECREASED RANGE OF MOTION: ICD-10-CM

## 2024-01-22 DIAGNOSIS — Z98.1 S/P LUMBAR SPINAL FUSION: Primary | ICD-10-CM

## 2024-01-22 DIAGNOSIS — M62.89 MUSCLE TIGHTNESS: ICD-10-CM

## 2024-01-22 PROCEDURE — 97112 NEUROMUSCULAR REEDUCATION: CPT | Mod: PN

## 2024-01-22 PROCEDURE — 97110 THERAPEUTIC EXERCISES: CPT | Mod: PN

## 2024-01-22 PROCEDURE — 97530 THERAPEUTIC ACTIVITIES: CPT | Mod: PN

## 2024-01-22 NOTE — PROGRESS NOTES
[facility]   Physical Therapy Treatment Note      Name: Shanthi Escoto  Clinic Number: [id number]    Therapy Diagnosis:   Encounter Diagnoses   Name Primary?    S/P lumbar spinal fusion Yes    Decreased range of motion     Muscle tightness        Physician: Dennis Flores MD    Visit Date: 1/22/2024    Physician: Dennis Flores MD         Physician Orders: PT Eval and Treat  Medical Diagnosis from Referral: Z98.1 (ICD-10-CM) - S/P lumbar spinal fusion  Evaluation Date: 12/6/2023  Authorization Period Expiration: 12/29/2023  Plan of Care Expiration: 3/30/2024                Progress Update: 1/6/2024               FOTO: 1 / 3   Visit # / Visits authorized: 8 / 20                          PRECAUTIONS: Standard Precautions and Orthopedic: Lumbar Fusion precautions      Time In: 1558   (Pnt arrived early)  Time Out: 1657  Total Appointment Time (timed & untimed codes): 59 minutes         Subjective     Patient reports: that she is hurting today.    She was compliant with home exercise program.  Response to previous treatment: no issues   Functional change: ongoing     Pain: 1/10  Location: bilateral back      Objective      Objective Measures updated at progress report unless specified.     Treatment     Shanthi received the treatments listed below:      therapeutic exercises to develop strength, endurance, ROM, flexibility, posture, and core stabilization for 10 minutes including:    Nustep x 10 mins    neuromuscular re-education activities to improve: Posture for 38 minutes. The following activities were included:    TrA sets with Physioball  2x10     Bridging x 10 - toes up to decrease Low back pain - np  SLRs  2x10 Bilateral   Clamshell green theraband 3x10    Hip adduction with ball  3x10     LAQs  2#  2x10 Bilateral    SAQs  2#  2x10 Bilateral     Standing gastroc stretch 2x30 secs Bilateral - np    therapeutic activities to improve functional performance for 11 minutes,  "including:    Step ups on 6" box 2 x 10 Bilateral - np  Single Leg Stance 3 x 20 sec Bilateral - np    Seated HR/TR   3x10   Scapular squeezes  3x10  OH pulleys   (flexion/abduction)  x 2 mins each    Rows with red tb  2 x 10 - np  Standing hip abduction with support x 10 Bilateral  - np  UBE 2/2    Seated marches 2#  x 20 Bilateral        Patient Education and Home Exercises       Education provided:   - home exercises, posture, exercise tolerance     Written Home Exercises Provided: Patient instructed to cont prior HEP. Exercises were reviewed and Shanthi was able to demonstrate them prior to the end of the session.  Shanthi demonstrated good  understanding of the education provided. See Electronic Medical Record under Patient Instructions for exercises provided during therapy sessions    Assessment     Shanthi presents with decreased endurance and pain with some exercises.  Rest breaks needed.  She puts forth good effort with activity and is eager to return to her PLOF.      Shanthi Is progressing well towards her goals.   Patient prognosis is Good.     Patient will continue to benefit from skilled outpatient physical therapy to address the deficits listed in the problem list box on initial evaluation, provide pt/family education and to maximize pt's level of independence in the home and community environment.     Patient's spiritual, cultural and educational needs considered and pt agreeable to plan of care and goals.     Anticipated barriers to physical therapy: none     Goals:   SHORT TERM GOALS: 4 weeks 1/22/2024     Recent signs and systems trend is improving in order to progress towards LTG's.  Ongoing   Patient will be independent with HEP in order to further progress and return to maximal function.  Ongoing   Pain rating at Worst: 5/10 in order to progress towards increased independence with activity.  Ongoing   Patient will be able to correct postural deviations in sitting and standing, to decrease pain " and promote postural awareness for injury prevention.   Ongoing      LONG TERM GOALS: 8 weeks 1/22/2024     Patient will return to normal ADL, recreational, and work related activities with less pain and limitation.   Ongoing   Patient will improve AROM to stated goals in order to return to maximal functional potential.   Ongoing   Patient will improve Strength to stated goals of appropriate musculature in order to improve functional independence.   Ongoing   Pain Rating at Best: 1/10 to improve Quality of Life.   Ongoing   Patient will meet predicted functional outcome (FOTO) score: 80% to increase self-worth & perceived functional ability.  Ongoing   Patient will have met/partially met personal goal of being able to walk with no pain.  Ongoing            Plan     Continue per Plan of Care     Hilario Ellis, PT

## 2024-01-24 ENCOUNTER — CLINICAL SUPPORT (OUTPATIENT)
Dept: REHABILITATION | Facility: HOSPITAL | Age: 49
End: 2024-01-24
Payer: COMMERCIAL

## 2024-01-24 DIAGNOSIS — Z98.1 S/P LUMBAR SPINAL FUSION: Primary | ICD-10-CM

## 2024-01-24 DIAGNOSIS — M62.89 MUSCLE TIGHTNESS: ICD-10-CM

## 2024-01-24 DIAGNOSIS — M25.60 DECREASED RANGE OF MOTION: ICD-10-CM

## 2024-01-24 PROCEDURE — 97530 THERAPEUTIC ACTIVITIES: CPT | Mod: PN

## 2024-01-24 PROCEDURE — 97112 NEUROMUSCULAR REEDUCATION: CPT | Mod: PN

## 2024-01-24 PROCEDURE — 97110 THERAPEUTIC EXERCISES: CPT | Mod: PN

## 2024-02-07 ENCOUNTER — CLINICAL SUPPORT (OUTPATIENT)
Dept: REHABILITATION | Facility: HOSPITAL | Age: 49
End: 2024-02-07
Payer: COMMERCIAL

## 2024-02-07 DIAGNOSIS — Z98.1 S/P LUMBAR SPINAL FUSION: Primary | ICD-10-CM

## 2024-02-07 DIAGNOSIS — M62.89 MUSCLE TIGHTNESS: ICD-10-CM

## 2024-02-07 DIAGNOSIS — M25.60 DECREASED RANGE OF MOTION: ICD-10-CM

## 2024-02-07 PROCEDURE — 97112 NEUROMUSCULAR REEDUCATION: CPT | Mod: PN,CQ

## 2024-02-07 PROCEDURE — 97110 THERAPEUTIC EXERCISES: CPT | Mod: PN,CQ

## 2024-02-07 NOTE — PROGRESS NOTES
[facility]   Physical Therapy Treatment Note      Name: Shanthi Escoto  Clinic Number: [id number]    Therapy Diagnosis:   Encounter Diagnoses   Name Primary?    S/P lumbar spinal fusion Yes    Decreased range of motion     Muscle tightness        Physician: Dennis Flores MD    Visit Date: 2/7/2024    Physician: Dennis Flores MD         Physician Orders: PT Eval and Treat  Medical Diagnosis from Referral: Z98.1 (ICD-10-CM) - S/P lumbar spinal fusion  Evaluation Date: 12/6/2023  Authorization Period Expiration: 12/29/2023  Plan of Care Expiration: 3/30/2024                Progress Update: 1/6/2024               FOTO: 1 / 3   Visit # / Visits authorized: 9 / 20                          PRECAUTIONS: Standard Precautions and Orthopedic: Lumbar Fusion precautions      Time In: 352  Time Out: 433  Total Appointment Time (timed & untimed codes): 45 minutes      Subjective     Patient reports: today she is wobbly without the cane, but doing alright. She wishes she was stronger in her recovery.   She was compliant with home exercise program.  Response to previous treatment: no issues   Functional change: not wearing the brace anymore     Pain: 3/10  Location: bilateral back      Objective      Objective Measures updated at progress report unless specified.     Treatment     Shanthi received the treatments listed below:      therapeutic exercises to develop strength, endurance, ROM, flexibility, posture, and core stabilization for 10 minutes including:    Nustep x 10 mins    neuromuscular re-education activities to improve: Posture for 35 minutes. The following activities were included:    TrA sets with Physioball  2x10     Bridging x 10 - toes up to decrease Low back pain - np  SLRs  2x10 Bilateral   Clamshell green theraband 3x10      Seated HR/TR   3x10   Hip adduction with ball  3x10     LAQs  2#  2x10 Bilateral    SAQs  2#  2x10 Bilateral     Standing gastroc stretch 2x30 secs Bilateral  "    therapeutic activities to improve functional performance for 0 minutes, including:    Step ups on 6" box 2 x 10 Bilateral - np   Single Leg Stance 3 x 20 sec Bilateral - np    Scapular squeezes  3x10  OH pulleys   (flexion/abduction)  x 2 mins each    Rows with red tb  2 x 10 - np  Standing hip abduction with support x 10 Bilateral  - np  UBE 2/2 - np    Seated marches 2#  x 20 Bilateral        Patient Education and Home Exercises       Education provided:   - home exercises, posture, exercise tolerance     Written Home Exercises Provided: Patient instructed to cont prior HEP. Exercises were reviewed and Shanthi was able to demonstrate them prior to the end of the session.  Shanthi demonstrated good  understanding of the education provided. See Electronic Medical Record under Patient Instructions for exercises provided during therapy sessions    Assessment     Shanthi presents with decreased endurance and unstable gait without cane. Modified session due to overall fatigue, but good effort provided in shortened time. Standby assistance during gait within clinic per safety. Progress as able.     Shanthi Is progressing well towards her goals.   Patient prognosis is Good.     Patient will continue to benefit from skilled outpatient physical therapy to address the deficits listed in the problem list box on initial evaluation, provide pt/family education and to maximize pt's level of independence in the home and community environment.     Patient's spiritual, cultural and educational needs considered and pt agreeable to plan of care and goals.     Anticipated barriers to physical therapy: none     Goals:   SHORT TERM GOALS: 4 weeks 2/7/2024     Recent signs and systems trend is improving in order to progress towards LTG's.  Ongoing   Patient will be independent with HEP in order to further progress and return to maximal function.  Ongoing   Pain rating at Worst: 5/10 in order to progress towards increased independence " with activity.  Ongoing   Patient will be able to correct postural deviations in sitting and standing, to decrease pain and promote postural awareness for injury prevention.   Ongoing      LONG TERM GOALS: 8 weeks 2/7/2024     Patient will return to normal ADL, recreational, and work related activities with less pain and limitation.   Ongoing   Patient will improve AROM to stated goals in order to return to maximal functional potential.   Ongoing   Patient will improve Strength to stated goals of appropriate musculature in order to improve functional independence.   Ongoing   Pain Rating at Best: 1/10 to improve Quality of Life.   Ongoing   Patient will meet predicted functional outcome (FOTO) score: 80% to increase self-worth & perceived functional ability.  Ongoing   Patient will have met/partially met personal goal of being able to walk with no pain.  Ongoing            Plan     Continue per Plan of Care     Elo Melgoza, PTA

## 2024-02-20 ENCOUNTER — CLINICAL SUPPORT (OUTPATIENT)
Dept: REHABILITATION | Facility: HOSPITAL | Age: 49
End: 2024-02-20
Payer: COMMERCIAL

## 2024-02-20 DIAGNOSIS — M62.89 MUSCLE TIGHTNESS: ICD-10-CM

## 2024-02-20 DIAGNOSIS — Z98.1 S/P LUMBAR SPINAL FUSION: Primary | ICD-10-CM

## 2024-02-20 DIAGNOSIS — M25.60 DECREASED RANGE OF MOTION: ICD-10-CM

## 2024-02-20 PROCEDURE — 97530 THERAPEUTIC ACTIVITIES: CPT | Mod: PN

## 2024-02-20 PROCEDURE — 97112 NEUROMUSCULAR REEDUCATION: CPT | Mod: PN

## 2024-02-20 PROCEDURE — 97110 THERAPEUTIC EXERCISES: CPT | Mod: PN

## 2024-02-20 NOTE — PROGRESS NOTES
[facility]   Physical Therapy Treatment Note      Name: Shanthi Escoto  Clinic Number: [id number]    Therapy Diagnosis:   Encounter Diagnoses   Name Primary?    S/P lumbar spinal fusion Yes    Decreased range of motion     Muscle tightness        Physician: Dennis Flores MD    Visit Date: 2/20/2024    Physician: Dennis Flores MD         Physician Orders: PT Eval and Treat  Medical Diagnosis from Referral: Z98.1 (ICD-10-CM) - S/P lumbar spinal fusion  Evaluation Date: 12/6/2023  Authorization Period Expiration: 12/29/2023  Plan of Care Expiration: 3/30/2024                Progress Update: 1/6/2024               FOTO: 1 / 3   Visit # / Visits authorized: 9 / 20                          PRECAUTIONS: Standard Precautions and Orthopedic: Lumbar Fusion precautions      Time In: 1650  (Pnt arrived early)  Time Out: 1738  Total Appointment Time (timed & untimed codes): 48 minutes      Subjective     Patient reports: today she is wobbly without the cane, but doing alright. States that she feels more numbness today.  She was compliant with home exercise program.  Response to previous treatment: no issues   Functional change: not wearing the brace anymore     Pain: 4/10  Location: bilateral back      Objective      Objective Measures updated at progress report unless specified.     Treatment     Shanthi received the treatments listed below:      therapeutic exercises to develop strength, endurance, ROM, flexibility, posture, and core stabilization for 10 minutes including:    Nustep x 10 mins    neuromuscular re-education activities to improve: Posture for 30 minutes. The following activities were included:    TrA sets with Physioball  2x10     Bridging x 10 - toes up to decrease Low back pain - np  SLRs  2x10 Bilateral   Clamshell green theraband 3x10      Seated HR/TR   3x10   Hip adduction with ball  3x10     LAQs  2#  2x10 Bilateral    SAQs  2#  2x10 Bilateral     Standing gastroc  "stretch 2x30 secs Bilateral     therapeutic activities to improve functional performance for 8 minutes, including:    Step ups on 6" box 2 x 10 Bilateral - np   Single Leg Stance 3 x 20 sec Bilateral - np    Scapular squeezes  3x10  OH pulleys   (flexion/abduction)  x 2 mins each  - np  Rows with red tb  2 x 10 - np  Standing hip abduction with support x 10 Bilateral  - np  UBE 3/3    Seated marches 2#  x 20 Bilateral        Patient Education and Home Exercises       Education provided:   - home exercises, posture, exercise tolerance     Written Home Exercises Provided: Patient instructed to cont prior HEP. Exercises were reviewed and Shanthi was able to demonstrate them prior to the end of the session.  Shanthi demonstrated good  understanding of the education provided. See Electronic Medical Record under Patient Instructions for exercises provided during therapy sessions    Assessment     Shanthi presents with decreased endurance and unstable gait without cane. Modified session due to overall fatigue, but good effort provided in shortened time. Standby assistance during gait within clinic per safety. Progress as able.     Shanthi Is progressing well towards her goals.   Patient prognosis is Good.     Patient will continue to benefit from skilled outpatient physical therapy to address the deficits listed in the problem list box on initial evaluation, provide pt/family education and to maximize pt's level of independence in the home and community environment.     Patient's spiritual, cultural and educational needs considered and pt agreeable to plan of care and goals.     Anticipated barriers to physical therapy: none     Goals:   SHORT TERM GOALS: 4 weeks 2/20/2024     Recent signs and systems trend is improving in order to progress towards LTG's.  Ongoing   Patient will be independent with HEP in order to further progress and return to maximal function.  Ongoing   Pain rating at Worst: 5/10 in order to progress " towards increased independence with activity.  Ongoing   Patient will be able to correct postural deviations in sitting and standing, to decrease pain and promote postural awareness for injury prevention.   Ongoing      LONG TERM GOALS: 8 weeks 2/20/2024     Patient will return to normal ADL, recreational, and work related activities with less pain and limitation.   Ongoing   Patient will improve AROM to stated goals in order to return to maximal functional potential.   Ongoing   Patient will improve Strength to stated goals of appropriate musculature in order to improve functional independence.   Ongoing   Pain Rating at Best: 1/10 to improve Quality of Life.   Ongoing   Patient will meet predicted functional outcome (FOTO) score: 80% to increase self-worth & perceived functional ability.  Ongoing   Patient will have met/partially met personal goal of being able to walk with no pain.  Ongoing            Plan     Continue per Plan of Care     Hilario Ellis, PT

## 2024-03-12 ENCOUNTER — CLINICAL SUPPORT (OUTPATIENT)
Dept: REHABILITATION | Facility: HOSPITAL | Age: 49
End: 2024-03-12
Payer: COMMERCIAL

## 2024-03-12 DIAGNOSIS — Z98.1 S/P LUMBAR SPINAL FUSION: Primary | ICD-10-CM

## 2024-03-12 DIAGNOSIS — M25.60 DECREASED RANGE OF MOTION: ICD-10-CM

## 2024-03-12 DIAGNOSIS — M62.89 MUSCLE TIGHTNESS: ICD-10-CM

## 2024-03-12 PROCEDURE — 97110 THERAPEUTIC EXERCISES: CPT | Mod: PN

## 2024-03-12 PROCEDURE — 97112 NEUROMUSCULAR REEDUCATION: CPT | Mod: PN

## 2024-03-12 PROCEDURE — 97530 THERAPEUTIC ACTIVITIES: CPT | Mod: PN

## 2024-03-12 NOTE — PROGRESS NOTES
[facility]   Physical Therapy Treatment Note      Name: Shanthi Escoto  Clinic Number: [id number]    Therapy Diagnosis:   Encounter Diagnoses   Name Primary?    S/P lumbar spinal fusion Yes    Decreased range of motion     Muscle tightness        Physician: Dennis Flores MD    Visit Date: 3/12/2024    Physician: Dennis Flores MD         Physician Orders: PT Eval and Treat  Medical Diagnosis from Referral: Z98.1 (ICD-10-CM) - S/P lumbar spinal fusion  Evaluation Date: 12/6/2023  Authorization Period Expiration: 12/29/2023  Plan of Care Expiration: 3/30/2024                Progress Update: 1/6/2024               FOTO: 1 / 3   Visit # / Visits authorized: 9 / 20                          PRECAUTIONS: Standard Precautions and Orthopedic: Lumbar Fusion precautions      Time In: 1648  (Pnt arrived early)  Time Out: 1743  Total Appointment Time (timed & untimed codes): 48 minutes      Subjective     Patient reports: that she feels like she has been getting worse and hasn't been able to make it in clinic due to work obligations.  She was compliant with home exercise program.  Response to previous treatment: no issues   Functional change: not wearing the brace anymore     Pain: 4/10  Location: bilateral back      Objective      Objective Measures updated at progress report unless specified.     Treatment     Shanthi received the treatments listed below:      therapeutic exercises to develop strength, endurance, ROM, flexibility, posture, and core stabilization for 10 minutes including:    Nustep x 5 mins  Bike x  5mins    neuromuscular re-education activities to improve: Posture for 30 minutes. The following activities were included:    TrA sets with Physioball  2x10     SLRs  2x10 Bilateral   Clamshell green theraband 3x10      Seated HR/TR   3x10   Hip adduction with ball  3x10     LAQs  2#  2x10 Bilateral    SAQs  2#  2x10 Bilateral     Standing gastroc stretch 2x30 secs Bilateral  "    therapeutic activities to improve functional performance for 8 minutes, including:    Step ups on 6" box 2 x 10 Bilateral - np   Single Leg Stance 3 x 20 sec Bilateral - np    Scapular squeezes  3x10  OH pulleys   (flexion/abduction)  x 2 mins each  - np  Rows with red tb  2 x 10 - np  Standing hip abduction with support x 10 Bilateral  - np  UBE 3/3    Seated marches 2#  x 20 Bilateral        Patient Education and Home Exercises       Education provided:   - home exercises, posture, exercise tolerance     Written Home Exercises Provided: Patient instructed to cont prior HEP. Exercises were reviewed and Shanthi was able to demonstrate them prior to the end of the session.  Shanthi demonstrated good  understanding of the education provided. See Electronic Medical Record under Patient Instructions for exercises provided during therapy sessions    Assessment     Shanthi presents with decreased endurance and unstable gait without cane. Modified session due to overall fatigue, but good effort provided in shortened time. Standby assistance during gait within clinic per safety. Progress as able.     Shanthi Is progressing well towards her goals.   Patient prognosis is Good.     Patient will continue to benefit from skilled outpatient physical therapy to address the deficits listed in the problem list box on initial evaluation, provide pt/family education and to maximize pt's level of independence in the home and community environment.     Patient's spiritual, cultural and educational needs considered and pt agreeable to plan of care and goals.     Anticipated barriers to physical therapy: none     Goals:   SHORT TERM GOALS: 4 weeks 3/12/2024     Recent signs and systems trend is improving in order to progress towards LTG's.  Ongoing   Patient will be independent with HEP in order to further progress and return to maximal function.  Ongoing   Pain rating at Worst: 5/10 in order to progress towards increased independence " with activity.  Ongoing   Patient will be able to correct postural deviations in sitting and standing, to decrease pain and promote postural awareness for injury prevention.   Ongoing      LONG TERM GOALS: 8 weeks 3/12/2024     Patient will return to normal ADL, recreational, and work related activities with less pain and limitation.   Ongoing   Patient will improve AROM to stated goals in order to return to maximal functional potential.   Ongoing   Patient will improve Strength to stated goals of appropriate musculature in order to improve functional independence.   Ongoing   Pain Rating at Best: 1/10 to improve Quality of Life.   Ongoing   Patient will meet predicted functional outcome (FOTO) score: 80% to increase self-worth & perceived functional ability.  Ongoing   Patient will have met/partially met personal goal of being able to walk with no pain.  Ongoing            Plan     Continue per Plan of Care     Hilario Ellis, PT

## 2024-03-18 ENCOUNTER — CLINICAL SUPPORT (OUTPATIENT)
Dept: REHABILITATION | Facility: HOSPITAL | Age: 49
End: 2024-03-18
Payer: COMMERCIAL

## 2024-03-18 DIAGNOSIS — M62.89 MUSCLE TIGHTNESS: ICD-10-CM

## 2024-03-18 DIAGNOSIS — M25.60 DECREASED RANGE OF MOTION: ICD-10-CM

## 2024-03-18 DIAGNOSIS — Z98.1 S/P LUMBAR SPINAL FUSION: Primary | ICD-10-CM

## 2024-03-18 PROCEDURE — 97110 THERAPEUTIC EXERCISES: CPT | Mod: PN

## 2024-03-18 PROCEDURE — 97530 THERAPEUTIC ACTIVITIES: CPT | Mod: PN

## 2024-03-18 PROCEDURE — 97112 NEUROMUSCULAR REEDUCATION: CPT | Mod: PN

## 2024-03-18 NOTE — PROGRESS NOTES
[facility]   Physical Therapy Treatment Note      Name: Shanthi Escoto  Clinic Number: [id number]    Therapy Diagnosis:   Encounter Diagnoses   Name Primary?    S/P lumbar spinal fusion Yes    Decreased range of motion     Muscle tightness        Physician: Dennis Flores MD    Visit Date: 3/18/2024    Physician: Dennis Flores MD         Physician Orders: PT Eval and Treat  Medical Diagnosis from Referral: Z98.1 (ICD-10-CM) - S/P lumbar spinal fusion  Evaluation Date: 12/6/2023  Authorization Period Expiration: 12/29/2023  Plan of Care Expiration: 3/30/2024                Progress Update: 1/6/2024               FOTO: 1 / 3   Visit # / Visits authorized: 9 / 20                          PRECAUTIONS: Standard Precautions and Orthopedic: Lumbar Fusion precautions      Time In: 1655   (Pnt arrived early)  Time Out: 1750  Total Appointment Time (timed & untimed codes): 55 minutes      Subjective     Patient reports: that she feels ok today, no complaints.  She was compliant with home exercise program.  Response to previous treatment: no issues   Functional change: not wearing the brace anymore     Pain: 2/10  Location: bilateral back      Objective      Objective Measures updated at progress report unless specified.     Treatment     Shanthi received the treatments listed below:      therapeutic exercises to develop strength, endurance, ROM, flexibility, posture, and core stabilization for 10 minutes including:    Nustep x 10 mins    neuromuscular re-education activities to improve: Posture for 30 minutes. The following activities were included:    TrA sets with Physioball  2x10     SLRs  2x10 Bilateral   Clamshell green theraband 3x10      Seated HR/TR   3x10   Hip adduction with ball  3x10     LAQs  2#  2x10 Bilateral    SAQs  2#  2x10 Bilateral     Standing gastroc stretch 2x30 secs Bilateral     therapeutic activities to improve functional performance for 8 minutes, including:Step  "ups on 6" box 2 x 10 Bilateral - np   Single Leg Stance 3 x 20 sec Bilateral - np    Scapular squeezes  3x10  OH pulleys   (flexion/abduction)  x 2 mins each  - np  Rows with red tb  2 x 10 - np  Standing hip abduction with support x 10 Bilateral  - np  UBE 3/3    Seated marches 2#  x 20 Bilateral        Patient Education and Home Exercises       Education provided:   - home exercises, posture, exercise tolerance     Written Home Exercises Provided: Patient instructed to cont prior HEP. Exercises were reviewed and Shanthi was able to demonstrate them prior to the end of the session.  Shanthi demonstrated good  understanding of the education provided. See Electronic Medical Record under Patient Instructions for exercises provided during therapy sessions    Assessment     Shanthi presents with decreased endurance and unstable gait without cane. Modified session due to overall fatigue, but good effort provided in shortened time. Standby assistance during gait within clinic per safety. Progress as able.     Shanthi Is progressing well towards her goals.   Patient prognosis is Good.     Patient will continue to benefit from skilled outpatient physical therapy to address the deficits listed in the problem list box on initial evaluation, provide pt/family education and to maximize pt's level of independence in the home and community environment.     Patient's spiritual, cultural and educational needs considered and pt agreeable to plan of care and goals.     Anticipated barriers to physical therapy: none     Goals:   SHORT TERM GOALS: 4 weeks 3/18/2024     Recent signs and systems trend is improving in order to progress towards LTG's.  Ongoing   Patient will be independent with HEP in order to further progress and return to maximal function.  Ongoing   Pain rating at Worst: 5/10 in order to progress towards increased independence with activity.  Ongoing   Patient will be able to correct postural deviations in sitting and " standing, to decrease pain and promote postural awareness for injury prevention.   Ongoing      LONG TERM GOALS: 8 weeks 3/18/2024     Patient will return to normal ADL, recreational, and work related activities with less pain and limitation.   Ongoing   Patient will improve AROM to stated goals in order to return to maximal functional potential.   Ongoing   Patient will improve Strength to stated goals of appropriate musculature in order to improve functional independence.   Ongoing   Pain Rating at Best: 1/10 to improve Quality of Life.   Ongoing   Patient will meet predicted functional outcome (FOTO) score: 80% to increase self-worth & perceived functional ability.  Ongoing   Patient will have met/partially met personal goal of being able to walk with no pain.  Ongoing            Plan     Continue per Plan of Care     Hilario Ellis, PT

## 2024-03-20 ENCOUNTER — CLINICAL SUPPORT (OUTPATIENT)
Dept: REHABILITATION | Facility: HOSPITAL | Age: 49
End: 2024-03-20
Payer: COMMERCIAL

## 2024-03-20 DIAGNOSIS — Z98.1 S/P LUMBAR SPINAL FUSION: Primary | ICD-10-CM

## 2024-03-20 DIAGNOSIS — M62.89 MUSCLE TIGHTNESS: ICD-10-CM

## 2024-03-20 DIAGNOSIS — M25.60 DECREASED RANGE OF MOTION: ICD-10-CM

## 2024-03-20 PROCEDURE — 97112 NEUROMUSCULAR REEDUCATION: CPT | Mod: PN

## 2024-03-20 PROCEDURE — 97530 THERAPEUTIC ACTIVITIES: CPT | Mod: PN

## 2024-03-20 PROCEDURE — 97110 THERAPEUTIC EXERCISES: CPT | Mod: PN

## 2024-03-20 NOTE — PROGRESS NOTES
[facility]   Physical Therapy Treatment Note      Name: Shanthi Escoto  Clinic Number: [id number]    Therapy Diagnosis:   Encounter Diagnoses   Name Primary?    S/P lumbar spinal fusion Yes    Decreased range of motion     Muscle tightness        Physician: Dennis Flores MD    Visit Date: 3/20/2024    Physician: Dennis Flores MD         Physician Orders: PT Eval and Treat  Medical Diagnosis from Referral: Z98.1 (ICD-10-CM) - S/P lumbar spinal fusion  Evaluation Date: 12/6/2023  Authorization Period Expiration: 12/29/2023  Plan of Care Expiration: 3/30/2024                Progress Update: 1/6/2024               FOTO: 1 / 3   Visit # / Visits authorized: 9 / 20                          PRECAUTIONS: Standard Precautions and Orthopedic: Lumbar Fusion precautions      Time In: 1700   (Pnt arrived early)  Time Out: 1754  Total Appointment Time (timed & untimed codes): 54 minutes      Subjective     Patient reports: that she feels ok today, no complaints.  She was compliant with home exercise program.  Response to previous treatment: no issues   Functional change: not wearing the brace anymore     Pain: 2/10  Location: bilateral back      Objective      Objective Measures updated at progress report unless specified.     Treatment     Shanthi received the treatments listed below:      therapeutic exercises to develop strength, endurance, ROM, flexibility, posture, and core stabilization for 8 minutes including:    LAQs  3#  2x10 Bilateral    SAQs  3#  2x10 Bilateral     neuromuscular re-education activities to improve: Posture for 38 minutes. The following activities were included:    Nustep x 15 mins  TrA sets with Physioball  2x10     SLRs  2x10 Bilateral   Clamshell green theraband 3x10      Seated HR/TR   3x10   Hip adduction with ball  3x10     UBE 3/3  Standing gastroc stretch 2x30 secs Bilateral     therapeutic activities to improve functional performance for 8 minutes,  including:  Single Leg Stance 3 x 20 sec Bilateral - np    Scapular squeezes  3x10  OH pulleys   (flexion/abduction)  x 3 mins each    Rows with red tb  2 x 10 - np  Standing hip abduction with support x 10 Bilateral  - np      Seated marches 3#  x 20 Bilateral        Patient Education and Home Exercises       Education provided:   - home exercises, posture, exercise tolerance     Written Home Exercises Provided: Patient instructed to cont prior HEP. Exercises were reviewed and Shanthi was able to demonstrate them prior to the end of the session.  Shanthi demonstrated good  understanding of the education provided. See Electronic Medical Record under Patient Instructions for exercises provided during therapy sessions    Assessment     Shanthi presents with decreased endurance and unstable gait without cane. Modified session due to overall fatigue, but good effort provided in shortened time. Standby assistance during gait within clinic per safety. Progress as able.     Shanthi Is progressing well towards her goals.   Patient prognosis is Good.     Patient will continue to benefit from skilled outpatient physical therapy to address the deficits listed in the problem list box on initial evaluation, provide pt/family education and to maximize pt's level of independence in the home and community environment.     Patient's spiritual, cultural and educational needs considered and pt agreeable to plan of care and goals.     Anticipated barriers to physical therapy: none     Goals:   SHORT TERM GOALS: 4 weeks 3/20/2024     Recent signs and systems trend is improving in order to progress towards LTG's.  Ongoing   Patient will be independent with HEP in order to further progress and return to maximal function.  Ongoing   Pain rating at Worst: 5/10 in order to progress towards increased independence with activity.  Ongoing   Patient will be able to correct postural deviations in sitting and standing, to decrease pain and promote  postural awareness for injury prevention.   Ongoing      LONG TERM GOALS: 8 weeks 3/20/2024     Patient will return to normal ADL, recreational, and work related activities with less pain and limitation.   Ongoing   Patient will improve AROM to stated goals in order to return to maximal functional potential.   Ongoing   Patient will improve Strength to stated goals of appropriate musculature in order to improve functional independence.   Ongoing   Pain Rating at Best: 1/10 to improve Quality of Life.   Ongoing   Patient will meet predicted functional outcome (FOTO) score: 80% to increase self-worth & perceived functional ability.  Ongoing   Patient will have met/partially met personal goal of being able to walk with no pain.  Ongoing            Plan     Continue per Plan of Care     Hilario Ellis, PT

## 2024-03-25 ENCOUNTER — CLINICAL SUPPORT (OUTPATIENT)
Dept: REHABILITATION | Facility: HOSPITAL | Age: 49
End: 2024-03-25
Payer: COMMERCIAL

## 2024-03-25 DIAGNOSIS — Z98.1 S/P LUMBAR SPINAL FUSION: Primary | ICD-10-CM

## 2024-03-25 DIAGNOSIS — M25.60 DECREASED RANGE OF MOTION: ICD-10-CM

## 2024-03-25 DIAGNOSIS — M62.89 MUSCLE TIGHTNESS: ICD-10-CM

## 2024-03-25 PROCEDURE — 97112 NEUROMUSCULAR REEDUCATION: CPT | Mod: PN

## 2024-03-25 PROCEDURE — 97530 THERAPEUTIC ACTIVITIES: CPT | Mod: PN

## 2024-03-25 PROCEDURE — 97110 THERAPEUTIC EXERCISES: CPT | Mod: PN

## 2024-03-25 NOTE — PROGRESS NOTES
[facility]   Physical Therapy Treatment Note      Name: Shanthi Escoto  Clinic Number: [id number]    Therapy Diagnosis:   Encounter Diagnoses   Name Primary?    S/P lumbar spinal fusion Yes    Decreased range of motion     Muscle tightness        Physician: Dennis Flores MD    Visit Date: 3/25/2024    Physician: Dennis Flores MD         Physician Orders: PT Eval and Treat  Medical Diagnosis from Referral: Z98.1 (ICD-10-CM) - S/P lumbar spinal fusion  Evaluation Date: 12/6/2023  Authorization Period Expiration: 12/29/2023  Plan of Care Expiration: 3/30/2024                Progress Update: 1/6/2024               FOTO: 1 / 3   Visit # / Visits authorized: 10 / 20                          PRECAUTIONS: Standard Precautions and Orthopedic: Lumbar Fusion precautions      Time In: 1600   (Pnt arrived early)  Time Out: 1654  Total Appointment Time (timed & untimed codes): 54 minutes      Subjective     Patient reports: that she feels ok today, no complaints.  She was compliant with home exercise program.  Response to previous treatment: no issues   Functional change: not wearing the brace anymore     Pain: 2/10  Location: bilateral back      Objective      Objective Measures updated at progress report unless specified.     Treatment     Shanthi received the treatments listed below:      therapeutic exercises to develop strength, endurance, ROM, flexibility, posture, and core stabilization for 8 minutes including:    LAQs  3#  2x10 Bilateral    SAQs  4#  2x10 Bilateral     neuromuscular re-education activities to improve: Posture for 38 minutes. The following activities were included:    Nustep x 15 mins  TrA sets with Physioball  2x10     SLRs  3x10 Bilateral   Clamshell green theraband 3x10      Seated HR/TR   3x10   Hip adduction with ball  3x10     UBE 3/3  Standing gastroc stretch 2x30 secs Bilateral     therapeutic activities to improve functional performance for 8 minutes,  including:    Single Leg Stance 3 x 20 sec Bilateral - np    Scapular squeezes  3x10  OH pulleys   (flexion/abduction)  x 3 mins each    Rows with red tb  2 x 10 - np  Standing hip abduction with support x 10 Bilateral  - np      Seated marches 3#  x 20 Bilateral        Patient Education and Home Exercises       Education provided:   - home exercises, posture, exercise tolerance     Written Home Exercises Provided: Patient instructed to cont prior HEP. Exercises were reviewed and Shanthi was able to demonstrate them prior to the end of the session.  Shanthi demonstrated good  understanding of the education provided. See Electronic Medical Record under Patient Instructions for exercises provided during therapy sessions    Assessment     Shanthi presents with decreased endurance and unstable gait without cane. Modified session due to overall fatigue, but good effort provided in shortened time. Standby assistance during gait within clinic per safety. Progress as able.     Shanthi Is progressing well towards her goals.   Patient prognosis is Good.     Patient will continue to benefit from skilled outpatient physical therapy to address the deficits listed in the problem list box on initial evaluation, provide pt/family education and to maximize pt's level of independence in the home and community environment.     Patient's spiritual, cultural and educational needs considered and pt agreeable to plan of care and goals.     Anticipated barriers to physical therapy: none     Goals:   SHORT TERM GOALS: 4 weeks 3/25/2024     Recent signs and systems trend is improving in order to progress towards LTG's.  Ongoing   Patient will be independent with HEP in order to further progress and return to maximal function.  Ongoing   Pain rating at Worst: 5/10 in order to progress towards increased independence with activity.  Ongoing   Patient will be able to correct postural deviations in sitting and standing, to decrease pain and  promote postural awareness for injury prevention.   Ongoing      LONG TERM GOALS: 8 weeks 3/25/2024     Patient will return to normal ADL, recreational, and work related activities with less pain and limitation.   Ongoing   Patient will improve AROM to stated goals in order to return to maximal functional potential.   Ongoing   Patient will improve Strength to stated goals of appropriate musculature in order to improve functional independence.   Ongoing   Pain Rating at Best: 1/10 to improve Quality of Life.   Ongoing   Patient will meet predicted functional outcome (FOTO) score: 80% to increase self-worth & perceived functional ability.  Ongoing   Patient will have met/partially met personal goal of being able to walk with no pain.  Ongoing            Plan     Continue per Plan of Care     Hilario Ellis, PT

## 2024-03-26 ENCOUNTER — PATIENT MESSAGE (OUTPATIENT)
Dept: NEUROSURGERY | Facility: CLINIC | Age: 49
End: 2024-03-26
Payer: COMMERCIAL

## 2024-03-27 ENCOUNTER — CLINICAL SUPPORT (OUTPATIENT)
Dept: REHABILITATION | Facility: HOSPITAL | Age: 49
End: 2024-03-27
Payer: COMMERCIAL

## 2024-03-27 DIAGNOSIS — M25.60 DECREASED RANGE OF MOTION: ICD-10-CM

## 2024-03-27 DIAGNOSIS — Z98.1 S/P LUMBAR SPINAL FUSION: Primary | ICD-10-CM

## 2024-03-27 DIAGNOSIS — M62.89 MUSCLE TIGHTNESS: ICD-10-CM

## 2024-03-27 PROCEDURE — 97110 THERAPEUTIC EXERCISES: CPT | Mod: PN

## 2024-03-27 PROCEDURE — 97530 THERAPEUTIC ACTIVITIES: CPT | Mod: PN

## 2024-03-27 PROCEDURE — 97112 NEUROMUSCULAR REEDUCATION: CPT | Mod: PN

## 2024-04-01 ENCOUNTER — CLINICAL SUPPORT (OUTPATIENT)
Dept: REHABILITATION | Facility: HOSPITAL | Age: 49
End: 2024-04-01
Payer: COMMERCIAL

## 2024-04-01 DIAGNOSIS — Z98.1 S/P LUMBAR SPINAL FUSION: Primary | ICD-10-CM

## 2024-04-01 DIAGNOSIS — M25.60 DECREASED RANGE OF MOTION: ICD-10-CM

## 2024-04-01 DIAGNOSIS — M62.89 MUSCLE TIGHTNESS: ICD-10-CM

## 2024-04-01 PROCEDURE — 97112 NEUROMUSCULAR REEDUCATION: CPT | Mod: PN

## 2024-04-01 PROCEDURE — 97530 THERAPEUTIC ACTIVITIES: CPT | Mod: PN

## 2024-04-01 PROCEDURE — 97110 THERAPEUTIC EXERCISES: CPT | Mod: PN

## 2024-04-01 NOTE — PROGRESS NOTES
[facility]   Physical Therapy Treatment Note      Name: Shanthi Escoto  Clinic Number: [id number]    Therapy Diagnosis:   Encounter Diagnoses   Name Primary?    S/P lumbar spinal fusion Yes    Decreased range of motion     Muscle tightness        Physician: Dennis Flores MD    Visit Date: 4/1/2024    Physician: Dennis Flores MD         Physician Orders: PT Eval and Treat  Medical Diagnosis from Referral: Z98.1 (ICD-10-CM) - S/P lumbar spinal fusion  Evaluation Date: 12/6/2023  Authorization Period Expiration: 12/29/2023  Plan of Care Expiration: 3/30/2024                Progress Update: 1/6/2024               FOTO: 1 / 3   Visit # / Visits authorized: 12 / 20                          PRECAUTIONS: Standard Precautions and Orthopedic: Lumbar Fusion precautions      Time In: 1553   (Pnt arrived early)  Time Out: 1647  Total Appointment Time (timed & untimed codes): 54 minutes      Subjective     Patient reports: that she feels ok today, no complaints.  She was compliant with home exercise program.  Response to previous treatment: no issues   Functional change: not wearing the brace anymore     Pain: 3/10  Location: bilateral back      Objective      Objective Measures updated at progress report unless specified.     Treatment     Shanthi received the treatments listed below:      therapeutic exercises to develop strength, endurance, ROM, flexibility, posture, and core stabilization for 8 minutes including:    LAQs  3#  2x10 Bilateral    SAQs  4#  2x10 Bilateral     neuromuscular re-education activities to improve: Posture for 38 minutes. The following activities were included:    Nustep x 17 mins  TrA sets with Physioball  2x10     SLRs  3x10 Bilateral   Clamshell green theraband 3x10      Seated HR/TR   3x10   Hip adduction with ball  3x10     UBE 3/3  Standing gastroc stretch 2x30 secs Bilateral     therapeutic activities to improve functional performance for 8 minutes,  including:    Single Leg Stance 3 x 20 sec Bilateral - np    Scapular squeezes  3x10  OH pulleys   (flexion/abduction)  x 3 mins each    Rows with red tb  2 x 10 - np  Standing hip abduction with support x 10 Bilateral  - np    Seated marches 3#  x 20 Bilateral        Patient Education and Home Exercises       Education provided:   - home exercises, posture, exercise tolerance     Written Home Exercises Provided: Patient instructed to cont prior HEP. Exercises were reviewed and Shanthi was able to demonstrate them prior to the end of the session.  Shanthi demonstrated good  understanding of the education provided. See Electronic Medical Record under Patient Instructions for exercises provided during therapy sessions    Assessment     Shanthi presents with decreased endurance and unstable gait without cane. Modified session due to overall fatigue, but good effort provided in shortened time. Standby assistance during gait within clinic per safety. Progress as able.     Shanthi Is progressing well towards her goals.   Patient prognosis is Good.     Patient will continue to benefit from skilled outpatient physical therapy to address the deficits listed in the problem list box on initial evaluation, provide pt/family education and to maximize pt's level of independence in the home and community environment.     Patient's spiritual, cultural and educational needs considered and pt agreeable to plan of care and goals.     Anticipated barriers to physical therapy: none     Goals:   SHORT TERM GOALS: 4 weeks 4/1/2024     Recent signs and systems trend is improving in order to progress towards LTG's.  Ongoing   Patient will be independent with HEP in order to further progress and return to maximal function.  Ongoing   Pain rating at Worst: 5/10 in order to progress towards increased independence with activity.  Ongoing   Patient will be able to correct postural deviations in sitting and standing, to decrease pain and promote  postural awareness for injury prevention.   Ongoing      LONG TERM GOALS: 8 weeks 4/1/2024     Patient will return to normal ADL, recreational, and work related activities with less pain and limitation.   Ongoing   Patient will improve AROM to stated goals in order to return to maximal functional potential.   Ongoing   Patient will improve Strength to stated goals of appropriate musculature in order to improve functional independence.   Ongoing   Pain Rating at Best: 1/10 to improve Quality of Life.   Ongoing   Patient will meet predicted functional outcome (FOTO) score: 80% to increase self-worth & perceived functional ability.  Ongoing   Patient will have met/partially met personal goal of being able to walk with no pain.  Ongoing            Plan     Continue per Plan of Care     Hilario Ellis, PT

## 2024-04-03 ENCOUNTER — CLINICAL SUPPORT (OUTPATIENT)
Dept: REHABILITATION | Facility: HOSPITAL | Age: 49
End: 2024-04-03
Payer: COMMERCIAL

## 2024-04-03 DIAGNOSIS — Z98.1 S/P LUMBAR SPINAL FUSION: Primary | ICD-10-CM

## 2024-04-03 DIAGNOSIS — M62.89 MUSCLE TIGHTNESS: ICD-10-CM

## 2024-04-03 DIAGNOSIS — M25.60 DECREASED RANGE OF MOTION: ICD-10-CM

## 2024-04-03 PROCEDURE — 97112 NEUROMUSCULAR REEDUCATION: CPT | Mod: PN

## 2024-04-03 PROCEDURE — 97110 THERAPEUTIC EXERCISES: CPT | Mod: PN

## 2024-04-03 PROCEDURE — 97530 THERAPEUTIC ACTIVITIES: CPT | Mod: PN

## 2024-04-03 NOTE — PROGRESS NOTES
[facility]   Physical Therapy Treatment Note      Name: Shanthi Escoto  Clinic Number: [id number]    Therapy Diagnosis:   Encounter Diagnoses   Name Primary?    S/P lumbar spinal fusion Yes    Decreased range of motion     Muscle tightness        Physician: Dennis Flores MD    Visit Date: 4/3/2024    Physician: Dennis Flores MD         Physician Orders: PT Eval and Treat  Medical Diagnosis from Referral: Z98.1 (ICD-10-CM) - S/P lumbar spinal fusion  Evaluation Date: 12/6/2023  Authorization Period Expiration: 12/29/2023  Plan of Care Expiration: 3/30/2024                Progress Update: 1/6/2024               FOTO: 1 / 3   Visit # / Visits authorized: 12 / 20                          PRECAUTIONS: Standard Precautions and Orthopedic: Lumbar Fusion precautions      Time In: 1550   (Pnt arrived early)  Time Out: 1700  Total Appointment Time (timed & untimed codes): 70 minutes      Subjective     Patient reports: that she feels ok today, no complaints.  She was compliant with home exercise program.  Response to previous treatment: no issues   Functional change: not wearing the brace anymore     Pain: 3/10  Location: bilateral back      Objective      Objective Measures updated at progress report unless specified.     Treatment     Shanthi received the treatments listed below:      therapeutic exercises to develop strength, endurance, ROM, flexibility, posture, and core stabilization for 8 minutes including:    LAQs  3#  2x10 Bilateral    SAQs  4#  2x10 Bilateral     neuromuscular re-education activities to improve: Posture for 39 minutes. The following activities were included:    Nustep x 20 mins  TrA sets with Physioball  2x10     SLRs  3x10 Bilateral   Clamshell green theraband 3x10      Seated HR/TR   3x10   Hip adduction with ball  3x10     UBE 3/3  Standing gastroc stretch 2x30 secs Bilateral     therapeutic activities to improve functional performance for 23 minutes,  "including:    Single Leg Stance 3 x 20 sec Bilateral - np    Step ups on 6" steps   x 10 B  Scapular squeezes  3x10  OH pulleys   (flexion/abduction)  x 3 mins each    Rows with red tb  2 x 10 - np  Standing hip abduction with support x 10 Bilateral  - np    Seated marches 3#  x 20 Bilateral        Patient Education and Home Exercises       Education provided:   - home exercises, posture, exercise tolerance     Written Home Exercises Provided: Patient instructed to cont prior HEP. Exercises were reviewed and Shanthi was able to demonstrate them prior to the end of the session.  Shanthi demonstrated good  understanding of the education provided. See Electronic Medical Record under Patient Instructions for exercises provided during therapy sessions    Assessment     Shanthi presents with decreased endurance and unstable gait without cane. Modified session due to overall fatigue, but good effort provided in shortened time. Standby assistance during gait within clinic per safety. Progress as able.     Shanthi Is progressing well towards her goals.   Patient prognosis is Good.     Patient will continue to benefit from skilled outpatient physical therapy to address the deficits listed in the problem list box on initial evaluation, provide pt/family education and to maximize pt's level of independence in the home and community environment.     Patient's spiritual, cultural and educational needs considered and pt agreeable to plan of care and goals.     Anticipated barriers to physical therapy: none     Goals:   SHORT TERM GOALS: 4 weeks 4/3/2024     Recent signs and systems trend is improving in order to progress towards LTG's.  Ongoing   Patient will be independent with HEP in order to further progress and return to maximal function.  Ongoing   Pain rating at Worst: 5/10 in order to progress towards increased independence with activity.  Ongoing   Patient will be able to correct postural deviations in sitting and " standing, to decrease pain and promote postural awareness for injury prevention.   Ongoing      LONG TERM GOALS: 8 weeks 4/3/2024     Patient will return to normal ADL, recreational, and work related activities with less pain and limitation.   Ongoing   Patient will improve AROM to stated goals in order to return to maximal functional potential.   Ongoing   Patient will improve Strength to stated goals of appropriate musculature in order to improve functional independence.   Ongoing   Pain Rating at Best: 1/10 to improve Quality of Life.   Ongoing   Patient will meet predicted functional outcome (FOTO) score: 80% to increase self-worth & perceived functional ability.  Ongoing   Patient will have met/partially met personal goal of being able to walk with no pain.  Ongoing            Plan     Continue per Plan of Care     Hilario Ellis, PT

## 2024-04-08 ENCOUNTER — CLINICAL SUPPORT (OUTPATIENT)
Dept: REHABILITATION | Facility: HOSPITAL | Age: 49
End: 2024-04-08
Payer: COMMERCIAL

## 2024-04-08 DIAGNOSIS — Z98.1 S/P LUMBAR SPINAL FUSION: Primary | ICD-10-CM

## 2024-04-08 DIAGNOSIS — M25.60 DECREASED RANGE OF MOTION: ICD-10-CM

## 2024-04-08 DIAGNOSIS — M62.89 MUSCLE TIGHTNESS: ICD-10-CM

## 2024-04-08 PROCEDURE — 97112 NEUROMUSCULAR REEDUCATION: CPT | Mod: PN

## 2024-04-08 PROCEDURE — 97530 THERAPEUTIC ACTIVITIES: CPT | Mod: PN

## 2024-04-08 PROCEDURE — 97110 THERAPEUTIC EXERCISES: CPT | Mod: PN

## 2024-04-08 NOTE — PROGRESS NOTES
[facility]   Physical Therapy Treatment Note      Name: Shanthi Escoto  Clinic Number: [id number]    Therapy Diagnosis:   Encounter Diagnoses   Name Primary?    S/P lumbar spinal fusion Yes    Decreased range of motion     Muscle tightness        Physician: Dennis Flores MD    Visit Date: 4/8/2024    Physician: Dennis Flores MD         Physician Orders: PT Eval and Treat  Medical Diagnosis from Referral: Z98.1 (ICD-10-CM) - S/P lumbar spinal fusion  Evaluation Date: 12/6/2023  Authorization Period Expiration: 12/29/2023  Plan of Care Expiration: 3/30/2024                Progress Update: 1/6/2024               FOTO: 1 / 3   Visit # / Visits authorized: 14 / 20                          PRECAUTIONS: Standard Precautions and Orthopedic: Lumbar Fusion precautions      Time In: 1553   (Pnt arrived early)  Time Out: 1653  Total Appointment Time (timed & untimed codes): 60 minutes      Subjective     Patient reports: that she is in some pain today.  States that she thinks it's because of the change in weather.  She was compliant with home exercise program.  Response to previous treatment: no issues   Functional change: n/a    Pain: 5/10  Location: bilateral back      Objective      Objective Measures updated at progress report unless specified.     Treatment     Shanthi received the treatments listed below:      therapeutic exercises to develop strength, endurance, ROM, flexibility, posture, and core stabilization for 10 minutes including:    LAQs  3#  2x10 Bilateral    SAQs  4#  2x10 Bilateral     neuromuscular re-education activities to improve: Posture for 40 minutes. The following activities were included:    Nustep x 15 mins  TrA sets with Physioball  2x10     SLRs  3x10 Bilateral   Clamshell green theraband 3x10      Seated HR/TR   3x10   Hip adduction with ball  3x10     UBE 3/3  Standing gastroc stretch 2x30 secs Bilateral     therapeutic activities to improve functional  "performance for 10 minutes, including:    Single Leg Stance 3 x 20 sec Bilateral - np    Step ups on 6" steps   x 10 B - np  Scapular squeezes  3x10  OH pulleys   (flexion/abduction)  x 3 mins each    Rows with red tb  2 x 10 - np  Standing hip abduction with support x 10 Bilateral  - np    Seated marches 3#  x 20 Bilateral        Patient Education and Home Exercises       Education provided:   - home exercises, posture, exercise tolerance     Written Home Exercises Provided: Patient instructed to cont prior HEP. Exercises were reviewed and Shanthi was able to demonstrate them prior to the end of the session.  Shanthi demonstrated good  understanding of the education provided. See Electronic Medical Record under Patient Instructions for exercises provided during therapy sessions    Assessment     Shanthi presents with decreased endurance and unstable gait without cane. Modified session due to overall fatigue, but good effort provided in shortened time. Standby assistance during gait within clinic per safety. Progress as able.     Shanthi Is progressing well towards her goals.   Patient prognosis is Good.     Patient will continue to benefit from skilled outpatient physical therapy to address the deficits listed in the problem list box on initial evaluation, provide pt/family education and to maximize pt's level of independence in the home and community environment.     Patient's spiritual, cultural and educational needs considered and pt agreeable to plan of care and goals.     Anticipated barriers to physical therapy: none     Goals:   SHORT TERM GOALS: 4 weeks 4/8/2024     Recent signs and systems trend is improving in order to progress towards LTG's.  Ongoing   Patient will be independent with HEP in order to further progress and return to maximal function.  Ongoing   Pain rating at Worst: 5/10 in order to progress towards increased independence with activity.  Ongoing   Patient will be able to correct postural " deviations in sitting and standing, to decrease pain and promote postural awareness for injury prevention.   Ongoing      LONG TERM GOALS: 8 weeks 4/8/2024     Patient will return to normal ADL, recreational, and work related activities with less pain and limitation.   Ongoing   Patient will improve AROM to stated goals in order to return to maximal functional potential.   Ongoing   Patient will improve Strength to stated goals of appropriate musculature in order to improve functional independence.   Ongoing   Pain Rating at Best: 1/10 to improve Quality of Life.   Ongoing   Patient will meet predicted functional outcome (FOTO) score: 80% to increase self-worth & perceived functional ability.  Ongoing   Patient will have met/partially met personal goal of being able to walk with no pain.  Ongoing            Plan     Continue per Plan of Care     Hilario Ellis, PT

## 2024-04-15 ENCOUNTER — CLINICAL SUPPORT (OUTPATIENT)
Dept: REHABILITATION | Facility: HOSPITAL | Age: 49
End: 2024-04-15
Payer: COMMERCIAL

## 2024-04-15 DIAGNOSIS — M25.60 DECREASED RANGE OF MOTION: ICD-10-CM

## 2024-04-15 DIAGNOSIS — M62.89 MUSCLE TIGHTNESS: ICD-10-CM

## 2024-04-15 DIAGNOSIS — Z98.1 S/P LUMBAR SPINAL FUSION: Primary | ICD-10-CM

## 2024-04-15 PROCEDURE — 97112 NEUROMUSCULAR REEDUCATION: CPT | Mod: PN

## 2024-04-15 PROCEDURE — 97110 THERAPEUTIC EXERCISES: CPT | Mod: PN

## 2024-04-15 PROCEDURE — 97530 THERAPEUTIC ACTIVITIES: CPT | Mod: PN

## 2024-04-15 NOTE — PROGRESS NOTES
[facility]   Physical Therapy Treatment Note      Name: Shanthi Escoto  Clinic Number: [id number]    Therapy Diagnosis:   Encounter Diagnoses   Name Primary?    S/P lumbar spinal fusion Yes    Decreased range of motion     Muscle tightness        Physician: Dennis Flores MD    Visit Date: 4/15/2024    Physician: Dennis Flores MD         Physician Orders: PT Eval and Treat  Medical Diagnosis from Referral: Z98.1 (ICD-10-CM) - S/P lumbar spinal fusion  Evaluation Date: 12/6/2023  Authorization Period Expiration: 12/29/2023  Plan of Care Expiration: 3/30/2024                Progress Update: 1/6/2024               FOTO: 1 / 3   Visit # / Visits authorized: 14 / 20                          PRECAUTIONS: Standard Precautions and Orthopedic: Lumbar Fusion precautions      Time In: 1557  (Pnt arrived early)  Time Out: 1652  Total Appointment Time (timed & untimed codes): 55 minutes      Subjective     Patient reports: that she feels ok today, no complaints.  She was compliant with home exercise program.  Response to previous treatment: no issues   Functional change: n/a    Pain: 0/10  Location: bilateral back      Objective      Objective Measures updated at progress report unless specified.     Treatment     Shanthi received the treatments listed below:      therapeutic exercises to develop strength, endurance, ROM, flexibility, posture, and core stabilization for 8 minutes including:    LAQs  3#  2x10 Bilateral    SAQs  4#  2x10 Bilateral     neuromuscular re-education activities to improve: Posture for 38 minutes. The following activities were included:    Nustep x 15 mins  TrA sets with Physioball  2x10   ;  SLRs  3x10 Bilateral   Clamshell green theraband 3x10      Seated HR/TR   3x10   Hip adduction with ball  3x10     UBE 3/3  Standing gastroc stretch 3x30 secs Bilateral     therapeutic activities to improve functional performance for 9 minutes, including:    Single Leg Stance 3 x  "20 sec Bilateral - np    Step ups on 6" steps   x 10 B - np  Scapular squeezes  3x10  OH pulleys   (flexion/abduction)  x 3 mins each    Rows with red tb  2 x 10 - np  Standing hip abduction with support x 10 Bilateral  - np    Seated marches 3#  x 20 Bilateral        Patient Education and Home Exercises       Education provided:   - home exercises, posture, exercise tolerance     Written Home Exercises Provided: Patient instructed to cont prior HEP. Exercises were reviewed and Shanthi was able to demonstrate them prior to the end of the session.  Shanthi demonstrated good  understanding of the education provided. See Electronic Medical Record under Patient Instructions for exercises provided during therapy sessions    Assessment     Shanthi presents with decreased endurance and unstable gait without cane. Modified session due to overall fatigue, but good effort provided in shortened time. Standby assistance during gait within clinic per safety. Progress as able.     Shanthi Is progressing well towards her goals.   Patient prognosis is Good.     Patient will continue to benefit from skilled outpatient physical therapy to address the deficits listed in the problem list box on initial evaluation, provide pt/family education and to maximize pt's level of independence in the home and community environment.     Patient's spiritual, cultural and educational needs considered and pt agreeable to plan of care and goals.     Anticipated barriers to physical therapy: none     Goals:   SHORT TERM GOALS: 4 weeks 4/15/2024     Recent signs and systems trend is improving in order to progress towards LTG's.  Ongoing   Patient will be independent with HEP in order to further progress and return to maximal function.  Ongoing   Pain rating at Worst: 5/10 in order to progress towards increased independence with activity.  Ongoing   Patient will be able to correct postural deviations in sitting and standing, to decrease pain and promote " postural awareness for injury prevention.   Ongoing      LONG TERM GOALS: 8 weeks 4/15/2024     Patient will return to normal ADL, recreational, and work related activities with less pain and limitation.   Ongoing   Patient will improve AROM to stated goals in order to return to maximal functional potential.   Ongoing   Patient will improve Strength to stated goals of appropriate musculature in order to improve functional independence.   Ongoing   Pain Rating at Best: 1/10 to improve Quality of Life.   Ongoing   Patient will meet predicted functional outcome (FOTO) score: 80% to increase self-worth & perceived functional ability.  Ongoing   Patient will have met/partially met personal goal of being able to walk with no pain.  Ongoing            Plan     Continue per Plan of Care     Hilario Ellis, PT

## 2024-04-17 ENCOUNTER — CLINICAL SUPPORT (OUTPATIENT)
Dept: REHABILITATION | Facility: HOSPITAL | Age: 49
End: 2024-04-17
Payer: COMMERCIAL

## 2024-04-17 DIAGNOSIS — M25.60 DECREASED RANGE OF MOTION: ICD-10-CM

## 2024-04-17 DIAGNOSIS — Z98.1 S/P LUMBAR SPINAL FUSION: Primary | ICD-10-CM

## 2024-04-17 DIAGNOSIS — M62.89 MUSCLE TIGHTNESS: ICD-10-CM

## 2024-04-17 PROCEDURE — 97110 THERAPEUTIC EXERCISES: CPT | Mod: PN

## 2024-04-17 PROCEDURE — 97112 NEUROMUSCULAR REEDUCATION: CPT | Mod: PN

## 2024-04-17 PROCEDURE — 97530 THERAPEUTIC ACTIVITIES: CPT | Mod: PN

## 2024-04-17 NOTE — PROGRESS NOTES
[facility]   Physical Therapy Treatment Note      Name: Shanthi Escoto  Clinic Number: [id number]    Therapy Diagnosis:   Encounter Diagnoses   Name Primary?    S/P lumbar spinal fusion Yes    Decreased range of motion     Muscle tightness        Physician: Dennis Flores MD    Visit Date: 4/17/2024    Physician: Dennis Flores MD         Physician Orders: PT Eval and Treat  Medical Diagnosis from Referral: Z98.1 (ICD-10-CM) - S/P lumbar spinal fusion  Evaluation Date: 12/6/2023  Authorization Period Expiration: 12/29/2023  Plan of Care Expiration: 3/30/2024                Progress Update: 1/6/2024               FOTO: 1 / 3   Visit # / Visits authorized: 16 / 20                          PRECAUTIONS: Standard Precautions and Orthopedic: Lumbar Fusion precautions      Time In: 1557  (Pnt arrived early)  Time Out: 1655  Total Appointment Time (timed & untimed codes): 58 minutes      Subjective     Patient reports: that she feels ok today, no complaints.  She was compliant with home exercise program.  Response to previous treatment: no issues   Functional change: n/a    Pain: 0/10  Location: bilateral back      Objective      Objective Measures updated at progress report unless specified.     Treatment     Shanthi received the treatments listed below:      therapeutic exercises to develop strength, endurance, ROM, flexibility, posture, and core stabilization for 10 minutes including:    LAQs  3#  2x10 Bilateral    SAQs  4#  2x10 Bilateral     neuromuscular re-education activities to improve: Posture for 38 minutes. The following activities were included:    Nustep x 15 mins  TrA sets with Physioball  2x10     SLRs  3x10 Bilateral   Clamshell green theraband 3x10      Seated HR/TR   3x10   Hip adduction with ball  3x10     UBE 3/3  Standing gastroc stretch 3x30 secs Bilateral     therapeutic activities to improve functional performance for 10 minutes, including:  Single Leg Stance 3 x 20  "sec Bilateral - np    Step ups on 6" steps   x 10 B - np  Scapular squeezes  3x10  OH pulleys   (flexion/abduction)  x 3 mins each    Rows with red tb  2 x 10 - np  Standing hip abduction with support x 10 Bilateral  - np    Seated marches 3#  x 20 Bilateral        Patient Education and Home Exercises       Education provided:   - home exercises, posture, exercise tolerance     Written Home Exercises Provided: Patient instructed to cont prior HEP. Exercises were reviewed and Shanthi was able to demonstrate them prior to the end of the session.  Shanthi demonstrated good  understanding of the education provided. See Electronic Medical Record under Patient Instructions for exercises provided during therapy sessions    Assessment     Shanthi presents with decreased endurance and unstable gait without cane. Modified session due to overall fatigue, but good effort provided in shortened time. Standby assistance during gait within clinic per safety. Progress as able.     Shanthi Is progressing well towards her goals.   Patient prognosis is Good.     Patient will continue to benefit from skilled outpatient physical therapy to address the deficits listed in the problem list box on initial evaluation, provide pt/family education and to maximize pt's level of independence in the home and community environment.     Patient's spiritual, cultural and educational needs considered and pt agreeable to plan of care and goals.     Anticipated barriers to physical therapy: none     Goals:   SHORT TERM GOALS: 4 weeks 4/17/2024     Recent signs and systems trend is improving in order to progress towards LTG's.  Ongoing   Patient will be independent with HEP in order to further progress and return to maximal function.  Ongoing   Pain rating at Worst: 5/10 in order to progress towards increased independence with activity.  Ongoing   Patient will be able to correct postural deviations in sitting and standing, to decrease pain and promote " postural awareness for injury prevention.   Ongoing      LONG TERM GOALS: 8 weeks 4/17/2024     Patient will return to normal ADL, recreational, and work related activities with less pain and limitation.   Ongoing   Patient will improve AROM to stated goals in order to return to maximal functional potential.   Ongoing   Patient will improve Strength to stated goals of appropriate musculature in order to improve functional independence.   Ongoing   Pain Rating at Best: 1/10 to improve Quality of Life.   Ongoing   Patient will meet predicted functional outcome (FOTO) score: 80% to increase self-worth & perceived functional ability.  Ongoing   Patient will have met/partially met personal goal of being able to walk with no pain.  Ongoing            Plan     Continue per Plan of Care     Hilario Ellis, PT

## 2024-04-22 ENCOUNTER — CLINICAL SUPPORT (OUTPATIENT)
Dept: REHABILITATION | Facility: HOSPITAL | Age: 49
End: 2024-04-22
Payer: COMMERCIAL

## 2024-04-22 DIAGNOSIS — Z98.1 S/P LUMBAR SPINAL FUSION: Primary | ICD-10-CM

## 2024-04-22 DIAGNOSIS — M62.89 MUSCLE TIGHTNESS: ICD-10-CM

## 2024-04-22 DIAGNOSIS — M25.60 DECREASED RANGE OF MOTION: ICD-10-CM

## 2024-04-22 PROCEDURE — 97112 NEUROMUSCULAR REEDUCATION: CPT | Mod: PN

## 2024-04-22 PROCEDURE — 97530 THERAPEUTIC ACTIVITIES: CPT | Mod: PN

## 2024-04-22 PROCEDURE — 97110 THERAPEUTIC EXERCISES: CPT | Mod: PN

## 2024-04-22 NOTE — PROGRESS NOTES
[facility]   Physical Therapy Treatment Note      Name: Shanthi Escoto  Clinic Number: [id number]    Therapy Diagnosis:   Encounter Diagnoses   Name Primary?    S/P lumbar spinal fusion Yes    Decreased range of motion     Muscle tightness        Physician: Dennis Flores MD    Visit Date: 4/22/2024    Physician: Dennis Flores MD         Physician Orders: PT Eval and Treat  Medical Diagnosis from Referral: Z98.1 (ICD-10-CM) - S/P lumbar spinal fusion  Evaluation Date: 12/6/2023  Authorization Period Expiration: 12/29/2023  Plan of Care Expiration: 3/30/2024                Progress Update: 1/6/2024               FOTO: 1 / 3   Visit # / Visits authorized: 17 / 20                          PRECAUTIONS: Standard Precautions and Orthopedic: Lumbar Fusion precautions      Time In: 1550  (Pnt arrived early)  Time Out: 1645  Total Appointment Time (timed & untimed codes): 55 minutes      Subjective     Patient reports: that she is in some pain and tired today.  She was compliant with home exercise program.  Response to previous treatment: no issues   Functional change: n/a    Pain: 4/10  Location: bilateral back      Objective      Objective Measures updated at progress report unless specified.     Treatment     Shanthi received the treatments listed below:      therapeutic exercises to develop strength, endurance, ROM, flexibility, posture, and core stabilization for 10 minutes including:    LAQs  3#  2x10 Bilateral    SAQs  4#  2x10 Bilateral     neuromuscular re-education activities to improve: Posture for 38 minutes. The following activities were included:    Nustep x 15 mins  TrA sets with Physioball  2x10     SLRs  3x10 Bilateral   Clamshell green theraband 3x10      Seated HR/TR   3x10   Hip adduction with ball  3x10     UBE 3/3  Standing gastroc stretch 3x30 secs Bilateral     therapeutic activities to improve functional performance for 10 minutes, including:    Single Leg Stance 3  "x 20 sec Bilateral - np    Step ups on 6" steps   x 10 B - np  Scapular squeezes  3x10  OH pulleys   (flexion/abduction)  x 3 mins each    Rows with red tb  2 x 10 - np  Standing hip abduction with support x 10 Bilateral  - np    Seated marches 3#  x 20 Bilateral        Patient Education and Home Exercises       Education provided:   - home exercises, posture, exercise tolerance     Written Home Exercises Provided: Patient instructed to cont prior HEP. Exercises were reviewed and Shanthi was able to demonstrate them prior to the end of the session.  Shanthi demonstrated good  understanding of the education provided. See Electronic Medical Record under Patient Instructions for exercises provided during therapy sessions    Assessment     Shanthi presents with improved endurance and strength but still displays unstable gait without cane. Standby assistance during gait within clinic per safety. Progress as able.     Shanthi Is progressing well towards her goals.   Patient prognosis is Good.     Patient will continue to benefit from skilled outpatient physical therapy to address the deficits listed in the problem list box on initial evaluation, provide pt/family education and to maximize pt's level of independence in the home and community environment.     Patient's spiritual, cultural and educational needs considered and pt agreeable to plan of care and goals.     Anticipated barriers to physical therapy: none     Goals:   SHORT TERM GOALS: 4 weeks 4/22/2024     Recent signs and systems trend is improving in order to progress towards LTG's.  Ongoing   Patient will be independent with HEP in order to further progress and return to maximal function.  Ongoing   Pain rating at Worst: 5/10 in order to progress towards increased independence with activity.  Ongoing   Patient will be able to correct postural deviations in sitting and standing, to decrease pain and promote postural awareness for injury prevention.   Ongoing    "   LONG TERM GOALS: 8 weeks 4/22/2024     Patient will return to normal ADL, recreational, and work related activities with less pain and limitation.   Ongoing   Patient will improve AROM to stated goals in order to return to maximal functional potential.   Ongoing   Patient will improve Strength to stated goals of appropriate musculature in order to improve functional independence.   Ongoing   Pain Rating at Best: 1/10 to improve Quality of Life.   Ongoing   Patient will meet predicted functional outcome (FOTO) score: 80% to increase self-worth & perceived functional ability.  Ongoing   Patient will have met/partially met personal goal of being able to walk with no pain.  Ongoing            Plan     Continue per Plan of Care     Hilario Ellis, PT

## 2024-04-24 ENCOUNTER — CLINICAL SUPPORT (OUTPATIENT)
Dept: REHABILITATION | Facility: HOSPITAL | Age: 49
End: 2024-04-24
Payer: COMMERCIAL

## 2024-04-24 DIAGNOSIS — Z98.1 S/P LUMBAR SPINAL FUSION: Primary | ICD-10-CM

## 2024-04-24 DIAGNOSIS — M25.60 DECREASED RANGE OF MOTION: ICD-10-CM

## 2024-04-24 DIAGNOSIS — M62.89 MUSCLE TIGHTNESS: ICD-10-CM

## 2024-04-24 PROCEDURE — 97530 THERAPEUTIC ACTIVITIES: CPT | Mod: PN

## 2024-04-24 PROCEDURE — 97112 NEUROMUSCULAR REEDUCATION: CPT | Mod: PN

## 2024-04-24 PROCEDURE — 97110 THERAPEUTIC EXERCISES: CPT | Mod: PN

## 2024-04-24 NOTE — PROGRESS NOTES
[facility]   Physical Therapy Treatment Note      Name: Shanthi Escoto  Clinic Number: [id number]    Therapy Diagnosis:   Encounter Diagnoses   Name Primary?    S/P lumbar spinal fusion Yes    Decreased range of motion     Muscle tightness        Physician: Dennis Flores MD    Visit Date: 4/24/2024    Physician: Dennis Flores MD         Physician Orders: PT Eval and Treat  Medical Diagnosis from Referral: Z98.1 (ICD-10-CM) - S/P lumbar spinal fusion  Evaluation Date: 12/6/2023  Authorization Period Expiration: 12/29/2023  Plan of Care Expiration: 3/30/2024                Progress Update: 1/6/2024               FOTO: 1 / 3   Visit # / Visits authorized: 18 / 20                          PRECAUTIONS: Standard Precautions and Orthopedic: Lumbar Fusion precautions      Time In: 1600  (Pnt arrived early)  Time Out: 1658  Total Appointment Time (timed & untimed codes): 58 minutes      Subjective     Patient reports: that she is in some pain and tired today.  She was compliant with home exercise program.  Response to previous treatment: no issues   Functional change: n/a    Pain: 4/10  Location: bilateral back      Objective      Objective Measures updated at progress report unless specified.     Treatment     Shanthi received the treatments listed below:      therapeutic exercises to develop strength, endurance, ROM, flexibility, posture, and core stabilization for 8 minutes including:    LAQs  3#  2x10 Bilateral    SAQs  4#  2x10 Bilateral     neuromuscular re-education activities to improve: Posture for 40 minutes. The following activities were included:    Nustep x 15 mins  TrA sets with Physioball  2x10     SLRs  3x10 Bilateral   Clamshell green theraband 3x10      Seated HR/TR   3x10   Hip adduction with ball  3x10     UBE 3/3  Standing gastroc stretch 3x30 secs Bilateral     therapeutic activities to improve functional performance for 10 minutes, including:    Single Leg Stance 3 x  "20 sec Bilateral - np    Step ups on 6" steps   x 10 B - np  Scapular squeezes  3x10  OH pulleys   (flexion/abduction)  x 3 mins each    Rows with red tb  2 x 10 - np  Standing hip abduction with support x 10 Bilateral  - np    Seated marches 3#  x 20 Bilateral        Patient Education and Home Exercises       Education provided:   - home exercises, posture, exercise tolerance     Written Home Exercises Provided: Patient instructed to cont prior HEP. Exercises were reviewed and Shanthi was able to demonstrate them prior to the end of the session.  Shanthi demonstrated good  understanding of the education provided. See Electronic Medical Record under Patient Instructions for exercises provided during therapy sessions    Assessment     Shanthi presents with improved endurance and strength but still displays unstable gait without cane. Standby assistance during gait within clinic per safety. Progress as able.     Shanthi Is progressing well towards her goals.   Patient prognosis is Good.     Patient will continue to benefit from skilled outpatient physical therapy to address the deficits listed in the problem list box on initial evaluation, provide pt/family education and to maximize pt's level of independence in the home and community environment.     Patient's spiritual, cultural and educational needs considered and pt agreeable to plan of care and goals.     Anticipated barriers to physical therapy: none     Goals:   SHORT TERM GOALS: 4 weeks 4/24/2024     Recent signs and systems trend is improving in order to progress towards LTG's.  Ongoing   Patient will be independent with HEP in order to further progress and return to maximal function.  Ongoing   Pain rating at Worst: 5/10 in order to progress towards increased independence with activity.  Ongoing   Patient will be able to correct postural deviations in sitting and standing, to decrease pain and promote postural awareness for injury prevention.   Ongoing    "   LONG TERM GOALS: 8 weeks 4/24/2024     Patient will return to normal ADL, recreational, and work related activities with less pain and limitation.   Ongoing   Patient will improve AROM to stated goals in order to return to maximal functional potential.   Ongoing   Patient will improve Strength to stated goals of appropriate musculature in order to improve functional independence.   Ongoing   Pain Rating at Best: 1/10 to improve Quality of Life.   Ongoing   Patient will meet predicted functional outcome (FOTO) score: 80% to increase self-worth & perceived functional ability.  Ongoing   Patient will have met/partially met personal goal of being able to walk with no pain.  Ongoing            Plan     Continue per Plan of Care     Hilario Ellis, PT

## 2024-04-29 ENCOUNTER — CLINICAL SUPPORT (OUTPATIENT)
Dept: REHABILITATION | Facility: HOSPITAL | Age: 49
End: 2024-04-29
Payer: COMMERCIAL

## 2024-04-29 DIAGNOSIS — Z98.1 S/P LUMBAR SPINAL FUSION: Primary | ICD-10-CM

## 2024-04-29 DIAGNOSIS — M62.89 MUSCLE TIGHTNESS: ICD-10-CM

## 2024-04-29 DIAGNOSIS — M25.60 DECREASED RANGE OF MOTION: ICD-10-CM

## 2024-04-29 PROCEDURE — 97530 THERAPEUTIC ACTIVITIES: CPT | Mod: PN

## 2024-04-29 PROCEDURE — 97110 THERAPEUTIC EXERCISES: CPT | Mod: PN

## 2024-04-29 PROCEDURE — 97112 NEUROMUSCULAR REEDUCATION: CPT | Mod: PN

## 2024-04-29 NOTE — PROGRESS NOTES
[facility]   Physical Therapy Treatment Note      Name: Shanthi Escoto  Clinic Number: [id number]    Therapy Diagnosis:   Encounter Diagnoses   Name Primary?    S/P lumbar spinal fusion Yes    Decreased range of motion     Muscle tightness        Physician: Dennis Flores MD    Visit Date: 4/29/2024    Physician: Dennis Flores MD         Physician Orders: PT Eval and Treat  Medical Diagnosis from Referral: Z98.1 (ICD-10-CM) - S/P lumbar spinal fusion  Evaluation Date: 12/6/2023  Authorization Period Expiration: 12/29/2023  Plan of Care Expiration: 3/30/2024                Progress Update: 1/6/2024               FOTO: 1 / 3   Visit # / Visits authorized: 19 / 20                          PRECAUTIONS: Standard Precautions and Orthopedic: Lumbar Fusion precautions      Time In: 1600  (Pnt arrived early)  Time Out: 1659  Total Appointment Time (timed & untimed codes): 59 minutes      Subjective     Patient reports: that she feels ok today, no complaints.  She was compliant with home exercise program.  Response to previous treatment: no issues   Functional change: n/a    Pain: 5/10  Location: bilateral back      Objective      Objective Measures updated at progress report unless specified.     Treatment     Shanthi received the treatments listed below:      therapeutic exercises to develop strength, endurance, ROM, flexibility, posture, and core stabilization for 9 minutes including:    LAQs  3#  2x10 Bilateral    SAQs  #5  2x10 Bilateral     neuromuscular re-education activities to improve: Posture for 40 minutes. The following activities were included:    Nustep x 15 mins  TrA sets with Physioball  2x10     SLRs  3x10 Bilateral   Clamshell green theraband 3x10      Seated HR/TR   3x10   Hip adduction with ball  3x10     UBE 3/3  Standing gastroc stretch 3x30 secs Bilateral     therapeutic activities to improve functional performance for 10 minutes, including:    Single Leg Stance 3 x  "20 sec Bilateral - np    Step ups on 6" steps   x 10 B - np  Scapular squeezes  3x10  OH pulleys   (flexion/abduction)  x 3 mins each    Rows with red tb  2 x 10 - np  Standing hip abduction with support x 10 Bilateral  - np    Seated marches 3#  x 20 Bilateral        Patient Education and Home Exercises       Education provided:   - home exercises, posture, exercise tolerance     Written Home Exercises Provided: Patient instructed to cont prior HEP. Exercises were reviewed and Shanthi was able to demonstrate them prior to the end of the session.  Shanthi demonstrated good  understanding of the education provided. See Electronic Medical Record under Patient Instructions for exercises provided during therapy sessions    Assessment     Shanthi presents with improved endurance and strength but still displays unstable gait without cane. Standby assistance during gait within clinic per safety. Progress as able.     Shanthi Is progressing well towards her goals.   Patient prognosis is Good.     Patient will continue to benefit from skilled outpatient physical therapy to address the deficits listed in the problem list box on initial evaluation, provide pt/family education and to maximize pt's level of independence in the home and community environment.     Patient's spiritual, cultural and educational needs considered and pt agreeable to plan of care and goals.     Anticipated barriers to physical therapy: none     Goals:   SHORT TERM GOALS: 4 weeks 4/29/2024     Recent signs and systems trend is improving in order to progress towards LTG's.  Ongoing   Patient will be independent with HEP in order to further progress and return to maximal function.  Ongoing   Pain rating at Worst: 5/10 in order to progress towards increased independence with activity.  Ongoing   Patient will be able to correct postural deviations in sitting and standing, to decrease pain and promote postural awareness for injury prevention.   Ongoing    "   LONG TERM GOALS: 8 weeks 4/29/2024     Patient will return to normal ADL, recreational, and work related activities with less pain and limitation.   Ongoing   Patient will improve AROM to stated goals in order to return to maximal functional potential.   Ongoing   Patient will improve Strength to stated goals of appropriate musculature in order to improve functional independence.   Ongoing   Pain Rating at Best: 1/10 to improve Quality of Life.   Ongoing   Patient will meet predicted functional outcome (FOTO) score: 80% to increase self-worth & perceived functional ability.  Ongoing   Patient will have met/partially met personal goal of being able to walk with no pain.  Ongoing            Plan     Continue per Plan of Care     Hilario Ellis, PT

## 2024-05-01 ENCOUNTER — CLINICAL SUPPORT (OUTPATIENT)
Dept: REHABILITATION | Facility: HOSPITAL | Age: 49
End: 2024-05-01
Payer: COMMERCIAL

## 2024-05-01 DIAGNOSIS — M25.60 DECREASED RANGE OF MOTION: ICD-10-CM

## 2024-05-01 DIAGNOSIS — Z98.1 S/P LUMBAR SPINAL FUSION: Primary | ICD-10-CM

## 2024-05-01 DIAGNOSIS — M62.89 MUSCLE TIGHTNESS: ICD-10-CM

## 2024-05-01 PROCEDURE — 97110 THERAPEUTIC EXERCISES: CPT | Mod: PN

## 2024-05-01 PROCEDURE — 97530 THERAPEUTIC ACTIVITIES: CPT | Mod: PN

## 2024-05-01 PROCEDURE — 97112 NEUROMUSCULAR REEDUCATION: CPT | Mod: PN

## 2024-05-01 NOTE — PLAN OF CARE
[facility]   Physical Therapy Plan of Care Progress Note    Name: Shanthi Escoto  Clinic Number: [id number]    Therapy Diagnosis:   Encounter Diagnoses   Name Primary?    S/P lumbar spinal fusion Yes    Decreased range of motion     Muscle tightness      Physician: Dennis Flores MD    Visit Date: 5/1/2024    Physician: Dennis Flores MD         Physician Orders: PT Eval and Treat  Medical Diagnosis from Referral: Z98.1 (ICD-10-CM) - S/P lumbar spinal fusion  Evaluation Date: 12/6/2023  Authorization Period Expiration: 12/29/2023  Plan of Care Expiration: 3/30/2024                Progress Update: 1/6/2024               FOTO: 1 / 3   Visit # / Visits authorized: 20 / 20         Precautions: Standard, Fall risk    Time In: 1550  Time Out: 1645  Total Billable Time: 55 minutes    SUBJECTIVE     Patient reports: that she feels ok today, no complaints.  She was compliant with home exercise program.  Response to previous treatment: no issues   Functional change: n/a     Pain: 5/10  Location: bilateral back      TREATMENT     Treatment Time In: 1550  Treatment Time Out: 1645  Total Treatment time separate from Evaluation: 55 minutes    Home Exercises Provided and Patient Education Provided     Education/Self-Care provided: (NO CHARGE)  Patient educated on the impairments noted above and the effects of physical therapy intervention to improve overall condition and QOL.   Patient was educated on all the above exercise prior/during/after for proper posture, positioning, and execution for safe performance with home exercise program.     Written Home Exercises Provided: Patient instructed to cont prior HEP.  Exercises were reviewed and Shanthi was able to demonstrate them prior to the end of the session.  Shanthi demonstrated good  understanding of the education provided.     See EMR under Patient Instructions for exercises provided 5/1/2024.    ASSESSMENT     Patient tolerated PT session  well with minimal complaints of pain or discomfort. Objective findings have improved with initial measurements of     RANGE OF MOTION:     Lumbar AROM/PROM Right  (spine)  12/6/2023 Left     12/6/2023 Goal   Lumbar Flexion (60) NT --- 55      Lumbar Extension (30) NT --- 30      Lumbar Side Bending (25) NT NT 20      Lumbar Rotation NT NT 45            Hip AROM/PROM Right  12/6/2023 Left  12/6/2023 Goal   Hip Flexion (120) wfl wfl 115      Hip IR (45) wfl wfl 40      Hip ER (45) wfl wfl 40            Knee AROM/PROM Right  12/6/2023 Left  12/6/2023 Goal   Hyper - Zero - Flexion wfl wfl 0-0-135            STRENGTH:     L/E MMT Right  12/6/2023 Goal   Hip Flexion  4-/5 5/5 B    Hip Extension  4-/5 5/5 B   Hip Abduction  4-/5 5/5 B   Hip IR 4-/5 5/5 B   Hip ER 4-/5 5/5 B   Knee Flexion 4-/5 5/5 B   Knee Extension 4-/5 5/5 B   Ankle DF 4-/5 5/5 B   Ankle PF 4-/5 5/5 B   Ankle Inversion 4-/5 5/5 B   Ankle Eversion 4-/5 5/5 B   Big Toe Extension 4-/5 5/5 B      L/E MMT Left  12/6/2023 Goal   Hip Flexion  4-/5 5/5 B    Hip Extension  4-/5 5/5 B   Hip Abduction  4-/5 5/5 B   Hip IR 4-/5 5/5 B   Hip ER 4-/5 5/5 B   Knee Flexion 4-/5 5/5 B   Knee Extension 4-/5 5/5 B   Ankle DF 4-/5 5/5 B   Ankle PF 4-/5 5/5 B   Ankle Inversion 4-/5 5/5 B   Ankle Eversion 4-/5 5/5 B   Big Toe Extension 4-/5 5/5 B         MUSCLE LENGTH:      Muscle Tested  Right  12/6/2023 Left   12/6/2023 Goal   Hip Flexors  decreased decreased Normal B   Quadriceps normal normal Normal B   Hamstrings  decreased decreased Normal B   Piriformis  normal normal Normal B   Gastrocnemius  decreased decreased Normal B   Soleus  decreased decreased Normal B     and functional mobility, see exam for details. Therapy exercises were reviewed.     Shanthi Is progressing well towards her goals.   Pt prognosis continue to be Good due to reasons stated above.     Pt will continue to benefit from skilled outpatient physical therapy to address the deficits listed in the  problem list box on initial evaluation, provide pt/family education and to maximize pt's level of independence in the home and community environment.     Pt's spiritual, cultural and educational needs considered and pt agreeable to plan of care and goals.     Anticipated barriers to physical therapy: none    Goals:     SHORT TERM GOALS: 4 weeks 5/1/2024      Recent signs and systems trend is improving in order to progress towards LTG's.  Ongoing   Patient will be independent with HEP in order to further progress and return to maximal function.  Ongoing   Pain rating at Worst: 5/10 in order to progress towards increased independence with activity.  Ongoing   Patient will be able to correct postural deviations in sitting and standing, to decrease pain and promote postural awareness for injury prevention.   Ongoing      LONG TERM GOALS: 8 weeks 5/1/2024      Patient will return to normal ADL, recreational, and work related activities with less pain and limitation.   Ongoing   Patient will improve AROM to stated goals in order to return to maximal functional potential.   Ongoing   Patient will improve Strength to stated goals of appropriate musculature in order to improve functional independence.   Ongoing   Pain Rating at Best: 1/10 to improve Quality of Life.   Ongoing   Patient will meet predicted functional outcome (FOTO) score: 80% to increase self-worth & perceived functional ability.  Ongoing   Patient will have met/partially met personal goal of being able to walk with no pain.  Ongoing        PLAN     Updated Certification Period: 5/1/2024 to 8/30/2024  Recommended Treatment Plan: 2 times per week for 6 weeks: Manual Therapy, Neuromuscular Re-ed, Patient Education, Self Care, Therapeutic Activities, and Therapeutic Exercise  Other Recommendations: n/a    Previous Plan of Care: 12/6/2023 - 5/1/2024    Hilario Ellis, PT, DPT

## 2024-05-01 NOTE — PROGRESS NOTES
[facility]   Physical Therapy Treatment Note      Name: Shanthi Escoto  Clinic Number: [id number]    Therapy Diagnosis:   Encounter Diagnoses   Name Primary?    S/P lumbar spinal fusion Yes    Decreased range of motion     Muscle tightness        Physician: Dennis Flores MD    Visit Date: 5/1/2024    Physician: Dennis Flores MD         Physician Orders: PT Eval and Treat  Medical Diagnosis from Referral: Z98.1 (ICD-10-CM) - S/P lumbar spinal fusion  Evaluation Date: 12/6/2023  Authorization Period Expiration: 12/29/2023  Plan of Care Expiration: 3/30/2024                Progress Update: 1/6/2024               FOTO: 1 / 3   Visit # / Visits authorized: 19 / 20                          PRECAUTIONS: Standard Precautions and Orthopedic: Lumbar Fusion precautions      Time In: 1550  (Pnt arrived early)  Time Out: 1643  Total Appointment Time (timed & untimed codes): 53 minutes      Subjective     Patient reports: that she feels ok today, no complaints.  She was compliant with home exercise program.  Response to previous treatment: no issues   Functional change: n/a    Pain: 5/10  Location: bilateral back      Objective      Objective Measures updated at progress report unless specified.     Treatment     Shanthi received the treatments listed below:      therapeutic exercises to develop strength, endurance, ROM, flexibility, posture, and core stabilization for 9 minutes including:    LAQs  3#  2x10 Bilateral    SAQs  #5  2x10 Bilateral     neuromuscular re-education activities to improve: Posture for 40 minutes. The following activities were included:    Nustep x 15 mins  TrA sets with Physioball  2x10     SLRs  3x10 Bilateral   Clamshell green theraband 3x10      Seated HR/TR   3x10   Hip adduction with ball  3x10     UBE 3/3  Standing gastroc stretch 3x30 secs Bilateral     therapeutic activities to improve functional performance for 10 minutes, including:    Single Leg Stance 3 x 20  "sec Bilateral - np    Step ups on 6" steps   x 10 B - np  Scapular squeezes  3x10  OH pulleys   (flexion/abduction)  x 3 mins each    Rows with red tb  2 x 10 - np  Standing hip abduction with support x 10 Bilateral  - np    Seated marches 3#  x 20 Bilateral        Patient Education and Home Exercises       Education provided:   - home exercises, posture, exercise tolerance     Written Home Exercises Provided: Patient instructed to cont prior HEP. Exercises were reviewed and Shanthi was able to demonstrate them prior to the end of the session.  Shanthi demonstrated good  understanding of the education provided. See Electronic Medical Record under Patient Instructions for exercises provided during therapy sessions    Assessment     Shanthi presents with improved endurance and strength but still displays unstable gait without cane. Standby assistance during gait within clinic per safety. Progress as able.     Shanthi Is progressing well towards her goals.   Patient prognosis is Good.     Patient will continue to benefit from skilled outpatient physical therapy to address the deficits listed in the problem list box on initial evaluation, provide pt/family education and to maximize pt's level of independence in the home and community environment.     Patient's spiritual, cultural and educational needs considered and pt agreeable to plan of care and goals.     Anticipated barriers to physical therapy: none     Goals:   SHORT TERM GOALS: 4 weeks 5/1/2024     Recent signs and systems trend is improving in order to progress towards LTG's.  Ongoing   Patient will be independent with HEP in order to further progress and return to maximal function.  Ongoing   Pain rating at Worst: 5/10 in order to progress towards increased independence with activity.  Ongoing   Patient will be able to correct postural deviations in sitting and standing, to decrease pain and promote postural awareness for injury prevention.   Ongoing      LONG " TERM GOALS: 8 weeks 5/1/2024     Patient will return to normal ADL, recreational, and work related activities with less pain and limitation.   Ongoing   Patient will improve AROM to stated goals in order to return to maximal functional potential.   Ongoing   Patient will improve Strength to stated goals of appropriate musculature in order to improve functional independence.   Ongoing   Pain Rating at Best: 1/10 to improve Quality of Life.   Ongoing   Patient will meet predicted functional outcome (FOTO) score: 80% to increase self-worth & perceived functional ability.  Ongoing   Patient will have met/partially met personal goal of being able to walk with no pain.  Ongoing            Plan     Continue per Plan of Care     Hilario Ellis, PT

## 2024-05-06 ENCOUNTER — CLINICAL SUPPORT (OUTPATIENT)
Dept: REHABILITATION | Facility: HOSPITAL | Age: 49
End: 2024-05-06
Payer: COMMERCIAL

## 2024-05-06 DIAGNOSIS — Z98.1 S/P LUMBAR SPINAL FUSION: Primary | ICD-10-CM

## 2024-05-06 DIAGNOSIS — M62.89 MUSCLE TIGHTNESS: ICD-10-CM

## 2024-05-06 DIAGNOSIS — M25.60 DECREASED RANGE OF MOTION: ICD-10-CM

## 2024-05-06 PROCEDURE — 97110 THERAPEUTIC EXERCISES: CPT | Mod: PN

## 2024-05-06 PROCEDURE — 97530 THERAPEUTIC ACTIVITIES: CPT | Mod: PN

## 2024-05-06 PROCEDURE — 97112 NEUROMUSCULAR REEDUCATION: CPT | Mod: PN

## 2024-05-06 NOTE — PROGRESS NOTES
[facility]   Physical Therapy Treatment Note      Name: Shanthi Escoto  Clinic Number: [id number]    Therapy Diagnosis:   Encounter Diagnoses   Name Primary?    S/P lumbar spinal fusion Yes    Decreased range of motion     Muscle tightness        Physician: Dennis Flores MD    Visit Date: 5/6/2024    Physician: Dennis Flores MD         Physician Orders: PT Eval and Treat  Medical Diagnosis from Referral: Z98.1 (ICD-10-CM) - S/P lumbar spinal fusion  Evaluation Date: 12/6/2023  Authorization Period Expiration: 12/29/2023  Plan of Care Expiration: 3/30/2024                Progress Update: 1/6/2024               FOTO: 1 / 3   Visit # / Visits authorized: 20 / 20                          PRECAUTIONS: Standard Precautions and Orthopedic: Lumbar Fusion precautions      Time In: 1543  (Pnt arrived early)  Time Out: 1639  Total Appointment Time (timed & untimed codes): 56 minutes      Subjective     Patient reports: that she feels ok today, no complaints.  She was compliant with home exercise program.  Response to previous treatment: no issues   Functional change: n/a    Pain: 5/10  Location: bilateral back      Objective      Objective Measures updated at progress report unless specified.     Treatment     Shanthi received the treatments listed below:      therapeutic exercises to develop strength, endurance, ROM, flexibility, posture, and core stabilization for 8 minutes including:    LAQs  3#  2x10 Bilateral    SAQs  #5  2x10 Bilateral     neuromuscular re-education activities to improve: Posture for 38 minutes. The following activities were included:    Nustep x 15 mins  TrA sets with Physioball  2x10     SLRs  3x10 Bilateral   Clamshell green theraband 3x10      Seated HR/TR   3x10   Hip adduction with ball  3x10     UBE 3/3  Standing gastroc stretch 3x30 secs Bilateral   Leg press   30#  3x10    therapeutic activities to improve functional performance for 10 minutes,  "including:    Single Leg Stance 3 x 20 sec Bilateral - np    Step ups on 6" steps   x 10 B - np  Scapular squeezes  3x10  OH pulleys   (flexion/abduction)  x 3 mins each    Rows with red tb  2 x 10 - np  Standing hip abduction with support x 10 Bilateral  - np    Seated marches 3#  x 20 Bilateral          Patient Education and Home Exercises       Education provided:   - home exercises, posture, exercise tolerance     Written Home Exercises Provided: Patient instructed to cont prior HEP. Exercises were reviewed and Shanthi was able to demonstrate them prior to the end of the session.  Shanthi demonstrated good  understanding of the education provided. See Electronic Medical Record under Patient Instructions for exercises provided during therapy sessions    Assessment     Shanthi presents with improved endurance and strength but still displays unstable gait without cane. Standby assistance during gait within clinic per safety. Progress as able.     Shanthi Is progressing well towards her goals.   Patient prognosis is Good.     Patient will continue to benefit from skilled outpatient physical therapy to address the deficits listed in the problem list box on initial evaluation, provide pt/family education and to maximize pt's level of independence in the home and community environment.     Patient's spiritual, cultural and educational needs considered and pt agreeable to plan of care and goals.     Anticipated barriers to physical therapy: none     Goals:   SHORT TERM GOALS: 4 weeks 5/6/2024     Recent signs and systems trend is improving in order to progress towards LTG's.  Ongoing   Patient will be independent with HEP in order to further progress and return to maximal function.  Ongoing   Pain rating at Worst: 5/10 in order to progress towards increased independence with activity.  Ongoing   Patient will be able to correct postural deviations in sitting and standing, to decrease pain and promote postural awareness " for injury prevention.   Ongoing      LONG TERM GOALS: 8 weeks 5/6/2024     Patient will return to normal ADL, recreational, and work related activities with less pain and limitation.   Ongoing   Patient will improve AROM to stated goals in order to return to maximal functional potential.   Ongoing   Patient will improve Strength to stated goals of appropriate musculature in order to improve functional independence.   Ongoing   Pain Rating at Best: 1/10 to improve Quality of Life.   Ongoing   Patient will meet predicted functional outcome (FOTO) score: 80% to increase self-worth & perceived functional ability.  Ongoing   Patient will have met/partially met personal goal of being able to walk with no pain.  Ongoing            Plan     Continue per Plan of Care     Hilario Ellis, PT

## 2024-05-08 ENCOUNTER — CLINICAL SUPPORT (OUTPATIENT)
Dept: REHABILITATION | Facility: HOSPITAL | Age: 49
End: 2024-05-08
Payer: COMMERCIAL

## 2024-05-08 DIAGNOSIS — M62.89 MUSCLE TIGHTNESS: ICD-10-CM

## 2024-05-08 DIAGNOSIS — M25.60 DECREASED RANGE OF MOTION: ICD-10-CM

## 2024-05-08 DIAGNOSIS — Z98.1 S/P LUMBAR SPINAL FUSION: Primary | ICD-10-CM

## 2024-05-08 PROCEDURE — 97530 THERAPEUTIC ACTIVITIES: CPT | Mod: PN

## 2024-05-08 PROCEDURE — 97110 THERAPEUTIC EXERCISES: CPT | Mod: PN

## 2024-05-08 PROCEDURE — 97112 NEUROMUSCULAR REEDUCATION: CPT | Mod: PN

## 2024-05-08 NOTE — PROGRESS NOTES
Dictation #1  MRN:[id number]  CSN:[id number] [facility]   Physical Therapy Treatment Note      Name: Shanthi Escoto  Clinic Number: [id number]    Therapy Diagnosis:   Encounter Diagnoses   Name Primary?    S/P lumbar spinal fusion Yes    Decreased range of motion     Muscle tightness        Physician: Dennis Flores MD    Visit Date: 5/8/2024    Physician: Dennis Flores MD         Physician Orders: PT Eval and Treat  Medical Diagnosis from Referral: Z98.1 (ICD-10-CM) - S/P lumbar spinal fusion  Evaluation Date: 12/6/2023  Authorization Period Expiration: 12/29/2023  Plan of Care Expiration: 3/30/2024                Progress Update: 1/6/2024               FOTO: 1 / 3   Visit # / Visits authorized: 22 / 40                          PRECAUTIONS: Standard Precautions and Orthopedic: Lumbar Fusion precautions      Time In: 1550  (Pnt arrived early)  Time Out: 1646  Total Appointment Time (timed & untimed codes): 56 minutes      Subjective     Patient reports: that she feels ok today, no complaints.  She was compliant with home exercise program.  Response to previous treatment: no issues   Functional change: n/a    Pain: 5/10  Location: bilateral back      Objective      Objective Measures updated at progress report unless specified.     Treatment     Shanthi received the treatments listed below:      therapeutic exercises to develop strength, endurance, ROM, flexibility, posture, and core stabilization for 8 minutes including:    LAQs  3#  2x10 Bilateral    SAQs  #5  2x10 Bilateral     neuromuscular re-education activities to improve: Posture for 38 minutes. The following activities were included:    Nustep x 15 mins  TrA sets with Physioball  2x10     SLRs  3x10 Bilateral   Clamshell green theraband 3x10      Seated HR/TR   3x10   Hip adduction with ball  3x10     UBE 3/3  Standing gastroc stretch 3x30 secs Bilateral   Leg press   30#  3x10    therapeutic activities to improve functional  "performance for 10 minutes, including:    Single Leg Stance 3 x 20 sec Bilateral - np    Step ups on 6" steps   x 10 B - np  Scapular squeezes  3x10  OH pulleys   (flexion/abduction)  x 3 mins each    Rows with red tb  2 x 10 - np  Standing hip abduction with support x 10 Bilateral  - np    Seated marches 3#  x 20 Bilateral          Patient Education and Home Exercises       Education provided:   - home exercises, posture, exercise tolerance     Written Home Exercises Provided: Patient instructed to cont prior HEP. Exercises were reviewed and Shanthi was able to demonstrate them prior to the end of the session.  Shanthi demonstrated good  understanding of the education provided. See Electronic Medical Record under Patient Instructions for exercises provided during therapy sessions    Assessment     Shanthi presents with improved endurance and strength but still displays unstable gait without cane. Standby assistance during gait within clinic per safety. Progress as able.     Shanthi Is progressing well towards her goals.   Patient prognosis is Good.     Patient will continue to benefit from skilled outpatient physical therapy to address the deficits listed in the problem list box on initial evaluation, provide pt/family education and to maximize pt's level of independence in the home and community environment.     Patient's spiritual, cultural and educational needs considered and pt agreeable to plan of care and goals.     Anticipated barriers to physical therapy: none     Goals:   SHORT TERM GOALS: 4 weeks 5/8/2024     Recent signs and systems trend is improving in order to progress towards LTG's.  Ongoing   Patient will be independent with HEP in order to further progress and return to maximal function.  Ongoing   Pain rating at Worst: 5/10 in order to progress towards increased independence with activity.  Ongoing   Patient will be able to correct postural deviations in sitting and standing, to decrease pain and " promote postural awareness for injury prevention.   Ongoing      LONG TERM GOALS: 8 weeks 5/8/2024     Patient will return to normal ADL, recreational, and work related activities with less pain and limitation.   Ongoing   Patient will improve AROM to stated goals in order to return to maximal functional potential.   Ongoing   Patient will improve Strength to stated goals of appropriate musculature in order to improve functional independence.   Ongoing   Pain Rating at Best: 1/10 to improve Quality of Life.   Ongoing   Patient will meet predicted functional outcome (FOTO) score: 80% to increase self-worth & perceived functional ability.  Ongoing   Patient will have met/partially met personal goal of being able to walk with no pain.  Ongoing            Plan     Continue per Plan of Care     Hilario Ellis, PT

## 2024-05-13 ENCOUNTER — CLINICAL SUPPORT (OUTPATIENT)
Dept: REHABILITATION | Facility: HOSPITAL | Age: 49
End: 2024-05-13
Payer: COMMERCIAL

## 2024-05-13 DIAGNOSIS — M25.60 DECREASED RANGE OF MOTION: ICD-10-CM

## 2024-05-13 DIAGNOSIS — Z98.1 S/P LUMBAR SPINAL FUSION: Primary | ICD-10-CM

## 2024-05-13 DIAGNOSIS — M62.89 MUSCLE TIGHTNESS: ICD-10-CM

## 2024-05-13 PROCEDURE — 97112 NEUROMUSCULAR REEDUCATION: CPT | Mod: PN

## 2024-05-13 PROCEDURE — 97530 THERAPEUTIC ACTIVITIES: CPT | Mod: PN

## 2024-05-13 PROCEDURE — 97110 THERAPEUTIC EXERCISES: CPT | Mod: PN

## 2024-05-13 NOTE — PROGRESS NOTES
"Dictation #1  MRN:[id number]  CSN:[id number] [facility]   Physical Therapy Treatment Note      Name: Shanthi Escoto  Clinic Number: [id number]    Therapy Diagnosis:   Encounter Diagnoses   Name Primary?    S/P lumbar spinal fusion Yes    Decreased range of motion     Muscle tightness        Physician: Dennis Flores MD    Visit Date: 5/13/2024    Physician: Dennis Flores MD         Physician Orders: PT Eval and Treat  Medical Diagnosis from Referral: Z98.1 (ICD-10-CM) - S/P lumbar spinal fusion  Evaluation Date: 12/6/2023  Authorization Period Expiration: 12/29/2023  Plan of Care Expiration: 3/30/2024                Progress Update: 1/6/2024               FOTO: 1 / 3   Visit # / Visits authorized: 22 / 40                          PRECAUTIONS: Standard Precautions and Orthopedic: Lumbar Fusion precautions      Time In: 1538  (Pnt arrived early)  Time Out: 1634  Total Appointment Time (timed & untimed codes): 56 minutes      Subjective     Patient reports: that she feels ok today, no complaints.  She was compliant with home exercise program.  Response to previous treatment: no issues   Functional change: n/a    Pain: 4/10  Location: bilateral back      Objective      Objective Measures updated at progress report unless specified.     Treatment     Shanthi received the treatments listed below:      therapeutic exercises to develop strength, endurance, ROM, flexibility, posture, and core stabilization for 8 minutes including:    LAQs  3#  2x10 Bilateral    SAQs  #5  2x10 Bilateral     neuromuscular re-education activities to improve: Posture for 38 minutes. The following activities were included:    Nustep x 15 mins  TrA sets with Physioball  2x10     SLRs  3x10 Bilateral   Clamshell green theraband 3x10      Seated HR/TR   3x10   Hip adduction with ball  3x10     UBE 3/3  Standing gastroc stretch 3x30 secs Bilateral   Leg press   30#  3x10 - np  Tony steps 6" step x 2 " "laps    therapeutic activities to improve functional performance for 10 minutes, including:    Single Leg Stance 3 x 20 sec Bilateral - np    Step ups on 6" steps   x 10 B - np  Scapular squeezes  3x10  OH pulleys   (flexion/abduction)  x 3 mins each  - np  Lateral steps along window x 5 laps  Rows with red tb  2 x 10 - np  Standing hip abduction with support x 10 Bilateral  - np    Seated marches 3#  x 20 Bilateral  - np        Patient Education and Home Exercises       Education provided:   - home exercises, posture, exercise tolerance     Written Home Exercises Provided: Patient instructed to cont prior HEP. Exercises were reviewed and Shanthi was able to demonstrate them prior to the end of the session.  Shanthi demonstrated good  understanding of the education provided. See Electronic Medical Record under Patient Instructions for exercises provided during therapy sessions    Assessment     Shanthi presents with improved endurance and strength but still displays unstable gait without cane. Standby assistance during gait within clinic per safety. Progress as able.     Shanthi Is progressing well towards her goals.   Patient prognosis is Good.     Patient will continue to benefit from skilled outpatient physical therapy to address the deficits listed in the problem list box on initial evaluation, provide pt/family education and to maximize pt's level of independence in the home and community environment.     Patient's spiritual, cultural and educational needs considered and pt agreeable to plan of care and goals.     Anticipated barriers to physical therapy: none     Goals:   SHORT TERM GOALS: 4 weeks 5/13/2024     Recent signs and systems trend is improving in order to progress towards LTG's.  Ongoing   Patient will be independent with HEP in order to further progress and return to maximal function.  Ongoing   Pain rating at Worst: 5/10 in order to progress towards increased independence with activity.  Ongoing "   Patient will be able to correct postural deviations in sitting and standing, to decrease pain and promote postural awareness for injury prevention.   Ongoing      LONG TERM GOALS: 8 weeks 5/13/2024     Patient will return to normal ADL, recreational, and work related activities with less pain and limitation.   Ongoing   Patient will improve AROM to stated goals in order to return to maximal functional potential.   Ongoing   Patient will improve Strength to stated goals of appropriate musculature in order to improve functional independence.   Ongoing   Pain Rating at Best: 1/10 to improve Quality of Life.   Ongoing   Patient will meet predicted functional outcome (FOTO) score: 80% to increase self-worth & perceived functional ability.  Ongoing   Patient will have met/partially met personal goal of being able to walk with no pain.  Ongoing            Plan     Continue per Plan of Care     Hilario Ellis, PT

## 2024-05-15 ENCOUNTER — CLINICAL SUPPORT (OUTPATIENT)
Dept: REHABILITATION | Facility: HOSPITAL | Age: 49
End: 2024-05-15
Payer: COMMERCIAL

## 2024-05-15 DIAGNOSIS — M62.89 MUSCLE TIGHTNESS: ICD-10-CM

## 2024-05-15 DIAGNOSIS — M25.60 DECREASED RANGE OF MOTION: ICD-10-CM

## 2024-05-15 DIAGNOSIS — Z98.1 S/P LUMBAR SPINAL FUSION: Primary | ICD-10-CM

## 2024-05-15 PROCEDURE — 97112 NEUROMUSCULAR REEDUCATION: CPT | Mod: PN

## 2024-05-15 PROCEDURE — 97110 THERAPEUTIC EXERCISES: CPT | Mod: PN

## 2024-05-15 PROCEDURE — 97530 THERAPEUTIC ACTIVITIES: CPT | Mod: PN

## 2024-05-15 NOTE — PROGRESS NOTES
"Dictation #1  MRN:[id number]  CSN:[id number] [facility]   Physical Therapy Treatment Note      Name: Shanthi Escoto  Clinic Number: [id number]    Therapy Diagnosis:   Encounter Diagnoses   Name Primary?    S/P lumbar spinal fusion Yes    Decreased range of motion     Muscle tightness        Physician: Dennis Flores MD    Visit Date: 5/15/2024    Physician: Dennis Flores MD         Physician Orders: PT Eval and Treat  Medical Diagnosis from Referral: Z98.1 (ICD-10-CM) - S/P lumbar spinal fusion  Evaluation Date: 12/6/2023  Authorization Period Expiration: 12/29/2023  Plan of Care Expiration: 3/30/2024                Progress Update: 1/6/2024               FOTO: 1 / 3   Visit # / Visits authorized: 23 / 40                          PRECAUTIONS: Standard Precautions and Orthopedic: Lumbar Fusion precautions      Time In: 1545  (Pnt arrived early)  Time Out: 1641  Total Appointment Time (timed & untimed codes): 56 minutes      Subjective     Patient reports: that her feet don't feel great today.    She was compliant with home exercise program.  Response to previous treatment: no issues   Functional change: n/a    Pain: 4/10  Location: bilateral back      Objective      Objective Measures updated at progress report unless specified.     Treatment     Shanthi received the treatments listed below:      therapeutic exercises to develop strength, endurance, ROM, flexibility, posture, and core stabilization for 8 minutes including:    LAQs  3x10 Bilateral    SAQs  3x10 Bilateral     neuromuscular re-education activities to improve: Posture for 38 minutes. The following activities were included:    Nustep x 15 mins  TrA sets with Physioball  2x10     SLRs  3x10 Bilateral   Clamshell green theraband 3x10      Seated HR/TR   3x10   Hip adduction with ball  3x10     UBE 3/3  Standing gastroc stretch 3x30 secs Bilateral - np  Leg press   30#  3x10 - np  Tony steps 6" step x 2 laps - " "np    therapeutic activities to improve functional performance for 10 minutes, including:    Single Leg Stance 3 x 20 sec Bilateral - np    Step ups on 6" steps   x 10 B - np  Scapular squeezes  3x10  OH pulleys   (flexion/abduction)  x 3 mins each   Lateral steps along window x 5 laps  Rows with red tb  2 x 10 - np  Standing hip abduction with support x 10 Bilateral  - np    Seated marches 3#  x 20 Bilateral          Patient Education and Home Exercises       Education provided:   - home exercises, posture, exercise tolerance     Written Home Exercises Provided: Patient instructed to cont prior HEP. Exercises were reviewed and Shanthi was able to demonstrate them prior to the end of the session.  Shanthi demonstrated good  understanding of the education provided. See Electronic Medical Record under Patient Instructions for exercises provided during therapy sessions    Assessment     Shanthi presents with improved endurance and strength but still displays unstable gait without cane. Standby assistance during gait within clinic per safety. Progress as able.     Shanthi Is progressing well towards her goals.   Patient prognosis is Good.     Patient will continue to benefit from skilled outpatient physical therapy to address the deficits listed in the problem list box on initial evaluation, provide pt/family education and to maximize pt's level of independence in the home and community environment.     Patient's spiritual, cultural and educational needs considered and pt agreeable to plan of care and goals.     Anticipated barriers to physical therapy: none     Goals:   SHORT TERM GOALS: 4 weeks 5/15/2024     Recent signs and systems trend is improving in order to progress towards LTG's.  Ongoing   Patient will be independent with HEP in order to further progress and return to maximal function.  Ongoing   Pain rating at Worst: 5/10 in order to progress towards increased independence with activity.  Ongoing   Patient " will be able to correct postural deviations in sitting and standing, to decrease pain and promote postural awareness for injury prevention.   Ongoing      LONG TERM GOALS: 8 weeks 5/15/2024     Patient will return to normal ADL, recreational, and work related activities with less pain and limitation.   Ongoing   Patient will improve AROM to stated goals in order to return to maximal functional potential.   Ongoing   Patient will improve Strength to stated goals of appropriate musculature in order to improve functional independence.   Ongoing   Pain Rating at Best: 1/10 to improve Quality of Life.   Ongoing   Patient will meet predicted functional outcome (FOTO) score: 80% to increase self-worth & perceived functional ability.  Ongoing   Patient will have met/partially met personal goal of being able to walk with no pain.  Ongoing            Plan     Continue per Plan of Care     Hilario Ellis, PT

## 2024-05-20 ENCOUNTER — CLINICAL SUPPORT (OUTPATIENT)
Dept: REHABILITATION | Facility: HOSPITAL | Age: 49
End: 2024-05-20
Payer: COMMERCIAL

## 2024-05-20 DIAGNOSIS — M25.60 DECREASED RANGE OF MOTION: ICD-10-CM

## 2024-05-20 DIAGNOSIS — M62.89 MUSCLE TIGHTNESS: ICD-10-CM

## 2024-05-20 DIAGNOSIS — Z98.1 S/P LUMBAR SPINAL FUSION: Primary | ICD-10-CM

## 2024-05-20 PROCEDURE — 97110 THERAPEUTIC EXERCISES: CPT | Mod: PN

## 2024-05-20 PROCEDURE — 97112 NEUROMUSCULAR REEDUCATION: CPT | Mod: PN

## 2024-05-20 PROCEDURE — 97530 THERAPEUTIC ACTIVITIES: CPT | Mod: PN

## 2024-05-20 NOTE — PROGRESS NOTES
"  Dictation #1  MRN:[id number]  CSN:[id number] [facility]   Physical Therapy Treatment Note      Name: Shanthi Escoto  Clinic Number: [id number]    Therapy Diagnosis:   Encounter Diagnoses   Name Primary?    S/P lumbar spinal fusion Yes    Decreased range of motion     Muscle tightness        Physician: Dennis Flores MD    Visit Date: 5/20/2024    Physician: Dennis Flores MD         Physician Orders: PT Eval and Treat  Medical Diagnosis from Referral: Z98.1 (ICD-10-CM) - S/P lumbar spinal fusion  Evaluation Date: 12/6/2023  Authorization Period Expiration: 12/29/2023  Plan of Care Expiration: 3/30/2024                Progress Update: 1/6/2024               FOTO: 1 / 3   Visit # / Visits authorized: 23 / 40                          PRECAUTIONS: Standard Precautions and Orthopedic: Lumbar Fusion precautions      Time In: 1550  (Pnt arrived early)  Time Out: 1645  Total Appointment Time (timed & untimed codes): 55 minutes      Subjective     Patient reports: that her feet don't feel great today.    She was compliant with home exercise program.  Response to previous treatment: no issues   Functional change: n/a    Pain: 4/10  Location: bilateral back      Objective      Objective Measures updated at progress report unless specified.     Treatment     Shanthi received the treatments listed below:      therapeutic exercises to develop strength, endurance, ROM, flexibility, posture, and core stabilization for 8 minutes including:    LAQs  3x10 Bilateral    SAQs  3x10 Bilateral     neuromuscular re-education activities to improve: Posture for 38 minutes. The following activities were included:    Nustep x 15 mins  TrA sets with Physioball  2x10     SLRs  3x10 Bilateral   Clamshell green theraband 3x10      Seated HR/TR   3x10   Hip adduction with ball  3x10     UBE 3/3  Standing gastroc stretch 3x30 secs Bilateral - np  Leg press   30#  3x10 - np  Tony steps 6" step x 2 laps - " "np    therapeutic activities to improve functional performance for 10 minutes, including:    Single Leg Stance 3 x 20 sec Bilateral - np    Step ups on 6" steps   x 10 B - np  Scapular squeezes  3x10  OH pulleys   (flexion/abduction)  x 3 mins each   Lateral steps along window x 5 laps  Rows with red tb  2 x 10 - np  Standing hip abduction with support x 10 Bilateral  - np    Seated marches 3#  x 20 Bilateral          Patient Education and Home Exercises       Education provided:   - home exercises, posture, exercise tolerance     Written Home Exercises Provided: Patient instructed to cont prior HEP. Exercises were reviewed and Shanthi was able to demonstrate them prior to the end of the session.  Shanthi demonstrated good  understanding of the education provided. See Electronic Medical Record under Patient Instructions for exercises provided during therapy sessions    Assessment     Shanthi presents with improved endurance and strength but still displays unstable gait without cane. Standby assistance during gait within clinic per safety. Progress as able.     Shanthi Is progressing well towards her goals.   Patient prognosis is Good.     Patient will continue to benefit from skilled outpatient physical therapy to address the deficits listed in the problem list box on initial evaluation, provide pt/family education and to maximize pt's level of independence in the home and community environment.     Patient's spiritual, cultural and educational needs considered and pt agreeable to plan of care and goals.     Anticipated barriers to physical therapy: none     Goals:   SHORT TERM GOALS: 4 weeks 5/20/2024     Recent signs and systems trend is improving in order to progress towards LTG's.  Ongoing   Patient will be independent with HEP in order to further progress and return to maximal function.  Ongoing   Pain rating at Worst: 5/10 in order to progress towards increased independence with activity.  Ongoing   Patient " will be able to correct postural deviations in sitting and standing, to decrease pain and promote postural awareness for injury prevention.   Ongoing      LONG TERM GOALS: 8 weeks 5/20/2024     Patient will return to normal ADL, recreational, and work related activities with less pain and limitation.   Ongoing   Patient will improve AROM to stated goals in order to return to maximal functional potential.   Ongoing   Patient will improve Strength to stated goals of appropriate musculature in order to improve functional independence.   Ongoing   Pain Rating at Best: 1/10 to improve Quality of Life.   Ongoing   Patient will meet predicted functional outcome (FOTO) score: 80% to increase self-worth & perceived functional ability.  Ongoing   Patient will have met/partially met personal goal of being able to walk with no pain.  Ongoing            Plan     Continue per Plan of Care     Hilario Ellis, PT

## 2024-05-22 ENCOUNTER — CLINICAL SUPPORT (OUTPATIENT)
Dept: REHABILITATION | Facility: HOSPITAL | Age: 49
End: 2024-05-22
Payer: COMMERCIAL

## 2024-05-22 ENCOUNTER — PATIENT MESSAGE (OUTPATIENT)
Dept: NEUROSURGERY | Facility: CLINIC | Age: 49
End: 2024-05-22
Payer: COMMERCIAL

## 2024-05-22 DIAGNOSIS — M25.60 DECREASED RANGE OF MOTION: ICD-10-CM

## 2024-05-22 DIAGNOSIS — M43.27 FUSION OF SPINE, LUMBOSACRAL REGION: Primary | ICD-10-CM

## 2024-05-22 DIAGNOSIS — Z98.1 S/P LUMBAR SPINAL FUSION: Primary | ICD-10-CM

## 2024-05-22 DIAGNOSIS — M62.89 MUSCLE TIGHTNESS: ICD-10-CM

## 2024-05-22 PROCEDURE — 97110 THERAPEUTIC EXERCISES: CPT | Mod: PN

## 2024-05-22 PROCEDURE — 97112 NEUROMUSCULAR REEDUCATION: CPT | Mod: PN

## 2024-05-22 PROCEDURE — 97530 THERAPEUTIC ACTIVITIES: CPT | Mod: PN

## 2024-05-22 NOTE — PROGRESS NOTES
"  Dictation #1  MRN:[id number]  CSN:[id number] [facility]   Physical Therapy Treatment Note      Name: Shanthi Escoto  Clinic Number: [id number]    Therapy Diagnosis:   Encounter Diagnoses   Name Primary?    S/P lumbar spinal fusion Yes    Decreased range of motion     Muscle tightness        Physician: Dennis Flores MD    Visit Date: 5/22/2024    Physician: Dennis Flores MD         Physician Orders: PT Eval and Treat  Medical Diagnosis from Referral: Z98.1 (ICD-10-CM) - S/P lumbar spinal fusion  Evaluation Date: 12/6/2023  Authorization Period Expiration: 12/29/2023  Plan of Care Expiration: 3/30/2024                Progress Update: 1/6/2024               FOTO: 1 / 3   Visit # / Visits authorized: 23 / 40                          PRECAUTIONS: Standard Precautions and Orthopedic: Lumbar Fusion precautions      Time In: 1540  (Pnt arrived early)  Time Out: 1635  Total Appointment Time (timed & untimed codes): 55 minutes      Subjective     Patient reports: that her feet don't feel great today.    She was compliant with home exercise program.  Response to previous treatment: no issues   Functional change: n/a    Pain: 4/10  Location: bilateral back      Objective      Objective Measures updated at progress report unless specified.     Treatment     Shanthi received the treatments listed below:      therapeutic exercises to develop strength, endurance, ROM, flexibility, posture, and core stabilization for 8 minutes including:    LAQs  3x10 Bilateral    SAQs  3x10 Bilateral     neuromuscular re-education activities to improve: Posture for 38 minutes. The following activities were included:    Nustep x 15 mins  TrA sets with Physioball  2x10     SLRs  3x10 Bilateral   Clamshell green theraband 3x10      Seated HR/TR   3x10   Hip adduction with ball  3x10     UBE 3/3  Standing gastroc stretch 3x30 secs Bilateral - np  Leg press   30#  3x10 - np  Tony steps 6" step x 2 laps - " "np    therapeutic activities to improve functional performance for 10 minutes, including:    Single Leg Stance 3 x 20 sec Bilateral - np    Step ups on 6" steps   x 10 B - np  Scapular squeezes  3x10  OH pulleys   (flexion/abduction)  x 3 mins each   Lateral steps along window x 5 laps  Rows with red tb  2 x 10 - np  Standing hip abduction with support x 10 Bilateral  - np    Seated marches 3#  x 20 Bilateral          Patient Education and Home Exercises       Education provided:   - home exercises, posture, exercise tolerance     Written Home Exercises Provided: Patient instructed to cont prior HEP. Exercises were reviewed and Shanthi was able to demonstrate them prior to the end of the session.  Shanthi demonstrated good  understanding of the education provided. See Electronic Medical Record under Patient Instructions for exercises provided during therapy sessions    Assessment     Shanthi presents with improved endurance and strength but still displays unstable gait without cane. Standby assistance during gait within clinic per safety. Progress as able.     Shanthi Is progressing well towards her goals.   Patient prognosis is Good.     Patient will continue to benefit from skilled outpatient physical therapy to address the deficits listed in the problem list box on initial evaluation, provide pt/family education and to maximize pt's level of independence in the home and community environment.     Patient's spiritual, cultural and educational needs considered and pt agreeable to plan of care and goals.     Anticipated barriers to physical therapy: none     Goals:   SHORT TERM GOALS: 4 weeks 5/22/2024     Recent signs and systems trend is improving in order to progress towards LTG's.  Ongoing   Patient will be independent with HEP in order to further progress and return to maximal function.  Ongoing   Pain rating at Worst: 5/10 in order to progress towards increased independence with activity.  Ongoing   Patient " will be able to correct postural deviations in sitting and standing, to decrease pain and promote postural awareness for injury prevention.   Ongoing      LONG TERM GOALS: 8 weeks 5/22/2024     Patient will return to normal ADL, recreational, and work related activities with less pain and limitation.   Ongoing   Patient will improve AROM to stated goals in order to return to maximal functional potential.   Ongoing   Patient will improve Strength to stated goals of appropriate musculature in order to improve functional independence.   Ongoing   Pain Rating at Best: 1/10 to improve Quality of Life.   Ongoing   Patient will meet predicted functional outcome (FOTO) score: 80% to increase self-worth & perceived functional ability.  Ongoing   Patient will have met/partially met personal goal of being able to walk with no pain.  Ongoing            Plan     Continue per Plan of Care     Hilario Ellis, PT

## 2024-05-29 ENCOUNTER — CLINICAL SUPPORT (OUTPATIENT)
Dept: REHABILITATION | Facility: HOSPITAL | Age: 49
End: 2024-05-29
Payer: COMMERCIAL

## 2024-05-29 DIAGNOSIS — Z98.1 S/P LUMBAR SPINAL FUSION: Primary | ICD-10-CM

## 2024-05-29 DIAGNOSIS — M62.89 MUSCLE TIGHTNESS: ICD-10-CM

## 2024-05-29 DIAGNOSIS — M25.60 DECREASED RANGE OF MOTION: ICD-10-CM

## 2024-05-29 PROCEDURE — 97112 NEUROMUSCULAR REEDUCATION: CPT | Mod: PN,CQ

## 2024-05-29 NOTE — PROGRESS NOTES
Dictation #1  MRN:[id number]  CSN:[id number] [facility]   Physical Therapy Treatment Note      Name: Shanthi Escoto  Clinic Number: [id number]    Therapy Diagnosis:   Encounter Diagnoses   Name Primary?    S/P lumbar spinal fusion Yes    Decreased range of motion     Muscle tightness        Physician: Dennis Flores MD    Visit Date: 5/29/2024    Physician: Dennis Flores MD         Physician Orders: PT Eval and Treat  Medical Diagnosis from Referral: Z98.1 (ICD-10-CM) - S/P lumbar spinal fusion  Evaluation Date: 12/6/2023  Authorization Period Expiration: 12/29/2023  Plan of Care Expiration: 3/30/2024                Progress Update: 1/6/2024               FOTO: 1 / 3   Visit # / Visits authorized: 24 / 40                          PRECAUTIONS: Standard Precautions and Orthopedic: Lumbar Fusion precautions      Time In: 345  Time Out: 420  Total Appointment Time (timed & untimed codes): 25 minutes      Subjective     Patient reports: she walked a lot already today at another MD appointment so she is tired already.    She was compliant with home exercise program.  Response to previous treatment: no issues   Functional change: n/a    Pain: 4/10  Location: bilateral back      Objective      Objective Measures updated at progress report unless specified.     Treatment     Shanthi received the treatments listed below:      therapeutic exercises to develop strength, endurance, ROM, flexibility, posture, and core stabilization for 0 minutes including:    LAQs  3x10 Bilateral    SAQs  3x10 Bilateral     neuromuscular re-education activities to improve: Posture for 25 minutes. The following activities were included:    Nustep x 15 mins  UBE 3/3    Not performed:   Seated HR/TR   3x10   Hip adduction with ball  3x10     TrA sets with Physioball  2x10     SLRs  3x10 Bilateral   Clamshell green theraband 3x10    Standing gastroc stretch 3x30 secs Bilateral - np  Leg press   30#  3x10 - np  Tony  "steps 6" step x 2 laps - np    therapeutic activities to improve functional performance for 0 minutes, including:    Single Leg Stance 3 x 20 sec Bilateral - np    Step ups on 6" steps   x 10 B - np  Scapular squeezes  3x10  OH pulleys   (flexion/abduction)  x 3 mins each   Lateral steps along window x 5 laps  Rows with red tb  2 x 10 - np  Standing hip abduction with support x 10 Bilateral  - np    Seated marches 3#  x 20 Bilateral          Patient Education and Home Exercises       Education provided:   - home exercises, posture, exercise tolerance     Written Home Exercises Provided: Patient instructed to cont prior HEP. Exercises were reviewed and Shanthi was able to demonstrate them prior to the end of the session.  Shanthi demonstrated good  understanding of the education provided. See Electronic Medical Record under Patient Instructions for exercises provided during therapy sessions    Assessment     Shanthi presents with increased fatigue upon arrival. Good effort with performance, but performed shortened session. Encouraged to use cane for assistance as needed when her MS is acting up. Continue to progress as needed.     Shanthi Is progressing well towards her goals.   Patient prognosis is Good.     Patient will continue to benefit from skilled outpatient physical therapy to address the deficits listed in the problem list box on initial evaluation, provide pt/family education and to maximize pt's level of independence in the home and community environment.     Patient's spiritual, cultural and educational needs considered and pt agreeable to plan of care and goals.     Anticipated barriers to physical therapy: none     Goals:   SHORT TERM GOALS: 4 weeks 5/29/2024     Recent signs and systems trend is improving in order to progress towards LTG's.  Ongoing   Patient will be independent with HEP in order to further progress and return to maximal function.  Ongoing   Pain rating at Worst: 5/10 in order to " progress towards increased independence with activity.  Ongoing   Patient will be able to correct postural deviations in sitting and standing, to decrease pain and promote postural awareness for injury prevention.   Ongoing      LONG TERM GOALS: 8 weeks 5/29/2024     Patient will return to normal ADL, recreational, and work related activities with less pain and limitation.   Ongoing   Patient will improve AROM to stated goals in order to return to maximal functional potential.   Ongoing   Patient will improve Strength to stated goals of appropriate musculature in order to improve functional independence.   Ongoing   Pain Rating at Best: 1/10 to improve Quality of Life.   Ongoing   Patient will meet predicted functional outcome (FOTO) score: 80% to increase self-worth & perceived functional ability.  Ongoing   Patient will have met/partially met personal goal of being able to walk with no pain.  Ongoing            Plan     Continue per Plan of Care     lEo Melgoza PTA

## 2024-06-10 ENCOUNTER — PATIENT MESSAGE (OUTPATIENT)
Dept: NEUROSURGERY | Facility: CLINIC | Age: 49
End: 2024-06-10
Payer: COMMERCIAL

## 2024-06-10 ENCOUNTER — CLINICAL SUPPORT (OUTPATIENT)
Dept: REHABILITATION | Facility: HOSPITAL | Age: 49
End: 2024-06-10
Payer: COMMERCIAL

## 2024-06-10 DIAGNOSIS — Z98.1 S/P LUMBAR SPINAL FUSION: Primary | ICD-10-CM

## 2024-06-10 DIAGNOSIS — M25.60 DECREASED RANGE OF MOTION: ICD-10-CM

## 2024-06-10 DIAGNOSIS — M62.89 MUSCLE TIGHTNESS: ICD-10-CM

## 2024-06-10 PROCEDURE — 97530 THERAPEUTIC ACTIVITIES: CPT | Mod: PN

## 2024-06-10 PROCEDURE — 97110 THERAPEUTIC EXERCISES: CPT | Mod: PN

## 2024-06-10 PROCEDURE — 97112 NEUROMUSCULAR REEDUCATION: CPT | Mod: PN

## 2024-06-10 NOTE — PROGRESS NOTES
Dictation #1  MRN:[id number]  CSN:[id number] [facility]   Physical Therapy Treatment Note      Name: Shanthi Escoto  Clinic Number: [id number]    Therapy Diagnosis:   Encounter Diagnoses   Name Primary?    S/P lumbar spinal fusion Yes    Decreased range of motion     Muscle tightness        Physician: Dennis Flores MD    Visit Date: 6/10/2024    Physician: Dennis Flores MD         Physician Orders: PT Eval and Treat  Medical Diagnosis from Referral: Z98.1 (ICD-10-CM) - S/P lumbar spinal fusion  Evaluation Date: 12/6/2023  Authorization Period Expiration: 12/29/2023  Plan of Care Expiration: 3/30/2024                Progress Update: 1/6/2024               FOTO: 1 / 3   Visit # / Visits authorized: 24 / 40                          PRECAUTIONS: Standard Precautions and Orthopedic: Lumbar Fusion precautions      Time In:  1647  (Pnt arrived early)  Time Out: 1741  Total Appointment Time (timed & untimed codes): 54 minutes      Subjective     Patient reports: she walked a lot already today at another MD appointment so she is tired already.    She was compliant with home exercise program.  Response to previous treatment: no issues     Functional change: n/a    Pain: 4/10  Location: bilateral back      Objective      Objective Measures updated at progress report unless specified.     Treatment     Shanthi received the treatments listed below:      therapeutic exercises to develop strength, endurance, ROM, flexibility, posture, and core stabilization for 8 minutes including:    LAQs  3x10 Bilateral    SAQs  2#  3x10 Bilateral     neuromuscular re-education activities to improve: Posture for 38 minutes. The following activities were included:    Nustep x 15 mins  UBE 3/3    Seated HR/TR   3x10   Hip adduction with ball  3x10     TrA sets with Physioball  2x10     SLRs  3x10 Bilateral   Clamshell green theraband 3x10    Standing gastroc stretch 3x30 secs Bilateral - np  Leg press   30#  3x10  "  Tony steps 6" step x 2 laps - np    therapeutic activities to improve functional performance for 8 minutes, including:    Single Leg Stance 3 x 20 sec Bilateral - np    Step ups on 6" steps   x 10 B - np  Scapular squeezes  3x10  OH pulleys   (flexion/abduction)  x 3 mins each   Lateral steps along window x 5 laps  Rows with red tb  2 x 10 - np  Standing hip abduction with support x 10 Bilateral  - np    Seated marches 3#  x 20 Bilateral          Patient Education and Home Exercises       Education provided:   - home exercises, posture, exercise tolerance     Written Home Exercises Provided: Patient instructed to cont prior HEP. Exercises were reviewed and Shanthi was able to demonstrate them prior to the end of the session.  Shanthi demonstrated good  understanding of the education provided. See Electronic Medical Record under Patient Instructions for exercises provided during therapy sessions    Assessment     Shanthi presents with increased fatigue upon arrival. Good effort with performance, but performed shortened session. Encouraged to use cane for assistance as needed when her MS is acting up. Continue to progress as needed.     Shanthi Is progressing well towards her goals.   Patient prognosis is Good.     Patient will continue to benefit from skilled outpatient physical therapy to address the deficits listed in the problem list box on initial evaluation, provide pt/family education and to maximize pt's level of independence in the home and community environment.     Patient's spiritual, cultural and educational needs considered and pt agreeable to plan of care and goals.     Anticipated barriers to physical therapy: none     Goals:   SHORT TERM GOALS: 4 weeks 6/10/2024     Recent signs and systems trend is improving in order to progress towards LTG's.  Ongoing   Patient will be independent with HEP in order to further progress and return to maximal function.  Ongoing   Pain rating at Worst: 5/10 in order " to progress towards increased independence with activity.  Ongoing   Patient will be able to correct postural deviations in sitting and standing, to decrease pain and promote postural awareness for injury prevention.   Ongoing      LONG TERM GOALS: 8 weeks 6/10/2024     Patient will return to normal ADL, recreational, and work related activities with less pain and limitation.   Ongoing   Patient will improve AROM to stated goals in order to return to maximal functional potential.   Ongoing   Patient will improve Strength to stated goals of appropriate musculature in order to improve functional independence.   Ongoing   Pain Rating at Best: 1/10 to improve Quality of Life.   Ongoing   Patient will meet predicted functional outcome (FOTO) score: 80% to increase self-worth & perceived functional ability.  Ongoing   Patient will have met/partially met personal goal of being able to walk with no pain.  Ongoing            Plan     Continue per Plan of Care     Hilario Ellis, PT

## 2024-06-11 ENCOUNTER — PATIENT MESSAGE (OUTPATIENT)
Dept: NEUROSURGERY | Facility: CLINIC | Age: 49
End: 2024-06-11
Payer: COMMERCIAL

## 2024-06-12 ENCOUNTER — CLINICAL SUPPORT (OUTPATIENT)
Dept: REHABILITATION | Facility: HOSPITAL | Age: 49
End: 2024-06-12
Payer: COMMERCIAL

## 2024-06-12 DIAGNOSIS — M25.60 DECREASED RANGE OF MOTION: ICD-10-CM

## 2024-06-12 DIAGNOSIS — Z98.1 S/P LUMBAR SPINAL FUSION: Primary | ICD-10-CM

## 2024-06-12 DIAGNOSIS — M62.89 MUSCLE TIGHTNESS: ICD-10-CM

## 2024-06-12 PROCEDURE — 97110 THERAPEUTIC EXERCISES: CPT | Mod: PN

## 2024-06-12 PROCEDURE — 97112 NEUROMUSCULAR REEDUCATION: CPT | Mod: PN

## 2024-06-12 PROCEDURE — 97530 THERAPEUTIC ACTIVITIES: CPT | Mod: PN

## 2024-06-12 NOTE — PROGRESS NOTES
"  Dictation #1  MRN:[id number]  CSN:[id number] [facility]   Physical Therapy Treatment Note      Name: Shanthi Escoto  Clinic Number: [id number]    Therapy Diagnosis:   Encounter Diagnoses   Name Primary?    S/P lumbar spinal fusion Yes    Decreased range of motion     Muscle tightness        Physician: Dennis Flores MD    Visit Date: 6/12/2024    Physician: Dennis Flores MD         Physician Orders: PT Eval and Treat  Medical Diagnosis from Referral: Z98.1 (ICD-10-CM) - S/P lumbar spinal fusion  Evaluation Date: 12/6/2023  Authorization Period Expiration: 12/29/2023  Plan of Care Expiration: 3/30/2024                Progress Update: 1/6/2024               FOTO: 1 / 3   Visit # / Visits authorized: 29 / 40                          PRECAUTIONS: Standard Precautions and Orthopedic: Lumbar Fusion precautions      Time In:  1650  (Pnt arrived early)  Time Out: 1745  Total Appointment Time (timed & untimed codes): 55 minutes      Subjective     Patient reports: that she isn't feeling well today.     She was compliant with home exercise program.  Response to previous treatment: no issues     Functional change: n/a    Pain: 4/10  Location: bilateral back      Objective      Objective Measures updated at progress report unless specified.     Treatment     Shanthi received the treatments listed below:      therapeutic exercises to develop strength, endurance, ROM, flexibility, posture, and core stabilization for 8 minutes including:    LAQs  3x10 Bilateral    SAQs  2#  3x10 Bilateral     neuromuscular re-education activities to improve: Posture for 38 minutes. The following activities were included:    Nustep x 10 mins  UBE 3/3 - np    Seated HR/TR   3x10   Hip adduction with ball  3x10     TrA sets with Physioball  2x10     SLRs  3x10 Bilateral   Clamshell green theraband 3x10    Standing gastroc stretch 3x30 secs Bilateral - np  Leg press   30#  3x10   Tony steps 6" step x 2 laps - " "np    therapeutic activities to improve functional performance for 8 minutes, including:    Single Leg Stance 3 x 20 sec Bilateral - np    Step ups on 6" steps   x 10 B - np  Scapular squeezes  3x10  OH pulleys   (flexion/abduction)  x 3 mins each   Lateral steps along window x 5 laps - np  Rows with red tb  2 x 10 - np  Standing hip abduction with support x 10 Bilateral  - np    Seated marches 2#  x 20 Bilateral          Patient Education and Home Exercises       Education provided:   - home exercises, posture, exercise tolerance     Written Home Exercises Provided: Patient instructed to cont prior HEP. Exercises were reviewed and Shanthi was able to demonstrate them prior to the end of the session.  Shanthi demonstrated good  understanding of the education provided. See Electronic Medical Record under Patient Instructions for exercises provided during therapy sessions    Assessment     Shanthi presents with increased fatigue upon arrival.  Encouraged to use cane for assistance as needed when her MS is acting up. Continue to progress as needed.     Shanthi Is progressing well towards her goals.   Patient prognosis is Good.     Patient will continue to benefit from skilled outpatient physical therapy to address the deficits listed in the problem list box on initial evaluation, provide pt/family education and to maximize pt's level of independence in the home and community environment.     Patient's spiritual, cultural and educational needs considered and pt agreeable to plan of care and goals.     Anticipated barriers to physical therapy: none     Goals:   SHORT TERM GOALS: 4 weeks 6/12/2024     Recent signs and systems trend is improving in order to progress towards LTG's.  Ongoing   Patient will be independent with HEP in order to further progress and return to maximal function.  Ongoing   Pain rating at Worst: 5/10 in order to progress towards increased independence with activity.  Ongoing   Patient will be able " to correct postural deviations in sitting and standing, to decrease pain and promote postural awareness for injury prevention.   Ongoing      LONG TERM GOALS: 8 weeks 6/12/2024     Patient will return to normal ADL, recreational, and work related activities with less pain and limitation.   Ongoing   Patient will improve AROM to stated goals in order to return to maximal functional potential.   Ongoing   Patient will improve Strength to stated goals of appropriate musculature in order to improve functional independence.   Ongoing   Pain Rating at Best: 1/10 to improve Quality of Life.   Ongoing   Patient will meet predicted functional outcome (FOTO) score: 80% to increase self-worth & perceived functional ability.  Ongoing   Patient will have met/partially met personal goal of being able to walk with no pain.  Ongoing            Plan     Continue per Plan of Care     Hilario Ellis, PT

## 2024-06-19 ENCOUNTER — CLINICAL SUPPORT (OUTPATIENT)
Dept: REHABILITATION | Facility: HOSPITAL | Age: 49
End: 2024-06-19
Payer: COMMERCIAL

## 2024-06-19 DIAGNOSIS — M62.89 MUSCLE TIGHTNESS: ICD-10-CM

## 2024-06-19 DIAGNOSIS — M25.60 DECREASED RANGE OF MOTION: ICD-10-CM

## 2024-06-19 DIAGNOSIS — Z98.1 S/P LUMBAR SPINAL FUSION: Primary | ICD-10-CM

## 2024-06-19 PROCEDURE — 97110 THERAPEUTIC EXERCISES: CPT | Mod: PN

## 2024-06-19 PROCEDURE — 97530 THERAPEUTIC ACTIVITIES: CPT | Mod: PN

## 2024-06-19 PROCEDURE — 97112 NEUROMUSCULAR REEDUCATION: CPT | Mod: PN

## 2024-06-19 NOTE — PROGRESS NOTES
"  Dictation #1  MRN:[id number]  CSN:[id number] [facility]   Physical Therapy Treatment Note      Name: Shanthi Escoto  Clinic Number: [id number]    Therapy Diagnosis:   Encounter Diagnoses   Name Primary?    S/P lumbar spinal fusion Yes    Decreased range of motion     Muscle tightness        Physician: Dennis Flores MD    Visit Date: 6/19/2024    Physician: Dennis Flores MD         Physician Orders: PT Eval and Treat  Medical Diagnosis from Referral: Z98.1 (ICD-10-CM) - S/P lumbar spinal fusion  Evaluation Date: 12/6/2023  Authorization Period Expiration: 12/29/2023  Plan of Care Expiration: 3/30/2024                Progress Update: 1/6/2024               FOTO: 1 / 3   Visit # / Visits authorized: 29 / 40                          PRECAUTIONS: Standard Precautions and Orthopedic: Lumbar Fusion precautions      Time In:  1554  (Pnt arrived early)  Time Out: 1649  Total Appointment Time (timed & untimed codes): 55 minutes      Subjective     Patient reports:  that she was walking great at home but no so much here.  She was compliant with home exercise program.  Response to previous treatment: no issues     Functional change: n/a    Pain: 4/10  Location: bilateral back      Objective      Objective Measures updated at progress report unless specified.     Treatment     Shanthi received the treatments listed below:      therapeutic exercises to develop strength, endurance, ROM, flexibility, posture, and core stabilization for 8 minutes including:    LAQs  3x10 Bilateral    SAQs  2#  3x10 Bilateral     neuromuscular re-education activities to improve: Posture for 38 minutes. The following activities were included:    Nustep x 10 mins  UBE 3/3 - np    Seated HR/TR   3x10   Hip adduction with ball  3x10     TrA sets with Physioball  2x10     SLRs  3x10 Bilateral   Clamshell green theraband 3x10    Standing gastroc stretch 3x30 secs Bilateral - np  Leg press   30#  3x10   Tony steps 6" " "step x 2 laps - np    therapeutic activities to improve functional performance for 8 minutes, including:    Single Leg Stance 3 x 20 sec Bilateral - np    Step ups on 6" steps   x 10 B - np  Scapular squeezes  3x10  OH pulleys   (flexion/abduction)  x 3 mins each   Lateral steps along window x 5 laps - np    Rows with red tb  2 x 10 - np  Standing hip abduction with support x 10 Bilateral  - np    Seated marches 2#  x 20 Bilateral          Patient Education and Home Exercises       Education provided:   - home exercises, posture, exercise tolerance     Written Home Exercises Provided: Patient instructed to cont prior HEP. Exercises were reviewed and Shanthi was able to demonstrate them prior to the end of the session.  Shanthi demonstrated good  understanding of the education provided. See Electronic Medical Record under Patient Instructions for exercises provided during therapy sessions    Assessment     Shanthi presents with increased fatigue upon arrival.  Encouraged to use cane for assistance as needed when her MS is acting up. Continue to progress as needed.     Shanthi Is progressing well towards her goals.   Patient prognosis is Good.     Patient will continue to benefit from skilled outpatient physical therapy to address the deficits listed in the problem list box on initial evaluation, provide pt/family education and to maximize pt's level of independence in the home and community environment.     Patient's spiritual, cultural and educational needs considered and pt agreeable to plan of care and goals.     Anticipated barriers to physical therapy: none     Goals:   SHORT TERM GOALS: 4 weeks 6/19/2024     Recent signs and systems trend is improving in order to progress towards LTG's.  Ongoing   Patient will be independent with HEP in order to further progress and return to maximal function.  Ongoing   Pain rating at Worst: 5/10 in order to progress towards increased independence with activity.  Ongoing "   Patient will be able to correct postural deviations in sitting and standing, to decrease pain and promote postural awareness for injury prevention.   Ongoing      LONG TERM GOALS: 8 weeks 6/19/2024     Patient will return to normal ADL, recreational, and work related activities with less pain and limitation.   Ongoing   Patient will improve AROM to stated goals in order to return to maximal functional potential.   Ongoing   Patient will improve Strength to stated goals of appropriate musculature in order to improve functional independence.   Ongoing   Pain Rating at Best: 1/10 to improve Quality of Life.   Ongoing   Patient will meet predicted functional outcome (FOTO) score: 80% to increase self-worth & perceived functional ability.  Ongoing   Patient will have met/partially met personal goal of being able to walk with no pain.  Ongoing            Plan     Continue per Plan of Care     Hilario Ellis, PT

## 2024-06-20 DIAGNOSIS — M43.27 FUSION OF SPINE, LUMBOSACRAL REGION: ICD-10-CM

## 2024-06-20 DIAGNOSIS — M54.9 DORSALGIA, UNSPECIFIED: Primary | ICD-10-CM

## 2024-07-01 ENCOUNTER — CLINICAL SUPPORT (OUTPATIENT)
Dept: REHABILITATION | Facility: HOSPITAL | Age: 49
End: 2024-07-01
Payer: COMMERCIAL

## 2024-07-01 DIAGNOSIS — Z98.1 S/P LUMBAR SPINAL FUSION: Primary | ICD-10-CM

## 2024-07-01 DIAGNOSIS — M25.60 DECREASED RANGE OF MOTION: ICD-10-CM

## 2024-07-01 DIAGNOSIS — M62.89 MUSCLE TIGHTNESS: ICD-10-CM

## 2024-07-01 PROCEDURE — 97530 THERAPEUTIC ACTIVITIES: CPT | Mod: PN

## 2024-07-01 PROCEDURE — 97112 NEUROMUSCULAR REEDUCATION: CPT | Mod: PN

## 2024-07-01 PROCEDURE — 97110 THERAPEUTIC EXERCISES: CPT | Mod: PN

## 2024-07-01 NOTE — PROGRESS NOTES
"  Dictation #1  MRN:[id number]  CSN:[id number] [facility]   Physical Therapy Treatment Note      Name: Shanthi Escoto  Clinic Number: [id number]    Therapy Diagnosis:   Encounter Diagnoses   Name Primary?    S/P lumbar spinal fusion Yes    Decreased range of motion     Muscle tightness        Physician: Dennis Flores MD    Visit Date: 7/1/2024    Physician: Dennis Flores MD         Physician Orders: PT Eval and Treat  Medical Diagnosis from Referral: Z98.1 (ICD-10-CM) - S/P lumbar spinal fusion  Evaluation Date: 12/6/2023  Authorization Period Expiration: 12/29/2023  Plan of Care Expiration: 3/30/2024                Progress Update: 1/6/2024               FOTO: 1 / 3   Visit # / Visits authorized: 29 / 40                          PRECAUTIONS: Standard Precautions and Orthopedic: Lumbar Fusion precautions      Time In:  1550  (Pnt arrived early)  Time Out: 1645  Total Appointment Time (timed & untimed codes): 55 minutes      Subjective     Patient reports:  that she fell last week and was really sore afterwards.    She was compliant with home exercise program.  Response to previous treatment: no issues     Functional change: n/a    Pain: 2/10  Location: bilateral back      Objective      Objective Measures updated at progress report unless specified.     Treatment     Shanthi received the treatments listed below:      therapeutic exercises to develop strength, endurance, ROM, flexibility, posture, and core stabilization for 8 minutes including:    LAQs  3x10 Bilateral    SAQs  2#  3x10 Bilateral     neuromuscular re-education activities to improve: Posture for 38 minutes. The following activities were included:    Nustep x 10 mins  UBE 3/3    Seated HR/TR   3x10   Hip adduction with ball  3x10     TrA sets with Physioball  2x10     SLRs  3x10 Bilateral   Clamshell green theraband 3x10    Standing gastroc stretch 3x30 secs Bilateral - np  Leg press   30#  3x10   Tony steps 6" step x " "2 laps - np    therapeutic activities to improve functional performance for 8 minutes, including:    Single Leg Stance 3 x 20 sec Bilateral - np    Step ups on 6" steps   x 10 B - np  Scapular squeezes  3x10  OH pulleys   (flexion/abduction)  x 3 mins each   Lateral steps along window x 5 laps - np    Rows with red tb  2 x 10 - np  Standing hip abduction with support x 10 Bilateral  - np    Seated marches 2#  x 20 Bilateral      Patient Education and Home Exercises       Education provided:   - home exercises, posture, exercise tolerance     Written Home Exercises Provided: Patient instructed to cont prior HEP. Exercises were reviewed and Shanthi was able to demonstrate them prior to the end of the session.  Shanthi demonstrated good  understanding of the education provided. See Electronic Medical Record under Patient Instructions for exercises provided during therapy sessions    Assessment     Shanthi presents with increased fatigue upon arrival.  Encouraged to use cane for assistance as needed when her MS is acting up. Continue to progress as needed.     Shanthi Is progressing well towards her goals.   Patient prognosis is Good.     Patient will continue to benefit from skilled outpatient physical therapy to address the deficits listed in the problem list box on initial evaluation, provide pt/family education and to maximize pt's level of independence in the home and community environment.     Patient's spiritual, cultural and educational needs considered and pt agreeable to plan of care and goals.     Anticipated barriers to physical therapy: none     Goals:     SHORT TERM GOALS: 4 weeks 7/1/2024     Recent signs and systems trend is improving in order to progress towards LTG's.  Ongoing   Patient will be independent with HEP in order to further progress and return to maximal function.  Ongoing   Pain rating at Worst: 5/10 in order to progress towards increased independence with activity.  Ongoing   Patient will " be able to correct postural deviations in sitting and standing, to decrease pain and promote postural awareness for injury prevention.   Ongoing      LONG TERM GOALS: 8 weeks 7/1/2024     Patient will return to normal ADL, recreational, and work related activities with less pain and limitation.   Ongoing   Patient will improve AROM to stated goals in order to return to maximal functional potential.   Ongoing   Patient will improve Strength to stated goals of appropriate musculature in order to improve functional independence.   Ongoing   Pain Rating at Best: 1/10 to improve Quality of Life.   Ongoing   Patient will meet predicted functional outcome (FOTO) score: 80% to increase self-worth & perceived functional ability.  Ongoing   Patient will have met/partially met personal goal of being able to walk with no pain.  Ongoing            Plan     Continue per Plan of Care     Hilario Ellis, PT

## 2024-07-03 ENCOUNTER — CLINICAL SUPPORT (OUTPATIENT)
Dept: REHABILITATION | Facility: HOSPITAL | Age: 49
End: 2024-07-03
Payer: COMMERCIAL

## 2024-07-03 DIAGNOSIS — M25.60 DECREASED RANGE OF MOTION: ICD-10-CM

## 2024-07-03 DIAGNOSIS — Z98.1 S/P LUMBAR SPINAL FUSION: Primary | ICD-10-CM

## 2024-07-03 DIAGNOSIS — M62.89 MUSCLE TIGHTNESS: ICD-10-CM

## 2024-07-03 PROCEDURE — 97110 THERAPEUTIC EXERCISES: CPT | Mod: PN

## 2024-07-03 PROCEDURE — 97530 THERAPEUTIC ACTIVITIES: CPT | Mod: PN

## 2024-07-03 PROCEDURE — 97112 NEUROMUSCULAR REEDUCATION: CPT | Mod: PN

## 2024-07-03 NOTE — PROGRESS NOTES
"  Dictation #1  MRN:[id number]  CSN:[id number] [facility]   Physical Therapy Treatment Note      Name: Shanthi Escoto  Clinic Number: [id number]    Therapy Diagnosis:   Encounter Diagnoses   Name Primary?    S/P lumbar spinal fusion Yes    Decreased range of motion     Muscle tightness        Physician: Dennis Flores MD    Visit Date: 7/3/2024    Physician: Dennis Flores MD         Physician Orders: PT Eval and Treat  Medical Diagnosis from Referral: Z98.1 (ICD-10-CM) - S/P lumbar spinal fusion  Evaluation Date: 12/6/2023  Authorization Period Expiration: 12/29/2023  Plan of Care Expiration: 3/30/2024                Progress Update: 1/6/2024               FOTO: 1 / 3   Visit # / Visits authorized: 32 / 40                          PRECAUTIONS: Standard Precautions and Orthopedic: Lumbar Fusion precautions      Time In:  1545  (Pnt arrived early)  Time Out: 1640  Total Appointment Time (timed & untimed codes): 55 minutes      Subjective     Patient reports:  that she   She was compliant with home exercise program.  Response to previous treatment: no issues     Functional change: n/a    Pain: 2/10  Location: bilateral back      Objective      Objective Measures updated at progress report unless specified.     Treatment     Shanthi received the treatments listed below:      therapeutic exercises to develop strength, endurance, ROM, flexibility, posture, and core stabilization for 8 minutes including:    LAQs  3x10 Bilateral    SAQs  2#  3x10 Bilateral     neuromuscular re-education activities to improve: Posture for 38 minutes. The following activities were included:    Nustep x 10 mins  UBE 3/3    Seated HR/TR   3x10   Hip adduction with ball  3x10     TrA sets with Physioball  2x10     SLRs  3x10 Bilateral   Clamshell green theraband 3x10    Standing gastroc stretch 3x30 secs Bilateral - np  Leg press   30#  3x10   Tony steps 6" step x 2 laps - np    therapeutic activities to improve " "functional performance for 8 minutes, including:    Single Leg Stance 3 x 20 sec Bilateral - np    Step ups on 6" steps   x 10 B - np  Scapular squeezes  3x10  OH pulleys   (flexion/abduction)  x 3 mins each   Lateral steps along window x 5 laps - np    Rows with red tb  2 x 10 - np  Standing hip abduction with support x 10 Bilateral  - np    Seated marches 2#  x 20 Bilateral      Patient Education and Home Exercises       Education provided:   - home exercises, posture, exercise tolerance     Written Home Exercises Provided: Patient instructed to cont prior HEP. Exercises were reviewed and Shanthi was able to demonstrate them prior to the end of the session.  Shanthi demonstrated good  understanding of the education provided. See Electronic Medical Record under Patient Instructions for exercises provided during therapy sessions    Assessment     Shanthi presents with increased fatigue upon arrival.  Encouraged to use cane for assistance as needed when her MS is acting up. Continue to progress as needed.     Shanthi Is progressing well towards her goals.   Patient prognosis is Good.     Patient will continue to benefit from skilled outpatient physical therapy to address the deficits listed in the problem list box on initial evaluation, provide pt/family education and to maximize pt's level of independence in the home and community environment.     Patient's spiritual, cultural and educational needs considered and pt agreeable to plan of care and goals.     Anticipated barriers to physical therapy: none     Goals:     SHORT TERM GOALS: 4 weeks 7/3/2024     Recent signs and systems trend is improving in order to progress towards LTG's.  Ongoing   Patient will be independent with HEP in order to further progress and return to maximal function.  Ongoing   Pain rating at Worst: 5/10 in order to progress towards increased independence with activity.  Ongoing   Patient will be able to correct postural deviations in sitting " and standing, to decrease pain and promote postural awareness for injury prevention.   Ongoing      LONG TERM GOALS: 8 weeks 7/3/2024     Patient will return to normal ADL, recreational, and work related activities with less pain and limitation.   Ongoing   Patient will improve AROM to stated goals in order to return to maximal functional potential.   Ongoing   Patient will improve Strength to stated goals of appropriate musculature in order to improve functional independence.   Ongoing   Pain Rating at Best: 1/10 to improve Quality of Life.   Ongoing   Patient will meet predicted functional outcome (FOTO) score: 80% to increase self-worth & perceived functional ability.  Ongoing   Patient will have met/partially met personal goal of being able to walk with no pain.  Ongoing            Plan     Continue per Plan of Care     Hilario Ellis, PT                                        no

## 2024-07-08 ENCOUNTER — CLINICAL SUPPORT (OUTPATIENT)
Dept: REHABILITATION | Facility: HOSPITAL | Age: 49
End: 2024-07-08
Payer: COMMERCIAL

## 2024-07-08 DIAGNOSIS — M62.89 MUSCLE TIGHTNESS: ICD-10-CM

## 2024-07-08 DIAGNOSIS — M25.60 DECREASED RANGE OF MOTION: ICD-10-CM

## 2024-07-08 DIAGNOSIS — Z98.1 S/P LUMBAR SPINAL FUSION: Primary | ICD-10-CM

## 2024-07-08 PROCEDURE — 97112 NEUROMUSCULAR REEDUCATION: CPT | Mod: PN

## 2024-07-08 PROCEDURE — 97530 THERAPEUTIC ACTIVITIES: CPT | Mod: PN

## 2024-07-08 PROCEDURE — 97110 THERAPEUTIC EXERCISES: CPT | Mod: PN

## 2024-07-08 NOTE — PROGRESS NOTES
Dictation #1  MRN:[id number]  CSN:[id number] [facility]   Physical Therapy Treatment and Discharge Note      Name: Shanthi Escoto  Clinic Number: [id number]    Therapy Diagnosis:   Encounter Diagnoses   Name Primary?    S/P lumbar spinal fusion Yes    Decreased range of motion     Muscle tightness        Physician: Dennis Flores MD    Visit Date: 7/8/2024    Physician: Dennis Flores MD         Physician Orders: PT Eval and Treat  Medical Diagnosis from Referral: Z98.1 (ICD-10-CM) - S/P lumbar spinal fusion  Evaluation Date: 12/6/2023  Authorization Period Expiration: 12/29/2023  Plan of Care Expiration: 3/30/2024                Progress Update: 1/6/2024               FOTO: 1 / 3   Visit # / Visits authorized: 32 / 40                          PRECAUTIONS: Standard Precautions and Orthopedic: Lumbar Fusion precautions      Time In:  1550  (Pnt arrived early)  Time Out: 1645  Total Appointment Time (timed & untimed codes): 55 minutes      Subjective     Patient reports:  that she feels confident that she can perform exercises at home and is ready to get rehab for her diagnosed MS.    She was compliant with home exercise program.  Response to previous treatment: no issues     Functional change: n/a    Pain: 2/10  Location: bilateral back      Objective      Objective Measures updated at progress report unless specified.     Treatment     Shanthi received the treatments listed below:      therapeutic exercises to develop strength, endurance, ROM, flexibility, posture, and core stabilization for 8 minutes including:    LAQs  3x10 Bilateral    SAQs  2#  3x10 Bilateral     neuromuscular re-education activities to improve: Posture for 38 minutes. The following activities were included:    Nustep x 10 mins  UBE 3/3    Seated HR/TR   3x10   Hip adduction with ball  3x10     TrA sets with Physioball  2x10     SLRs  3x10 Bilateral   Clamshell green theraband 3x10    Standing gastroc stretch  "3x30 secs Bilateral - np  Leg press   30#  3x10   Tony steps 6" step x 2 laps - np    therapeutic activities to improve functional performance for 8 minutes, including:    Single Leg Stance 3 x 20 sec Bilateral - np    Step ups on 6" steps   x 10 B - np  Scapular squeezes  3x10  OH pulleys   (flexion/abduction)  x 3 mins each   Lateral steps along window x 5 laps - np    Rows with red tb  2 x 10 - np  Standing hip abduction with support x 10 Bilateral  - np    Seated marches 2#  x 20 Bilateral      Patient Education and Home Exercises       Education provided:   - home exercises, posture, exercise tolerance     Written Home Exercises Provided: Patient instructed to cont prior HEP. Exercises were reviewed and Shanthi was able to demonstrate them prior to the end of the session.  Shanthi demonstrated good  understanding of the education provided. See Electronic Medical Record under Patient Instructions for exercises provided during therapy sessions    Assessment     Shanthi presents with increased fatigue upon arrival.  Encouraged to use cane for assistance as needed when her MS is acting up. Continue to progress as needed.     Shanthi Is progressing well towards her goals.   Patient prognosis is Good.     Patient is being discharged from physical therapy.      Patient's spiritual, cultural and educational needs considered and pt agreeable to plan of care and goals.     Anticipated barriers to physical therapy: none     Goals:     SHORT TERM GOALS: 4 weeks 7/8/2024     Recent signs and systems trend is improving in order to progress towards LTG's.  met   Patient will be independent with HEP in order to further progress and return to maximal function. met   Pain rating at Worst: 5/10 in order to progress towards increased independence with activity. met   Patient will be able to correct postural deviations in sitting and standing, to decrease pain and promote postural awareness for injury prevention.  Not met    "   LONG TERM GOALS: 8 weeks 7/8/2024     Patient will return to normal ADL, recreational, and work related activities with less pain and limitation.   not met   Patient will improve AROM to stated goals in order to return to maximal functional potential.  Not met   Patient will improve Strength to stated goals of appropriate musculature in order to improve functional independence.  Not met   Pain Rating at Best: 1/10 to improve Quality of Life.  Not met   Patient will meet predicted functional outcome (FOTO) score: 80% to increase self-worth & perceived functional ability. Not met   Patient will have met/partially met personal goal of being able to walk with no pain. Not met           Plan     Pnt is being discharged from physical therapy and will begin neuro PT for her diagnosed MS.      Hilario Ellis, PT

## 2024-07-09 DIAGNOSIS — Z98.1 S/P LUMBAR SPINAL FUSION: ICD-10-CM

## 2024-07-09 DIAGNOSIS — G35 MULTIPLE SCLEROSIS: Primary | ICD-10-CM

## 2024-07-09 DIAGNOSIS — R20.0 NUMBNESS OF LOWER EXTREMITY: ICD-10-CM

## 2024-07-10 ENCOUNTER — OFFICE VISIT (OUTPATIENT)
Dept: NEUROSURGERY | Facility: CLINIC | Age: 49
End: 2024-07-10
Payer: COMMERCIAL

## 2024-07-10 ENCOUNTER — PATIENT MESSAGE (OUTPATIENT)
Dept: NEUROSURGERY | Facility: CLINIC | Age: 49
End: 2024-07-10
Payer: COMMERCIAL

## 2024-07-10 ENCOUNTER — HOSPITAL ENCOUNTER (OUTPATIENT)
Dept: RADIOLOGY | Facility: HOSPITAL | Age: 49
Discharge: HOME | End: 2024-07-10
Attending: NEUROLOGICAL SURGERY
Payer: COMMERCIAL

## 2024-07-10 VITALS
OXYGEN SATURATION: 99 % | SYSTOLIC BLOOD PRESSURE: 150 MMHG | BODY MASS INDEX: 36.32 KG/M2 | HEART RATE: 80 BPM | DIASTOLIC BLOOD PRESSURE: 80 MMHG | HEIGHT: 64 IN | WEIGHT: 212.75 LBS

## 2024-07-10 DIAGNOSIS — M43.27 FUSION OF SPINE, LUMBOSACRAL REGION: ICD-10-CM

## 2024-07-10 DIAGNOSIS — M43.27 FUSION OF SPINE, LUMBOSACRAL REGION: Primary | ICD-10-CM

## 2024-07-10 DIAGNOSIS — M47.27 LUMBOSACRAL SPONDYLOSIS WITH RADICULOPATHY: ICD-10-CM

## 2024-07-10 DIAGNOSIS — M54.9 DORSALGIA, UNSPECIFIED: ICD-10-CM

## 2024-07-10 DIAGNOSIS — Z98.1 S/P LUMBAR SPINAL FUSION: ICD-10-CM

## 2024-07-10 PROCEDURE — 3008F BODY MASS INDEX DOCD: CPT | Mod: CPTII,S$GLB,, | Performed by: NEUROLOGICAL SURGERY

## 2024-07-10 PROCEDURE — 3079F DIAST BP 80-89 MM HG: CPT | Mod: CPTII,S$GLB,, | Performed by: NEUROLOGICAL SURGERY

## 2024-07-10 PROCEDURE — 99214 OFFICE O/P EST MOD 30 MIN: CPT | Mod: S$GLB,,, | Performed by: NEUROLOGICAL SURGERY

## 2024-07-10 PROCEDURE — 1159F MED LIST DOCD IN RCRD: CPT | Mod: CPTII,S$GLB,, | Performed by: NEUROLOGICAL SURGERY

## 2024-07-10 PROCEDURE — 72131 CT LUMBAR SPINE W/O DYE: CPT | Mod: 26,,, | Performed by: RADIOLOGY

## 2024-07-10 PROCEDURE — 3077F SYST BP >= 140 MM HG: CPT | Mod: CPTII,S$GLB,, | Performed by: NEUROLOGICAL SURGERY

## 2024-07-10 PROCEDURE — 1160F RVW MEDS BY RX/DR IN RCRD: CPT | Mod: CPTII,S$GLB,, | Performed by: NEUROLOGICAL SURGERY

## 2024-07-10 PROCEDURE — 72131 CT LUMBAR SPINE W/O DYE: CPT | Mod: TC

## 2024-07-10 RX ORDER — NORETHINDRONE 0.35 MG/1
1 TABLET ORAL
COMMUNITY
Start: 2024-05-29

## 2024-07-10 RX ORDER — MULTIVITAMIN
1 TABLET ORAL DAILY
COMMUNITY

## 2024-07-10 NOTE — PATIENT INSTRUCTIONS
I have personally reviewed the CT LUMBAR SPINE WITHOUT CONTRAST with the pt which shows:  1. Postsurgical changes of L4-S1 as detailed above.  No hardware fracture or displacement.  2. Lumbar spondylosis as detailed above.    I spent the majority of the time counseling the pt on increasing frequency of rowing exercise.      I will order another EMG study BLE for the chief complaint of numbness in the BLE.    I will schedule the patient for f/u in 6mo with repeat CT lumbar.

## 2024-07-10 NOTE — PROGRESS NOTES
Subjective:   I, Blas Levy , attest that this documentation has been prepared under the direction and in the presence of Dennis Flores MD.     Patient ID: Shanthi Escoto is a 49 y.o. female     Chief Complaint: Follow-up      Follow-up  Associated symptoms include numbness (BLE). Pertinent negatives include no fatigue, fever, headaches, joint swelling, myalgias, nausea, neck pain, vomiting or weakness.     Ms. Shanthi Escoto is a 49 y.o. woman with MS, who presents today for her 6mo follow up visit s/p MIS L4-S1 TLIF done on 10/16/2023. This is a patient who presented to me with low back pain with BLE paresthesia. In March 2021, pt reports waking up with numbness to the lower half of her body. She was referred to pain management and had imaging with new findings of multiple enhancing lesions and posterior disc extrusion at L4-L5 causing severe spinal canal stenosis. She was then referred to neurosurgery and had an additional MRI with EMG nerve conduction study. At that point, pt was diagnosed with MS. Patient had consulted with  neurosurgery to discuss treatment options. However, by the time she was scheduled for operation, the surgeon had retired. Since then, pt reports symptoms progressively worsened. At the time of our last visit on 11/29/2023, the pt reports she is doing very well postoperatively. She is ambulating daily without complications. Pt is compliant with a back brace. She has yet to begin physical therapy since her hospital discharge.    Today the pt reports she is feeling fine but she continues to experience numbness in her feet.  She also endorses a 6-7/10 pain in her lower back which gets better with rest.  She currently is working and does her PT exercises at home with the last visit on 7/8/24.  Pt states that she was rowing 3 days a week before.  She would like to start with rowing 3 days a week again. Pt has a weight goal of getting down to 175lbs.       Review of Systems    Constitutional:  Negative for activity change, appetite change, fatigue, fever and unexpected weight change.   HENT:  Negative for facial swelling.    Eyes: Negative.    Respiratory: Negative.     Cardiovascular: Negative.    Gastrointestinal:  Negative for diarrhea, nausea and vomiting.   Endocrine: Negative.    Genitourinary: Negative.    Musculoskeletal:  Positive for back pain. Negative for joint swelling, myalgias and neck pain.   Neurological:  Positive for numbness (BLE). Negative for dizziness, seizures, weakness and headaches.   Psychiatric/Behavioral: Negative.          Past Medical History:   Diagnosis Date    Demyelinating disease     MS (multiple sclerosis)     Numbness and tingling of both feet     Obesity, unspecified        Objective:      Vitals:    07/10/24 1152   BP: (!) 150/80   Pulse: 80      Physical Exam  Constitutional:       General: She is not in acute distress.     Appearance: Normal appearance.   HENT:      Head: Normocephalic and atraumatic.   Pulmonary:      Effort: Pulmonary effort is normal.   Musculoskeletal:      Cervical back: Neck supple.   Neurological:      Mental Status: She is alert and oriented to person, place, and time.      GCS: GCS eye subscore is 4. GCS verbal subscore is 5. GCS motor subscore is 6.      Cranial Nerves: No cranial nerve deficit.            IMAGING:  CT LUMBAR SPINE WITHOUT CONTRAST: 7/10/24  1. Postsurgical changes of L4-S1 as detailed above.  No hardware fracture or displacement.  2. Lumbar spondylosis as detailed above.     I have personally reviewed the images with the pt.      I, Dr. Dennis Flores, personally performed the services described in this documentation. All medical record entries made by the scribe, Blas Levy, were at my direction and in my presence.  I have reviewed the chart and agree that the record reflects my personal performance and is accurate and complete. Dennis Flores MD. 07/10/2024    Assessment:       S/p L3-S1 fusion.       Plan:   I have personally reviewed the CT LUMBAR SPINE WITHOUT CONTRAST with the pt which shows:  1. Postsurgical changes of L4-S1 as detailed above.  No hardware fracture or displacement.  2. Lumbar spondylosis as detailed above.    I spent the majority of the time counseling the pt on increasing frequency of rowing exercise.      I will order another EMG study BLE for the chief complaint of numbness in the BLE.    I will schedule the patient for f/u in 6mo with repeat CT lumbar.

## 2024-07-16 ENCOUNTER — CLINICAL SUPPORT (OUTPATIENT)
Dept: REHABILITATION | Facility: HOSPITAL | Age: 49
End: 2024-07-16
Attending: FAMILY MEDICINE
Payer: COMMERCIAL

## 2024-07-16 DIAGNOSIS — Z74.09 IMPAIRED FUNCTIONAL MOBILITY, BALANCE, GAIT, AND ENDURANCE: Primary | ICD-10-CM

## 2024-07-16 DIAGNOSIS — R29.898 IMPAIRED STRENGTH OF LOWER EXTREMITY: ICD-10-CM

## 2024-07-16 PROCEDURE — 97162 PT EVAL MOD COMPLEX 30 MIN: CPT | Mod: PO

## 2024-07-17 PROBLEM — R29.898 IMPAIRED STRENGTH OF LOWER EXTREMITY: Status: ACTIVE | Noted: 2023-12-06

## 2024-07-17 PROBLEM — Z74.09 IMPAIRED FUNCTIONAL MOBILITY, BALANCE, GAIT, AND ENDURANCE: Status: ACTIVE | Noted: 2024-07-17

## 2024-07-17 NOTE — PLAN OF CARE
"  [facility]  Physical Therapy Neurological Rehabilitation Initial Evaluation       Name: Shanthi Escoto  Clinic Number: [id number]    Therapy Diagnosis:   Encounter Diagnoses   Name Primary?    Impaired functional mobility, balance, gait, and endurance Yes    Impaired strength of lower extremity      Physician: Arin Albarran MD    Physician Orders: PT Eval and Treat  " Neuro PT"  Medical Diagnosis from Referral:   Diagnosis   G35 (ICD-10-CM) - Multiple sclerosis     Evaluation Date: 2024  Authorization Period Expiration: 24  Plan of Care Expiration: 24  Progress Note Due: 24  Visit # / Visits authorized:   FOTO:     Precautions: Standard and Fall Orthopedic: Lumbar Fusion precautions  ( unclear if these are current or have been lifted)    Time In: 1:50 pm   Time Out: 2:30 pm   Total Billable Time: 40 minutes      Subjective     Medical History:   Past Medical History:   Diagnosis Date    Demyelinating disease     MS (multiple sclerosis)     Numbness and tingling of both feet     Obesity, unspecified        Surgical History:   Shanthi Escoto  has a past surgical history that includes  section; Endometrial ablation; and Minimally invasive transforaminal lumbar interbody fusion (TLIF) (Left, 10/16/2023).    Medications:   Shanthi has a current medication list which includes the following prescription(s): bacillus coagulans/inulin, cholecalciferol (vitamin d3), oriahnn, gabapentin, sudarshan, linaclotide, methocarbamol, multivitamin, naloxone, ondansetron, oxycodone-acetaminophen, zeposia, phentermine, semaglutide, and tamsulosin.    Allergies:   Review of patient's allergies indicates:   Allergen Reactions    Penicillins Anaphylaxis     As an infant        Imagin/10/24 CT lumbar spine   Impression:     1. Postsurgical changes of L4-S1 as detailed above.  No hardware fracture or displacement.  2. Lumbar spondylosis as detailed above.    MRI " "Thoracic spine 12/5/23  IMPRESSION:     1. No significant change of intramedullary increased T2 signal within the thoracic cord as above suggesting sequelae of chronic demyelination in patient with reported known history of multiple sclerosis.  2. No abnormal enhancement to suggest active demyelination throughout the thoracic cord.     MRI cervical spine 12/5/23  IMPRESSION:     Multiple stable intramedullary lesions within the cervical spinal cord, likely representing demyelination considering the provided history of multiple sclerosis.     No abnormal intramedullary enhancement to suggest active demyelination.     No new lesions    MRI brain w and without contrast 12/5/23  IMPRESSION:  1. Stable gliotic foci in both cerebral hemispheres and in the right cerebellar peduncle, without evidence for progression of disease compared to prior MRI.  2. No abnormal intra-axial enhancement to suggest active demyelination.        Date of onset: diagnosed with MS 12/31/21 per pt.   History of current condition :      Pt arrived to therapy ambulating with a single point cane. Pt reports she " had a disc laying on a nerve" . She reports she had a two level low back fusion October 2023 and completed ortho PT recently for this. Pt reports she continues to have numbness in bilateral feet ( which she reports her doctor says is coming from her back, not her Multiple Sclerosis). Pt reports she was diagnosed with MS 12/31/21. Pt repos she only found out she had MS because of scans and imaging for her low back pian. Pt reports she " hasn't had any symptoms from MS".  Pt reports she is currently walking with a single point cane but does not walk with a cane at home. Her recent imaging in Dec 2023 she reports shows no progression of MS at this time. Pt does endorse significant fatigue with walking. Pt does not have a WC at this time. Reports she is not interested in a custom manual WC at this time.       Follow-up  Associated symptoms " include numbness (BLE). Pertinent negatives include no fatigue, fever, headaches, joint swelling, myalgias, nausea, neck pain, vomiting or weakness.      Ms. Shanthi Escoto is a 49 y.o. woman with MS, who presents today for her 6mo follow up visit s/p MIS L4-S1 TLIF done on 10/16/2023. This is a patient who presented to me with low back pain with BLE paresthesia. In March 2021, pt reports waking up with numbness to the lower half of her body. She was referred to pain management and had imaging with new findings of multiple enhancing lesions and posterior disc extrusion at L4-L5 causing severe spinal canal stenosis. She was then referred to neurosurgery and had an additional MRI with EMG nerve conduction study. At that point, pt was diagnosed with MS. Patient had consulted with  neurosurgery to discuss treatment options. However, by the time she was scheduled for operation, the surgeon had retired. Since then, pt reports symptoms progressively worsened. At the time of our last visit on 11/29/2023, the pt reports she is doing very well postoperatively. She is ambulating daily without complications. Pt is compliant with a back brace. She has yet to begin physical therapy since her hospital discharge.     Today the pt reports she is feeling fine but she continues to experience numbness in her feet.  She also endorses a 6-7/10 pain in her lower back which gets better with rest.  She currently is working and does her PT exercises at home with the last visit on 7/8/24.  Pt states that she was rowing 3 days a week before.  She would like to start with rowing 3 days a week again. Pt has a weight goal of getting down to 175lbs.           Prior Therapy:  ortho PT  Social History: lives with boyfriend    Falls: yes one fall. Many near falls.   DME: single point cane, has a rolling walker but doesn' t use currently. Has a built in shower chair.   Home Environment: one story house no stairs to enter   Exercise Routine /  History: just completed ortho PT.     Family Present at time of Eval: no   Occupation:   reports she works from home. Did not specify   Prior Level of Function: mod I . Increased time  Current Level of Function: currently using single point cane. Needs some assist cooking and cleaning  Driving:  yes    Pain:  Current 3/10, worst 6/10, best 2/10     Low back pain     Pts goals:  walking and balance. Getting off cane.     Objective     Observation: pleasant and cooperative   Speech: normal     Mental status: alert, oriented to person, place, and time, normal mood, behavior, speech, dress, motor activity, and thought processes  Appearance: Casually dressed  Behavior:  calm and cooperative  Attention Span and Concentration:  Normal    Posture Alignment :slouched posture    Dominant hand:  right     Skin integrity:  Intact    Visual/Auditory: denies changes     ROM:   GROSS AROM/PROM  UPPER EXTREMITY  (R) UE: WFLs  (L) UE: WFLs  LOWER EXTREMITY  (R) LE: WFLs  (L) LE: WFLs    BP:  141/ 91 mmhg 78 bpm.      Lower Extremity Strength   Right LE  Left LE    Hip flexion:  4/5 Hip flexion: 3+/5   Knee extension: 4+/5 Knee extension: 4+/5   Knee flexion: 4-/5 Knee flexion: 3+/5   Ankle dorsiflexion:  4/5 Ankle dorsiflexion: 3+/5   Ankle plantarflexion:  4/5 Ankle plantarflexion: 3+/5         Upper extremity strength: within functional limits     Coordination: impaired     Sensation: bilateral feet numbness (says doctors say stemming from her back and not MS)      Functional Mobility   Evaluation    Bed mobility:  Supervision    Supine to sit: Supervision    Sit to supine:  Supervision    Rolling:  Mod I    Transfers to bed: Supervision / standby assist    Sit to stand Supervision    Stand pivot:   Supervision/ standby assist    Car transfers:  supervision extra time.    Wheelchair mobility:   does not have a WC at this time.           Evaluation   5 times sit to stand 13.5 seconds minimal upper extremity support   TUG 17.9  seconds single point cane   Self selected walking speed (10MWT) 0.44 m/sec (6m/13.7s)  single point cane     Timed up and go score >14 seconds indicates risk for falls in older stroke patients (Charles et al, 2006)    Independent Community Ambulator > 1.4 m/s   Mod I Community Ambulator 1.2-1.4 m/s   SPV/SBA Community Ambulator 0.8-1.2 m/s   Limited Community Ambulator 0.4-0.8 m/s   Household Ambulator < 0.4 m/s       5 x sit<>stand  >12 sec= fall risk for general elderly  >16 sec= fall risk for Parkinson's disease  >10 sec= balance/vestibular dysfunction (<61 y/o)  >14.2 sec= balance/vestibular dysfunction (>61 y/o)  >12 sec= fall risk for CVA      Gait     Evaluation   AD used:   Single point cane    Assistance  Supervision    Distance  100 ft       GAIT DEVIATIONS:   Shanthi displays the following deviations with ambulation: wide base of support,  slow pace, moderate sway, toe catching, decreased step length.    Impairments contributing to deviations: impaired motor control, impaired strength, impaired endurance, gait instability, balance deficits      Stairs: not assessed at this time.     Endurance Deficit: yes    PT Evaluation Completed? Yes      CMS Impairment/Limitation/Restriction for FOTO Intake Survey    Therapist reviewed FOTO scores for Shanthi Escoto on 7/16/2024.   FOTO documents entered into [facility] - see Media section.    Limitation Score: 42         TREATMENT   Treatment not performed today secondary to time constraints     Total Treatment time separate from Evaluation: 0 minutes      Patient Education and Home Exercises     Education provided:   -- educated about role of PT in outpatient rehab. Educated about plan of care. Educated about attendance policy. Reports understanding and has no questions at this time.  Educated about MS and what to expect from therapy. Reports understanding.       Assessment   Shanthi is a 49 y.o. female referred to outpatient Physical Therapy with a medical diagnosis  of Multiple Sclerosis. Pt presents to PT with the following impairments leading to his/her functional decline: impaired bilateral  lower extremity strength (left weaker than right), impaired endurance, gait instability, balance deficits. Pt currently is using a single point cane for balance. Pt is currently very fatigued with short distances walking. Pt is in a fall risk category for her 5x sit to stand score, is a limited community ambulator based on her gait speed, and is within a fall risk on her TUG score. Did begin education on MS and how pt could benefit from custom manual WC.  Pt is motivated to improve and a good candidate for PT.         Pt prognosis is Good.   Pt will benefit from skilled outpatient Physical Therapy to address the deficits stated above and in the chart below, provide pt/family education, and to maximize pt's level of independence.     Plan of care discussed with patient: Yes  Pt's spiritual, cultural and educational needs considered and patient is agreeable to the plan of care and goals as stated below:     Anticipated Barriers for therapy: progressive disease process    Medical Necessity is demonstrated by the following  History  Co-morbidities and personal factors that may impact the plan of care [] LOW: no personal factors / comorbidiies  [] MODERATE: 1-2 personal factors / comorbidiies  [x] HIGH: 3+ personal factors / comorbidiies    Moderate / High Support Documentation: MS, numbness,  s/p lumbar fusion     Examination  Body Structures and Functions, activity limitations and participation restrictions that may impact the plan of care [] LOW: addressing 1-2 elements  [] MODERATE: 3+ elements  [x] HIGH: 3+ elements (please support below)    Moderate / High Support Documentation: impaired strength, impaired endurance, gait instability, balance deficits.      Clinical Presentation [] LOW: stable  [x] MODERATE: Evolving  [] HIGH: Unstable     Decision Making/ Complexity Score: moderate          Goals:      Short Term Goals: 4 weeks Date last assessed:  Status:    1. Patient will be provided with an HEP for strength, endurance and balance.  7/16/2024   New   2.  Patient will demonstrate improve endurance and activity tolerance as evidenced by ability to perform Nu-step x 15 minutes without rests breaks. 7/16/2024  New         Long Term Goals: 8 weeks  Date last assessed: Status:    1. Patient will be independent and compliant with updated HEP. 7/16/2024   New   2.  Patient will increase her   gait speed as assessed by the timed 10MWT from 0.44 m/s to 0.58 m/s to increase her safety and (I) with gait at a community level. (MDC for CVA= 0.14 m/s)    7/16/2024   New     3. The patient will demonstrate improved efficiency with functional mobility and decreased risk for falls as evidenced by an increase in her score on the Timed up and Go from 17.9 to 15.0. (Minimal detectable change in chronic stroke = 2.9 seconds)  7/16/2024 New   4.Patient will improve her FOTO score from 42  to at least 57 for improved self perception of functional mobility.(score 0-100, high score indicates greater level of function) 7/16/2024 New   5. Pt will improve 5x sit to stand score from 13.5 sec to less than 12 seconds to decrease fall risk and improve safety.  7/16/2024   New         Plan   Plan of care Certification: 7/16/2024 to 9/11/24.    Outpatient Physical Therapy 2 times weekly for 8 weeks to include the following interventions: Electrical Stimulation as indicated, Gait Training, Manual Therapy, Moist Heat/ Ice, Neuromuscular Re-ed, Orthotic Management and Training, Patient Education, Therapeutic Activities, and Therapeutic Exercise.     Dianna Rosas, PT

## 2024-07-22 NOTE — PROGRESS NOTES
"  [facility]   Physical Therapy Treatment Note      Name: Shanthi Escoto  Clinic Number: [id number]      Therapy Diagnosis:   Encounter Diagnosis   Name Primary?    Impaired functional mobility, balance, gait, and endurance Yes     Physician: Arin Albarran MD    Visit Date: 7/23/2024    Physician Orders: PT Eval and Treat  " Neuro PT"  Medical Diagnosis from Referral:   Diagnosis   G35 (ICD-10-CM) - Multiple sclerosis      Evaluation Date: 7/16/2024  Authorization Period Expiration: 12/31/24  Plan of Care Expiration: 9/11/24  Progress Note Due: 8/17/24  Visit # / Visits authorized: 1/ 20 (2)   FOTO: 1/5    Time In: 9:33 am   Time Out: 10:19 am   Total Billable Time: 46 minutes    Precautions: Standard and Fall Orthopedic: Lumbar Fusion precautions  ( unclear if these are current or have been lifted)     PTA visit 0/5        Subjective     Pt reports:  " My left foot drags sometimes."  Reports she feels weakest after taking a shower with her MS. Pt reports her MD cleared her for her rowing machine. Reprost she " can trist and bend within reason. He didn't mention any lifting information."    She was not yet given home exercise program.  Response to previous treatment: good  Functional change: ongoing       Pain: 4/10  Location: low back pain.       Objective      Objective Measures updated at progress report unless specified.     Treatment     Shanthi received the treatments listed below:        Shanthi received therapeutic exercises to develop strength, endurance, and ROM for 15 minutes including:  -  sci fit level 1 bilateral upper extremity/ lower extremity 15 minutes       Shanthi participated in dynamic functional therapeutic activities to improve functional performance for 31  minutes, including:  -  donned left prefab AFO with verbal cues    - pt ambulated 120 ft to gym supervision with single point cane. Slow pace    - pt ambulated 40 ft with single point cane to trial left " "AFO supervision.   - pt ambulated 40 ft with left AFO trialling small based quad cane supervision       (Therapeutic rest breaks throughout as needed).       Home Exercises Provided and Patient Education Provided     Education provided:     -  gave printed information on Spoons theory for energy conservation. Gave handout with website for  MS society and MS foundation. Educated about resources and grants on those websites.  Local MS support group handout given . Educated about trying to take cooler showers instead of hot to help mitigate fatigue with MS.         Assessment     Pt tolerated session well. Resources and education given about MS and energy conservation. Trialled left pre sahara AFO . Pt reports she feels it helps keep her foot up better when walking but " feels kind of weird". Discussed Bioness L300 and reports she wants to trial that in future session also to compare. Also trialled small abased quad cane as we discussed she may benefit from an assistive device that provides a little more support. Pt fatigued at end of session. Assisted with WC transport out to car.       Shanthi Is progressing well towards her goals.   Pt prognosis is Good.     Pt will continue to benefit from skilled outpatient physical therapy to address the deficits listed in the problem list box on initial evaluation, provide pt/family education and to maximize pt's level of independence in the home and community environment.     Pt's spiritual, cultural and educational needs considered and pt agreeable to plan of care and goals.     Anticipated barriers to physical therapy: progressive disease process     Goals:         Short Term Goals: 4 weeks Date last assessed:  Status:    1. Patient will be provided with an HEP for strength, endurance and balance.  7/16/2024    ongoing   2.  Patient will demonstrate improve endurance and activity tolerance as evidenced by ability to perform Nu-step x 15 minutes without rests breaks. 7/16/2024  " ongoing            Long Term Goals: 8 weeks  Date last assessed: Status:    1. Patient will be independent and compliant with updated HEP. 7/16/2024    ongoing   2.  Patient will increase her   gait speed as assessed by the timed 10MWT from 0.44 m/s to 0.58 m/s to increase her safety and (I) with gait at a community level. (MDC for CVA= 0.14 m/s)     7/16/2024    ongoing      3. The patient will demonstrate improved efficiency with functional mobility and decreased risk for falls as evidenced by an increase in her score on the Timed up and Go from 17.9 to 15.0. (Minimal detectable change in chronic stroke = 2.9 seconds)  7/16/2024 ongoing   4.Patient will improve her FOTO score from 42  to at least 57 for improved self perception of functional mobility.(score 0-100, high score indicates greater level of function) 7/16/2024 ongoing   5. Pt will improve 5x sit to stand score from 13.5 sec to less than 12 seconds to decrease fall risk and improve safety.  7/16/2024    ongoing        Plan   Plan of care Certification: 7/16/2024 to 9/11/24.     Outpatient Physical Therapy 2 times weekly for 8 weeks to include the following interventions: Electrical Stimulation as indicated, Gait Training, Manual Therapy, Moist Heat/ Ice, Neuromuscular Re-ed, Orthotic Management and Training, Patient Education, Therapeutic Activities, and Therapeutic Exercise.     Recommendations for next treatment session:     Trial Bioness L300     Dianna Rosas, PT

## 2024-07-23 ENCOUNTER — CLINICAL SUPPORT (OUTPATIENT)
Dept: REHABILITATION | Facility: HOSPITAL | Age: 49
End: 2024-07-23
Payer: COMMERCIAL

## 2024-07-23 DIAGNOSIS — Z74.09 IMPAIRED FUNCTIONAL MOBILITY, BALANCE, GAIT, AND ENDURANCE: Primary | ICD-10-CM

## 2024-07-23 PROCEDURE — 97110 THERAPEUTIC EXERCISES: CPT | Mod: PO

## 2024-07-23 PROCEDURE — 97530 THERAPEUTIC ACTIVITIES: CPT | Mod: PO

## 2024-07-29 NOTE — PROGRESS NOTES
"  [facility]   Physical Therapy Treatment Note      Name: Shanthi Escoto  Clinic Number: [id number]      Therapy Diagnosis:   Encounter Diagnosis   Name Primary?    Impaired functional mobility, balance, gait, and endurance Yes       Physician: Arin Albarran MD    Visit Date: 7/30/2024    Physician Orders: PT Eval and Treat  " Neuro PT"  Medical Diagnosis from Referral:   Diagnosis   G35 (ICD-10-CM) - Multiple sclerosis      Evaluation Date: 7/16/2024  Authorization Period Expiration: 12/31/24  Plan of Care Expiration: 9/11/24  Progress Note Due: 8/17/24  Visit # / Visits authorized: 2/ 20 (3)   FOTO: 1/5    Time In: 1:50 pm   Time Out: 2:30 pm   Total Billable Time: 40 minutes    Precautions: Standard and Fall Orthopedic: Lumbar Fusion precautions  ( unclear if these are current or have been lifted)     PTA visit 0/5        Subjective     Pt reports: " I ordered a hurrycane so that should be in soon."    She was not yet given home exercise program.  Response to previous treatment: good  Functional change: ongoing       Pain: 3/10    Location: low back pain.       Objective      Objective Measures updated at progress report unless specified.     Treatment     Shanthi received the treatments listed below:        Shanthi received therapeutic exercises to develop strength, endurance, and ROM for 10 minutes including:    sci fit level 2 bilateral upper extremity/ lower extremity 10 minutes       Shanthi participated in dynamic functional therapeutic activities to improve functional performance for 30  minutes, including:      Pt wanting to trial Left Bioness L300. Cleared of all contraindications and education provided before during and after. Pt able to ambulate 100 ft, 90 ft with left Bioness. Pt with more foot clearance and reports she feels safer with Bioness.     Resources given about  Bioness and education provided see below.        Eval ( no Bioness) 7/30/24 with Bioness L300  "   SSWS  0.44 supervision  single point cane   0.52 m/s with Bioness L300 with single point cane        - pt ambulated 120 ft to gym supervision with single point cane. Slow pace       (Therapeutic rest breaks throughout as needed).       Home Exercises Provided and Patient Education Provided     Education provided:     Pt educated about Bioness L300. Resources given for website and additional education. Educated about procedure of applying Bioness and how it charges, the phone jorge etc. Pt reports understanding      Assessment       Pt tolerated session well.Trialling left Bioness L300 today. Pt gait speed improved from 0.44 m/s to 0.52 m/s with Bioness L300  Pt reports feeling more comfortable with Bioness and reports  more foot clearance with device. Pt reports she would like to start process and get more information about acquiring a Bioness for home use. Will reach out to [employer] rep to give pt more information. Pt remains motivated to improve and a good candidate for PT.       Shanthi Is progressing well towards her goals.   Pt prognosis is Good.     Pt will continue to benefit from skilled outpatient physical therapy to address the deficits listed in the problem list box on initial evaluation, provide pt/family education and to maximize pt's level of independence in the home and community environment.     Pt's spiritual, cultural and educational needs considered and pt agreeable to plan of care and goals.     Anticipated barriers to physical therapy: progressive disease process     Goals:         Short Term Goals: 4 weeks Date last assessed:  Status:    1. Patient will be provided with an HEP for strength, endurance and balance.  7/16/2024    ongoing   2.  Patient will demonstrate improve endurance and activity tolerance as evidenced by ability to perform Nu-step x 15 minutes without rests breaks. 7/16/2024  ongoing            Long Term Goals: 8 weeks  Date last assessed: Status:    1. Patient will be  independent and compliant with updated HEP. 7/16/2024    ongoing   2.  Patient will increase her   gait speed as assessed by the timed 10MWT from 0.44 m/s to 0.58 m/s to increase her safety and (I) with gait at a community level. (MDC for CVA= 0.14 m/s)     7/16/2024    ongoing      3. The patient will demonstrate improved efficiency with functional mobility and decreased risk for falls as evidenced by an increase in her score on the Timed up and Go from 17.9 to 15.0. (Minimal detectable change in chronic stroke = 2.9 seconds)  7/16/2024 ongoing   4.Patient will improve her FOTO score from 42  to at least 57 for improved self perception of functional mobility.(score 0-100, high score indicates greater level of function) 7/16/2024 ongoing   5. Pt will improve 5x sit to stand score from 13.5 sec to less than 12 seconds to decrease fall risk and improve safety.  7/16/2024    ongoing        Plan   Plan of care Certification: 7/16/2024 to 9/11/24.     Outpatient Physical Therapy 2 times weekly for 8 weeks to include the following interventions: Electrical Stimulation as indicated, Gait Training, Manual Therapy, Moist Heat/ Ice, Neuromuscular Re-ed, Orthotic Management and Training, Patient Education, Therapeutic Activities, and Therapeutic Exercise.     Recommendations for next treatment session:     Continue  with trials with [employer] L300     Dianna Rosas, PT

## 2024-07-30 ENCOUNTER — CLINICAL SUPPORT (OUTPATIENT)
Dept: REHABILITATION | Facility: HOSPITAL | Age: 49
End: 2024-07-30
Payer: COMMERCIAL

## 2024-07-30 DIAGNOSIS — Z74.09 IMPAIRED FUNCTIONAL MOBILITY, BALANCE, GAIT, AND ENDURANCE: Primary | ICD-10-CM

## 2024-07-30 PROCEDURE — 97110 THERAPEUTIC EXERCISES: CPT | Mod: PO

## 2024-07-30 PROCEDURE — 97530 THERAPEUTIC ACTIVITIES: CPT | Mod: PO

## 2024-08-06 ENCOUNTER — CLINICAL SUPPORT (OUTPATIENT)
Dept: REHABILITATION | Facility: HOSPITAL | Age: 49
End: 2024-08-06
Payer: COMMERCIAL

## 2024-08-06 DIAGNOSIS — Z74.09 IMPAIRED FUNCTIONAL MOBILITY, BALANCE, GAIT, AND ENDURANCE: Primary | ICD-10-CM

## 2024-08-06 PROCEDURE — 97110 THERAPEUTIC EXERCISES: CPT | Mod: PO

## 2024-08-06 PROCEDURE — 97530 THERAPEUTIC ACTIVITIES: CPT | Mod: PO

## 2024-08-08 NOTE — PROGRESS NOTES
"  [facility]   Physical Therapy Treatment Note      Name: Shanthi Escoto  Clinic Number: [id number]      Therapy Diagnosis:   Encounter Diagnosis   Name Primary?    Impaired functional mobility, balance, gait, and endurance Yes         Physician: Arin Albarran MD    Visit Date: 8/9/2024    Physician Orders: PT Eval and Treat  " Neuro PT"  Medical Diagnosis from Referral:   Diagnosis   G35 (ICD-10-CM) - Multiple sclerosis      Evaluation Date: 7/16/2024  Authorization Period Expiration: 12/31/24  Plan of Care Expiration: 9/11/24  Progress Note Due: 8/17/24  Visit # / Visits authorized: 4/ 20 (5)   FOTO: 1/5    Time In: 1:47pm      Time Out: 2:30 pm      Total Billable Time: 43   minutes    Precautions: Standard and Fall Orthopedic: Lumbar Fusion precautions  (unclear if these are current or have been lifted)     PTA visit 0/5      Subjective     Pt reports:  " I am doing ok."    She was not yet given home exercise program.  Response to previous treatment: good  Functional change: ongoing       Pain: 5/10     Location: low back pain.       Objective      Objective Measures updated at progress report unless specified.     Arrived ambulating with hurry-cane    Treatment     Shanthi received the treatments listed below:        Shanthi received therapeutic exercises to develop strength, endurance, and ROM for 13   minutes including:      - sci fit level 2 bilateral upper extremity/ lower extremity 13 min        (Therapeutic rest breaks throughout as needed).       Shanthi participated in dynamic functional therapeutic activities to improve functional performance for 30    minutes, including:     Pt ambulated 120 ft x 2 trials with no Bioness or assistive device with supervision with hurry cane       Ambulation with Bioness L300 with supervision with hurry cane. Pt ambulated 50 ft x 2 trials with Bioness L300 with device assisting dorsiflexion on left. No adverse effects.     Some " difficulty finding exact spot for best muscle response with Bioness. Plan is to call Pam with [employer] and try to schedule a day she can come and assist placement.        (Therapeutic rest breaks throughout as needed).       Home Exercises Provided and Patient Education Provided     Education provided:      Pt educated about Bioness L300 and how information has been faxed with Pam Heard with [employer] to start insurance approval process.       Assessment     - Pt tolerated session well. Ambulation trials with Bioness L300 . Some difficulty finding motor unit to get best muscle contraction with Bioness. Plan to Call [employer] rep  to schedule a time she can assist. Pt reporting she has not seen her MS doctor and was not educated about follow-up. Pt asking about other options for MS doctors. Discussed with pt. Pt remains motivated to improve and a good candidate for PT.       Shanthi Is progressing well towards her goals.   Pt prognosis is Good.     Pt will continue to benefit from skilled outpatient physical therapy to address the deficits listed in the problem list box on initial evaluation, provide pt/family education and to maximize pt's level of independence in the home and community environment.     Pt's spiritual, cultural and educational needs considered and pt agreeable to plan of care and goals.     Anticipated barriers to physical therapy: progressive disease process     Goals:         Short Term Goals: 4 weeks Date last assessed:  Status:    1. Patient will be provided with an HEP for strength, endurance and balance.  7/16/2024    ongoing   2.  Patient will demonstrate improve endurance and activity tolerance as evidenced by ability to perform Nu-step x 15 minutes without rests breaks. 7/16/2024  ongoing            Long Term Goals: 8 weeks  Date last assessed: Status:    1. Patient will be independent and compliant with updated HEP. 7/16/2024    ongoing   2.  Patient will increase her   gait speed as  assessed by the timed 10MWT from 0.44 m/s to 0.58 m/s to increase her safety and (I) with gait at a community level. (MDC for CVA= 0.14 m/s)     7/16/2024    ongoing      3. The patient will demonstrate improved efficiency with functional mobility and decreased risk for falls as evidenced by an increase in her score on the Timed up and Go from 17.9 to 15.0. (Minimal detectable change in chronic stroke = 2.9 seconds)  7/16/2024 ongoing   4.Patient will improve her FOTO score from 42  to at least 57 for improved self perception of functional mobility.(score 0-100, high score indicates greater level of function) 7/16/2024 ongoing   5. Pt will improve 5x sit to stand score from 13.5 sec to less than 12 seconds to decrease fall risk and improve safety.  7/16/2024    ongoing        Plan   Plan of care Certification: 7/16/2024 to 9/11/24.     Outpatient Physical Therapy 2 times weekly for 8 weeks to include the following interventions: Electrical Stimulation as indicated, Gait Training, Manual Therapy, Moist Heat/ Ice, Neuromuscular Re-ed, Orthotic Management and Training, Patient Education, Therapeutic Activities, and Therapeutic Exercise.     Recommendations for next treatment session:     - strengthening    Dianna Rosas, PT

## 2024-08-09 ENCOUNTER — CLINICAL SUPPORT (OUTPATIENT)
Dept: REHABILITATION | Facility: HOSPITAL | Age: 49
End: 2024-08-09
Payer: COMMERCIAL

## 2024-08-09 DIAGNOSIS — Z74.09 IMPAIRED FUNCTIONAL MOBILITY, BALANCE, GAIT, AND ENDURANCE: Primary | ICD-10-CM

## 2024-08-09 PROCEDURE — 97110 THERAPEUTIC EXERCISES: CPT | Mod: PO

## 2024-08-09 PROCEDURE — 97530 THERAPEUTIC ACTIVITIES: CPT | Mod: PO

## 2024-08-12 ENCOUNTER — CLINICAL SUPPORT (OUTPATIENT)
Dept: REHABILITATION | Facility: HOSPITAL | Age: 49
End: 2024-08-12
Payer: COMMERCIAL

## 2024-08-12 DIAGNOSIS — Z74.09 IMPAIRED FUNCTIONAL MOBILITY, BALANCE, GAIT, AND ENDURANCE: Primary | ICD-10-CM

## 2024-08-12 PROCEDURE — 97110 THERAPEUTIC EXERCISES: CPT | Mod: PO

## 2024-08-12 PROCEDURE — 97530 THERAPEUTIC ACTIVITIES: CPT | Mod: PO

## 2024-08-12 NOTE — PROGRESS NOTES
"  [facility]   Physical Therapy Treatment Note      Name: Shanthi Escoto  Clinic Number: [id number]      Therapy Diagnosis:   Encounter Diagnosis   Name Primary?    Impaired functional mobility, balance, gait, and endurance Yes       Physician: Arin Albarran MD    Visit Date: 8/12/2024    Physician Orders: PT Eval and Treat  " Neuro PT"  Medical Diagnosis from Referral:   Diagnosis   G35 (ICD-10-CM) - Multiple sclerosis      Evaluation Date: 7/16/2024  Authorization Period Expiration: 12/31/24  Plan of Care Expiration: 9/11/24  Progress Note Due: 8/17/24  Visit # / Visits authorized: 5/ 20 (6)   FOTO: 1/5    Time In: 4:00 pm    Time Out: 4:45 pm    Total Billable Time: 45 minutes    Precautions: Standard and Fall Orthopedic: Lumbar Fusion precautions  (unclear if these are current or have been lifted)     PTA visit 0/5      Subjective     Pt reports:  " I am doing fine."    She was not yet given home exercise program.  Response to previous treatment: good  Functional change: ongoing    Pain: 4/10      Location: low back pain       Objective      Objective Measures updated at progress report unless specified.     Treatment     Shanthi received the treatments listed below:        Shanthi received therapeutic exercises to develop strength, endurance, and ROM for 25 minutes including:      - sci fit level 2 bilateral upper extremity/ lower extremity 6 min   - double knee to chest red theraball 3x10    - lateral trunk with red theraball 3x10   - seated march 3x10      (Therapeutic rest breaks throughout as needed).       Shanthi participated in dynamic functional therapeutic activities to improve functional performance for 20 minutes, including:     - pt ambulated 120 ft  supervision with single point cane. Slow pace.     - Sit to supine on mat supervision   - supine to sit on mat supervision     - Car transfer  from  to car supervision     Education provided see below     " "(Therapeutic rest breaks throughout as needed).       Home Exercises Provided and Patient Education Provided     Education provided:      pt asking about WC options for future knowledge.  discussed smart drive. Pt reports she does not want a WC at this time.       Assessment     Pt tolerated session fair. Pt reporting she has been " overdoing it" and not giving herself enough rest during the day. Tolerated a few supine exercises and sitting today . Fatigued and requested WC transfer to car. Discussed spoons therapy again and WC options for future use.       Shanthi Is progressing well towards her goals.   Pt prognosis is Good.     Pt will continue to benefit from skilled outpatient physical therapy to address the deficits listed in the problem list box on initial evaluation, provide pt/family education and to maximize pt's level of independence in the home and community environment.     Pt's spiritual, cultural and educational needs considered and pt agreeable to plan of care and goals.     Anticipated barriers to physical therapy: progressive disease process     Goals:         Short Term Goals: 4 weeks Date last assessed:  Status:    1. Patient will be provided with an HEP for strength, endurance and balance.  7/16/2024    ongoing   2.  Patient will demonstrate improve endurance and activity tolerance as evidenced by ability to perform Nu-step x 15 minutes without rests breaks. 7/16/2024  ongoing            Long Term Goals: 8 weeks  Date last assessed: Status:    1. Patient will be independent and compliant with updated HEP. 7/16/2024    ongoing   2.  Patient will increase her   gait speed as assessed by the timed 10MWT from 0.44 m/s to 0.58 m/s to increase her safety and (I) with gait at a community level. (MDC for CVA= 0.14 m/s)     7/16/2024    ongoing      3. The patient will demonstrate improved efficiency with functional mobility and decreased risk for falls as evidenced by an increase in her score on the " Timed up and Go from 17.9 to 15.0. (Minimal detectable change in chronic stroke = 2.9 seconds)  7/16/2024 ongoing   4.Patient will improve her FOTO score from 42  to at least 57 for improved self perception of functional mobility.(score 0-100, high score indicates greater level of function) 7/16/2024 ongoing   5. Pt will improve 5x sit to stand score from 13.5 sec to less than 12 seconds to decrease fall risk and improve safety.  7/16/2024    ongoing        Plan   Plan of care Certification: 7/16/2024 to 9/11/24.     Outpatient Physical Therapy 2 times weekly for 8 weeks to include the following interventions: Electrical Stimulation as indicated, Gait Training, Manual Therapy, Moist Heat/ Ice, Neuromuscular Re-ed, Orthotic Management and Training, Patient Education, Therapeutic Activities, and Therapeutic Exercise.     Recommendations for next treatment session:      Continue with trials with [employer] L300     Dianna Rosas, PT

## 2024-08-14 ENCOUNTER — TELEPHONE (OUTPATIENT)
Dept: NEUROLOGY | Facility: CLINIC | Age: 49
End: 2024-08-14
Payer: COMMERCIAL

## 2024-08-14 NOTE — TELEPHONE ENCOUNTER
Uploaded neuro referral. Neuro notes and MRIs in Epic. Msg to Ashlee to sched new pt with Dr Sanchez or Dr Riggins

## 2024-08-15 ENCOUNTER — CLINICAL SUPPORT (OUTPATIENT)
Dept: REHABILITATION | Facility: HOSPITAL | Age: 49
End: 2024-08-15
Payer: COMMERCIAL

## 2024-08-15 DIAGNOSIS — Z74.09 IMPAIRED FUNCTIONAL MOBILITY, BALANCE, GAIT, AND ENDURANCE: Primary | ICD-10-CM

## 2024-08-15 PROCEDURE — 97530 THERAPEUTIC ACTIVITIES: CPT | Mod: PO

## 2024-08-15 PROCEDURE — 97110 THERAPEUTIC EXERCISES: CPT | Mod: PO

## 2024-08-15 NOTE — PROGRESS NOTES
"  [facility]   Physical Therapy Treatment Note      Name: Shanthi Escoto  Clinic Number: [id number]      Therapy Diagnosis:   Encounter Diagnosis   Name Primary?    Impaired functional mobility, balance, gait, and endurance Yes         Physician: Arin Albarran MD    Visit Date: 8/19/2024    Physician Orders: PT Eval and Treat  " Neuro PT"  Medical Diagnosis from Referral:   Diagnosis   G35 (ICD-10-CM) - Multiple sclerosis      Evaluation Date: 7/16/2024  Authorization Period Expiration: 12/31/24  Plan of Care Expiration: 9/11/24.   Progress Note Due: 8/17/24  Visit # / Visits authorized: 7/ 20 (8)   FOTO: 1/5    Time In: 2:30 pm     Time Out: 3:15 pm    Total Billable Time: 45  minutes    Precautions: Standard and Fall Orthopedic: Lumbar Fusion precautions  (unclear if these are current or have been lifted)     PTA visit 0/5      Subjective     Pt reports:  " I am ok. I only have two spoons". (Referring to spoon theory for energy conservation).      She was not yet given home exercise program.  Response to previous treatment: good  Functional change: ongoing    Pain: 5/10       Location: low back pain       Objective      Objective Measures updated at progress report unless specified.     Treatment     Shanthi received the treatments listed below:        Shanthi received therapeutic exercises to develop strength, endurance, and ROM for 35  minutes including:       - sci fit level 1 bilateral upper extremity/ lower extremity 10 min     Shuttle leg press bilateral lower extremity 62# 3x10 . Cues to not hyperextend knees.   Shuttle leg press right lower extremity 43# 3x10   Shuttle leg press left lower extremity 43# 3x10        (Therapeutic rest breaks throughout as needed).       Shanthi participated in dynamic functional therapeutic activities to improve functional performance for 10  minutes, including:     - 120 ft supervision with hurry cane  - sci fit to  supervision stand " pivot    - WC< --> shuttle leg press supervision stand pivot  - Shuttle sit to supine min A   - Shuttle supine to sit min A     - Car transfer  from WC to car supervision     Education provided see below     (Therapeutic rest breaks throughout as needed).       Home Exercises Provided and Patient Education Provided     Education provided:     -  gave anna's email and phone number again to pt as she reports she lost original.   -  pt asking about WC options for future knowledge.  discussed smart drive. Pt reports she does not want a WC at this time.       Assessment     Pt tolerated session well. Working on shuttle leg press with bilateral lower extremity and single leg press . Educated about conserving energy  with these exercises and resting as needed. Pt remains motivated to improve and a good candidate for PT.       Shanthi Is progressing well towards her goals.   Pt prognosis is Good.     Pt will continue to benefit from skilled outpatient physical therapy to address the deficits listed in the problem list box on initial evaluation, provide pt/family education and to maximize pt's level of independence in the home and community environment.     Pt's spiritual, cultural and educational needs considered and pt agreeable to plan of care and goals.     Anticipated barriers to physical therapy: progressive disease process     Goals:         Short Term Goals: 4 weeks Date last assessed:  Status:    1. Patient will be provided with an HEP for strength, endurance and balance.  7/16/2024    ongoing   2.  Patient will demonstrate improve endurance and activity tolerance as evidenced by ability to perform Nu-step x 15 minutes without rests breaks. 7/16/2024  ongoing            Long Term Goals: 8 weeks  Date last assessed: Status:    1. Patient will be independent and compliant with updated HEP. 7/16/2024    ongoing   2.  Patient will increase her   gait speed as assessed by the timed 10MWT from 0.44 m/s to 0.58 m/s to  increase her safety and (I) with gait at a community level. (MDC for CVA= 0.14 m/s)     7/16/2024    ongoing      3. The patient will demonstrate improved efficiency with functional mobility and decreased risk for falls as evidenced by an increase in her score on the Timed up and Go from 17.9 to 15.0. (Minimal detectable change in chronic stroke = 2.9 seconds)  7/16/2024 ongoing   4.Patient will improve her FOTO score from 42  to at least 57 for improved self perception of functional mobility.(score 0-100, high score indicates greater level of function) 7/16/2024 ongoing   5. Pt will improve 5x sit to stand score from 13.5 sec to less than 12 seconds to decrease fall risk and improve safety.  7/16/2024    ongoing        Plan   Plan of care Certification: 7/16/2024 to 9/11/24.     Outpatient Physical Therapy 2 times weekly for 8 weeks to include the following interventions: Electrical Stimulation as indicated, Gait Training, Manual Therapy, Moist Heat/ Ice, Neuromuscular Re-ed, Orthotic Management and Training, Patient Education, Therapeutic Activities, and Therapeutic Exercise.     Recommendations for next treatment session:       Continue with trials with [employer] L300     Dianna Rosas, PT

## 2024-08-15 NOTE — PROGRESS NOTES
"  [facility]   Physical Therapy Treatment Note      Name: Shanthi Escoto  Clinic Number: [id number]      Therapy Diagnosis:   Encounter Diagnosis   Name Primary?    Impaired functional mobility, balance, gait, and endurance Yes       Physician: Arin Albarran MD    Visit Date: 8/15/2024    Physician Orders: PT Eval and Treat  " Neuro PT"  Medical Diagnosis from Referral:   Diagnosis   G35 (ICD-10-CM) - Multiple sclerosis      Evaluation Date: 7/16/2024  Authorization Period Expiration: 12/31/24  Plan of Care Expiration: 9/11/24  Progress Note Due: 8/17/24  Visit # / Visits authorized: 6/ 20 (7)   FOTO: 1/5    Time In: 4:02 pm     Time Out: 4:45 pm    Total Billable Time: 43  minutes    Precautions: Standard and Fall Orthopedic: Lumbar Fusion precautions  (unclear if these are current or have been lifted)     PTA visit 0/5      Subjective     Pt reports:  " I am doing ok. Tired".     She was not yet given home exercise program.  Response to previous treatment: good  Functional change: ongoing    Pain: 4/10     Location: low back pain       Objective      Objective Measures updated at progress report unless specified.     Treatment     Shanthi received the treatments listed below:        Shanthi received therapeutic exercises to develop strength, endurance, and ROM for 15  minutes including:       - sci fit level 1 bilateral upper extremity/ lower extremity 10 min     - Seated bicep curls 3# dowel 1x10, 5#dowel 2x10      (Therapeutic rest breaks throughout as needed).       Shanthi participated in dynamic functional therapeutic activities to improve functional performance for 28  minutes, including:      - pt ambulated 120 ft, 75 ft supervision with single point cane. Slow pace.     - Car transfer  from  to car supervision     Extensive education provided see below     (Therapeutic rest breaks throughout as needed).       Home Exercises Provided and Patient Education Provided "     Education provided:     Educated about getting an AFO. Pt reports she will think about it. Educated about spoons theory again. Discussed importance of resting. Educated about shower chair and ways she can rest more throughout her day.       Assessment      Pt tolerated session fair. Pt reporting she is fatigued today. Pt able to tolerate sci fit and bicep curls today . Requiring  WC transport to car with fatigue. Extensive education provided about spoons theory and energy conservation. Discussed patient's daily routine and incorporating rest breaks throughout to have more energy for therapy. Pt reports PCP called and said cannot fill out [employer] form. Messaged Pam with [employer] that we can fax form to neurologist. Pt remains motivated to improve and a good candidate for PT.       Shanthi Is progressing well towards her goals.   Pt prognosis is Good.     Pt will continue to benefit from skilled outpatient physical therapy to address the deficits listed in the problem list box on initial evaluation, provide pt/family education and to maximize pt's level of independence in the home and community environment.     Pt's spiritual, cultural and educational needs considered and pt agreeable to plan of care and goals.     Anticipated barriers to physical therapy: progressive disease process     Goals:         Short Term Goals: 4 weeks Date last assessed:  Status:    1. Patient will be provided with an HEP for strength, endurance and balance.  7/16/2024    ongoing   2.  Patient will demonstrate improve endurance and activity tolerance as evidenced by ability to perform Nu-step x 15 minutes without rests breaks. 7/16/2024  ongoing            Long Term Goals: 8 weeks  Date last assessed: Status:    1. Patient will be independent and compliant with updated HEP. 7/16/2024    ongoing   2.  Patient will increase her   gait speed as assessed by the timed 10MWT from 0.44 m/s to 0.58 m/s to increase her safety and (I) with gait at  a community level. (MDC for CVA= 0.14 m/s)     7/16/2024    ongoing      3. The patient will demonstrate improved efficiency with functional mobility and decreased risk for falls as evidenced by an increase in her score on the Timed up and Go from 17.9 to 15.0. (Minimal detectable change in chronic stroke = 2.9 seconds)  7/16/2024 ongoing   4.Patient will improve her FOTO score from 42  to at least 57 for improved self perception of functional mobility.(score 0-100, high score indicates greater level of function) 7/16/2024 ongoing   5. Pt will improve 5x sit to stand score from 13.5 sec to less than 12 seconds to decrease fall risk and improve safety.  7/16/2024    ongoing        Plan   Plan of care Certification: 7/16/2024 to 9/11/24.     Outpatient Physical Therapy 2 times weekly for 8 weeks to include the following interventions: Electrical Stimulation as indicated, Gait Training, Manual Therapy, Moist Heat/ Ice, Neuromuscular Re-ed, Orthotic Management and Training, Patient Education, Therapeutic Activities, and Therapeutic Exercise.     Recommendations for next treatment session:   - seated exercises.     Dianna Rosas, PT

## 2024-08-19 ENCOUNTER — CLINICAL SUPPORT (OUTPATIENT)
Dept: REHABILITATION | Facility: HOSPITAL | Age: 49
End: 2024-08-19
Payer: COMMERCIAL

## 2024-08-19 DIAGNOSIS — Z74.09 IMPAIRED FUNCTIONAL MOBILITY, BALANCE, GAIT, AND ENDURANCE: Primary | ICD-10-CM

## 2024-08-19 PROCEDURE — 97530 THERAPEUTIC ACTIVITIES: CPT | Mod: PO

## 2024-08-19 PROCEDURE — 97110 THERAPEUTIC EXERCISES: CPT | Mod: PO

## 2024-08-22 ENCOUNTER — CLINICAL SUPPORT (OUTPATIENT)
Dept: REHABILITATION | Facility: HOSPITAL | Age: 49
End: 2024-08-22
Payer: COMMERCIAL

## 2024-08-22 DIAGNOSIS — Z74.09 IMPAIRED FUNCTIONAL MOBILITY, BALANCE, GAIT, AND ENDURANCE: Primary | ICD-10-CM

## 2024-08-22 PROCEDURE — 97110 THERAPEUTIC EXERCISES: CPT | Mod: PO

## 2024-08-22 PROCEDURE — 97530 THERAPEUTIC ACTIVITIES: CPT | Mod: PO

## 2024-08-22 NOTE — PROGRESS NOTES
"  [facility]   Physical Therapy Treatment Note      Name: Shanthi Escoto  Clinic Number: [id number]      Therapy Diagnosis:   Encounter Diagnosis   Name Primary?    Impaired functional mobility, balance, gait, and endurance Yes       Physician: Arin Albarran MD    Visit Date: 8/22/2024    Physician Orders: PT Eval and Treat  " Neuro PT"  Medical Diagnosis from Referral:   Diagnosis   G35 (ICD-10-CM) - Multiple sclerosis      Evaluation Date: 7/16/2024  Authorization Period Expiration: 12/31/24  Plan of Care Expiration: 9/11/24.   Progress Note Due: 8/17/24  Visit # / Visits authorized: 7/ 20 (8)   FOTO: 1/5    Time In: 2:30 pm      Time Out: 3:18 pm    Total Billable Time: 48   minutes    Precautions: Standard and Fall Orthopedic: Lumbar Fusion precautions  (unclear if these are current or have been lifted)     PTA visit 0/5      Subjective     Pt reports:  " I am ok. I am tired." Reports that she is now showering at night so that she isn't so tired in the mornings after a shower.     She was not yet given home exercise program.  Response to previous treatment: good  Functional change: ongoing    Pain: 5/10       Location: low back pain       Objective      Objective Measures updated at progress report unless specified.     Treatment     Shanthi received the treatments listed below:        Shanthi received therapeutic exercises to develop strength, endurance, and ROM for 15   minutes including:       - Shuttle leg press bilateral lower extremity 62# 3x10 . Cues to not hyperextend knees.      (Therapeutic rest breaks throughout as needed).       Shanthi participated in dynamic functional therapeutic activities to improve functional performance for 33   minutes, including:      - 120 ft supervision with hurry cane    - < --> shuttle leg press supervision stand pivot  - Shuttle sit to supine min A   - Shuttle supine to sit min A     - Car transfer  from  to car supervision     - " rx well tolerated; cpm   "Standard manual WC < -->  demo lightweight custom WC standby assist.     - Pt propelled manual standard WC 15 ft . Reports fatigue and increased effort for propulsion  - pt propelled light weight demo [employer] WC 20 ft with increased speed and efficiency supervision      (Therapeutic rest breaks throughout as needed).       Home Exercises Provided and Patient Education Provided     Education provided:   - educated about custom manual WC versus standard WC. Educated about Smart drive as a potential option.     Assessment      Pt tolerated session well. Pt reports she has been thinking about getting a wheelchair and decided she does want to get one. We trialled a standard WC versus a lightweight . Pt had a much easier time propelling the lightweight WC and reports " I wouldn't even make it 1 minute" in the standard chair. Pt requesting therapist to get orders for custom manual WC. Did discuss SmartDrive as a potential option. Pt reports she is interested in this as she is unable to perform any activities currently outside her basic job duties due to fatigue from Multiple Sclerosis. She has been requiring a WC transport back to car everyday after session with doing minimal activity. Pt would greatly benefit from a custom manual light weight WC to improve her independence, mobility and her quality of life. Pt requested to call [employer] rep to see if she is able to eprform custom WC eval and assist with information and a potential trial with a Smart Drive device.           Shanthi Is progressing well towards her goals.   Pt prognosis is Good.     Pt will continue to benefit from skilled outpatient physical therapy to address the deficits listed in the problem list box on initial evaluation, provide pt/family education and to maximize pt's level of independence in the home and community environment.     Pt's spiritual, cultural and educational needs considered and pt agreeable to plan of care and goals.     Anticipated " barriers to physical therapy: progressive disease process     Goals:         Short Term Goals: 4 weeks Date last assessed:  Status:    1. Patient will be provided with an HEP for strength, endurance and balance.  7/16/2024    ongoing   2.  Patient will demonstrate improve endurance and activity tolerance as evidenced by ability to perform Nu-step x 15 minutes without rests breaks. 7/16/2024  ongoing            Long Term Goals: 8 weeks  Date last assessed: Status:    1. Patient will be independent and compliant with updated HEP. 7/16/2024    ongoing   2.  Patient will increase her   gait speed as assessed by the timed 10MWT from 0.44 m/s to 0.58 m/s to increase her safety and (I) with gait at a community level. (MDC for CVA= 0.14 m/s)     7/16/2024    ongoing      3. The patient will demonstrate improved efficiency with functional mobility and decreased risk for falls as evidenced by an increase in her score on the Timed up and Go from 17.9 to 15.0. (Minimal detectable change in chronic stroke = 2.9 seconds)  7/16/2024 ongoing   4.Patient will improve her FOTO score from 42  to at least 57 for improved self perception of functional mobility.(score 0-100, high score indicates greater level of function) 7/16/2024 ongoing   5. Pt will improve 5x sit to stand score from 13.5 sec to less than 12 seconds to decrease fall risk and improve safety.  7/16/2024    ongoing        Plan   Plan of care Certification: 7/16/2024 to 9/11/24.     Outpatient Physical Therapy 2 times weekly for 8 weeks to include the following interventions: Electrical Stimulation as indicated, Gait Training, Manual Therapy, Moist Heat/ Ice, Neuromuscular Re-ed, Orthotic Management and Training, Patient Education, Therapeutic Activities, and Therapeutic Exercise.     Recommendations for next treatment session:        Continue with trials with [employer] L300     Dianna Rosas, PT

## 2024-08-26 ENCOUNTER — CLINICAL SUPPORT (OUTPATIENT)
Dept: REHABILITATION | Facility: HOSPITAL | Age: 49
End: 2024-08-26
Payer: COMMERCIAL

## 2024-08-26 DIAGNOSIS — Z74.09 IMPAIRED FUNCTIONAL MOBILITY, BALANCE, GAIT, AND ENDURANCE: Primary | ICD-10-CM

## 2024-08-26 PROCEDURE — 97530 THERAPEUTIC ACTIVITIES: CPT | Mod: PO

## 2024-08-26 PROCEDURE — 97110 THERAPEUTIC EXERCISES: CPT | Mod: PO

## 2024-08-26 NOTE — PROGRESS NOTES
"  [facility]   Physical Therapy Treatment Note      Name: Shanthi Escoto  Clinic Number: [id number]      Therapy Diagnosis:   Encounter Diagnosis   Name Primary?    Impaired functional mobility, balance, gait, and endurance Yes       Physician: Arin Albarran MD    Visit Date: 8/26/2024    Physician Orders: PT Eval and Treat  " Neuro PT"  Medical Diagnosis from Referral:   Diagnosis   G35 (ICD-10-CM) - Multiple sclerosis      Evaluation Date: 7/16/2024  Authorization Period Expiration: 12/31/24  Plan of Care Expiration: 9/11/24.   Progress Note Due: 8/17/24  Visit # / Visits authorized: 7/ 20 (8)   FOTO: 1/5    Time In:  1:46 pm        Time Out: 2:30 pm      Total Billable Time: 44    minutes    Precautions: Standard and Fall Orthopedic: Lumbar Fusion precautions  (unclear if these are current or have been lifted)     PTA visit 0/5      Subjective     Pt reports:   " I am tired. I almost went home because the construction is blocking the handicap parking spots".  EMG on September 16th.  Pt reports she had a fall Saturday when she was entering bathroom as boyfriend was exiting. She reports she fell. Did not hit her  head and did not get checked out.     She was not yet given home exercise program.  Response to previous treatment: good  Functional change: ongoing    Pain: 5/10        Location: low back pain       Objective      Objective Measures updated at progress report unless specified.     Treatment     Shanthi received the treatments listed below:        Shanthi received therapeutic exercises to develop strength, endurance, and ROM for 20    minutes including:     -   biceps curls dowel 5# 3x10 seated  - Chest press 3# dowel 3x10  seated  - overhead lift with 3# dowel 1x10 seated     (Therapeutic rest breaks throughout as needed).       Shanthi participated in dynamic functional therapeutic activities to improve functional performance for 24    minutes, including:         -  pt " "ambulated 120 ft supervision with hurry-cane     - Mat to WC transfer Supervision/ setup    - WC to standing in restroom transfer supervision    - Car transfer from WC to car supervision     Education provided see below.      (Therapeutic rest breaks throughout as needed).       Home Exercises Provided and Patient Education Provided     Education provided:     - educated pt about phone call with April from [employer]. Educated that she will need to be in a WC for 1 year before qualifying for a Smart Drive. Discussed option of having April with [employer] bring a demo of custom lightweight WC with Smart Drive and power WC for pt to trial. Pt reports she would like to trial the demos. Will schedule appt with April.     Assessment         Pt tolerated session well. Therapist spoke with April with [employer]. Was informed that pt will have to be in a WC for one year prior to getting SmartDrive. Pt reports she would like to trial a demo of Smart drive versus power WC to see which one she may like to order. PT to schedule. Pt still with difficulty managing energy levels and "spoons" for therapy sessions. Also spoke with Pam with [employer]. Reports they are still waiting on prescription from MD from MD to proceed with [employer] ordering. Remains a good candidate for PT.       Shanthi Is progressing well towards her goals.   Pt prognosis is Good.     Pt will continue to benefit from skilled outpatient physical therapy to address the deficits listed in the problem list box on initial evaluation, provide pt/family education and to maximize pt's level of independence in the home and community environment.     Pt's spiritual, cultural and educational needs considered and pt agreeable to plan of care and goals.     Anticipated barriers to physical therapy: progressive disease process     Goals:         Short Term Goals: 4 weeks Date last assessed:  Status:    1. Patient will be provided with an HEP for strength, endurance and balance.  " 7/16/2024    ongoing   2.  Patient will demonstrate improve endurance and activity tolerance as evidenced by ability to perform Nu-step x 15 minutes without rests breaks. 7/16/2024  ongoing            Long Term Goals: 8 weeks  Date last assessed: Status:    1. Patient will be independent and compliant with updated HEP. 7/16/2024    ongoing   2.  Patient will increase her   gait speed as assessed by the timed 10MWT from 0.44 m/s to 0.58 m/s to increase her safety and (I) with gait at a community level. (MDC for CVA= 0.14 m/s)     7/16/2024    ongoing      3. The patient will demonstrate improved efficiency with functional mobility and decreased risk for falls as evidenced by an increase in her score on the Timed up and Go from 17.9 to 15.0. (Minimal detectable change in chronic stroke = 2.9 seconds)  7/16/2024 ongoing   4.Patient will improve her FOTO score from 42  to at least 57 for improved self perception of functional mobility.(score 0-100, high score indicates greater level of function) 7/16/2024 ongoing   5. Pt will improve 5x sit to stand score from 13.5 sec to less than 12 seconds to decrease fall risk and improve safety.  7/16/2024    ongoing        Plan   Plan of care Certification: 7/16/2024 to 9/11/24.     Outpatient Physical Therapy 2 times weekly for 8 weeks to include the following interventions: Electrical Stimulation as indicated, Gait Training, Manual Therapy, Moist Heat/ Ice, Neuromuscular Re-ed, Orthotic Management and Training, Patient Education, Therapeutic Activities, and Therapeutic Exercise.     Recommendations for next treatment session:           Continue with trials with [employer] L300     Dianna Rosas, PT

## 2024-09-03 ENCOUNTER — CLINICAL SUPPORT (OUTPATIENT)
Dept: REHABILITATION | Facility: HOSPITAL | Age: 49
End: 2024-09-03
Payer: COMMERCIAL

## 2024-09-03 DIAGNOSIS — Z74.09 IMPAIRED FUNCTIONAL MOBILITY, BALANCE, GAIT, AND ENDURANCE: Primary | ICD-10-CM

## 2024-09-03 PROCEDURE — 97110 THERAPEUTIC EXERCISES: CPT | Mod: PO

## 2024-09-03 PROCEDURE — 97530 THERAPEUTIC ACTIVITIES: CPT | Mod: PO

## 2024-09-03 NOTE — PROGRESS NOTES
"  [facility]   Physical Therapy Treatment Note      Name: Shanthi Escoto  Clinic Number: [id number]      Therapy Diagnosis:   Encounter Diagnosis   Name Primary?    Impaired functional mobility, balance, gait, and endurance Yes       Physician: Arin Albarran MD    Visit Date: 9/3/2024    Physician Orders: PT Eval and Treat  " Neuro PT"  Medical Diagnosis from Referral:   Diagnosis   G35 (ICD-10-CM) - Multiple sclerosis      Evaluation Date: 7/16/2024  Authorization Period Expiration: 12/31/24  Plan of Care Expiration: 9/11/24.   Progress Note Due: 8/17/24  Visit # / Visits authorized: 10/ 20 (11)   FOTO: 1/5    Time In:  1:45 pm    Time Out: 2:30 pm      Total Billable Time: 45 minutes    Precautions: Standard and Fall Orthopedic: Lumbar Fusion precautions  (unclear if these are current or have been lifted)     PTA visit 0/5      Subjective     Pt reports:   " I am ok." Pt reports she had time off of work and rested but reports she thinks she rested too much.  Pt reports taking 2 spoons to walk in building as handicapped parking is blocked with construction.     She was not yet given home exercise program.  Response to previous treatment: good  Functional change: ongoing    Pain: 5/10      Location: low back pain       Objective      Objective Measures updated at progress report unless specified.     Treatment     Shanthi received the treatments listed below:        Shanthi received therapeutic exercises to develop strength, endurance, and ROM for 30 minutes including:     -  sci fit level 1 bilateral upper extremity/ lower extremity 8 minutes.   - supine heel slides 3x10 each lower extremity   - supine hip abduction 3x10 each lower extremity   - supine marches 2x10 each lower extremity        (Therapeutic rest breaks throughout as needed).       Shanthi participated in dynamic functional therapeutic activities to improve functional performance for 15     minutes, including:      - " spoke with Pam with [employer]. Reports that she believes everything is submitted for [employer] L300 ordering form  - spoke with April Earl with [employer]. Confirmed 2:30 pm appt on Friday for demo power WC and Smart Drive WC    - Pt ambulated 120 ft with single point cane with supervision slow pace.   - pt with dependent WC transfer back to car with fatigue.     Home Exercises Provided and Patient Education Provided     Education provided:     - educated about progress with [employer] L300 ordering and [employer] demo on Friday. Educated about energy conservation again.     Assessment        Pt tolerated session  fair. Pt reports she is still having a lot of trouble accepting her diagnosis and performing energy conservation techniques. [employer] form per Pam Heard should be submitted tomorrow and will reach out if any issues. Scheduled for power WC demo and smart drive on Friday with April from [employer]. Pt still with difficulty with energy conservation. No WC order placed yet for WC eval. Pt remains motivated to improve and a good candidate for PT.         Shanthi Is progressing well towards her goals.   Pt prognosis is Good.     Pt will continue to benefit from skilled outpatient physical therapy to address the deficits listed in the problem list box on initial evaluation, provide pt/family education and to maximize pt's level of independence in the home and community environment.     Pt's spiritual, cultural and educational needs considered and pt agreeable to plan of care and goals.     Anticipated barriers to physical therapy: progressive disease process     Goals:         Short Term Goals: 4 weeks Date last assessed:  Status:    1. Patient will be provided with an HEP for strength, endurance and balance.  7/16/2024    ongoing   2.  Patient will demonstrate improve endurance and activity tolerance as evidenced by ability to perform Nu-step x 15 minutes without rests breaks. 7/16/2024  ongoing            Long Term  Goals: 8 weeks  Date last assessed: Status:    1. Patient will be independent and compliant with updated HEP. 7/16/2024    ongoing   2.  Patient will increase her   gait speed as assessed by the timed 10MWT from 0.44 m/s to 0.58 m/s to increase her safety and (I) with gait at a community level. (MDC for CVA= 0.14 m/s)     7/16/2024    ongoing      3. The patient will demonstrate improved efficiency with functional mobility and decreased risk for falls as evidenced by an increase in her score on the Timed up and Go from 17.9 to 15.0. (Minimal detectable change in chronic stroke = 2.9 seconds)  7/16/2024 ongoing   4.Patient will improve her FOTO score from 42  to at least 57 for improved self perception of functional mobility.(score 0-100, high score indicates greater level of function) 7/16/2024 ongoing   5. Pt will improve 5x sit to stand score from 13.5 sec to less than 12 seconds to decrease fall risk and improve safety.  7/16/2024    ongoing        Plan   Plan of care Certification: 7/16/2024 to 9/11/24.     Outpatient Physical Therapy 2 times weekly for 8 weeks to include the following interventions: Electrical Stimulation as indicated, Gait Training, Manual Therapy, Moist Heat/ Ice, Neuromuscular Re-ed, Orthotic Management and Training, Patient Education, Therapeutic Activities, and Therapeutic Exercise.     Recommendations for next treatment session:            Continue with trials with [employer] L300     Dianna Rosas, PT

## 2024-09-05 NOTE — PROGRESS NOTES
"  [facility]   Physical Therapy Treatment Note      Name: Shanthi Escoto  Clinic Number: [id number]      Therapy Diagnosis:   Encounter Diagnosis   Name Primary?    Impaired functional mobility, balance, gait, and endurance Yes       Physician: Arin Albarran MD    Visit Date: 9/6/2024    Physician Orders: PT Eval and Treat  " Neuro PT"  Medical Diagnosis from Referral:   Diagnosis   G35 (ICD-10-CM) - Multiple sclerosis      Evaluation Date: 7/16/2024  Authorization Period Expiration: 12/31/24  Plan of Care Expiration: 9/11/24.   Progress Note Due: 8/17/24  Visit # / Visits authorized: 12/ 20 (13)   FOTO: 1/5    Time In:   2:20 pm     Time Out:  3:30  pm      Total Billable Time: 70 minutes    Precautions: Standard and Fall Orthopedic: Lumbar Fusion precautions  (unclear if these are current or have been lifted)     PTA visit 0/5      Subjective     Pt reports:   April with [employer] present with two demo chairs for pt.  Pt reports she is "doing good."      She was not yet given home exercise program.  Response to previous treatment: good  Functional change: ongoing    Pain: 5/10       Location: low back pain       Objective      Objective Measures updated at progress report unless specified.     Treatment     Shanthi received the treatments listed below:        Shanthi participated in dynamic functional therapeutic activities to improve functional performance for 70 minutes, including:      Pt ambulated 120 ft with single point cane with supervision x 1 trial.     April with [employer] present with 2 demo chairs for pt to trial.     Chair 1 was a TiLite lightweight  rigid frame WC with SmartDrive attachment. Education provided see below. Pt able to trial chair and navigate through hallways, changing speed, making turns and min A from therapist and [employer] rep.     Chair 2 was a power WC with seat elevator. Pt able to drive power WC around clinic with turns and practiced tiltint, " reclining, elevating seat and the anterior tilt to asist with sit to stands.         Home Exercises Provided and Patient Education Provided     Education provided:   Discussed pros and cons of both WC. Discussed transportation with chairs, discussed how to take apart  manual chair to get into and out of car. Discussed needing a WC van or trailor hitch for power chair. Discussed how everything is customizable with the chairs. Discussed seat elevator on power chairs. Discussed tilt, recline and foot elevator. Discussed how would have to attach and remove smart drive before putting into car. Educated about how to use each WC and had pt drive each chair. Had pt hold the weight of the SmartDrive to feel how heavy device is. Pt reports understanding all information and has no questions or concerns at this time. .     Assessment         Pt tolerated session  well. Pt able to trial a manual WC with Smart Drive attachment and a custom power WC. Pt able to navigate both throughout hallways to get a feel for each type of WC. Extensive education provided about each WC and answered all patient's questions and concerns. Pt reports she would like to think about the types of wheelchairs before her custom wC eval non 9/19.       Shanthi Is progressing well towards her goals.   Pt prognosis is Good.     Pt will continue to benefit from skilled outpatient physical therapy to address the deficits listed in the problem list box on initial evaluation, provide pt/family education and to maximize pt's level of independence in the home and community environment.     Pt's spiritual, cultural and educational needs considered and pt agreeable to plan of care and goals.     Anticipated barriers to physical therapy: progressive disease process     Goals:         Short Term Goals: 4 weeks Date last assessed:  Status:    1. Patient will be provided with an HEP for strength, endurance and balance.  7/16/2024    ongoing   2.  Patient will  demonstrate improve endurance and activity tolerance as evidenced by ability to perform Nu-step x 15 minutes without rests breaks. 7/16/2024  ongoing            Long Term Goals: 8 weeks  Date last assessed: Status:    1. Patient will be independent and compliant with updated HEP. 7/16/2024    ongoing   2.  Patient will increase her   gait speed as assessed by the timed 10MWT from 0.44 m/s to 0.58 m/s to increase her safety and (I) with gait at a community level. (MDC for CVA= 0.14 m/s)     7/16/2024    ongoing      3. The patient will demonstrate improved efficiency with functional mobility and decreased risk for falls as evidenced by an increase in her score on the Timed up and Go from 17.9 to 15.0. (Minimal detectable change in chronic stroke = 2.9 seconds)  7/16/2024 ongoing   4.Patient will improve her FOTO score from 42  to at least 57 for improved self perception of functional mobility.(score 0-100, high score indicates greater level of function) 7/16/2024 ongoing   5. Pt will improve 5x sit to stand score from 13.5 sec to less than 12 seconds to decrease fall risk and improve safety.  7/16/2024    ongoing        Plan   Plan of care Certification: 7/16/2024 to 9/11/24.     Outpatient Physical Therapy 2 times weekly for 8 weeks to include the following interventions: Electrical Stimulation as indicated, Gait Training, Manual Therapy, Moist Heat/ Ice, Neuromuscular Re-ed, Orthotic Management and Training, Patient Education, Therapeutic Activities, and Therapeutic Exercise.     Recommendations for next treatment session:            Continue with trials with [employer] L300     Dianna Rosas, PT

## 2024-09-06 ENCOUNTER — CLINICAL SUPPORT (OUTPATIENT)
Dept: REHABILITATION | Facility: HOSPITAL | Age: 49
End: 2024-09-06
Payer: COMMERCIAL

## 2024-09-06 DIAGNOSIS — G35 MULTIPLE SCLEROSIS: Primary | ICD-10-CM

## 2024-09-06 DIAGNOSIS — Z74.09 IMPAIRED FUNCTIONAL MOBILITY, BALANCE, GAIT, AND ENDURANCE: Primary | ICD-10-CM

## 2024-09-06 PROCEDURE — 97530 THERAPEUTIC ACTIVITIES: CPT | Mod: PO

## 2024-09-09 ENCOUNTER — CLINICAL SUPPORT (OUTPATIENT)
Dept: REHABILITATION | Facility: HOSPITAL | Age: 49
End: 2024-09-09
Payer: COMMERCIAL

## 2024-09-09 DIAGNOSIS — Z74.09 IMPAIRED FUNCTIONAL MOBILITY, BALANCE, GAIT, AND ENDURANCE: Primary | ICD-10-CM

## 2024-09-09 PROCEDURE — 97530 THERAPEUTIC ACTIVITIES: CPT | Mod: PO

## 2024-09-09 NOTE — PLAN OF CARE
"  [facility]   Physical Therapy Plan of Care Update      Name: Shanthi Escoto  Clinic Number: [id number]      Therapy Diagnosis:   Encounter Diagnosis   Name Primary?    Impaired functional mobility, balance, gait, and endurance Yes       Physician: Arin Albarran MD    Visit Date: 9/9/2024    Physician Orders: PT Eval and Treat  " Neuro PT"  Medical Diagnosis from Referral:   Diagnosis   G35 (ICD-10-CM) - Multiple sclerosis      Evaluation Date: 7/16/2024  Authorization Period Expiration: 12/31/24  Updated Plan of Care Expiration: 11/4/24  Progress Note Due: 8/17/24  Visit # / Visits authorized: 11/ 20 (12)   FOTO: 1/5    Time In:   3:15 pm    Time Out:  3:50 pm (request to end early)      Total Billable Time: 35  minutes    Precautions: Standard and Fall Orthopedic: Lumbar Fusion precautions  (unclear if these are current or have been lifted)     PTA visit 0/5      Subjective     Pt reports:   " I am having a bad day." Reports this morning she was standing and started sweating a lot and felt like she was going to pass out. Reports she is staying hydrated. Reports she doesn't feel like she needs the full power chair. Reports she wants the manual and the Smart Drive if able. Reports  she would like [employer] as vendor.       She was not yet given home exercise program.  Response to previous treatment: good  Functional change: ongoing    Pain: 5/10      Location: low back pain       Objective      Objective Measures updated at progress report unless specified.     125/84 mmhg  76 bpm     Treatment     Shanthi received the treatments listed below:        Shanthi participated in dynamic functional therapeutic activities to improve functional performance for 35 minutes, including:           Evaluation 9/9/24   5 times sit to stand 13.5 seconds minimal upper extremity support 17 sec bilateral upper extremity support.    TUG 17.9 seconds single point cane 24.1 sec with " "single point cane    Self selected walking speed (10MWT) 0.44 m/sec (6m/13.7s)  single point cane  0.30 m/s ( 6m/ 19.8 sec) single point cane         - Pt ambulated 120 ft with single point cane with supervision slow pace.   - pt with dependent WC transfer back to car with fatigue.     Home Exercises Provided and Patient Education Provided     Education provided:     - educated about decreased scores , however, difficult to determine if from regression or because having a bad day. Educated about WC eval and scheduled with Walker with [facility]    Assessment      Pt tolerated session fair. Pt reporting feeling dizzy and diaphoretic this morning. Reports not feeling great this afternoon. BP as above currently. Patient's scores declined in the 5x sit to stand, the SSWS, and the TUG test. However, difficult to determine accuracy of objective measure testing today with not feeling good this morning and almost passing out.  Pt is requesting a manual custom WC and a SmartDrive device from the trial last session of the two devices.   Pt has been scheduled  with DERRELL Cardoso for 9/19 2:30-4:00 pm .  Pt still with some difficulty with "spoons theory " and energy conservation. Pt would benefit from additional therapy for strength, endurance, education and custom seating.       Shanthi Is progressing well towards her goals.   Pt prognosis is Good.     Pt will continue to benefit from skilled outpatient physical therapy to address the deficits listed in the problem list box on initial evaluation, provide pt/family education and to maximize pt's level of independence in the home and community environment.     Pt's spiritual, cultural and educational needs considered and pt agreeable to plan of care and goals.     Anticipated barriers to physical therapy: progressive disease process     Goals:          Short Term Goals: 4 weeks Date last assessed:  Status:    1. Patient will be provided with an HEP for strength, endurance and balance.  " 7/16/2024    MET   2.  Patient will demonstrate improve endurance and activity tolerance as evidenced by ability to perform Nu-step x 15 minutes without rests breaks. 9/9/24  ongoing            Long Term Goals: 8 weeks  Date last assessed: Status:    1. Patient will be independent and compliant with updated HEP. 9/9/24    ongoing   2.  Patient will increase her   gait speed as assessed by the timed 10MWT from 0.44 m/s to 0.58 m/s to increase her safety and (I) with gait at a community level. (MDC for CVA= 0.14 m/s)     9/9/24    ongoing      3. The patient will demonstrate improved efficiency with functional mobility and decreased risk for falls as evidenced by an increase in her score on the Timed up and Go from 17.9 to 15.0. (Minimal detectable change in chronic stroke = 2.9 seconds)  9/9/24 ongoing   4.Patient will improve her FOTO score from 42  to at least 57 for improved self perception of functional mobility.(score 0-100, high score indicates greater level of function) 9/9/24 ongoing   5. Pt will improve 5x sit to stand score from 13.5 sec to less than 12 seconds to decrease fall risk and improve safety.  9/9/24    ongoing        Plan   Updated Plan of care Certification: 9/9/24- 11/4/24.      Outpatient Physical Therapy 2 times weekly for 8 weeks to include the following interventions: Electrical Stimulation as indicated, Gait Training, Manual Therapy, Moist Heat/ Ice, Neuromuscular Re-ed, Orthotic Management and Training, Patient Education, Therapeutic Activities, and Therapeutic Exercise.     Recommendations for next treatment session:        Continue with trials with [employer] L300     Dianna Rosas, PT

## 2024-09-16 ENCOUNTER — OFFICE VISIT (OUTPATIENT)
Dept: PHYSICAL MEDICINE AND REHAB | Facility: CLINIC | Age: 49
End: 2024-09-16
Attending: NEUROLOGICAL SURGERY
Payer: COMMERCIAL

## 2024-09-16 VITALS — BODY MASS INDEX: 36.19 KG/M2 | WEIGHT: 212 LBS | HEIGHT: 64 IN

## 2024-09-16 DIAGNOSIS — R20.0 NUMBNESS OF LOWER EXTREMITY: ICD-10-CM

## 2024-09-16 DIAGNOSIS — Z98.1 S/P LUMBAR SPINAL FUSION: ICD-10-CM

## 2024-09-16 DIAGNOSIS — G35 MULTIPLE SCLEROSIS: ICD-10-CM

## 2024-09-16 PROCEDURE — 99999 PR PBB SHADOW E&M-EST. PATIENT-LVL II: CPT | Mod: PBBFAC,,, | Performed by: STUDENT IN AN ORGANIZED HEALTH CARE EDUCATION/TRAINING PROGRAM

## 2024-09-16 PROCEDURE — 99499 UNLISTED E&M SERVICE: CPT | Mod: S$GLB,,, | Performed by: STUDENT IN AN ORGANIZED HEALTH CARE EDUCATION/TRAINING PROGRAM

## 2024-09-16 PROCEDURE — 95910 NRV CNDJ TEST 7-8 STUDIES: CPT | Mod: S$GLB,,, | Performed by: STUDENT IN AN ORGANIZED HEALTH CARE EDUCATION/TRAINING PROGRAM

## 2024-09-16 PROCEDURE — 95886 MUSC TEST DONE W/N TEST COMP: CPT | Mod: S$GLB,,, | Performed by: STUDENT IN AN ORGANIZED HEALTH CARE EDUCATION/TRAINING PROGRAM

## 2024-09-16 NOTE — PROGRESS NOTES
[facility]  Physical Medicine and Rehabilitation   [address] [place] [address], [address]  [place] [address]       Full Name: Shanthi Escoto Gender: Female  Patient ID: [id number] YOB: 1975      Visit Date: 9/16/2024 08:48  Age: 49 Years 5 Months Old  Examining Physician: Syd Santamaria MD  Referring Physician:  Dennis Flores MD  Height: 5 feet 4 inch  Weight: 212 lbs  Conclusion: Numbness in bilateral feet and legs assoc with low back pain      Sensory NCS      Nerve / Sites Rec. Site Onset Lat Peak Lat NP Amp PP Amp Segments Distance Velocity     ms ms µV µV  cm m/s   L Sural - Ankle (Calf)      Calf Ankle 2.03 2.55 6.0 7.6 Calf - Ankle 14 69      Ref.   <=4.20 >=5.0 >=5.0 Ref.     R Sural - Ankle (Calf)      Calf Ankle 3.70 4.17 5.2 11.5 Calf - Ankle 14 38      Ref.   <=4.20 >=5.0 >=5.0 Ref.     L Superficial peroneal - Ankle      Lat leg Ankle 2.03 3.13 16.9 16.8 Lat leg - Ankle 14 69      Ref.   <=4.40 >=5.0 >=5.0 Ref.     R Superficial peroneal - Ankle      Lat leg Ankle 2.81 3.65 14.4 15.3 Lat leg - Ankle 14 50      Ref.   <=4.40 >=5.0 >=5.0 Ref.         Motor NCS      Nerve / Sites Muscle Latency Ref. Amplitude Ref. Amp % Duration Segments Distance Lat Diff Velocity Ref.     ms ms mV mV % ms  cm ms m/s m/s   L Peroneal - EDB      Ankle EDB 3.54 <=6.20 5.7 >=2.0 100 7.55 Ankle - EDB 8         Fib head EDB 10.68  5.3  92.4 7.92 Fib head - Ankle 30 7.14 42 >=39      Pop fossa EDB 12.81  4.8  84.2 7.76 Pop fossa - Fib head 10 2.14 47 >=39   R Peroneal - EDB      Ankle EDB 3.44 <=6.20 7.5 >=2.0 100 8.49 Ankle - EDB 8         Fib head EDB 10.83  6.7  90 8.75 Fib head - Ankle 30 7.40 41 >=39      Pop fossa EDB 12.60  6.4  86.3 8.23 Pop fossa - Fib head 10 1.77 56 >=39   L Tibial - AH      Ankle AH 4.01 <=6.00 12.4 >=3.0 100 6.56 Ankle - AH 8         Pop fossa AH 12.24  10.4  84.3 7.55 Pop fossa - Ankle 37 8.23 45 >=39   R Tibial - AH      Ankle AH 3.85 <=6.00 10.8 >=3.0 100 7.45 Ankle  - AH 8         Pop fossa AH 11.72  9.0  83.2 8.65 Pop fossa - Ankle 37 7.86 47 >=39       EMG Summary Table     Spontaneous MUAP Recruitment   Muscle IA Fib PSW Fasc Other Amp Dur. PPP Pattern   R. Vastus medialis N None None None . N N N N   L. Vastus medialis N None None None . N N N N   L. Tibialis anterior N None None None . N N N N   R. Tibialis anterior N None None None . N N N N   L. Tibialis posterior N None None None . N N N N   R. Tibialis posterior N None None None . N N N N   L. Peroneus longus N None None None . N N N N   R. Peroneus longus N None None None . N N N N   L. Gastrocnemius (Medial head) N None None None . N N N N   R. Gastrocnemius (Medial head) N None None None . N N N N       Summary    The motor conduction test was normal in all 4 of the tested nerves: L Peroneal - EDB, R Peroneal - EDB, L Tibial - AH, R Tibial - AH.    The sensory conduction test was normal in all 4 of the tested nerves: L Sural - Ankle (Calf), R Sural - Ankle (Calf), L Superficial peroneal - Ankle, R Superficial peroneal - Ankle.    The needle EMG study was normal in all 10 tested muscles: R. Vastus medialis, L. Vastus medialis, L. Tibialis anterior, R. Tibialis anterior, L. Tibialis posterior, R. Tibialis posterior, L. Peroneus longus, R. Peroneus longus, L. Gastrocnemius (Medial head), R. Gastrocnemius (Medial head).    Conclusion:     Normal study    EMG and nerve conduction study of both lower extremities was normal.      There was no electrodiagnostic evidence of radiculopathy, plexopathy, large fiber peripheral polyneuropathy or myopathy in the lower extremities.     Plan: Discussed results with patient. F/u with referring provider for additional management.   ____________________________  Syd Santamaria MD

## 2024-09-19 ENCOUNTER — CLINICAL SUPPORT (OUTPATIENT)
Dept: REHABILITATION | Facility: HOSPITAL | Age: 49
End: 2024-09-19
Payer: COMMERCIAL

## 2024-09-19 DIAGNOSIS — Z74.09 IMPAIRED FUNCTIONAL MOBILITY, BALANCE, GAIT, AND ENDURANCE: Primary | ICD-10-CM

## 2024-09-19 PROCEDURE — 97530 THERAPEUTIC ACTIVITIES: CPT | Mod: PO

## 2024-09-19 PROCEDURE — 97162 PT EVAL MOD COMPLEX 30 MIN: CPT | Mod: PO

## 2024-09-19 NOTE — PROGRESS NOTES
"  [facility]  Physical Therapy  Functional Mobility and Wheelchair Assessment    Date: 2024   Name: Shanthi Escoto  Clinic Number: [id number]    Therapy Diagnosis: No diagnosis found.  Physician: Arin Albarran MD    Physician Orders: PT Eval and Treat Wheelchair Evaluation    Medical Diagnosis from Referral:   Order Diagnosis: Multiple sclerosis [G35 (ICD-10-CM)]     Evaluation Date: 2024  Authorization Period Expiration: 24  Plan of Care Expiration: 24   Visit # / Visits authorized:     Time In: ***  Time Out: ***  Total Billable Time: *** minutes    Precautions: Standard and Fall     Subjective   Date of onset: diagnosed with MS 21.     History of current condition - Shanthi was referred by Dr. Albarran for a functional mobility and custom wheelchair assessment due to ***.     Prior Therapy: currently a pt at this clinic for neuro therapy for Multiple Sclerosis.     Medical History:   Past Medical History:   Diagnosis Date    Demyelinating disease     MS (multiple sclerosis)     Numbness and tingling of both feet     Obesity, unspecified        Surgical History:   Shanthi Escoto  has a past surgical history that includes  section; Endometrial ablation; and Minimally invasive transforaminal lumbar interbody fusion (TLIF) (Left, 10/16/2023).    Medications:   Shanthi has a current medication list which includes the following prescription(s): bacillus coagulans/inulin, cholecalciferol (vitamin d3), oriahnn, gabapentin, sudarshan, linaclotide, methocarbamol, multivitamin, naloxone, ondansetron, oxycodone-acetaminophen, zeposia, phentermine, semaglutide, and tamsulosin.    Allergies:   Review of patient's allergies indicates:   Allergen Reactions    Penicillins Anaphylaxis     As an infant        Patient height: 5'4"  Patient weight:   Wt Readings from Last 1 Encounters:   24 96.2 kg (212 lb)      Is this a progressive disease? Yes  Any " "recent weight changes or trends? {YES **/NO:22859}  Relevant future surgeries: {YES **/NO:51750}  Cardio status: intact  Respiratory status: intact   Does this patient have an amputation: No   Orthotic use: No    HOME ENVIRONMENT  Living environment: single story house no stairs to enter.   Social support: significant other   Hours without assistance: {EVAL hours without assist:30421}  Home is accessible to patient: {[facility] home access:94045}  Storage of wheelchair: in home     COMMUNITY  Transportation: car  Does patient sit in wheelchair during transport? No   Self-?  Yes  Employment and/or School - specific requirements related to mobility needs: ***    COMMUNICATION   Verbal communication: WFL receptive and WFL expressive  Language - primary:  English  Language - secondary: n/a   Communication provided by patient    CURRENT SEATING / MOBILITY: - pt is not currently in a seating system    Prior Level of Function: ***  Current Level of Function: ***      Pain:  Current {0-10:47354::"0"}/10, worst {0-10:68578::"0"}/10, best {0-10:99854::"0"}/10   Location: {RIGHT LEFT BILATERAL:91096} {LOCATION ON BODY:33822} {Pain Loc:88850}    Pt's goals: Obtain {OTW Wheelchair type (Optional):82434} wheelchair for independent mobility in home and community.   Caregiver goals and specific limitations that may affect care: ***     See face-sheet for patient contact and insurance information         Objective     MOBILITY / BALANCE  Sitting Balance Standing Balance Transfers Ambulation   {Balance Static Sit:14764} {Balance Static Stand:05623} {BRITTANY FIM:19774} {WC EVAL ambulation:04230}   Fall History:   Number of falls in last 6 months: ***  Number of near falls in last 6 months: *** Transfer method: { EVAL transfer:66844}     Ability to complete Mobility-Related Activities of Daily Living (MRADL's) with CURRENT Mobility Device  Move room to room {BRITTANY FIM:19774} MRADL's Comments: ***   Meal preparation {BRITTANY FIM:19774}  " "  Feeding {BRITTANY FIM:19774}    Bathing {BRITTANY FIM:19774}    Grooming {BRITTANY FIM:19774}    Upper Extremity Dressing {BRITTANY FIM:19774}    Lower Extremity Dressing {BRITTANY FIM:19774}    Toileting {BRITTANY FIM:19774}    Bowel Management: { EVAL bowel and bladder:56539}   Bladder Management: { EVAL bowel and bladder:60791}     Current Functional Mobility Equipment Skills     Cane/Crutches {WC EVAL equipment skills:77413}   Walker / Rollator {WC EVAL equipment skills:25118}   Manual Wheelchair - {WC EVAL equipment skills:10786}   Manual Wheelchair with Power Assist () {WC EVAL equipment skills:94726}   Scooter {WC EVAL equipment skills:04057}   Power Wheelchair: standard joystick { EVAL equipment skills:77816}   Power Wheelchair: alternative controls { EVAL equipment skills:45979}   Summary: least costly alternative for independent functional mobility was found to be: { EVAL mobility device:47311}     Functional Processing Skills for Wheeled Mobility        Processing skills are adequate for safe mobility: {YES/NO:20267::"Yes"}  Patient is willing and motivated to use recommended mobility equipment: {YES/NO:20267::"Yes"}  Patient is UNABLE to safely operate mobility equipment independently and requires dependent care mobility: {YES/NO:20267::"No"}       PATIENT MEASUREMENTS (inches)    1 *** seat to top of head    2 *** seat to shoulder left and right    3 *** seat to axilla left and right    4 *** seat to 90 degree elbow left and right    5 *** seat depth    6 *** foot length left and right    7 *** head width    8 *** shoulder width    9 *** chest width    10 *** hip width    11 *** floor to seat left    12 *** floor to seat right   Comments: ***        SENSATION and SKIN ISSUES    Sensation: { EVAL sensation:66904} bilateral feet numbness Location(s) of sensation impairment: ***   Pressure Relief Methods: { EVAL pressure relief:09327} Effective pressure relief method(s) above can be performed consistently " "throughout the day: {YES/NO:20267} ***     Skin Issues/Skin Integrity - Current skin issues:  {WC EVAL skin integrity:75844}         History of skin issues:   {YES **/NO:77194}   History of skin flap surgeries:   {YES **/NO:53466::"No"}   PAIN  {PAIN INTENSITY:81902}   How does pain interfere with mobility and/or MRADLs? ***       Yoel Scale for Predicting Pressure Sore Risk   Veronika Diallo & Asuncion Moreno, Copyrighted 1988.  Risk Factor Score / Description   Sensory Perception; ability to respond meaningfully to pressure-related discomfort {WC EVAL Yoel Sensory Perception:01826}   Moisture; degree to which skin is exposed to moisture {WC EVAL Yoel Scale Moisture:14517}   Activity; degree of physical activity {WC EVAL Yoel Scale Activity:21582}   Mobility; ability to change and control body position {WC EVAL Yoel Scale Mobility:43412}   Nutrition; Usual food intake pattern  1NPO: nothing by mouth  2IV: Intravenously  3TPN: total parenteral nutrition {WC EVAL Yoel Scale Nutrition:55405}   Friction and Shear {WC EVAL Yoel Scale Friction and Shear:93790}   Total: ***  Very High Risk 6-9  High Risk 10-12  Moderate Risk 13-14  Mild Risk 15-18  No Risk 19-23         MAT EVALUATION    Neuro-Muscular Status (tone, reflexive, responses, etc.): {WC EVAL NeuroMuscular Status:18675} ***     Pelvis     {WC EVAL Pelvis AP:72128}       {WC EVAL Pelvis Obliquity:71475::"WFL"}       {WC EVAL Pelvis Rotation:94424::"WFL"}      Tonal Influence at Pelvis: {WC EVAL tonal influence:22743}   Trunk     {WC EVAL trunk AP:86256}       {WC EVAL trunk AP:26915}        {WC EVAL trunk rotation:45844}       Tonal Influence at Trunk: {WC EVAL tonal influence:73245}   Head & Neck {WC EVAL head and neck posture:98231} {WC EVAL head control:53039} Tone/Movement of head and neck comments: ***      Hips     {WC EVAL hip position:49623}        {WC EVAL windswept:62442}   Tone/Movement of lower extremities:  {WC EVAL tonal " influence:80889}  Edema: {Numbers; edema:60592}  Location: ***        Lower Extremity Passive Range of Motion     ROM   Hip Flexion {AMB PT VESTIBULAR ROM:98416}     Hip Extension {AMB PT VESTIBULAR ROM:76214}   Hip Abduction {AMB PT VESTIBULAR ROM:06189}   Hip Adduction {AMB PT VESTIBULAR ROM:70374}   Knee Extension {AMB PT VESTIBULAR ROM:58320}   Knee Flexion    Ankle Dorsiflexion {AMB PT VESTIBULAR ROM:54857}   Ankle Plantarflexion        Lower Extremity Strength  ***   Right LE   Left LE     Hip flexion:  4/5 Hip flexion: 3+/5   Knee extension: 4+/5 Knee extension: 4+/5   Knee flexion: 4-/5 Knee flexion: 3+/5   Ankle dorsiflexion:  4/5 Ankle dorsiflexion: 3+/5   Ankle plantarflexion:  4/5 Ankle plantarflexion: 3+/5         Lower Extremity Strength  Right LE  Left LE    Hip flexion: {AMB PT VESTIBULAR STRENGTH:23014} Hip flexion: {AMB PT VESTIBULAR STRENGTH:31405}   Hip extension:  {AMB PT VESTIBULAR STRENGTH:20625} Hip extension: {AMB PT VESTIBULAR STRENGTH:11115}   Hip abduction: {AMB PT VESTIBULAR STRENGTH:00620} Hip abduction: {AMB PT VESTIBULAR STRENGTH:00951}   Hip adduction: {AMB PT VESTIBULAR STRENGTH:77072} Hip adduction {AMB PT VESTIBULAR STRENGTH:61948}   Knee extension: {AMB PT VESTIBULAR STRENGTH:13184} Knee extension: {AMB PT VESTIBULAR STRENGTH:06690}   Knee flexion: {AMB PT VESTIBULAR STRENGTH:76211} Knee flexion: {AMB PT VESTIBULAR STRENGTH:99447}   Ankle dorsiflexion: {AMB PT VESTIBULAR STRENGTH:55059} Ankle dorsiflexion: {AMB PT VESTIBULAR STRENGTH:46910}   Ankle plantarflexion: {AMB PT VESTIBULAR STRENGTH:99917} Ankle plantarflexion: {AMB PT VESTIBULAR STRENGTH:45037}         Upper Extremity Shoulder Posture:  Functional -{RIGHT/LEFT/BILATERAL:20891}  Elevation-{RIGHT/LEFT/BILATERAL:20891}  Depression -{RIGHT/LEFT/BILATERAL:20891}  Protraction -{RIGHT/LEFT/BILATERAL:20891}  Retraction -{RIGHT/LEFT/BILATERAL:20891}  Int-rotation -{RIGHT/LEFT/BILATERAL:20891}  Ext-rotation  -{RIGHT/LEFT/BILATERAL:20891}  Subluxed -{RIGHT/LEFT/BILATERAL:20891}   Tone/Movement of upper extremities:   {WC EVAL tonal influence:87714}    Edema: {Numbers; edema:00918}  Location: ***         Wrist & Hand Handedness: {DOMINANT HAND:95331}   Hand Limitations:  WNL -{RIGHT/LEFT/BILATERAL:20891}  Limitations -{RIGHT/LEFT/BILATERAL:20891}  Contractures -{RIGHT/LEFT/BILATERAL:20891}  Fisting -{RIGHT/LEFT/BILATERAL:20891}  Tremors -{RIGHT/LEFT/BILATERAL:20891}  Weak grasp -{RIGHT/LEFT/BILATERAL:20891}  Poor dexterity -{RIGHT/LEFT/BILATERAL:20891}  Hand movement non-functional -{RIGHT/LEFT/BILATERAL:20891}  Paralysis -{RIGHT/LEFT/BILATERAL:20891}       Upper Extremity Range of Motion     ROM   Shoulder Flexion {AMB PT VESTIBULAR ROM:24414}   Shoulder Abduction {AMB PT VESTIBULAR ROM:57749}   Shoulder Adduction  {AMB PT VESTIBULAR ROM:09271}   Elbow Flexion {AMB PT VESTIBULAR ROM:22413}   Elbow Extension {AMB PT VESTIBULAR ROM:63182}   Wrist Flexion   {AMB PT VESTIBULAR ROM:36103}   Wrist Extension {AMB PT VESTIBULAR ROM:67111}   Pinch Strength Right: *** psi   Left: *** psi    Strength Right: ***  lbs  Left: *** lbs      Upper Extremity Strength  (R) UE  (L) UE    Shoulder flexion: {AMB PT VESTIBULAR STRENGTH:92084} Shoulder flexion: {AMB PT VESTIBULAR STRENGTH:06794}   Shoulder Abduction: {AMB PT VESTIBULAR STRENGTH:43154} Shoulder Abduction: {AMB PT VESTIBULAR STRENGTH:57471}   Shoulder Adduction: {AMB PT VESTIBULAR STRENGTH:04991} Shoulder Adduction: {AMB PT VESTIBULAR STRENGTH:92213}   Elbow flexion: {AMB PT VESTIBULAR STRENGTH:06071} Elbow flexion: {AMB PT VESTIBULAR STRENGTH:54565}   Elbow extension: {AMB PT VESTIBULAR STRENGTH:42952} Elbow extension: {AMB PT VESTIBULAR STRENGTH:28108}   Wrist flexion: {AMB PT VESTIBULAR STRENGTH:06457} Wrist flexion: {AMB PT VESTIBULAR STRENGTH:38329}   Wrist extension: {AMB PT VESTIBULAR STRENGTH:99987} Wrist extension: {AMB PT VESTIBULAR STRENGTH:06663}       Mobility  "Base Recommendations and Justification    Mobility Base Recommendation: { EVAL base recommendation:57563}  Justification for decision: { EVAL base justification:21713}  Number of Hours per day in above selected mobility base: {EVAL hours without assist:72541}    ENTER "MANUALWCPARTS" or "powerwcparts" SMARTPHRASE***  { EVAL parts lists:18413}      The above equipment has a life- long use expectancy. Growth and changes in medical and/or functional conditions would be the exceptions.          *This functional mobility and wheelchair assessment form has been adapted with permission from [employer].     TREATMENT   Treatment Time In: ***  Treatment Time Out: ***  Total Treatment time (time-based codes) separate from Evaluation: *** minutes    Shanthi received therapeutic exercises to develop {AMB PT PROGRESS OBJECTIVE:47980} for *** minutes including:  ***        Shanthi participated in dynamic functional therapeutic activities to improve functional performance for ***  minutes, including: ***        Home Exercises and Patient Education Provided    Education provided: Process of obtaining custom wheelchair, ***        Assessment  Why mobility device was selected; include why a lower level device is not appropriate:   Shanthi is a 49 y.o. female referred to outpatient Physical Therapy with a medical diagnosis of Multiple Sclerosis for custom {OTW Wheelchair type (Optional):96049} wheelchair evaluation.  ***     Patient has mobility limitation that significantly impairs safe, timely, consistent participation in one or more MRADL. {Yes;No:27555}  A mobility assistive device will effectively improve ability to participate in or aid participation in MRADL's.  {Yes;No:46202}   A cane or walker will provide patient the ability to safely and consistently perform MRADLs in a timely manner  {Yes;No:67965}  The patient's home environment will support use of recommended mobility device. {Yes;No:31845}  The patient " has sufficient UE strength to effectively self propel a manual wheelchair for all MRADL's. {Yes;No:18004}  The patient has sufficient upper extremity strength, , transfer ability and trunk stability to safely operate a POV (scooter). {Yes;No:80722}  The additional benefits of a power wheelchair will more effectively enable MRADL's in home. {Yes;No:46854}   The patient demonstrates ability/potential ability to use recommended equipment. {Yes;No:37845}  The patient is willing and motivated to use the recommended equipment. {Yes;No:93716}      Pt prognosis is Fair.   Pt will benefit from skilled outpatient Physical Therapy to address the deficits stated above and in the chart below, provide pt/family education, and to maximize pt's level of independence.     Plan of care discussed with patient: Yes  Pt's spiritual, cultural and educational needs considered and patient is agreeable to the plan of care and goals as stated below:     Anticipated Barriers for therapy: progressive disease process     Medical Necessity is demonstrated by the following  History  Co-morbidities and personal factors that may impact the plan of care [] LOW: no personal factors / comorbidiies  [] MODERATE: 1-2 personal factors / comorbidiies  [x] HIGH: 3+ personal factors / comorbidiies    Moderate / High Support Documentation: MS numbness, s/p lumbar fusion     Examination  Body Structures and Functions, activity limitations and participation restrictions that may impact the plan of care [] LOW: addressing 1-2 elements  [] MODERATE: 3+ elements  [x] HIGH: 3+ elements (please support below)    Moderate / High Support Documentation: impaired strength, impaired endurance, gait instability, balance deficits.      Clinical Presentation [] LOW: stable  [x] MODERATE: Evolving  [] HIGH: Unstable     Decision Making/ Complexity Score: moderate           Plan   Plan of care Certification: 9/19/2024 to 9/19/24.    Dianna Rosas, PT   9/19/2024  License Number: [id number]   Therapist contact phone number: [phone]   As the evaluating therapist, I hereby attest that I have personally completed this evaluation and that I am not an employee of or working under contract to the (s) or the provider(s) of the durable medical equipment recommended in my evaluation. I further attest that I have not and will not receive remuneration of any kind from the (s) or the durable medical equipment provider(s) for the equipment I have recommended with this evaluation.         The following ATP was present and participated in this evaluation:   Walker Morrell , ATP from [email or web address].   Vendor phone: [phone]        I concur with the above findings and recommendations of the therapist:      Physician:         Physician signature:___________________________________   date: ___________

## 2024-09-19 NOTE — PLAN OF CARE
[facility]  Physical Therapy  Functional Mobility and Wheelchair Assessment    Date: 2024   Name: Shanthi Escoto  Clinic Number: [id number]    Therapy Diagnosis:   Encounter Diagnosis   Name Primary?    Impaired functional mobility, balance, gait, and endurance Yes     Physician: Arin Albarran MD    Physician Orders: PT Eval and Treat Wheelchair Evaluation    Medical Diagnosis from Referral:   Order Diagnosis: Multiple sclerosis [G35 (ICD-10-CM)]     Evaluation Date: 2024  Authorization Period Expiration: 24  Plan of Care Expiration: 24   Visit # / Visits authorized:     Time In: 2:35 pm   Time Out: 4:00 pm   Total Billable Time: 85 minutes total ( 45 min eval 40 min treatment)       Precautions: Standard and Fall     Subjective   Date of onset: diagnosed with MS 21.     History of current condition - Shanthi was referred by Dr. lAbarran for a functional mobility and custom wheelchair assessment due to diagnosis of Multiple Sclerosis. Pt has been diagnosed with MS since  but only recently has been referred to neuro PT for therapy. Pt currently is ambulating with a single point cane but is unable to ambulate more than 10-15 ft without needing a rest break. Pt is currently able to ambulate into clinic with many seated rest breaks and is very unsteady when fatigued. She reports she has had a few falls due to fatigue and weakness when ambulating.       Prior Therapy: currently a pt at this clinic for neuro therapy for Multiple Sclerosis.     Medical History:   Past Medical History:   Diagnosis Date    Demyelinating disease     MS (multiple sclerosis)     Numbness and tingling of both feet     Obesity, unspecified        Surgical History:   Shanthi Escoto  has a past surgical history that includes  section; Endometrial ablation; and Minimally invasive transforaminal lumbar interbody fusion (TLIF) (Left, 10/16/2023).    Medications:  "  Shanthi has a current medication list which includes the following prescription(s): bacillus coagulans/inulin, cholecalciferol (vitamin d3), oriahnn, gabapentin, sudarshan, linaclotide, methocarbamol, multivitamin, naloxone, ondansetron, oxycodone-acetaminophen, zeposia, phentermine, semaglutide, and tamsulosin.    Allergies:   Review of patient's allergies indicates:   Allergen Reactions    Penicillins Anaphylaxis     As an infant        Patient height: 5'4"  Patient weight:   Wt Readings from Last 1 Encounters:   09/16/24 96.2 kg (212 lb)      Is this a progressive disease? Yes  Any recent weight changes or trends? No  Relevant future surgeries: No  Cardio status: intact  Respiratory status: intact   Does this patient have an amputation: No   Orthotic use: No    HOME ENVIRONMENT  Living environment: single story house no stairs to enter.   Social support: significant other   Hours without assistance: 1-4  Home is accessible to patient: yes, WC/handicap accessible  Storage of wheelchair: in home     COMMUNITY  Transportation: car  Does patient sit in wheelchair during transport? No   Self-?  Yes  Employment and/or School - specific requirements related to mobility needs: NA    COMMUNICATION   Verbal communication: WFL receptive and WFL expressive  Language - primary:  English  Language - secondary: n/a   Communication provided by patient    CURRENT SEATING / MOBILITY: - pt is not currently in a seating system    Prior Level of Function: independent ambulating   Current Level of Function: able to ambulate short distances with single point cane but requires many rest breaks and is very fatigued after short ambulation trials.       Pain:  Current 5/10, worst 8/10, best 3/10   Location: low back    Pt's goals: Obtain manual with power assist wheelchair for independent mobility in home and community.   Caregiver goals and specific limitations that may affect care: NA     See face-sheet for patient contact and " insurance information         Objective     MOBILITY / BALANCE  Sitting Balance Standing Balance Transfers Ambulation   Normal: Patient able to maintain steady balance without handhold support. Fair: Patient able to maintain balance with handhold support; may require occasional minimal assistance. supervision Able to ambulate 10-15 ft at home before needing a rest break. If walks further, significantly fatigued and unable to complete tasks afterwards.    Fall History:   Number of falls in last 6 months: 2  Number of near falls in last 6 months: 10+  Transfer method: 1 person assist and stand pivot     Ability to complete Mobility-Related Activities of Daily Living (MRADL's) with CURRENT Mobility Device  Move room to room Supervision with fatigue  MRADL's Comments:    Meal preparation maximal assist    Feeding independent    Bathing Supervision with bench    Grooming Independent with increased time    Upper Extremity Dressing independent with increased time    Lower Extremity Dressing independent with increased time    Toileting independent with increased time    Bowel Management: continent   Bladder Management: continent     Current Functional Mobility Equipment Skills     Cane/Crutches Does not meet mobility needs due to:, Risk of falling or History of falls, and Decreased / limitations in endurance & strength   Walker / Rollator Does not meet mobility needs due to:, Risk of falling or History of falls, Safety concerns with physical ability, and Decreased / limitations in endurance & strength   Manual Wheelchair - Does not meet mobility needs due to:, Risk of falling or History of falls, and Safety concerns with physical ability   Manual Wheelchair with Power Assist () Meets needs for safe Independent functional ambulation / mobility   Scooter Not applicable   Power Wheelchair: standard joystick Not applicable   Power Wheelchair: alternative controls Not applicable   Summary: least costly alternative  for independent functional mobility was found to be: manual wheelchair  with power assist.    Functional Processing Skills for Wheeled Mobility        Processing skills are adequate for safe mobility: Yes  Patient is willing and motivated to use recommended mobility equipment: Yes  Patient is UNABLE to safely operate mobility equipment independently and requires dependent care mobility: No       PATIENT MEASUREMENTS (inches)    1 33 inches seat to top of head    2 23 inches seat to shoulder left and right    3 16 inches seat to axilla left and right    4 8 inches seat to 90 degree elbow left and right    5 21 inches seat depth    6 11 inches foot length left and right    7 7 inches head width    8 18 inches shoulder width    9 13 inches chest width    10 20 inches hip width    11 17 inches floor to seat left    12 17 inches floor to seat right   Comments:         SENSATION and SKIN ISSUES    Sensation: impaired bilateral feet numbness Location(s) of sensation impairment: bilateral feet. Legs numb at times.    Pressure Relief Methods: wheelchair push up (4+ times/hour for 15+ seconds) Effective pressure relief method(s) above can be performed consistently throughout the day: Yes      Skin Issues/Skin Integrity - Current skin issues:  No, intact         History of skin issues:   No   History of skin flap surgeries:   No   PAIN  5/10   How does pain interfere with mobility and/or MRADLs? Low back pain interferes with safety ambulating short distances at times.        Yoel Scale for Predicting Pressure Sore Risk   Veronika Diallo & Asuncion Moreno, Copyrighted 1988.  Risk Factor Score / Description   Sensory Perception; ability to respond meaningfully to pressure-related discomfort 3. Slightly Limited; Responds to verbal commands but cannot always communicate discomfort or need to be turned, OR has some sensory impairment which limits ability to feel pain or discomfort in 1 or 2 extremities.    Moisture; degree to  which skin is exposed to moisture 4. Rarely Moist; skin is usually dry; linen only requires changing at routine intervals.    Activity; degree of physical activity 3. Walks Occasionally; walks occasionally during the day, but for very short distances, with or without assistance. Spends majority of each shift in bed or chair.    Mobility; ability to change and control body position 3. Slightly Limited; Makes frequent though slight changes in body or extremity position independently.    Nutrition; Usual food intake pattern  1NPO: nothing by mouth  2IV: Intravenously  3TPN: total parenteral nutrition 4. Excellent; eats most of every meal. Never refuses a meal. Usually eats a total of 4 or more servings of meat and dairy products. Occasionally eats between meals. Does not require supplementation.    Friction and Shear 2. Potential Problem; Moves feebly or requires a minimum assistance. During a move, skin probably slides to some extent against sheets, chair, restraints or other devices. Maintains relatively good position in chairor bed most of the time but occassionally slides down.    Total: 19  Very High Risk 6-9  High Risk 10-12  Moderate Risk 13-14  Mild Risk 15-18  No Risk 19-23         MAT EVALUATION    Neuro-Muscular Status (tone, reflexive, responses, etc.): Muscle Spasms      Pelvis     posterior; flexible - external correction       WFL       WFL      Tonal Influence at Pelvis: Low Tone   Trunk     increased thoracic kyphosis; flexible - external correction       WFL        neutral       Tonal Influence at Trunk: Low Tone   Head & Neck functional Good head control Tone/Movement of head and neck comments: WFL      Hips     neutral         neutral   Tone/Movement of lower extremities:  Low Tone  Edema: none  Location: NA        Lower Extremity Passive Range of Motion     ROM   Hip Flexion WFLs     Hip Extension WFLs   Hip Abduction WFLs   Hip Adduction WFLs   Knee Extension WFLs   Knee Flexion    Ankle  Dorsiflexion WFLs   Ankle Plantarflexion        Lower Extremity Strength     Right LE   Left LE     Hip flexion:  4/5 Hip flexion: 3+/5   Knee extension: 4+/5 Knee extension: 4/5   Knee flexion: 4-/5 Knee flexion: 3+/5   Ankle dorsiflexion:  4/5 Ankle dorsiflexion: 3+/5   Ankle plantarflexion:  4/5 Ankle plantarflexion: 3+/5           Upper Extremity Shoulder Posture:  Functional -bilateral   Elevation-  Depression -  Protraction -  Retraction -  Int-rotation -  Ext-rotation -  Subluxed -   Tone/Movement of upper extremities:   Low Tone    Edema: none  Location: NA         Wrist & Hand Handedness: right   Hand Limitations:  WNL - bilateral   Limitations -  Contractures -  Fisting -  Tremors -  Weak grasp -  Poor dexterity -  Hand movement non-functional -  Paralysis -       Upper Extremity Range of Motion     ROM   Shoulder Flexion WFLs   Shoulder Abduction WFLs   Shoulder Adduction  WFLs   Elbow Flexion WFLs   Elbow Extension WFLs   Wrist Flexion   WFLs   Wrist Extension WFLs   Pinch Strength Within functional limits     Strength Within functional limits      Upper Extremity Strength  (R) UE  (L) UE    Shoulder flexion: 4/5 Shoulder flexion: 4/5   Shoulder Abduction: 4+/5 Shoulder Abduction: 4+/5   Elbow flexion: 4+/5 Elbow flexion: 4+/5   Elbow extension: 4+/5 Elbow extension: 4+/5   Wrist flexion: 4+/5 Wrist flexion: 4+/5   Wrist extension: 4+/5 Wrist extension: 4+/5       Mobility Base Recommendations and Justification    Mobility Base Recommendation: Manual mobility base with power assist  Justification for decision: is not a safe, functional ambulator, limitation prevents from completing a MRADL(s) within a reasonable timeframe, limitation places at high risk of morbidity or mortality secondary to the attempts to perform a MRADL(s), limitation prevents accomplishing a MRADL(s) entirely, provide independent mobility, equipment is a lifetime medical need, walker or cane inadequate, and any type manual  wheelchair inadequate   Number of Hours per day in above selected mobility base: 8+      Component Recommendations and Justification  Should include but not be limited to:   MANUAL MOBILITY     [] Standard manual wheelchair    Arm:  [] Right [] Left [] Bilateral  Foot:  [] Right [] Left [] Bilateral [] self-propels wheelchair   [] will use on regular basis   [] chair fits throughout home   [] willing and motivated to use   [] propels with assistance   [] dependent use    [] Standard gabby-manual wheelchair    Arm:  [] Right [] Left [] Bilateral  Foot:  [] Right [] Left [] Bilateral [] lower seat height required to foot propel   [] short stature   [] self-propels wheelchair   [] will use on regular basis   [] chair fits throughout home   [] willing and motivated to use   [] propels with assistance  [] dependent use    [] Lightweight manual wheelchair    Arm:  [] Right [] Left [] Bilateral  Foot:  [] Right [] Left [] Bilateral  [] gabby height required [] medical condition and weight of wheelchair affect ability to self propel standard manual wheelchair in the residence   [] can and does self-propel (marginal propulsion skills)   [] daily use   hours   [] chair fits throughout home   [] willing and motivated to use  [] lower seat height required to foot propel   [] short stature    [] High strength lightweight manual wheelchair (ClearStream Ultra 4)    Arm:  [] Right [] Left [] Bilateral  Foot:  [] Right [] Left [] Bilateral  [] gabby height required [] medical condition and weight of wheelchair affect ability to self propel while engaging in frequent MRADL(s) that cannot be performed in a standard or lightweight manual wheelchair   [] daily use  hours   [] chair fits throughout home   [] willing and motivated to use  [] prevent repetitive use injuries   [] lower seat height required to foot propel   [] short stature   [x] Ultralightweight manual wheelchair    Arm:  [] Right [] Left [x]  Bilateral  Foot:  [] Right [] Left [x] Bilateral   [] gabby height required   [] heavy duty   Front seat to floor 18.5 inches   Rear seat to floor 16.5 inches   Back height 18  inches Back angle 89 degrees   Front angle 85 degrees [x] full-time manual wheelchair user   [x] Requires individualized fitting and optimal adjustments for multiple features that include adjustable axle configuration, fully adjustable center of gravity, wheel camber, seat and back angle, angle of seat slope, which cannot be accommodated by a  through  manual wheelchair   [x] prevent repetitive use injuries   [x] daily use 8+ hours   [] user has high activity patterns that frequently require them to go out into the community for the purpose of independently accomplishing high level MRADL activities. Examples of these might include a combination of; shopping, work, school, banking, childcare, independently loading and unloading from a vehicle etc.   [] lower seat height required to foot propel   [] short stature   [] heavy duty - weight over 250lbs    [] Current chair is a  manufacture:________NA___________ model:_________________ serial#____________________ age:_________  [] First time  user (complete trial)    time and # of strokes to propel 30 feet: ________seconds _________strokes    time and # of strokes to propel 30 feet: ________seconds _________strokes   What was the result of the trial between the  and  manual wheelchair?   What features of the  w/c are needed as compared to the  base? Why?   [] adjustable seat and back angle changes the angle of seat slope of the frame to attain a gravity assisted position for efficient propulsion and proper weight distribution along the frame   [] the front of the wheelchair will be configured higher than the back of the chair to allow gravity to assist the user with postural stability   [] the center of the wheel will be positioned for stability,  safety and efficient propulsion   [] adjustable axle allows for vertical, horizontal, camber and overall width changes throughout the wheels for adjustment of the client's exact needs and abilities.   [] adjustable axle increases the stability and function of the chair allowing for adjustment of the center of gravity.   [] accommodates the client's anatomical position in the chair maximizing independence in mobility and maneuverability in all environments.   [] create a minimal fixed tilt-in space to assist in positioning.   Describe users full-time manual wheelchair activity patterns: ____   [x] Power assist   Comments:   Smart Drive speed control dial.  [x] prevent repetitive use injuries  [x] repetitive strain injury present in shoulder girdle   [] shoulder pain is (> or =) to 7/10 during manual propulsion  Current Pain ___/10   [x] requires conservation of energy to participate in MRADL(s)   [x] unable to propel up ramps or curbs using manual wheelchair   [] been  user greater than one year   [] user unwilling to use power wheelchair (reason):    [x] less expensive option to power wheelchair   [] rim activated power assist - decreased strength    [] Heavy duty manual wheelchair      Arm:    Foot:   [] gabby height required   [] Dependent base [] user exceeds 250lbs   [] non-functional ambulator   [] extreme spasticity   [] overactive movement  [] broken frame/hx of repeated repairs   [] able to self-propel in residence  [] lower seat to floor height required   [] unable to self-propel in residence    -Extra heavy duty manual wheelchair    Arm:    Foot:    [] gabby height required   [] Dependent base [] user exceeds 300lbs   [] non-functional ambulator   [] able to self-propel in residence   [] lower seat to floor height required  [] unable to self-propel in residence    [] Manual wheelchair with tilt    (Manual Tilt-n-Space)  [] patient is dependent for transfers   [] patient requires  frequent positioning for pressure relief   [] patient requires frequent positioning for poor/absent trunk control    [] Stroller Base [] infant/child   [] unable to propel manual wheelchair   [] allows for growth   [] non-functional ambulator  [] non-functional UE   [] independent mobility is not a goal at this time   MANUAL FRAME OPTIONS   Push handles   [] extended   [] angle adjustable   [x] standard [x] caregiver access   [x] caregiver assist   [] allows hooking to enable increased ability to perform ADLs or maintain balance   [] Angle Adjustable Back  [] postural control   [] control of tone/spasticity   [] accommodation of range of motion  [] UE functional control   [] accommodation for seating system    Rear wheel placement   [] std/fixed   [x] fully adjustable  [] amputee   [x] camber 2 degree   [x] removable rear wheel   [] non-removable rear wheel   Wheel size 24 inch    Wheel style spoke [x] improved UE access to wheels   [x] increase propulsion ability   [x] improved stability   [x] changing angle in space for improvement of postural stability   [x] remove for transport   [] allow for seating system to fit on base   [] amputee placement   [] 1-arm drive access:    [] enable propulsion of manual wheelchair with one arm   [] amputee    Wheel rims/ Hand rims   [x] Standard   [] Specialized-    [x] provide ability to propel manual wheelchair   [x] increase self-propulsion with hand weakness/decreased grasp   [] Spoke protector/guard  [] prevent hands from getting caught in spokes   Tires:   [] pneumatic   [] flat free inserts   [x] solid   Style: solid  [x] decrease roll resistance   [x] increase shock absorbency   [x] decrease pain from road shock   [] decrease spasms from road shock   [x] prevent frequent flats   [x] decrease maintenance    Wheel Locks:   [x] push [] pull [] scissor  [x] lock wheels for transfers   [x] lock wheels from rolling   [] Brake/wheel lock extension:  [] allow user to operate  wheel locks due to decreased reach or strength   Pranay housing: standard  Pranay size: 5x1  Style: plastic  [] suspension fork  [x] maneuverability   [x] stability of wheelchair   [x] durability   [] maintenance   [] angle adjustment for posture   [x] allow for feet to come under wheelchair base  [x] allows change in seat to floor height   [x] increase shock absorbency  [] decrease pain from road shock   [] decrease spasms from road shock   [x] Side guards  [x] prevent clothing getting caught in wheel or becoming soiled   [] provide hip and pelvic stability  [x] eliminates contact between body and wheels   [x] limit hand contact with wheels   [x] Anti-tippers  [x] prevent wheelchair from tipping backward  [x] assist caregiver with curbs      CHAIR OPTIONS MANUAL & POWER   Armrests   [x] adjustable height [] removable [x] swing away[] fixed   [] flip back [] reclining   [x] full length pads [] desk [] tube arms   [] gel pads  [x] provide support with elbow at 90  [x] remove/flip back/swing away for transfers   [x] provide support and positioning of upper body   [] allow to come closer to table top   [] remove for access to tables   [] provide support for w/c tray   [] change of height/angles for variable activities   [] Elbow support / Elbow stop  [] keep elbow positioned on arm pad   [] keep arms from falling off arm pad during tilt and/or recline    Upper Extremity Support   [] Arm trough   Style:   [] swivel mount [] fixed mount   [] posterior hand support   [] ½ tray   [] full tray -joystick cut out   Style:  [] decrease gravitational pull on shoulders   [] provide support to increase UE function   [] provide hand support in natural position   [] position flaccid UE   [] decrease subluxation   [] decrease edema   [] manage spasticity   [] provide midline positioning   [] provide work surface   [] placement for AAC/Computer/EADL   Hangers/ Legrests   [x] 85 degree   [] elevating   [] articulating   [] swing away    [x] fixed   [] lift off   [] heavy duty   [] adjustable knee angle   [] adjustable calf panel   [] longer extension tube  [x] provide LE support   [x] maintain placement of feet on footplate  [] accommodate lower leg length   [] accommodate to hamstring tightness   [x] enable transfers   [x] provide change in position for LE's   [] elevate legs during recline   [] decrease edema   [x] durability    Foot support   [x] footplate   [x] flip up  [] depth adjustable   [] angle adjustable  [] foot board/one piece  [x] provide foot support   [] accommodate to ankle ROM   [x] allow foot to go under wheelchair base   [x] enable transfers    [] Shoe holders  [] position foot   [] decrease / manage spasticity   [] control position of LE   [] stability   [] safety    [] Ankle strap/heel loops  [] support foot on foot support   [] decrease extraneous movement   [] provide input to heel   [] protect foot   [] Amputee adapter     Style:  Size:    [] Provide support for stump/residual extremity   [] Transportation tie-down [] to provide crash tested tie-down brackets    [] Crutch/cane blood   [] O2 blood   [] IV   [] Ventilator tray/mount  [] stabilize accessory on wheelchair   [x] Seat cushion - Acta embrace [x] accommodate impaired sensation   [] decubitus ulcers present or history   [] unable to shift weight   [x] increase pressure distribution   [] prevent pelvic extension   [x] stabilize/promote pelvis alignment   [x] stabilize/promote femur alignment   [] accommodate obliquity   [] accommodate multiple deformity   [] incontinent/accidents   [x] low maintenance   [] seat shirley   [] fixed   [] removable [] attach seat platform/cushion to wheelchair frame   [] Seat wedge  [] provide increased aggressiveness of seat shape to decrease sliding down in the seat   [] accommodate ROM    [] Cover replacement  [] protect back or seat cushion   [] incontinent/accidents   [] Solid seat / insert  [] support cushion to prevent  hammocking   [] allows attachment of cushion to mobility base    [] Lateral pelvic/thigh/hip support (Guides)  [] decrease abduction   [] accommodate pelvis   [] position upper legs   [] accommodate spasticity   [] removable for transfers     [] Lateral pelvic/thigh supports shirley  [] fixed   [] swing-away   [] removable   [] shirley lateral pelvic/thigh supports   [] shirley lateral pelvic/thigh supports swing-away or removable for transfers   [] Medial thigh support (Pommel)  [] decrease adduction   [] accommodate ROM   [] alignment  [] remove for transfers     [] Medial thigh support shirley   [] fixed   [] swing-away  [] removable   [] shirley medial thigh supports  [] shirley medial supports swing- away or removable for transfers   [x] Back - Acta relief [x] provide posterior trunk support   [x] provide lumbar/sacral support   [x] support trunk in midline  [x] provide lateral trunk support   [] accommodate or decrease tone   [] facilitate tone   [] accommodate deformity   [] custom required off-the-shelf back support will not accommodate deformity    [] Back shirley  [] fixed   [] removable [] attach back rest/cushion to wheelchair frame   [] Lateral trunk supports    [] decrease lateral trunk leaning   [] accommodate asymmetry   [] contour for increased contact   [] safety   [] control of tone    [] Lateral trunk supports shirley  [] fixed   [] swing-away   [] removable [] shirley lateral trunk supports   [] shirley lateral trunk supports swing away or removable for transfers   [] Anterior chest strap, vest  [] decrease forward movement of shoulder   [] decrease forward movement of trunk   [] safety/stability   [] added abdominal support   [] trunk alignment   [] assistance with shoulder control   [] decrease shoulder elevation    [] Headrest  [] provide posterior head support   [] provide posterior neck support   [] provide lateral head support   [] provide anterior head support   [] support during tilt and recline    [] improve feeding   [] improve respiration   [] placement of switches   [] safety   [] accommodate ROM   [] accommodate tone   [] improve visual orientation    [] Headrest mounting hardware  [] fixed   [] removable   [] flip down   [] swing-away laterals/switches [] mount headrest   [] shirley headrest flip down or removable for transfers  [] mount headrest swing-away laterals   [] mount switches    [] Neck Support  [] decrease neck rotation   [] decrease forward neck flexion    [x] Pelvic Positioner   [x] std hip belt   [] padded hip belt   [] dual pull hip belt   [] four point hip belt  [x] stabilize tone   [x] decrease falling out of chair   [] prevent excessive extension   [] special pull angle to control rotation   [] pad for protection over jil prominence   [x] promote comfort    [] Essential needs   bag/pouch  [] medicines [] special food [] orthotics [] clothing changes   [] diapers [] catheter/hygiene [] ostomy supplies                  The above equipment has a life- long use expectancy. Growth and changes in medical and/or functional conditions would be the exceptions.          *This functional mobility and wheelchair assessment form has been adapted with permission from [employer].     TREATMENT   Treatment Time In: 3:20 pm  Treatment Time Out: 4:00 pm  Total Treatment time (time-based codes) separate from Evaluation: 40 minutes      Shanthi participated in dynamic functional therapeutic activities to improve functional performance for 40  minutes, including:     Extensive Education provided see below.     WC< --> mat t/f supervision stand pivot.     Home Exercises and Patient Education Provided    Education provided: Process of obtaining custom wheelchair, pros/ cons about different types of wheelchairs and what will best assist patient's needs. Discussed how Smart Drive will assist pt with safety and will prevent repetitive use injuries and assist with energy conservation as well as assist  with keeping pt independent and to continue to work full time as she currently is. Pt able to observe a demo chair today and discuss the differences between a rigid chair and folding frame WC and various footplate configurations that would work with pt.  Answered all patient's questions about WC. Pt reports understanding and has no questions or concerns at this time.       Assessment  Why mobility device was selected; include why a lower level device is not appropriate:   Shanthi is a 49 y.o. female referred to outpatient Physical Therapy with a medical diagnosis of Multiple Sclerosis for custom manual with power assist wheelchair evaluation.  Shanthi is currently only able to ambulate 10-15 ft with a single point cane with supervision before needing a rest break due to extensive fatigue and muscle weakness from Multiple Sclerosis. If she ambulates farther than this, she is completely exhausted and is unable to safely ambulate unless able to rest for an extensive period of time ( hours). Pt has fallen a few times due to fatigue and unsteadiness with walking as her MS progresses. She requires WC assistance back to car everyday after her typical therapy session and is unable to participate in many therapy activities with fatigue from just entering building.  With the progressive nature of MS, pt is now currently unsafe with ambulation as primary mode of mobility and requires a custom manual WC for safety and independence. During this evaluation, we considered all mobility devices including a cane, walker, and standard wheelchair, but none of these would meet the mobility needs.   The ultra-lightweight manual wheelchair would keep the patient safe, timely, and independent.  This wheelchair frame has an adjustable axle plate that will allow the rear wheel to be adjusted to a seat dump and center of gravity adjustment that is not available on a standard wheelchair.  These features allow this frame to be set up for  stability, safety and efficient propulsion for Shanthi. With this frame Shanthi will be able to complete all mobility related ADL's independently including going to the kitchen for hydration and nutrition as well as going to the restroom in a timely manner to remain continent. Shanthi is unable to ambulate more than 10-15 ft at a time and cannot resolve his/her mobility limitations with the use of a cane, walker. She requires an optimally configured manual WC with power assist with the SmartDrive to decrease risk of shoulder injury, assist with energy conservation, and to continue to drive/ go to work full time as she has been doing prior to MS decline. This pt has trialled a lightweight WC with a Smart Drive with [employer] rep and demonstrates the range of motion, strength, and cognitive ability to safely operate this wheelchair forward, backwards, left, right, and around obstacles with smart drive attachment.         Patient has mobility limitation that significantly impairs safe, timely, consistent participation in one or more MRADL. Yes  A mobility assistive device will effectively improve ability to participate in or aid participation in MRADL's.  Yes   A cane or walker will provide patient the ability to safely and consistently perform MRADLs in a timely manner  No  The patient's home environment will support use of recommended mobility device. Yes  The patient has sufficient UE strength to effectively self propel a manual wheelchair for all MRADL's. Yes for short distances . Requires power assist Smart Drive for safety in community.   The patient has sufficient upper extremity strength, , transfer ability and trunk stability to safely operate a POV (scooter). No  The additional benefits of a power wheelchair will more effectively enable MRADL's in home. No   The patient demonstrates ability/potential ability to use recommended equipment. Yes  The patient is willing and motivated to use the recommended  equipment. Yes      Pt prognosis is Fair.   Pt will benefit from skilled outpatient Physical Therapy to address the deficits stated above and in the chart below, provide pt/family education, and to maximize pt's level of independence.     Plan of care discussed with patient: Yes  Pt's spiritual, cultural and educational needs considered and patient is agreeable to the plan of care and goals as stated below:     Anticipated Barriers for therapy: progressive disease process     Medical Necessity is demonstrated by the following  History  Co-morbidities and personal factors that may impact the plan of care [] LOW: no personal factors / comorbidiies  [] MODERATE: 1-2 personal factors / comorbidiies  [x] HIGH: 3+ personal factors / comorbidiies    Moderate / High Support Documentation: MS numbness, s/p lumbar fusion     Examination  Body Structures and Functions, activity limitations and participation restrictions that may impact the plan of care [] LOW: addressing 1-2 elements  [] MODERATE: 3+ elements  [x] HIGH: 3+ elements (please support below)    Moderate / High Support Documentation: impaired strength, impaired endurance, gait instability, balance deficits.      Clinical Presentation [] LOW: stable  [x] MODERATE: Evolving  [] HIGH: Unstable     Decision Making/ Complexity Score: moderate           Plan   Plan of care Certification: 9/19/2024 to 9/19/24.    Dianna Rosas, PT  9/19/2024  License Number: [id number]   Therapist contact phone number: [phone]   As the evaluating therapist, I hereby attest that I have personally completed this evaluation and that I am not an employee of or working under contract to the (s) or the provider(s) of the durable medical equipment recommended in my evaluation. I further attest that I have not and will not receive remuneration of any kind from the (s) or the durable medical equipment provider(s) for the equipment I have recommended with this evaluation.          The following ATP was present and participated in this evaluation:   Walker Morrell , ATP from [employer].   Vendor phone: [phone]        I concur with the above findings and recommendations of the therapist:      Physician:         Physician signature:___________________________________   date: ___________

## 2024-09-27 NOTE — PROGRESS NOTES
"  [facility]   Physical Therapy Treatment Note      Name: Shanthi Escoto  Clinic Number: [id number]      Therapy Diagnosis:   Encounter Diagnosis   Name Primary?    Impaired functional mobility, balance, gait, and endurance Yes       Physician: Arin Albarran MD    Visit Date: 9/30/2024    Physician Orders: PT Eval and Treat  " Neuro PT"  Medical Diagnosis from Referral:   Diagnosis   G35 (ICD-10-CM) - Multiple sclerosis      Evaluation Date: 7/16/2024  Authorization Period Expiration: 12/31/24  Plan of Care Expiration: 11/4/24   Progress Note Due: 8/17/24  Visit # / Visits authorized: 12/ 20 (13)   FOTO: 1/5    Time In:   3:17 pm    Time Out:  4:00 pm       Total Billable Time: 43 minutes    Precautions: Standard and Fall Orthopedic: Lumbar Fusion precautions  (unclear if these are current or have been lifted)     PTA visit 0/5      Subjective     Pt reports:  " I am ok." Pt reports she  has been working " from her bed" since Wednesday. Reports she feels she is getting up more often and walking around more. Reports she will go back to desk next week. Reports she has a shower chair now and a grab bar for shower.       She was not yet given home exercise program.  Response to previous treatment: good  Functional change: ongoing    Pain: 4/10  Location: low back pain       Objective      Objective Measures updated at progress report unless specified.     Treatment     Shanthi received the treatments listed below:      Shanthi participated in dynamic functional therapeutic activities to improve functional performance for 20  minutes, including:      Pt ambulated 120 ft with single point cane with supervision x 1 trial.     WC assist back to car per pt request with fatigue.     Extensive education performed.      (Therapeutic rest breaks throughout as needed).     Shanthi received therapeutic exercises to develop strength, endurance, and ROM for 23 minutes including:    Seated " skateboard 3x10 each lower extremity   Seated march 2x10  Seated clamshells with orange theraband 2x10   Ball squeezes 2x10      (Therapeutic rest breaks throughout as needed).       Home Exercises Provided and Patient Education Provided     Education provided:     - educated about WC eval. Educated about [employer] denial and what we can do moving forward. Educated about shower adaptations at home.     Assessment          Pt tolerated session fair. Pt still having difficulty using spoons theory and conserving energy.  Working on seated hip and knee exercises today. Education provided about custom WC seating and B[employer] denial and what to do from here. Pam with [employer] aware. Will begin appeal process to help justify why pt needs device. Pt remains motivated to improve and a good candidate for PT      Shanthi Is progressing well towards her goals.   Pt prognosis is Good.     Pt will continue to benefit from skilled outpatient physical therapy to address the deficits listed in the problem list box on initial evaluation, provide pt/family education and to maximize pt's level of independence in the home and community environment.     Pt's spiritual, cultural and educational needs considered and pt agreeable to plan of care and goals.     Anticipated barriers to physical therapy: progressive disease process     Goals:          Short Term Goals: 4 weeks Date last assessed:  Status:    1. Patient will be provided with an HEP for strength, endurance and balance.  7/16/2024    MET   2.  Patient will demonstrate improve endurance and activity tolerance as evidenced by ability to perform Nu-step x 15 minutes without rests breaks. 9/9/24  ongoing            Long Term Goals: 8 weeks  Date last assessed: Status:    1. Patient will be independent and compliant with updated HEP. 9/9/24    ongoing   2.  Patient will increase her   gait speed as assessed by the timed 10MWT from 0.44 m/s to 0.58 m/s to increase her safety and (I) with  gait at a community level. (MDC for CVA= 0.14 m/s)     9/9/24    ongoing      3. The patient will demonstrate improved efficiency with functional mobility and decreased risk for falls as evidenced by an increase in her score on the Timed up and Go from 17.9 to 15.0. (Minimal detectable change in chronic stroke = 2.9 seconds)  9/9/24 ongoing   4.Patient will improve her FOTO score from 42  to at least 57 for improved self perception of functional mobility.(score 0-100, high score indicates greater level of function) 9/9/24 ongoing   5. Pt will improve 5x sit to stand score from 13.5 sec to less than 12 seconds to decrease fall risk and improve safety.  9/9/24    ongoing        Plan   Updated Plan of care Certification: 9/9/24- 11/4/24.      Outpatient Physical Therapy 2 times weekly for 8 weeks to include the following interventions: Electrical Stimulation as indicated, Gait Training, Manual Therapy, Moist Heat/ Ice, Neuromuscular Re-ed, Orthotic Management and Training, Patient Education, Therapeutic Activities, and Therapeutic Exercise.     Recommendations for next treatment session:           trial  treadmill per pt request.   Continue with trials with [employer]te[employer]ion L300     Dianna Rosas, PT

## 2024-09-30 ENCOUNTER — CLINICAL SUPPORT (OUTPATIENT)
Dept: REHABILITATION | Facility: HOSPITAL | Age: 49
End: 2024-09-30
Payer: COMMERCIAL

## 2024-09-30 DIAGNOSIS — Z74.09 IMPAIRED FUNCTIONAL MOBILITY, BALANCE, GAIT, AND ENDURANCE: Primary | ICD-10-CM

## 2024-09-30 PROCEDURE — 97530 THERAPEUTIC ACTIVITIES: CPT | Mod: PO

## 2024-09-30 PROCEDURE — 97110 THERAPEUTIC EXERCISES: CPT | Mod: PO

## 2024-10-07 ENCOUNTER — CLINICAL SUPPORT (OUTPATIENT)
Dept: REHABILITATION | Facility: HOSPITAL | Age: 49
End: 2024-10-07
Payer: COMMERCIAL

## 2024-10-07 DIAGNOSIS — Z74.09 IMPAIRED FUNCTIONAL MOBILITY, BALANCE, GAIT, AND ENDURANCE: Primary | ICD-10-CM

## 2024-10-07 PROCEDURE — 97110 THERAPEUTIC EXERCISES: CPT | Mod: PO

## 2024-10-07 PROCEDURE — 97530 THERAPEUTIC ACTIVITIES: CPT | Mod: PO

## 2024-10-07 NOTE — PROGRESS NOTES
"  [facility]   Physical Therapy Treatment Note      Name: Shanthi Escoto  Clinic Number: [id number]      Therapy Diagnosis:   Encounter Diagnosis   Name Primary?    Impaired functional mobility, balance, gait, and endurance Yes       Physician: Arin Albarran MD    Visit Date: 10/7/2024    Physician Orders: PT Eval and Treat  " Neuro PT"  Medical Diagnosis from Referral:   Diagnosis   G35 (ICD-10-CM) - Multiple sclerosis      Evaluation Date: 7/16/2024  Authorization Period Expiration: 12/31/24  Plan of Care Expiration: 11/4/24   Progress Note Due: 8/17/24  Visit # / Visits authorized: 12/ 20 (13)   FOTO: 1/5    Time In:    3:15 pm      Time Out:  3:58 pm        Total Billable Time: 43 minutes    Precautions: Standard and Fall Orthopedic: Lumbar Fusion precautions  (unclear if these are current or have been lifted)     PTA visit 0/5      Subjective     Pt reports:  " I am ok."  Pt reports she forwarded her [employer] denial letter to Pam with [employer] for next steps. Pt reports she has her appt with Dr. Mckinney tomorrow. Reports she got a message that the custom WC I sin " phase 2". Reports she will wear looser pants next session to practice with [employer] again.       She was compliant with home exercise program.    Response to previous treatment: good  Functional change: ongoing    Pain: 4/10   Location: low back pain       Objective      Objective Measures updated at progress report unless specified.     Treatment     Shanthi received the treatments listed below:      Shanthi participated in dynamic functional therapeutic activities to improve functional performance for 23  minutes, including:       Pt ambulated 120 ft with single point cane with supervision x 1 trial.     Parallel bars:   - stepping forward over hurdles x 5 hurdles with bilateral upper extremity support step - to pattern x 5 laps  - lateral stepping over hurdles x 5  with bilateral upper extremity support x 2 laps " "    WC assist back to car per pt request with fatigue.     Education provided throughout      (Therapeutic rest breaks throughout as needed).       Shanthi received therapeutic exercises to develop strength, endurance, and ROM for 20  minutes including:    - standing hip abduction in parallel bars 2x10 each lower extremity   - Seated bicep curls 3# 3x10 dowel.   - Chest press with dowel 3# 3x10      (Therapeutic rest breaks throughout as needed).       Home Exercises Provided and Patient Education Provided     Education provided:     - educated about trying to perform energy conservation techniques throughout the day. Reports understanding.       Assessment      Pt tolerated session fair. Pt still having difficulty using spoons theory and conserving energy.  Pt able to perform a few standing exercises today in parallel bars with rest breaks in between. Working on stepping over obstacles. Unable to trial Bioness again today due to clothing limitations. Patient's WC is in " phase 2" of being built per pt. Pt remains motivated to improve and a good candidate for PT.       Shanthi Is progressing well towards her goals.   Pt prognosis is Good.     Pt will continue to benefit from skilled outpatient physical therapy to address the deficits listed in the problem list box on initial evaluation, provide pt/family education and to maximize pt's level of independence in the home and community environment.     Pt's spiritual, cultural and educational needs considered and pt agreeable to plan of care and goals.     Anticipated barriers to physical therapy: progressive disease process     Goals:          Short Term Goals: 4 weeks Date last assessed:  Status:    1. Patient will be provided with an HEP for strength, endurance and balance.  7/16/2024    MET   2.  Patient will demonstrate improve endurance and activity tolerance as evidenced by ability to perform Nu-step x 15 minutes without rests breaks. 9/9/24  ongoing          "   Long Term Goals: 8 weeks  Date last assessed: Status:    1. Patient will be independent and compliant with updated HEP. 9/9/24    ongoing   2.  Patient will increase her   gait speed as assessed by the timed 10MWT from 0.44 m/s to 0.58 m/s to increase her safety and (I) with gait at a community level. (MDC for CVA= 0.14 m/s)     9/9/24    ongoing      3. The patient will demonstrate improved efficiency with functional mobility and decreased risk for falls as evidenced by an increase in her score on the Timed up and Go from 17.9 to 15.0. (Minimal detectable change in chronic stroke = 2.9 seconds)  9/9/24 ongoing   4.Patient will improve her FOTO score from 42  to at least 57 for improved self perception of functional mobility.(score 0-100, high score indicates greater level of function) 9/9/24 ongoing   5. Pt will improve 5x sit to stand score from 13.5 sec to less than 12 seconds to decrease fall risk and improve safety.  9/9/24    ongoing        Plan   Updated Plan of care Certification: 9/9/24- 11/4/24.      Outpatient Physical Therapy 2 times weekly for 8 weeks to include the following interventions: Electrical Stimulation as indicated, Gait Training, Manual Therapy, Moist Heat/ Ice, Neuromuscular Re-ed, Orthotic Management and Training, Patient Education, Therapeutic Activities, and Therapeutic Exercise.     Recommendations for next treatment session:            trial  treadmill per pt request.   Continue with trials with [employer] L300     Dianna Rosas, PT

## 2024-10-08 ENCOUNTER — OFFICE VISIT (OUTPATIENT)
Dept: NEUROLOGY | Facility: CLINIC | Age: 49
End: 2024-10-08
Payer: COMMERCIAL

## 2024-10-08 ENCOUNTER — PATIENT MESSAGE (OUTPATIENT)
Dept: NEUROLOGY | Facility: CLINIC | Age: 49
End: 2024-10-08

## 2024-10-08 VITALS
HEIGHT: 64 IN | WEIGHT: 205 LBS | SYSTOLIC BLOOD PRESSURE: 138 MMHG | BODY MASS INDEX: 35 KG/M2 | HEART RATE: 71 BPM | DIASTOLIC BLOOD PRESSURE: 86 MMHG

## 2024-10-08 DIAGNOSIS — Z79.899 ENCOUNTER FOR LONG-TERM (CURRENT) USE OF HIGH-RISK MEDICATION: ICD-10-CM

## 2024-10-08 DIAGNOSIS — M62.838 MUSCLE SPASM: Primary | ICD-10-CM

## 2024-10-08 DIAGNOSIS — R26.9 GAIT DISTURBANCE: ICD-10-CM

## 2024-10-08 DIAGNOSIS — Z29.89 PROPHYLACTIC IMMUNOTHERAPY: ICD-10-CM

## 2024-10-08 DIAGNOSIS — Z71.89 COUNSELING REGARDING GOALS OF CARE: ICD-10-CM

## 2024-10-08 DIAGNOSIS — G35 MS (MULTIPLE SCLEROSIS): ICD-10-CM

## 2024-10-08 DIAGNOSIS — Z29.89 IMMUNOTHERAPY: ICD-10-CM

## 2024-10-08 PROCEDURE — 99999 PR PBB SHADOW E&M-EST. PATIENT-LVL III: CPT | Mod: PBBFAC,,, | Performed by: PSYCHIATRY & NEUROLOGY

## 2024-10-08 RX ORDER — BACLOFEN 10 MG/1
10 TABLET ORAL 3 TIMES DAILY
Qty: 90 TABLET | Refills: 11 | Status: SHIPPED | OUTPATIENT
Start: 2024-10-08 | End: 2025-10-08

## 2024-10-09 ENCOUNTER — LAB VISIT (OUTPATIENT)
Dept: LAB | Facility: HOSPITAL | Age: 49
End: 2024-10-09
Attending: PSYCHIATRY & NEUROLOGY
Payer: COMMERCIAL

## 2024-10-09 ENCOUNTER — TELEPHONE (OUTPATIENT)
Dept: PSYCHIATRY | Facility: CLINIC | Age: 49
End: 2024-10-09

## 2024-10-09 DIAGNOSIS — Z29.89 IMMUNOTHERAPY: ICD-10-CM

## 2024-10-09 DIAGNOSIS — G35 MS (MULTIPLE SCLEROSIS): ICD-10-CM

## 2024-10-09 LAB
25(OH)D3+25(OH)D2 SERPL-MCNC: 78 NG/ML (ref 30–96)
ALBUMIN SERPL BCP-MCNC: 3.4 G/DL (ref 3.5–5.2)
ALP SERPL-CCNC: 106 U/L (ref 55–135)
ALT SERPL W/O P-5'-P-CCNC: 16 U/L (ref 10–44)
ANION GAP SERPL CALC-SCNC: 6 MMOL/L (ref 8–16)
AST SERPL-CCNC: 16 U/L (ref 10–40)
BASOPHILS # BLD AUTO: 0.02 K/UL (ref 0–0.2)
BASOPHILS NFR BLD: 0.4 % (ref 0–1.9)
BILIRUB SERPL-MCNC: 0.5 MG/DL (ref 0.1–1)
BUN SERPL-MCNC: 10 MG/DL (ref 6–20)
CALCIUM SERPL-MCNC: 9.1 MG/DL (ref 8.7–10.5)
CHLORIDE SERPL-SCNC: 107 MMOL/L (ref 95–110)
CO2 SERPL-SCNC: 26 MMOL/L (ref 23–29)
CREAT SERPL-MCNC: 0.7 MG/DL (ref 0.5–1.4)
DIFFERENTIAL METHOD BLD: ABNORMAL
EOSINOPHIL # BLD AUTO: 0 K/UL (ref 0–0.5)
EOSINOPHIL NFR BLD: 0.4 % (ref 0–8)
ERYTHROCYTE [DISTWIDTH] IN BLOOD BY AUTOMATED COUNT: 13.3 % (ref 11.5–14.5)
EST. GFR  (NO RACE VARIABLE): >60 ML/MIN/1.73 M^2
GLUCOSE SERPL-MCNC: 93 MG/DL (ref 70–110)
HAV IGG SER QL IA: NORMAL
HBV CORE AB SERPL QL IA: NORMAL
HBV SURFACE AB SER-ACNC: <3 MIU/ML
HBV SURFACE AB SER-ACNC: NORMAL M[IU]/ML
HBV SURFACE AG SERPL QL IA: NORMAL
HCT VFR BLD AUTO: 37.6 % (ref 37–48.5)
HCV AB SERPL QL IA: NORMAL
HGB BLD-MCNC: 12.2 G/DL (ref 12–16)
HIV 1+2 AB+HIV1 P24 AG SERPL QL IA: NORMAL
IGA SERPL-MCNC: 182 MG/DL (ref 40–350)
IGG SERPL-MCNC: 1147 MG/DL (ref 650–1600)
IGM SERPL-MCNC: 56 MG/DL (ref 50–300)
IMM GRANULOCYTES # BLD AUTO: 0.03 K/UL (ref 0–0.04)
IMM GRANULOCYTES NFR BLD AUTO: 0.6 % (ref 0–0.5)
LYMPHOCYTES # BLD AUTO: 0.7 K/UL (ref 1–4.8)
LYMPHOCYTES NFR BLD: 12.8 % (ref 18–48)
MCH RBC QN AUTO: 28.5 PG (ref 27–31)
MCHC RBC AUTO-ENTMCNC: 32.4 G/DL (ref 32–36)
MCV RBC AUTO: 88 FL (ref 82–98)
MONOCYTES # BLD AUTO: 0.6 K/UL (ref 0.3–1)
MONOCYTES NFR BLD: 11.7 % (ref 4–15)
NEUTROPHILS # BLD AUTO: 4 K/UL (ref 1.8–7.7)
NEUTROPHILS NFR BLD: 74.1 % (ref 38–73)
NRBC BLD-RTO: 0 /100 WBC
PLATELET # BLD AUTO: 372 K/UL (ref 150–450)
PMV BLD AUTO: 10.8 FL (ref 9.2–12.9)
POTASSIUM SERPL-SCNC: 4 MMOL/L (ref 3.5–5.1)
PROT SERPL-MCNC: 6.8 G/DL (ref 6–8.4)
RBC # BLD AUTO: 4.28 M/UL (ref 4–5.4)
SODIUM SERPL-SCNC: 139 MMOL/L (ref 136–145)
TREPONEMA PALLIDUM IGG+IGM AB [PRESENCE] IN SERUM OR PLASMA BY IMMUNOASSAY: NONREACTIVE
WBC # BLD AUTO: 5.4 K/UL (ref 3.9–12.7)

## 2024-10-09 PROCEDURE — 86790 VIRUS ANTIBODY NOS: CPT | Performed by: PSYCHIATRY & NEUROLOGY

## 2024-10-09 PROCEDURE — 36415 COLL VENOUS BLD VENIPUNCTURE: CPT | Mod: PO | Performed by: PSYCHIATRY & NEUROLOGY

## 2024-10-09 PROCEDURE — 82784 ASSAY IGA/IGD/IGG/IGM EACH: CPT | Mod: 59 | Performed by: PSYCHIATRY & NEUROLOGY

## 2024-10-09 PROCEDURE — 86787 VARICELLA-ZOSTER ANTIBODY: CPT | Performed by: PSYCHIATRY & NEUROLOGY

## 2024-10-09 PROCEDURE — 86803 HEPATITIS C AB TEST: CPT | Performed by: PSYCHIATRY & NEUROLOGY

## 2024-10-09 PROCEDURE — 82306 VITAMIN D 25 HYDROXY: CPT | Performed by: PSYCHIATRY & NEUROLOGY

## 2024-10-09 PROCEDURE — 86480 TB TEST CELL IMMUN MEASURE: CPT | Performed by: PSYCHIATRY & NEUROLOGY

## 2024-10-09 PROCEDURE — 86711 JOHN CUNNINGHAM ANTIBODY: CPT | Performed by: PSYCHIATRY & NEUROLOGY

## 2024-10-09 PROCEDURE — 87340 HEPATITIS B SURFACE AG IA: CPT | Performed by: PSYCHIATRY & NEUROLOGY

## 2024-10-09 PROCEDURE — 80053 COMPREHEN METABOLIC PANEL: CPT | Performed by: PSYCHIATRY & NEUROLOGY

## 2024-10-09 PROCEDURE — 87389 HIV-1 AG W/HIV-1&-2 AB AG IA: CPT | Performed by: PSYCHIATRY & NEUROLOGY

## 2024-10-09 PROCEDURE — 86593 SYPHILIS TEST NON-TREP QUANT: CPT | Performed by: PSYCHIATRY & NEUROLOGY

## 2024-10-09 PROCEDURE — 85025 COMPLETE CBC W/AUTO DIFF WBC: CPT | Performed by: PSYCHIATRY & NEUROLOGY

## 2024-10-09 PROCEDURE — 86706 HEP B SURFACE ANTIBODY: CPT | Performed by: PSYCHIATRY & NEUROLOGY

## 2024-10-09 PROCEDURE — 86704 HEP B CORE ANTIBODY TOTAL: CPT | Performed by: PSYCHIATRY & NEUROLOGY

## 2024-10-09 PROCEDURE — 86682 HELMINTH ANTIBODY: CPT | Performed by: PSYCHIATRY & NEUROLOGY

## 2024-10-10 ENCOUNTER — TELEPHONE (OUTPATIENT)
Dept: NEUROLOGY | Facility: CLINIC | Age: 49
End: 2024-10-10
Payer: COMMERCIAL

## 2024-10-10 DIAGNOSIS — G35 MS (MULTIPLE SCLEROSIS): Primary | ICD-10-CM

## 2024-10-10 RX ORDER — OZANIMOD HYDROCHLORIDE 0.92 MG/1
1 CAPSULE ORAL DAILY
Qty: 90 CAPSULE | Refills: 0 | Status: SHIPPED | OUTPATIENT
Start: 2024-10-10

## 2024-10-10 NOTE — TELEPHONE ENCOUNTER
Labs 10/9/24  WBC 5.40      AST 16  aLT 16    Confirmed patient is taking Zeposia once daily. Patient discussing change in DMT with BB soon

## 2024-10-10 NOTE — TELEPHONE ENCOUNTER
----- Message from MASON Perrin sent at 10/9/2024 11:28 AM CDT -----  Regarding: FW: New Prescription  Contact: Jacob/[facility]  [phone]    ----- Message -----  From: Dontrell Chinchilla  Sent: 10/9/2024  11:17 AM CDT  To: Laura NGUYEN Staff  Subject: New Prescription                                 Jacob called from [facility] requesting a new prescription on  ozanimod (ZEPOSIA) 0.92 mg Cap  medication. Please contact patient as soon as possible.     [facility]  Ph: [phone]  FAX: [phone]    Reference number: [id number]

## 2024-10-11 ENCOUNTER — CLINICAL SUPPORT (OUTPATIENT)
Dept: REHABILITATION | Facility: HOSPITAL | Age: 49
End: 2024-10-11
Payer: COMMERCIAL

## 2024-10-11 DIAGNOSIS — Z74.09 IMPAIRED FUNCTIONAL MOBILITY, BALANCE, GAIT, AND ENDURANCE: Primary | ICD-10-CM

## 2024-10-11 LAB
GAMMA INTERFERON BACKGROUND BLD IA-ACNC: 0.04 IU/ML
M TB IFN-G CD4+ BCKGRND COR BLD-ACNC: 0.01 IU/ML
M TB IFN-G CD4+ BCKGRND COR BLD-ACNC: 0.03 IU/ML
MITOGEN IGNF BCKGRD COR BLD-ACNC: 6.26 IU/ML
STRONGYLOIDES ANTIBODY IGG: NEGATIVE
TB GOLD PLUS: NEGATIVE
VARICELLA INTERPRETATION: POSITIVE
VARICELLA ZOSTER IGG: 764

## 2024-10-11 PROCEDURE — 97110 THERAPEUTIC EXERCISES: CPT | Mod: PO

## 2024-10-11 PROCEDURE — 97530 THERAPEUTIC ACTIVITIES: CPT | Mod: PO

## 2024-10-11 NOTE — PROGRESS NOTES
"  [facility]   Physical Therapy Treatment Note      Name: Shanthi Escoto  Clinic Number: [id number]      Therapy Diagnosis:   Encounter Diagnosis   Name Primary?    Impaired functional mobility, balance, gait, and endurance Yes       Physician: Arin Albarran MD    Visit Date: 10/11/2024    Physician Orders: PT Eval and Treat  " Neuro PT"  Medical Diagnosis from Referral:   Diagnosis   G35 (ICD-10-CM) - Multiple sclerosis      Evaluation Date: 7/16/2024  Authorization Period Expiration: 12/31/24  Plan of Care Expiration: 11/4/24   Progress Note Due: 8/17/24  Visit # / Visits authorized: 12/ 20 (13)   FOTO: 1/5    Time In:    1:45 pm   Time Out: 2:30 pm        Total Billable Time: 45  minutes    Precautions: Standard and Fall Orthopedic: Lumbar Fusion precautions  (unclear if these are current or have been lifted)     PTA visit 0/5      Subjective     Pt reports:  Pt reports she had first appt with Dr. Mckinney MS doctor and reports it went well. She states MD thinks her feet have a chance of gaining improve function based on images and EMG testing. Pt reports they discussed medicines and have an appt coming up to further discuss.       She was compliant with home exercise program.    Response to previous treatment: good  Functional change: ongoing    Pain: 4/10     Location: low back pain       Objective      Objective Measures updated at progress report unless specified.     Treatment     Shanthi received the treatments listed below:      Shanthi participated in dynamic functional therapeutic activities to improve functional performance for 25   minutes, including:          Pt ambulated 120 ft with single point cane with supervision x 1 trial.     Attempted to perform walking with [employer] L300 today. Unable to get a good contraction today despite much efforts and various setting changes.     Education provided     Per request, pt transported back to car via  at end of session. "      (Therapeutic rest breaks throughout as needed).       Shanthi received therapeutic exercises to develop strength, endurance, and ROM for 20   minutes including:     - seated march 3x10 each lower extremity   - seated LAQ 2x10 each lower extremity   - seated ER with orange theraband 3x10      (Therapeutic rest breaks throughout as needed).       Home Exercises Provided and Patient Education Provided     Education provided:     - educated about recommendation to come on Wednesday for Bioness fitting with Pam with [employer]. Pt reports she will think about it and let therapist know.       Assessment         Pt tolerated session fair. Attempted to walk with [employer] L300 today but had much difficulty getting Bioness to perform good contraction today as it had previous sessions. Recommending pt come to clinic this upcoming Wednesday to meet with [employer] rep to troubleshoot why we had trouble finding good contraction.   Pt able to perform  some seated exercises today. Remains motivated to improve and a good candidate for PT.       Shanthi Is progressing well towards her goals.   Pt prognosis is Good.     Pt will continue to benefit from skilled outpatient physical therapy to address the deficits listed in the problem list box on initial evaluation, provide pt/family education and to maximize pt's level of independence in the home and community environment.     Pt's spiritual, cultural and educational needs considered and pt agreeable to plan of care and goals.     Anticipated barriers to physical therapy: progressive disease process     Goals:          Short Term Goals: 4 weeks Date last assessed:  Status:    1. Patient will be provided with an HEP for strength, endurance and balance.  7/16/2024    MET   2.  Patient will demonstrate improve endurance and activity tolerance as evidenced by ability to perform Nu-step x 15 minutes without rests breaks. 9/9/24  ongoing            Long Term Goals: 8 weeks  Date last  assessed: Status:    1. Patient will be independent and compliant with updated HEP. 9/9/24    ongoing   2.  Patient will increase her   gait speed as assessed by the timed 10MWT from 0.44 m/s to 0.58 m/s to increase her safety and (I) with gait at a community level. (MDC for CVA= 0.14 m/s)     9/9/24    ongoing      3. The patient will demonstrate improved efficiency with functional mobility and decreased risk for falls as evidenced by an increase in her score on the Timed up and Go from 17.9 to 15.0. (Minimal detectable change in chronic stroke = 2.9 seconds)  9/9/24 ongoing   4.Patient will improve her FOTO score from 42  to at least 57 for improved self perception of functional mobility.(score 0-100, high score indicates greater level of function) 9/9/24 ongoing   5. Pt will improve 5x sit to stand score from 13.5 sec to less than 12 seconds to decrease fall risk and improve safety.  9/9/24    ongoing        Plan   Updated Plan of care Certification: 9/9/24- 11/4/24.      Outpatient Physical Therapy 2 times weekly for 8 weeks to include the following interventions: Electrical Stimulation as indicated, Gait Training, Manual Therapy, Moist Heat/ Ice, Neuromuscular Re-ed, Orthotic Management and Training, Patient Education, Therapeutic Activities, and Therapeutic Exercise.     Recommendations for next treatment session:             trial  treadmill per pt request.   Continue with trials with [employer] L300     Dianna Rosas, PT

## 2024-10-14 ENCOUNTER — CLINICAL SUPPORT (OUTPATIENT)
Dept: REHABILITATION | Facility: HOSPITAL | Age: 49
End: 2024-10-14
Payer: COMMERCIAL

## 2024-10-14 DIAGNOSIS — Z74.09 IMPAIRED FUNCTIONAL MOBILITY, BALANCE, GAIT, AND ENDURANCE: Primary | ICD-10-CM

## 2024-10-14 PROCEDURE — 97530 THERAPEUTIC ACTIVITIES: CPT | Mod: PO

## 2024-10-14 PROCEDURE — 97110 THERAPEUTIC EXERCISES: CPT | Mod: PO

## 2024-10-14 NOTE — PROGRESS NOTES
"  [facility]   Physical Therapy Treatment Note      Name: Shanthi Escoto  Clinic Number: [id number]      Therapy Diagnosis:   Encounter Diagnosis   Name Primary?    Impaired functional mobility, balance, gait, and endurance Yes       Physician: Arin Albarran MD    Visit Date: 10/14/2024    Physician Orders: PT Eval and Treat  " Neuro PT"  Medical Diagnosis from Referral:   Diagnosis   G35 (ICD-10-CM) - Multiple sclerosis      Evaluation Date: 7/16/2024  Authorization Period Expiration: 12/31/24  Plan of Care Expiration: 11/4/24   Progress Note Due: 8/17/24  Visit # / Visits authorized: 16/ 20 (17)   FOTO: 1/5    Time In:  3:17 pm     Time Out: 3:59 pm         Total Billable Time: 42 minutes    Precautions: Standard and Fall Orthopedic: Lumbar Fusion precautions  (unclear if these are current or have been lifted)     PTA visit 0/5      Subjective     Pt reports: " I am doing good."       She was compliant with home exercise program.    Response to previous treatment: good  Functional change: ongoing    Pain: 6/10      Location: low back pain       Objective      Objective Measures updated at progress report unless specified.     Treatment     Shanthi received the treatments listed below:      Shanthi participated in dynamic functional therapeutic activities to improve functional performance for 32 minutes, including:         Pt ambulated 120 ft with single point cane with supervision x 1 trial.     Bioness L300 donned on left lower extremity. Therapist assist for multiple trials to find best contraction location for Bioness placement. Able to get a slight DF/ eversion contraction today. Pt ambulated 30 ft x 3 trials with Bioness L300 on on various locations to find best setup spot on leg.     Per request, pt transported back to car via  at end of session.      (Therapeutic rest breaks throughout as needed).       Shanthi received therapeutic exercises to develop strength, " endurance, and ROM for 10 minutes including:     -   seated skateboard knee flexion/ extension 3x10 left lower extremity   - seated march 3x10 each lower extremity      (Therapeutic rest breaks throughout as needed).       Home Exercises Provided and Patient Education Provided     Education provided:     - educated about recommendations to come Wednesday for fitting for B[employer] . Pt reports she will deter at this time and come next month if needed.       Assessment         Pt tolerated session well. Able to get a decent  contraction today with Bioness L300. Ambulation trials performed with Bioness on at various positions on lower extremity to try to find best contraction. Recommending pt come on Wednesday to clinic to get fitted with [employer] rep to further try and find best location for best contraction. Pt reports she does not wish to come at this time and will come in a future month if needed.Pt remains motivated to improve and a good candidate for PT         Shanthi Is progressing well towards her goals.   Pt prognosis is Good.     Pt will continue to benefit from skilled outpatient physical therapy to address the deficits listed in the problem list box on initial evaluation, provide pt/family education and to maximize pt's level of independence in the home and community environment.     Pt's spiritual, cultural and educational needs considered and pt agreeable to plan of care and goals.     Anticipated barriers to physical therapy: progressive disease process     Goals:          Short Term Goals: 4 weeks Date last assessed:  Status:    1. Patient will be provided with an HEP for strength, endurance and balance.  7/16/2024    MET   2.  Patient will demonstrate improve endurance and activity tolerance as evidenced by ability to perform Nu-step x 15 minutes without rests breaks. 9/9/24  ongoing            Long Term Goals: 8 weeks  Date last assessed: Status:    1. Patient will be independent and compliant with  updated HEP. 9/9/24    ongoing   2.  Patient will increase her   gait speed as assessed by the timed 10MWT from 0.44 m/s to 0.58 m/s to increase her safety and (I) with gait at a community level. (MDC for CVA= 0.14 m/s)     9/9/24    ongoing      3. The patient will demonstrate improved efficiency with functional mobility and decreased risk for falls as evidenced by an increase in her score on the Timed up and Go from 17.9 to 15.0. (Minimal detectable change in chronic stroke = 2.9 seconds)  9/9/24 ongoing   4.Patient will improve her FOTO score from 42  to at least 57 for improved self perception of functional mobility.(score 0-100, high score indicates greater level of function) 9/9/24 ongoing   5. Pt will improve 5x sit to stand score from 13.5 sec to less than 12 seconds to decrease fall risk and improve safety.  9/9/24    ongoing        Plan   Updated Plan of care Certification: 9/9/24- 11/4/24.      Outpatient Physical Therapy 2 times weekly for 8 weeks to include the following interventions: Electrical Stimulation as indicated, Gait Training, Manual Therapy, Moist Heat/ Ice, Neuromuscular Re-ed, Orthotic Management and Training, Patient Education, Therapeutic Activities, and Therapeutic Exercise.     Recommendations for next treatment session:              trial  treadmill per pt request.   Continue with trials with [email or web address] L300     Dianna Rosas, PT

## 2024-10-16 LAB
JCPYV AB SERPL QL IA: NEGATIVE
JCV INDEX: 0.11

## 2024-10-17 NOTE — PROGRESS NOTES
"  [facility]   Physical Therapy Treatment Note     Name: Shanthi Escoto  Clinic Number: [id number]      Therapy Diagnosis:   Encounter Diagnosis   Name Primary?    Impaired functional mobility, balance, gait, and endurance Yes       Physician: Arin Albarran MD    Visit Date: 10/18/2024    Physician Orders: PT Eval and Treat  " Neuro PT"  Medical Diagnosis from Referral:   Diagnosis   G35 (ICD-10-CM) - Multiple sclerosis      Evaluation Date: 7/16/2024  Authorization Period Expiration: 12/31/24  Plan of Care Expiration: 11/4/24   Progress Note Due: 8/17/24     Visit # / Visits authorized: 16/ 20 (17)   FOTO: 1/5    Time In:  1:50 pm       Time Out: 2:30 pm           Total Billable Time: 40  minutes    Precautions: Standard and Fall Orthopedic: Lumbar Fusion precautions  (unclear if these are current or have been lifted)     PTA visit 0/5      Subjective     Pt reports: " I am doing good. Work almost made me late."       She was compliant with home exercise program.    Response to previous treatment: good  Functional change: ongoing    Pain: 5/10        Location: low back pain       Objective      Objective Measures updated at progress report unless specified.     Treatment     Shanthi received the treatments listed below:      Shanthi participated in dynamic functional therapeutic activities to improve functional performance for 40  minutes, including:       Pt ambulated 120 ft with supervision with single point cane       Pt received Manual electrical stimulation for 15 minutes after being cleared for all contradictions as follows: Pt received Functional Electrical stimulation with  FES lower extremity bike to elicit  sequential muscle contraction of the left tibialis anterior and gastroc/ soleus muscle groups.  Activity time includes skilled set-up for proper placement of electrodes and "muscle test" to determine optimal intensity to elicit a muscular contraction,  adjustment " "of pedal lever arms to promote forced use of left  lower extremity and securing of "Q-Straints" in kenyon/cross pattern to stabilize chair. Training set-up for intensity training. Results as follows:       Distance travelled: 1.77 miles  Energy Expended: 0.2 kCal  Energy per hour 1.1 kCal/ hour  Average power: 1.3 W  Average Asymmetry: left 4%   Total therapy time: 15.21   Time in active (off motor) 6.34   Time in passive: (on motor): 8.47     Request for WC transport back to car.     Home Exercises Provided and Patient Education Provided     Education provided:     - educated about FES bike and purpose. Reports understanding.       Assessment       Pt tolerated session well. Trialled FES bike today on left gastroc/soleus and left dorsiflexors. Pt in pants and unable to access any thigh muscles. Educated that if she wants other muscles, she will have to wear shorts. Pt reports no adverse effects from FES bike and reports she liked it. " I feel like I worked out." Pt able to pedal for a little over 6 minutes actively. Pt remains motivated to improve and a good candidate for PT.       Shanthi Is progressing well towards her goals.   Pt prognosis is Good.     Pt will continue to benefit from skilled outpatient physical therapy to address the deficits listed in the problem list box on initial evaluation, provide pt/family education and to maximize pt's level of independence in the home and community environment.     Pt's spiritual, cultural and educational needs considered and pt agreeable to plan of care and goals.     Anticipated barriers to physical therapy: progressive disease process     Goals:          Short Term Goals: 4 weeks Date last assessed:  Status:    1. Patient will be provided with an HEP for strength, endurance and balance.  7/16/2024    MET   2.  Patient will demonstrate improve endurance and activity tolerance as evidenced by ability to perform Nu-step x 15 minutes without rests breaks. 9/9/24  ongoing "            Long Term Goals: 8 weeks  Date last assessed: Status:    1. Patient will be independent and compliant with updated HEP. 9/9/24    ongoing   2.  Patient will increase her   gait speed as assessed by the timed 10MWT from 0.44 m/s to 0.58 m/s to increase her safety and (I) with gait at a community level. (MDC for CVA= 0.14 m/s)     9/9/24    ongoing      3. The patient will demonstrate improved efficiency with functional mobility and decreased risk for falls as evidenced by an increase in her score on the Timed up and Go from 17.9 to 15.0. (Minimal detectable change in chronic stroke = 2.9 seconds)  9/9/24 ongoing   4.Patient will improve her FOTO score from 42  to at least 57 for improved self perception of functional mobility.(score 0-100, high score indicates greater level of function) 9/9/24 ongoing   5. Pt will improve 5x sit to stand score from 13.5 sec to less than 12 seconds to decrease fall risk and improve safety.  9/9/24    ongoing        Plan   Updated Plan of care Certification: 9/9/24- 11/4/24.      Outpatient Physical Therapy 2 times weekly for 8 weeks to include the following interventions: Electrical Stimulation as indicated, Gait Training, Manual Therapy, Moist Heat/ Ice, Neuromuscular Re-ed, Orthotic Management and Training, Patient Education, Therapeutic Activities, and Therapeutic Exercise.     Recommendations for next treatment session:              trial  treadmill per pt request.   Continue with trials with [employer] L300     Dianna Rosas, PT

## 2024-10-18 ENCOUNTER — CLINICAL SUPPORT (OUTPATIENT)
Dept: REHABILITATION | Facility: HOSPITAL | Age: 49
End: 2024-10-18
Payer: COMMERCIAL

## 2024-10-18 DIAGNOSIS — Z74.09 IMPAIRED FUNCTIONAL MOBILITY, BALANCE, GAIT, AND ENDURANCE: Primary | ICD-10-CM

## 2024-10-18 PROCEDURE — 97530 THERAPEUTIC ACTIVITIES: CPT | Mod: PO

## 2024-10-24 NOTE — PROGRESS NOTES
"  [facility]   Physical Therapy Treatment Note     Name: Shanthi Escoto  Clinic Number: [id number]    Therapy Diagnosis:   Encounter Diagnosis   Name Primary?    Impaired functional mobility, balance, gait, and endurance Yes       Physician: Arin Albarran MD    Visit Date: 10/25/2024    Physician Orders: PT Eval and Treat  " Neuro PT"  Medical Diagnosis from Referral:   Diagnosis   G35 (ICD-10-CM) - Multiple sclerosis      Evaluation Date: 7/16/2024  Authorization Period Expiration: 12/31/24  Plan of Care Expiration: 11/4/24   Progress Note Due: 8/17/24     Visit # / Visits authorized: 18/ 20 (19)   FOTO: 1/5    Time In:   1:46 pm       Time Out:  2:30 pm           Total Billable Time: 44 minutes    Precautions: Standard and Fall Orthopedic: Lumbar Fusion precautions  (unclear if these are current or have been lifted)     PTA visit 0/5      Subjective     Pt reports: " I am doing ok. I have more pain today than usual."  Reports no issues with FES bike last session and enjoyed it.       She was compliant with home exercise program.    Response to previous treatment: good  Functional change: ongoing    Pain: 7/10         Location: low back pain       Objective      Objective Measures updated at progress report unless specified.     Treatment     Shanthi received the treatments listed below:        Shanthi received therapeutic exercises to develop strength endurance and balance  for 44 minutes including:    - seated skateboard knee flexion/ extension 3x10   - seated green  theraband hip external rotation 3x10   - Ball squeezes seated 3x10     - Bicep curls 3# 3x10 with dowel   - Overhead lifts 3# 3x10 dowel   - chest press 3# dowel 3x10      (Therapeutic rest breaks throughout as needed).     Pt ambulated 120 ft with supervision with single point cane       Request for WC transport back to car.       Home Exercises Provided and Patient Education Provided     Education provided:     - " "educated about [employer] letter of appeal submitted to Pam with [employer]. Reports understanding.       Assessment        Pt tolerated session well. Pt reporting more back pain today . " It is one of those days." Requests to perform seated exercises today. Pt tolerated seated exercises well with no adverse effects. Appeal letter has been submitted to Pam with [employer] . Pt remains motivated to improve and a good candidate for PT.         Shanthi Is progressing well towards her goals.   Pt prognosis is Good.     Pt will continue to benefit from skilled outpatient physical therapy to address the deficits listed in the problem list box on initial evaluation, provide pt/family education and to maximize pt's level of independence in the home and community environment.     Pt's spiritual, cultural and educational needs considered and pt agreeable to plan of care and goals.     Anticipated barriers to physical therapy: progressive disease process     Goals:          Short Term Goals: 4 weeks Date last assessed:  Status:    1. Patient will be provided with an HEP for strength, endurance and balance.  7/16/2024    MET   2.  Patient will demonstrate improve endurance and activity tolerance as evidenced by ability to perform Nu-step x 15 minutes without rests breaks. 9/9/24  ongoing            Long Term Goals: 8 weeks  Date last assessed: Status:    1. Patient will be independent and compliant with updated HEP. 9/9/24    ongoing   2.  Patient will increase her   gait speed as assessed by the timed 10MWT from 0.44 m/s to 0.58 m/s to increase her safety and (I) with gait at a community level. (MDC for CVA= 0.14 m/s)     9/9/24    ongoing      3. The patient will demonstrate improved efficiency with functional mobility and decreased risk for falls as evidenced by an increase in her score on the Timed up and Go from 17.9 to 15.0. (Minimal detectable change in chronic stroke = 2.9 seconds)  9/9/24 ongoing   4.Patient will improve her " FOTO score from 42  to at least 57 for improved self perception of functional mobility.(score 0-100, high score indicates greater level of function) 9/9/24 ongoing   5. Pt will improve 5x sit to stand score from 13.5 sec to less than 12 seconds to decrease fall risk and improve safety.  9/9/24    ongoing        Plan   Updated Plan of care Certification: 9/9/24- 11/4/24.      Outpatient Physical Therapy 2 times weekly for 8 weeks to include the following interventions: Electrical Stimulation as indicated, Gait Training, Manual Therapy, Moist Heat/ Ice, Neuromuscular Re-ed, Orthotic Management and Training, Patient Education, Therapeutic Activities, and Therapeutic Exercise.     Recommendations for next treatment session:     Continue with trials with [employer] L300     Dianna Rosas, PT

## 2024-10-25 ENCOUNTER — CLINICAL SUPPORT (OUTPATIENT)
Dept: REHABILITATION | Facility: HOSPITAL | Age: 49
End: 2024-10-25
Payer: COMMERCIAL

## 2024-10-25 ENCOUNTER — DOCUMENTATION ONLY (OUTPATIENT)
Dept: NEUROLOGY | Facility: CLINIC | Age: 49
End: 2024-10-25

## 2024-10-25 DIAGNOSIS — Z74.09 IMPAIRED FUNCTIONAL MOBILITY, BALANCE, GAIT, AND ENDURANCE: Primary | ICD-10-CM

## 2024-10-25 PROCEDURE — 97110 THERAPEUTIC EXERCISES: CPT | Mod: PO

## 2024-10-28 ENCOUNTER — CLINICAL SUPPORT (OUTPATIENT)
Dept: REHABILITATION | Facility: HOSPITAL | Age: 49
End: 2024-10-28
Payer: COMMERCIAL

## 2024-10-28 DIAGNOSIS — Z74.09 IMPAIRED FUNCTIONAL MOBILITY, BALANCE, GAIT, AND ENDURANCE: Primary | ICD-10-CM

## 2024-10-28 PROCEDURE — 97110 THERAPEUTIC EXERCISES: CPT | Mod: PO

## 2024-10-30 ENCOUNTER — OFFICE VISIT (OUTPATIENT)
Dept: NEUROLOGY | Facility: CLINIC | Age: 49
End: 2024-10-30
Payer: COMMERCIAL

## 2024-10-30 DIAGNOSIS — R26.9 GAIT DISTURBANCE: ICD-10-CM

## 2024-10-30 DIAGNOSIS — Z29.89 PROPHYLACTIC IMMUNOTHERAPY: ICD-10-CM

## 2024-10-30 DIAGNOSIS — Z79.899 ENCOUNTER FOR LONG-TERM (CURRENT) USE OF HIGH-RISK MEDICATION: ICD-10-CM

## 2024-10-30 DIAGNOSIS — Z71.89 COUNSELING REGARDING GOALS OF CARE: ICD-10-CM

## 2024-10-30 DIAGNOSIS — M62.838 MUSCLE SPASM: ICD-10-CM

## 2024-10-30 DIAGNOSIS — Z29.89 IMMUNOTHERAPY: ICD-10-CM

## 2024-10-30 DIAGNOSIS — G35 MS (MULTIPLE SCLEROSIS): Primary | ICD-10-CM

## 2024-10-30 PROCEDURE — 1160F RVW MEDS BY RX/DR IN RCRD: CPT | Mod: CPTII,95,, | Performed by: PSYCHIATRY & NEUROLOGY

## 2024-10-30 PROCEDURE — G2211 COMPLEX E/M VISIT ADD ON: HCPCS | Mod: 95,,, | Performed by: PSYCHIATRY & NEUROLOGY

## 2024-10-30 PROCEDURE — 99214 OFFICE O/P EST MOD 30 MIN: CPT | Mod: 95,,, | Performed by: PSYCHIATRY & NEUROLOGY

## 2024-10-30 PROCEDURE — 1159F MED LIST DOCD IN RCRD: CPT | Mod: CPTII,95,, | Performed by: PSYCHIATRY & NEUROLOGY

## 2024-10-30 NOTE — Clinical Note
1. Pt new to clinic; on Zeposia; ALC is 0.7 - I think one week washout is fine;  labs are done; not sure whether she is open to vaccines, but I explained you may offer them and she is free to decline if she wants. She lives in [place] and has transportation issues, so asks for [place] but if [place] only option that's okay; Reviewed all the side effects so ready for consent to be signed; needs auth done, and copay assist, etc. thanks

## 2024-10-30 NOTE — PROGRESS NOTES
The patient location is: home  The chief complaint leading to consultation is: MS    Visit type: audiovisual    Face to Face time with patient: 22  32 minutes of total time spent on the encounter, which includes face to face time and non-face to face time preparing to see the patient (eg, review of tests), Obtaining and/or reviewing separately obtained history, Documenting clinical information in the electronic or other health record, Independently interpreting results (not separately reported) and communicating results to the patient/family/caregiver, or Care coordination (not separately reported).     Each patient to whom he or she provides medical services by telemedicine is:  (1) informed of the relationship between the physician and patient and the respective role of any other health care provider with respect to management of the patient; and (2) notified that he or she may decline to receive medical services by telemedicine and may withdraw from such care at any time.    Subjective:          Patient ID: Shanthi Escoto is a 49 y.o. female who presents today for a routine clinic visit for MS.      MS HPI:  DMT: ozanimod daily   Side effects from DMT? No  Taking vitamin D3 as recommended? Yes -    She is interested in switching to Briumvi infusion --   She reports she's had a cough since July - just finished Z pack and prednisone, and feeling better but cough not 100% gone; No fevers    Medications:  Current Outpatient Medications   Medication Sig    Bacillus coagulans/inulin (PROBIOTIC WITH PREBIOTIC ORAL) Take 1 tablet by mouth once daily.    baclofen (LIORESAL) 10 MG tablet Take 1 tablet (10 mg total) by mouth 3 (three) times daily.    cholecalciferol, vitamin D3, (VITAMIN D3) 50 mcg (2,000 unit) Cap Take 1 capsule by mouth once daily.    gabapentin (NEURONTIN) 600 MG tablet Take 600 mg by mouth once daily.    STEFANIE 0.35 mg tablet Take 1 tablet by mouth.    linaCLOtide (LINZESS) 145 mcg Cap capsule Take  "1 capsule (145 mcg total) by mouth before breakfast. Daily as needed for constipation    multivitamin (THERAGRAN) per tablet Take 1 tablet by mouth once daily.    ozanimod (ZEPOSIA) 0.92 mg Cap Take 1 capsule by mouth Daily.     No current facility-administered medications for this visit.       SOCIAL HISTORY  Social History     Tobacco Use    Smoking status: Never    Smokeless tobacco: Never   Substance Use Topics    Alcohol use: Yes     Comment: rarely    Drug use: Never       Labs:     Lab Results   Component Value Date    OYZSUYRI98JA 78 10/09/2024     Lab Results   Component Value Date    JCVINDEX 0.11 10/09/2024    JCVANTIBODY NEGATIVE 10/09/2024     No results found for: "GG4WACHH", "ABSOLUTECD3", "TC5NBLOJ", "ABSOLUTECD8", "WN1GSGRJ", "ABSOLUTECD4", "LABCD48"  Lab Results   Component Value Date    WBC 5.40 10/09/2024    RBC 4.28 10/09/2024    HGB 12.2 10/09/2024    HCT 37.6 10/09/2024    MCV 88 10/09/2024    MCH 28.5 10/09/2024    MCHC 32.4 10/09/2024    RDW 13.3 10/09/2024     10/09/2024    MPV 10.8 10/09/2024    GRAN 4.0 10/09/2024    GRAN 74.1 (H) 10/09/2024    LYMPH 0.7 (L) 10/09/2024    LYMPH 12.8 (L) 10/09/2024    MONO 0.6 10/09/2024    MONO 11.7 10/09/2024    EOS 0.0 10/09/2024    BASO 0.02 10/09/2024    EOSINOPHIL 0.4 10/09/2024    BASOPHIL 0.4 10/09/2024     Sodium   Date Value Ref Range Status   10/09/2024 139 136 - 145 mmol/L Final     Potassium   Date Value Ref Range Status   10/09/2024 4.0 3.5 - 5.1 mmol/L Final     Chloride   Date Value Ref Range Status   10/09/2024 107 95 - 110 mmol/L Final     CO2   Date Value Ref Range Status   10/09/2024 26 23 - 29 mmol/L Final     Glucose   Date Value Ref Range Status   10/09/2024 93 70 - 110 mg/dL Final     BUN   Date Value Ref Range Status   10/09/2024 10 6 - 20 mg/dL Final     Creatinine   Date Value Ref Range Status   10/09/2024 0.7 0.5 - 1.4 mg/dL Final     Calcium   Date Value Ref Range Status   10/09/2024 9.1 8.7 - 10.5 mg/dL Final     Total " Protein   Date Value Ref Range Status   10/09/2024 6.8 6.0 - 8.4 g/dL Final     Albumin   Date Value Ref Range Status   10/09/2024 3.4 (L) 3.5 - 5.2 g/dL Final     Total Bilirubin   Date Value Ref Range Status   10/09/2024 0.5 0.1 - 1.0 mg/dL Final     Comment:     For infants and newborns, interpretation of results should be based  on gestational age, weight and in agreement with clinical  observations.    Premature Infant recommended reference ranges:  Up to 24 hours.............<8.0 mg/dL  Up to 48 hours............<12.0 mg/dL  3-5 days..................<15.0 mg/dL  6-29 days.................<15.0 mg/dL       Alkaline Phosphatase   Date Value Ref Range Status   10/09/2024 106 55 - 135 U/L Final     AST   Date Value Ref Range Status   10/09/2024 16 10 - 40 U/L Final     ALT   Date Value Ref Range Status   10/09/2024 16 10 - 44 U/L Final     Anion Gap   Date Value Ref Range Status   10/09/2024 6 (L) 8 - 16 mmol/L Final     Lab Results   Component Value Date    HEPBSAG Non-reactive 10/09/2024    HEPBSAB <3.00 10/09/2024    HEPBSAB Non-reactive 10/09/2024    HEPBCAB Non-reactive 10/09/2024       MS Impression and Plan:     NEURO MULTIPLE SCLEROSIS IMPRESSION:   Clinical Progression:  Worsened  Type:  Progressive  MS Classification:  Primary Progressive MS and Secondarily Progressive MS (Unclear to me if SPMS or PPMS -- will explore further at next visit;)  DMT:  Switch Disease Modifying therapy  Switch Disease Modifying Therapy FROM:  Ozanimod  TO:  Ublituximab  Symptom Management:  No change in symptom management   After shared decision making will switch DMT from Zeposia to Briumvi   The rationale, side effects, risks and expectations of this medication was discussed.   Recommend one week washout between Zeposia and Briumvi  [facility] Clinical Pharmacist Pedro Pablo Limon will coordinate initiation of this immunotherapy.   F/u Whitney Boudet NP in 3 months VV      Problem List Items Addressed This Visit    None  Visit  Diagnoses       MS (multiple sclerosis)    -  Primary    Muscle spasm        Prophylactic immunotherapy        Counseling regarding goals of care        Gait disturbance        Immunotherapy        Encounter for long-term (current) use of high-risk medication                Catherine Sanchez MD      Visit today included increased complexity associated with the care of the episodic problem chronic immunotherapy; addressed and managing the longitudinal care of the patient due to the serious and/or complex managed problem(s) MS.

## 2024-10-31 ENCOUNTER — TELEPHONE (OUTPATIENT)
Dept: NEUROLOGY | Facility: CLINIC | Age: 49
End: 2024-10-31
Payer: COMMERCIAL

## 2024-11-01 ENCOUNTER — TELEPHONE (OUTPATIENT)
Dept: NEUROLOGY | Facility: CLINIC | Age: 49
End: 2024-11-01
Payer: COMMERCIAL

## 2024-11-01 ENCOUNTER — CLINICAL SUPPORT (OUTPATIENT)
Dept: REHABILITATION | Facility: HOSPITAL | Age: 49
End: 2024-11-01
Payer: COMMERCIAL

## 2024-11-01 DIAGNOSIS — Z74.09 IMPAIRED FUNCTIONAL MOBILITY, BALANCE, GAIT, AND ENDURANCE: Primary | ICD-10-CM

## 2024-11-01 PROCEDURE — 97110 THERAPEUTIC EXERCISES: CPT | Mod: PO

## 2024-11-04 ENCOUNTER — CLINICAL SUPPORT (OUTPATIENT)
Dept: REHABILITATION | Facility: HOSPITAL | Age: 49
End: 2024-11-04
Payer: COMMERCIAL

## 2024-11-04 DIAGNOSIS — Z74.09 IMPAIRED FUNCTIONAL MOBILITY, BALANCE, GAIT, AND ENDURANCE: Primary | ICD-10-CM

## 2024-11-04 PROCEDURE — 97530 THERAPEUTIC ACTIVITIES: CPT | Mod: PO

## 2024-11-04 NOTE — PLAN OF CARE
"    [facility]   Physical Therapy  Plan of Care Update      Name: Shanthi Escoto  Clinic Number: [id number]    Therapy Diagnosis:   Encounter Diagnosis   Name Primary?    Impaired functional mobility, balance, gait, and endurance Yes       Physician: Arin Albarran MD    Visit Date: 11/4/2024    Physician Orders: PT Eval and Treat  " Neuro PT"  Medical Diagnosis from Referral:   Diagnosis   G35 (ICD-10-CM) - Multiple sclerosis      Evaluation Date: 7/16/2024  Authorization Period Expiration: 12/31/24  Updated Plan of Care Expiration: 1/13/25  Progress Note Due: 8/17/24     Visit # / Visits authorized: 21/ 22 (22)   FOTO: 1/5    Time In:  4:03 pm         Time Out:  4:35 pm (request to go schedule at end of reassessment)              Total Billable Time: 32 minutes    Precautions: Standard and Fall Orthopedic: Lumbar Fusion precautions  (unclear if these are current or have been lifted)     PTA visit 0/5      Subjective     Pt reports: " I am doing ok. I am off work this week."     She was compliant with home exercise program.    Response to previous treatment: good  Functional change: ongoing    Pain: 4/10          Location: low back pain       Objective      Objective Measures updated at progress report unless specified.     Treatment     Shanthi received the treatments listed below:        therapeutic activities to improve functional performance for 32 minutes, including:          Evaluation 9/9/24 11/4/24   5 times sit to stand 13.5 seconds minimal upper extremity support 17 sec bilateral upper extremity support.  15.8 sec bilateral upper extremity    TUG 17.9 seconds single point cane 24.1 sec with single point cane  17.9 seconds with single point cane    Self selected walking speed (10MWT) 0.44 m/sec (6m/13.7s)  single point cane  0.30 m/s ( 6m/ 19.8 sec) single point cane  0.50 m/s (6m/ 11.96 sec) single point cane    2MWT  Not assessed at this time Not assessed at this time.  " 172 ft single point cane         Pt ambulated 120 ft with supervision with single point cane       Request for WC transport back to car.       Home Exercises Provided and Patient Education Provided     Education provided:     - educated about progress with therapy. Reports understanding.     Assessment      Pt tolerated session well. Plan of Care Update today to reassess progress towards goals. Pt has met 1/2 short term goals and 0/5 long term goals. Pt has progressed back to baseline on her TUG test. She has improved her 5x sit to stand score from last reassessment. Pt improved her walking speed to 0.50 m/s with single point cane versus 0.44 m/s on eval. Pt is in the process of acquiring a custom manual WC and a Bioness L300. Pt remains motivated to improve and a good candidate for PT.       Shanthi Is progressing well towards her goals.   Pt prognosis is Good.     Pt will continue to benefit from skilled outpatient physical therapy to address the deficits listed in the problem list box on initial evaluation, provide pt/family education and to maximize pt's level of independence in the home and community environment.     Pt's spiritual, cultural and educational needs considered and pt agreeable to plan of care and goals.     Anticipated barriers to physical therapy: progressive disease process     Goals:           Short Term Goals: 5 weeks Date last assessed:  Status:    1. Patient will be provided with an HEP for strength, endurance and balance.  7/16/2024    MET   2.  Patient will demonstrate improve endurance and activity tolerance as evidenced by ability to perform Nu-step x 15 minutes without rests breaks. 11/4/24 ongoing            Long Term Goals: 10 weeks  Date last assessed: Status:    1. Patient will be independent and compliant with updated HEP. 9/9/24    ongoing   2.  Patient will increase her   gait speed as assessed by the timed 10MWT from 0.44 m/s to 0.58 m/s to increase her safety and (I) with gait  at a community level. (MDC for CVA= 0.14 m/s)     11/4/24    Progressing       3. The patient will demonstrate improved efficiency with functional mobility and decreased risk for falls as evidenced by an increase in her score on the Timed up and Go from 17.9 to 15.0. (Minimal detectable change in chronic stroke = 2.9 seconds)  11/4/24 ongoing   4.Patient will improve her FOTO score from 42  to at least 57 for improved self perception of functional mobility.(score 0-100, high score indicates greater level of function) 11/4/24 ongoing   5. Pt will improve 5x sit to stand score from 13.5 sec to less than 12 seconds to decrease fall risk and improve safety.  11/4/24    ongoing   6. Pt will acquire custom manual WC to assist with independence and safety with long distance mobility.  11/1/24 NEW   7. Pt will acquire left Bioness L300 to assist with ambulation and decrease foot drop to lower fall risk.  11/1/24 NEW   8. Patient will increase his/her distance achieved on the 2 Minute Walk Test from 172 feet to 209 feet to demonstrate improved endurance and efficiency with community level gait. (MDC for CVA = 112 feet) 11/1/24 NEW        Plan   Updated Plan of care Certification: 11/4/24- 1/13/25.       Outpatient Physical Therapy 2 times weekly for 10 weeks to include the following interventions: Electrical Stimulation as indicated, Gait Training, Manual Therapy, Moist Heat/ Ice, Neuromuscular Re-ed, Orthotic Management and Training, Patient Education, Therapeutic Activities, and Therapeutic Exercise.     Recommendations for next treatment session:     Continue with trials with Bioness L300     Dianna Rosas, PT

## 2024-11-07 ENCOUNTER — TELEPHONE (OUTPATIENT)
Dept: NEUROLOGY | Facility: CLINIC | Age: 49
End: 2024-11-07
Payer: COMMERCIAL

## 2024-11-07 DIAGNOSIS — G35 MULTIPLE SCLEROSIS: Primary | ICD-10-CM

## 2024-11-07 DIAGNOSIS — Z29.89 IMMUNOTHERAPY: ICD-10-CM

## 2024-11-07 NOTE — TELEPHONE ENCOUNTER
Briumvi new start  Labs:  10/9/24  WBC 5.40   - on zeposia   AST 16 , ALT 16  IgG  1147  IgM  56  IgA 182  HBsAg - anti-Hbc - anti-Hbs -, no current or past infection  Hep C Ab  -  Hep A IgG - not immune  HIV -  Varicella IgG +, immune  Strongyloides -   TB gold -     Vaccines:  Influenza:  Due 2024 -2025 Flu season  Pneumonia (Prevnar 20): One dose due now  Tetanus (TDAP): One dose due now  Hepatitis B (Heplisav-B):  One dose due now, then another dose due in 28 days  Hepatitis A (Havrix):  One dose due now, then another dose due in 6 month  Shingles (Shingrix): One dose due now, then another 2 months  Covid:  One dose due now   Respiratory Syncytial Virus (Arexvy): Not a candidate at this time    Start Form:   Pending patient and provider signature    Washout:    One week from Zeposia to Briumvi    Therapy plan:  Faxed to [facility] support on 11/8/24    Consent form:   Needs to be completed

## 2024-11-07 NOTE — TELEPHONE ENCOUNTER
----- Message from Catherine Sanchez MD sent at 10/30/2024 11:20 AM CDT -----  1. Pt new to clinic; on Zeposia; ALC is 0.7 - I think one week washout is fine;  labs are done; not sure whether she is open to vaccines, but I explained you may offer them and she is free to decline if she wants. She lives in [place] and has transportation issues, so asks for [place] but if [place] only option that's okay; Reviewed all the side effects so ready for consent to be signed; needs auth done, and copay assist, etc. thanks

## 2024-11-08 ENCOUNTER — HOSPITAL ENCOUNTER (OUTPATIENT)
Dept: RADIOLOGY | Facility: HOSPITAL | Age: 49
Discharge: HOME | End: 2024-11-08
Attending: FAMILY MEDICINE
Payer: COMMERCIAL

## 2024-11-08 DIAGNOSIS — R06.09 DYSPNEA ON EXERTION: Primary | ICD-10-CM

## 2024-11-08 DIAGNOSIS — R06.02 SOB (SHORTNESS OF BREATH): ICD-10-CM

## 2024-11-08 DIAGNOSIS — R06.09 DYSPNEA ON EXERTION: ICD-10-CM

## 2024-11-08 PROCEDURE — 71046 X-RAY EXAM CHEST 2 VIEWS: CPT | Mod: TC,PO

## 2024-11-08 PROCEDURE — 71046 X-RAY EXAM CHEST 2 VIEWS: CPT | Mod: 26,,, | Performed by: RADIOLOGY

## 2024-11-08 RX ORDER — ZOSTER VACCINE RECOMBINANT, ADJUVANTED 50 MCG/0.5
KIT INTRAMUSCULAR
Qty: 1 EACH | Refills: 1 | Status: SHIPPED | OUTPATIENT
Start: 2024-11-08

## 2024-11-08 RX ORDER — PNEUMOCOCCAL 20-VALENT CONJUGATE VACCINE 2.2; 2.2; 2.2; 2.2; 2.2; 2.2; 2.2; 2.2; 2.2; 2.2; 2.2; 2.2; 2.2; 2.2; 2.2; 2.2; 4.4; 2.2; 2.2; 2.2 UG/.5ML; UG/.5ML; UG/.5ML; UG/.5ML; UG/.5ML; UG/.5ML; UG/.5ML; UG/.5ML; UG/.5ML; UG/.5ML; UG/.5ML; UG/.5ML; UG/.5ML; UG/.5ML; UG/.5ML; UG/.5ML; UG/.5ML; UG/.5ML; UG/.5ML; UG/.5ML
0.5 INJECTION, SUSPENSION INTRAMUSCULAR ONCE
Qty: 0.5 ML | Refills: 0 | Status: SHIPPED | OUTPATIENT
Start: 2024-11-08 | End: 2024-11-08

## 2024-11-11 ENCOUNTER — CLINICAL SUPPORT (OUTPATIENT)
Dept: REHABILITATION | Facility: HOSPITAL | Age: 49
End: 2024-11-11
Payer: COMMERCIAL

## 2024-11-11 DIAGNOSIS — Z74.09 IMPAIRED FUNCTIONAL MOBILITY, BALANCE, GAIT, AND ENDURANCE: Primary | ICD-10-CM

## 2024-11-11 PROCEDURE — 97110 THERAPEUTIC EXERCISES: CPT | Mod: PO

## 2024-11-11 RX ORDER — FAMOTIDINE 10 MG/ML
20 INJECTION INTRAVENOUS
OUTPATIENT
Start: 2024-11-11

## 2024-11-11 RX ORDER — HEPARIN 100 UNIT/ML
500 SYRINGE INTRAVENOUS
OUTPATIENT
Start: 2024-11-11

## 2024-11-11 RX ORDER — ACETAMINOPHEN 500 MG
1000 TABLET ORAL
OUTPATIENT
Start: 2024-11-11

## 2024-11-11 RX ORDER — EPINEPHRINE 0.3 MG/.3ML
0.3 INJECTION SUBCUTANEOUS
OUTPATIENT
Start: 2024-11-11

## 2024-11-11 RX ORDER — DIPHENHYDRAMINE HYDROCHLORIDE 50 MG/ML
50 INJECTION INTRAMUSCULAR; INTRAVENOUS
OUTPATIENT
Start: 2024-11-11

## 2024-11-11 RX ORDER — SODIUM CHLORIDE 0.9 % (FLUSH) 0.9 %
10 SYRINGE (ML) INJECTION
OUTPATIENT
Start: 2024-11-11

## 2024-11-11 NOTE — PROGRESS NOTES
"  [facility]   Physical Therapy  Treatment Note      Name: Shanthi Escoto  Clinic Number: [id number]    Therapy Diagnosis:   No diagnosis found.      Physician: Arin Albarran MD    Visit Date: 11/11/2024    Physician Orders: PT Eval and Treat  " Neuro PT"  Medical Diagnosis from Referral:   Diagnosis   G35 (ICD-10-CM) - Multiple sclerosis      Evaluation Date: 7/16/2024  Authorization Period Expiration: 12/31/24  Plan of Care Expiration: 11/4/24   ***   Progress Note Due: 8/17/24     Visit # / Visits authorized: 20/ 20 (21)   FOTO: 1/5    Time In: 5:31 pm         Time Out:  ***               Total Billable Time: ***   minutes    Precautions: Standard and Fall Orthopedic: Lumbar Fusion precautions  (unclear if these are current or have been lifted)     PTA visit 0/5      Subjective     Pt reports:  ***   Reports trouble swallowing. Has had a lingering cough and doc thinks from MS.  " I am tired today."     She was compliant with home exercise program.    Response to previous treatment: good  Functional change: ongoing    Pain: 4/10     ***    Location: low back pain       Objective      Objective Measures updated at progress report unless specified.     Treatment     Shanthi received the treatments listed below:        Shanthi received therapeutic exercises to develop strength endurance and balance  for ***    minutes including:     ***    parallel bars:   - standing marches 3x10   - standing hip abduction 3x10   - standing calf raises 2x10   - standing hamstring curl 3x10 each lower extremity   - Rocking forward and backward x30   - alternating toe taps 3 inch step  3x10      (Therapeutic rest breaks throughout as needed).     Pt ambulated 120 ft with supervision with single point cane       Request for WC transport back to car.         Home Exercises Provided and Patient Education Provided     Education provided:      ***   - educated about getting an AFO as a backup to assist " with foot catching  reports COLLINS felt weird in her shoe and does not want to order at this time    Assessment    ***   Pt tolerated session well. Able to tolerate standing exercises today with seated rest breaks. Pt reporting custom WC with [employer] got denied. Per Walker with [facility], it is because of [employer] request. Reports he will resubmit for just manual WC. Pt remains motivated to improve and a good candidate for PT.       Shanthi Is progressing well towards her goals.   Pt prognosis is Good.     Pt will continue to benefit from skilled outpatient physical therapy to address the deficits listed in the problem list box on initial evaluation, provide pt/family education and to maximize pt's level of independence in the home and community environment.     Pt's spiritual, cultural and educational needs considered and pt agreeable to plan of care and goals.     Anticipated barriers to physical therapy: progressive disease process     Goals:     ***         Short Term Goals: 4 weeks Date last assessed:  Status:    1. Patient will be provided with an HEP for strength, endurance and balance.  7/16/2024    MET   2.  Patient will demonstrate improve endurance and activity tolerance as evidenced by ability to perform Nu-step x 15 minutes without rests breaks. 9/9/24  ongoing            Long Term Goals: 8 weeks  Date last assessed: Status:    1. Patient will be independent and compliant with updated HEP. 9/9/24    ongoing   2.  Patient will increase her   gait speed as assessed by the timed 10MWT from 0.44 m/s to 0.58 m/s to increase her safety and (I) with gait at a community level. (MDC for CVA= 0.14 m/s)     9/9/24    ongoing      3. The patient will demonstrate improved efficiency with functional mobility and decreased risk for falls as evidenced by an increase in her score on the Timed up and Go from 17.9 to 15.0. (Minimal detectable change in chronic stroke = 2.9 seconds)  9/9/24 ongoing   4.Patient will improve  her FOTO score from 42  to at least 57 for improved self perception of functional mobility.(score 0-100, high score indicates greater level of function) 9/9/24 ongoing   5. Pt will improve 5x sit to stand score from 13.5 sec to less than 12 seconds to decrease fall risk and improve safety.  9/9/24    ongoing   6. Pt will acquire custom manual WC to assist with independence and safety with long distance mobility.  11/1/24 ongoing   7. Pt will acquire left Bioness L300 to assist with ambulation and decrease foot drop to lower fall risk.  11/1/24 ongoing        Plan   Updated Plan of care Certification: 9/9/24- 11/4/24   ***      Outpatient Physical Therapy 2 times weekly for 8 weeks to include the following interventions: Electrical Stimulation as indicated, Gait Training, Manual Therapy, Moist Heat/ Ice, Neuromuscular Re-ed, Orthotic Management and Training, Patient Education, Therapeutic Activities, and Therapeutic Exercise.     Recommendations for next treatment session:    ***      Continue with trials with Bioness L300     Dianna Rosas, PT

## 2024-11-11 NOTE — PROGRESS NOTES
"  [facility]   Physical Therapy  Treatment Note      Name: Shanthi Escoto  Clinic Number: [id number]    Therapy Diagnosis:   Encounter Diagnosis   Name Primary?    Impaired functional mobility, balance, gait, and endurance Yes       Physician: Arin Albarran MD    Visit Date: 11/11/2024    Physician Orders: PT Eval and Treat  " Neuro PT"  Medical Diagnosis from Referral:   Diagnosis   G35 (ICD-10-CM) - Multiple sclerosis      Evaluation Date: 7/16/2024  Authorization Period Expiration: 12/31/24  Plan of Care Expiration: 1/13/25   Progress Note Due: 8/17/24     Visit # / Visits authorized: 22/ 40 (23)   FOTO: 1/5    Time In: 5:31 pm         Time Out: 6:15 pm                Total Billable Time: 44 minutes    Precautions: Standard and Fall Orthopedic: Lumbar Fusion precautions  (unclear if these are current or have been lifted)     PTA visit 0/5      Subjective     Pt reports:  " I am ok." Pt reports she has had a lingering cough with no illness for months. Her MD thinks it may be related to MS. Reports spasms with swallowing at times. Has an appt with GI doc.        She was compliant with home exercise program.    Response to previous treatment: good  Functional change: ongoing    Pain: 4/10         Location: low back pain       Objective      Objective Measures updated at progress report unless specified.     Treatment     Shanthi received the treatments listed below:        Shanthi received therapeutic exercises to develop strength endurance and balance  for 44  minutes including:     -  shuttle leg press 62# 3x10 bilateral lower extremity . Cues to not hyperextend knees  - single leg press 43# 3x10 right lower extremity 3x10   - single leg press 43# left lower extremity 3x10     Bicep curls 3# dowel 3x10    Overhead press 3# 3x10   Chest press 3# 3x10      (Therapeutic rest breaks throughout as needed).     Pt ambulated 120 ft with supervision with single point cane       Request " for  transport back to car.         Home Exercises Provided and Patient Education Provided     Education provided:     - educated about strength on shuttle reports understanding.     Assessment       Pt tolerated session well. Able to tolerate shuttle leg press with 62# with bilateral lower extremity and 43# with single leg press with rest breaks in between. Seated arm exercises performed to maintain upper extremity strength. Pt remains a good candidate for PT.       Shanthi Is progressing well towards her goals.   Pt prognosis is Good.     Pt will continue to benefit from skilled outpatient physical therapy to address the deficits listed in the problem list box on initial evaluation, provide pt/family education and to maximize pt's level of independence in the home and community environment.     Pt's spiritual, cultural and educational needs considered and pt agreeable to plan of care and goals.     Anticipated barriers to physical therapy: progressive disease process     Goals: Goals:           Short Term Goals: 5 weeks Date last assessed:  Status:    1. Patient will be provided with an HEP for strength, endurance and balance.  7/16/2024    MET   2.  Patient will demonstrate improve endurance and activity tolerance as evidenced by ability to perform Nu-step x 15 minutes without rests breaks. 11/4/24 ongoing            Long Term Goals: 10 weeks  Date last assessed: Status:    1. Patient will be independent and compliant with updated HEP. 9/9/24    ongoing   2.  Patient will increase her   gait speed as assessed by the timed 10MWT from 0.44 m/s to 0.58 m/s to increase her safety and (I) with gait at a community level. (MDC for CVA= 0.14 m/s)     11/4/24    Progressing       3. The patient will demonstrate improved efficiency with functional mobility and decreased risk for falls as evidenced by an increase in her score on the Timed up and Go from 17.9 to 15.0. (Minimal detectable change in chronic stroke = 2.9  seconds)  11/4/24 ongoing   4.Patient will improve her FOTO score from 42  to at least 57 for improved self perception of functional mobility.(score 0-100, high score indicates greater level of function) 11/4/24 ongoing   5. Pt will improve 5x sit to stand score from 13.5 sec to less than 12 seconds to decrease fall risk and improve safety.  11/4/24    ongoing   6. Pt will acquire custom manual WC to assist with independence and safety with long distance mobility.  11/1/24 ongoing   7. Pt will acquire left Bioness L300 to assist with ambulation and decrease foot drop to lower fall risk.  11/1/24 ongoing   8. Patient will increase his/her distance achieved on the 2 Minute Walk Test from 172 feet to 209 feet to demonstrate improved endurance and efficiency with community level gait. (MDC for CVA = 112 feet) 11/1/24 ongoing           Plan   Updated Plan of care Certification: 11/4/24- 1/13/25.       Outpatient Physical Therapy 2 times weekly for 10 weeks to include the following interventions: Electrical Stimulation as indicated, Gait Training, Manual Therapy, Moist Heat/ Ice, Neuromuscular Re-ed, Orthotic Management and Training, Patient Education, Therapeutic Activities, and Therapeutic Exercise.     Recommendations for next treatment session:      Continue with trials with Bioness L300     Dianna Rosas, PT

## 2024-11-14 NOTE — PROGRESS NOTES
"  [facility]   Physical Therapy  Treatment Note      Name: Shanthi Escoto  Clinic Number: [id number]    Therapy Diagnosis:   Encounter Diagnosis   Name Primary?    Impaired functional mobility, balance, gait, and endurance Yes       Physician: Arin Albarran MD    Visit Date: 11/15/2024    Physician Orders: PT Eval and Treat  " Neuro PT"  Medical Diagnosis from Referral:   Diagnosis   G35 (ICD-10-CM) - Multiple sclerosis      Evaluation Date: 7/16/2024  Authorization Period Expiration: 12/31/24  Plan of Care Expiration: 1/13/25   Progress Note Due: 8/17/24     Visit # / Visits authorized: 22/ 40 (23)   FOTO: 1/5    Time In: 2:31 pm       Time Out:  3:15 pm             Total Billable Time: 44  minutes    Precautions: Standard and Fall Orthopedic: Lumbar Fusion precautions  (unclear if these are current or have been lifted)     PTA visit 0/5      Subjective     Pt reports:  Reports she had a swallow study order and is going to get scheduled for a modified barium swallow study.      She was compliant with home exercise program.    Response to previous treatment: good  Functional change: ongoing    Pain: 4/10         Location: low back pain       Objective      Objective Measures updated at progress report unless specified.     Treatment     Shanthi received the treatments listed below:        therapeutic activities to improve functional performance for 44  minutes, including:       - Pt received Manual electrical stimulation for 20 minutes after being cleared for all contradictions as follows: Pt received Functional Electrical stimulation with  FES lower extremity bike to elicit  sequential muscle contraction of the left tibialis anterior and gastroc/ soleus muscle groups.  Activity time includes skilled set-up for proper placement of electrodes and "muscle test" to determine optimal intensity to elicit a muscular contraction,  adjustment of pedal lever arms to promote forced use " "of left  lower extremity and securing of "Q-Straints" in kenyon/cross pattern to stabilize chair. Training set-up for intensity training. Results as follows:     Distance travelled: 2.36 miles miles  Energy Expended: 0.2 kCal  Energy per hour 0.7 kCal/ hour  Average power: 0.9 W  Average Asymmetry: right 4 %  Total therapy time: 21.1 min    Time in active (off motor) 8.02 min   Time in passive: (on motor): 13.09 min       Pt ambulated 120 ft with supervision with single point cane       Request for WC transport back to car.         Home Exercises Provided and Patient Education Provided     Education provided:     - educated about  FES bike and purpose. Reports understanding.     Assessment          Pt tolerated session well. FES bike to left gastroc/soleus/ tibialis  anterior todayfor 20 min . Pt able to stay in active mode 8 min and passive mode 13 min. Pt remains motivated to improve and a good candidate for PT.        Shanthi Is progressing well towards her goals.   Pt prognosis is Good.     Pt will continue to benefit from skilled outpatient physical therapy to address the deficits listed in the problem list box on initial evaluation, provide pt/family education and to maximize pt's level of independence in the home and community environment.     Pt's spiritual, cultural and educational needs considered and pt agreeable to plan of care and goals.     Anticipated barriers to physical therapy: progressive disease process     Goals:        Short Term Goals: 5 weeks Date last assessed:  Status:    1. Patient will be provided with an HEP for strength, endurance and balance.  7/16/2024    MET   2.  Patient will demonstrate improve endurance and activity tolerance as evidenced by ability to perform Nu-step x 15 minutes without rests breaks. 11/4/24 ongoing            Long Term Goals: 10 weeks  Date last assessed: Status:    1. Patient will be independent and compliant with updated HEP. 9/9/24    ongoing   2.  Patient " will increase her   gait speed as assessed by the timed 10MWT from 0.44 m/s to 0.58 m/s to increase her safety and (I) with gait at a community level. (MDC for CVA= 0.14 m/s)     11/4/24    Progressing       3. The patient will demonstrate improved efficiency with functional mobility and decreased risk for falls as evidenced by an increase in her score on the Timed up and Go from 17.9 to 15.0. (Minimal detectable change in chronic stroke = 2.9 seconds)  11/4/24 ongoing   4.Patient will improve her FOTO score from 42  to at least 57 for improved self perception of functional mobility.(score 0-100, high score indicates greater level of function) 11/4/24 ongoing   5. Pt will improve 5x sit to stand score from 13.5 sec to less than 12 seconds to decrease fall risk and improve safety.  11/4/24    ongoing   6. Pt will acquire custom manual WC to assist with independence and safety with long distance mobility.  11/1/24 ongoing   7. Pt will acquire left Bioness L300 to assist with ambulation and decrease foot drop to lower fall risk.  11/1/24 ongoing   8. Patient will increase his/her distance achieved on the 2 Minute Walk Test from 172 feet to 209 feet to demonstrate improved endurance and efficiency with community level gait. (MDC for CVA = 112 feet) 11/1/24 ongoing           Plan   Updated Plan of care Certification: 11/4/24- 1/13/25.       Outpatient Physical Therapy 2 times weekly for 10 weeks to include the following interventions: Electrical Stimulation as indicated, Gait Training, Manual Therapy, Moist Heat/ Ice, Neuromuscular Re-ed, Orthotic Management and Training, Patient Education, Therapeutic Activities, and Therapeutic Exercise.     Recommendations for next treatment session:        Continue with trials with Bioness L300     Dianna Rosas, PT

## 2024-11-15 ENCOUNTER — CLINICAL SUPPORT (OUTPATIENT)
Dept: REHABILITATION | Facility: HOSPITAL | Age: 49
End: 2024-11-15
Payer: COMMERCIAL

## 2024-11-15 DIAGNOSIS — Z74.09 IMPAIRED FUNCTIONAL MOBILITY, BALANCE, GAIT, AND ENDURANCE: Primary | ICD-10-CM

## 2024-11-15 PROCEDURE — 97530 THERAPEUTIC ACTIVITIES: CPT | Mod: PO

## 2024-11-18 ENCOUNTER — CLINICAL SUPPORT (OUTPATIENT)
Dept: REHABILITATION | Facility: HOSPITAL | Age: 49
End: 2024-11-18
Payer: COMMERCIAL

## 2024-11-18 ENCOUNTER — TELEPHONE (OUTPATIENT)
Dept: GASTROENTEROLOGY | Facility: CLINIC | Age: 49
End: 2024-11-18
Payer: COMMERCIAL

## 2024-11-18 DIAGNOSIS — Z74.09 IMPAIRED FUNCTIONAL MOBILITY, BALANCE, GAIT, AND ENDURANCE: Primary | ICD-10-CM

## 2024-11-18 PROCEDURE — 97530 THERAPEUTIC ACTIVITIES: CPT | Mod: PO

## 2024-11-18 NOTE — PROGRESS NOTES
"  [facility]   Physical Therapy  Treatment Note      Name: Shanthi Escoto  Clinic Number: [id number]    Therapy Diagnosis:   Encounter Diagnosis   Name Primary?    Impaired functional mobility, balance, gait, and endurance Yes       Physician: Arin Albarran MD    Visit Date: 11/18/2024    Physician Orders: PT Eval and Treat  " Neuro PT"  Medical Diagnosis from Referral:   Diagnosis   G35 (ICD-10-CM) - Multiple sclerosis      Evaluation Date: 7/16/2024  Authorization Period Expiration: 12/31/24  Plan of Care Expiration: 1/13/25   Progress Note Due: 8/17/24     Visit # / Visits authorized: 24/ 40 (25)   FOTO: 1/5    Time In: 5:30 pm       Time Out:  6:15 pm              Total Billable Time: 45 minutes    Precautions: Standard and Fall Orthopedic: Lumbar Fusion precautions  (unclear if these are current or have been lifted)     PTA visit 0/5      Subjective     Pt reports:  " I am doing ok." Pt reports she has the form to fax to [facility] for her WC eval appeal.     She was compliant with home exercise program.    Response to previous treatment: good  Functional change: ongoing    Pain: 5/10         Location: low back pain       Objective      Objective Measures updated at progress report unless specified.     Treatment     Shanthi received the treatments listed below:        therapeutic activities to improve functional performance for 45   minutes, including:      - Pt received Manual electrical stimulation for 25 minutes after being cleared for all contradictions as follows: Pt received Functional Electrical stimulation with  FES lower extremity bike to elicit  sequential muscle contraction of the left tibialis anterior and gastroc/ soleus muscle groups.  Activity time includes skilled set-up for proper placement of electrodes and "muscle test" to determine optimal intensity to elicit a muscular contraction,  adjustment of pedal lever arms to promote forced use of left  lower " "extremity and securing of "Q-Straints" in kenyon/cross pattern to stabilize chair. Training set-up for intensity training. Results as follows:     Distance travelled: 3.13 miles  Energy Expended: 1.2 kCal  Energy per hour 3.1 kCal/ hour  Average power: 3.6 W  Average Asymmetry: right 9 %  Total therapy time:  24.10 min     Time in active (off motor) 20.05 min   Time in passive: (on motor): 4.03 min       Pt ambulated 120 ft with supervision with single point cane       Request for WC transport back to car.       Home Exercises Provided and Patient Education Provided     Education provided:      - educated about  FES bike and purpose. Reports understanding.     Assessment          Pt tolerated session well. FES bike to left gastroc/soleus/ tibialis  anterior todayfor 25 min . Pt able to stay in active mode 25  min today versus 8 min last session. Pt still awaiting custom WC and Bioness device.  Pt remains motivated to improve and a good candidate for PT.        Shanthi Is progressing well towards her goals.   Pt prognosis is Good.     Pt will continue to benefit from skilled outpatient physical therapy to address the deficits listed in the problem list box on initial evaluation, provide pt/family education and to maximize pt's level of independence in the home and community environment.     Pt's spiritual, cultural and educational needs considered and pt agreeable to plan of care and goals.     Anticipated barriers to physical therapy: progressive disease process     Goals:        Short Term Goals: 5 weeks Date last assessed:  Status:    1. Patient will be provided with an HEP for strength, endurance and balance.  7/16/2024    MET   2.  Patient will demonstrate improve endurance and activity tolerance as evidenced by ability to perform Nu-step x 15 minutes without rests breaks. 11/4/24 ongoing            Long Term Goals: 10 weeks  Date last assessed: Status:    1. Patient will be independent and compliant with updated " HEP. 9/9/24    ongoing   2.  Patient will increase her   gait speed as assessed by the timed 10MWT from 0.44 m/s to 0.58 m/s to increase her safety and (I) with gait at a community level. (MDC for CVA= 0.14 m/s)     11/4/24    Progressing       3. The patient will demonstrate improved efficiency with functional mobility and decreased risk for falls as evidenced by an increase in her score on the Timed up and Go from 17.9 to 15.0. (Minimal detectable change in chronic stroke = 2.9 seconds)  11/4/24 ongoing   4.Patient will improve her FOTO score from 42  to at least 57 for improved self perception of functional mobility.(score 0-100, high score indicates greater level of function) 11/4/24 ongoing   5. Pt will improve 5x sit to stand score from 13.5 sec to less than 12 seconds to decrease fall risk and improve safety.  11/4/24    ongoing   6. Pt will acquire custom manual WC to assist with independence and safety with long distance mobility.  11/1/24 ongoing   7. Pt will acquire left Bioness L300 to assist with ambulation and decrease foot drop to lower fall risk.  11/1/24 ongoing   8. Patient will increase his/her distance achieved on the 2 Minute Walk Test from 172 feet to 209 feet to demonstrate improved endurance and efficiency with community level gait. (MDC for CVA = 112 feet) 11/1/24 ongoing           Plan   Updated Plan of care Certification: 11/4/24- 1/13/25.       Outpatient Physical Therapy 2 times weekly for 10 weeks to include the following interventions: Electrical Stimulation as indicated, Gait Training, Manual Therapy, Moist Heat/ Ice, Neuromuscular Re-ed, Orthotic Management and Training, Patient Education, Therapeutic Activities, and Therapeutic Exercise.     Recommendations for next treatment session:      Continue with trials with Bioness L300     Dianna Rosas, PT

## 2024-11-18 NOTE — TELEPHONE ENCOUNTER
Called patient,no answer left voice mail and portal message to schedule from referral, number provided.  dysphagia

## 2024-11-25 ENCOUNTER — CLINICAL SUPPORT (OUTPATIENT)
Dept: REHABILITATION | Facility: HOSPITAL | Age: 49
End: 2024-11-25
Payer: COMMERCIAL

## 2024-11-25 DIAGNOSIS — Z74.09 IMPAIRED FUNCTIONAL MOBILITY, BALANCE, GAIT, AND ENDURANCE: Primary | ICD-10-CM

## 2024-11-25 PROCEDURE — 97530 THERAPEUTIC ACTIVITIES: CPT | Mod: PO

## 2024-11-25 NOTE — PROGRESS NOTES
"  [facility]   Physical Therapy  Treatment Note      Name: Shanthi Escoto  Clinic Number: [id number]    Therapy Diagnosis:   Encounter Diagnosis   Name Primary?    Impaired functional mobility, balance, gait, and endurance Yes         Physician: Arin Albarran MD    Visit Date: 11/25/2024    Physician Orders: PT Eval and Treat  " Neuro PT"  Medical Diagnosis from Referral:   Diagnosis   G35 (ICD-10-CM) - Multiple sclerosis      Evaluation Date: 7/16/2024  Authorization Period Expiration: 12/31/24  Plan of Care Expiration: 1/13/25   Progress Note Due: 8/17/24     Visit # / Visits authorized: 25/ 40 (26)   FOTO: 1/5    Time In: 5:31 pm       Time Out:  6:15 pm              Total Billable Time: 44 minutes    Precautions: Standard and Fall Orthopedic: Lumbar Fusion precautions  (unclear if these are current or have been lifted)     PTA visit 0/5      Subjective     Pt reports:  "My Bioness appeal was denied. The letter said because I didn't have a spinal cord injury."     She was compliant with home exercise program.    Response to previous treatment: good  Functional change: ongoing    Pain: 5/10          Location: low back pain       Objective      Objective Measures updated at progress report unless specified.     Treatment     Shanthi received the treatments listed below:        therapeutic activities to improve functional performance for 44    minutes, including:         - Pt received Manual electrical stimulation for 44 minutes after being cleared for all contradictions as follows: Pt received Functional Electrical stimulation with  FES lower extremity bike to elicit  sequential muscle contraction of the left tibialis anterior and gastroc/ soleus muscle groups.  Activity time includes skilled set-up for proper placement of electrodes and "muscle test" to determine optimal intensity to elicit a muscular contraction,  adjustment of pedal lever arms to promote forced use of left " " lower extremity and securing of "Q-Straints" in kenyon/cross pattern to stabilize chair. Training set-up for intensity training. Results as follows:     Distance travelled: 3.47 miles  Energy Expended: 1.4 kCal  Energy per hour 3.4 kCal/ hour  Average power: 3.9 W  Average Asymmetry: right 6 %  Total therapy time:  26.00    Time in active (off motor) 23.13 min   Time in passive: (on motor): 2.47 min       Pt ambulated 120 ft with supervision with single point cane       Request for WC transport back to car.       Home Exercises Provided and Patient Education Provided     Education provided:       - educated about  FES bike and purpose. Reports understanding.     Assessment            Pt tolerated session well. FES bike to left gastroc/soleus/ tibialis  anterior todayfor 26 min . Pt able to stay in active mode 23  min today  Pt still awaiting custom WC. Pt reports Bioness appeal was denied. Will assist pt as needed if wants to purchase Bioness from vendor. Pt leaning heavily on single point cane when ambualting. Discussed how rolling walker would be safer for pt and will offload her legs a little better. Pt remains motivated to improve and a good candidate for PT      Shanthi Is progressing well towards her goals.   Pt prognosis is Good.     Pt will continue to benefit from skilled outpatient physical therapy to address the deficits listed in the problem list box on initial evaluation, provide pt/family education and to maximize pt's level of independence in the home and community environment.     Pt's spiritual, cultural and educational needs considered and pt agreeable to plan of care and goals.     Anticipated barriers to physical therapy: progressive disease process     Goals:        Short Term Goals: 5 weeks Date last assessed:  Status:    1. Patient will be provided with an HEP for strength, endurance and balance.  7/16/2024    MET   2.  Patient will demonstrate improve endurance and activity tolerance as " evidenced by ability to perform Nu-step x 15 minutes without rests breaks. 11/4/24 ongoing            Long Term Goals: 10 weeks  Date last assessed: Status:    1. Patient will be independent and compliant with updated HEP. 9/9/24    ongoing   2.  Patient will increase her   gait speed as assessed by the timed 10MWT from 0.44 m/s to 0.58 m/s to increase her safety and (I) with gait at a community level. (MDC for CVA= 0.14 m/s)     11/4/24    Progressing       3. The patient will demonstrate improved efficiency with functional mobility and decreased risk for falls as evidenced by an increase in her score on the Timed up and Go from 17.9 to 15.0. (Minimal detectable change in chronic stroke = 2.9 seconds)  11/4/24 ongoing   4.Patient will improve her FOTO score from 42  to at least 57 for improved self perception of functional mobility.(score 0-100, high score indicates greater level of function) 11/4/24 ongoing   5. Pt will improve 5x sit to stand score from 13.5 sec to less than 12 seconds to decrease fall risk and improve safety.  11/4/24    ongoing   6. Pt will acquire custom manual WC to assist with independence and safety with long distance mobility.  11/1/24 ongoing   7. Pt will acquire left Bioness L300 to assist with ambulation and decrease foot drop to lower fall risk.  11/1/24 ongoing   8. Patient will increase his/her distance achieved on the 2 Minute Walk Test from 172 feet to 209 feet to demonstrate improved endurance and efficiency with community level gait. (MDC for CVA = 112 feet) 11/1/24 ongoing           Plan   Updated Plan of care Certification: 11/4/24- 1/13/25.       Outpatient Physical Therapy 2 times weekly for 10 weeks to include the following interventions: Electrical Stimulation as indicated, Gait Training, Manual Therapy, Moist Heat/ Ice, Neuromuscular Re-ed, Orthotic Management and Training, Patient Education, Therapeutic Activities, and Therapeutic Exercise.     Recommendations for next  treatment session:       Dianna Rosas, PT

## 2024-11-26 ENCOUNTER — PATIENT MESSAGE (OUTPATIENT)
Dept: NEUROLOGY | Facility: CLINIC | Age: 49
End: 2024-11-26
Payer: COMMERCIAL

## 2024-11-26 DIAGNOSIS — N31.9 NEUROGENIC BLADDER: Primary | ICD-10-CM

## 2024-11-29 RX ORDER — TAMSULOSIN HYDROCHLORIDE 0.4 MG/1
0.4 CAPSULE ORAL DAILY
Qty: 30 CAPSULE | Refills: 11 | Status: SHIPPED | OUTPATIENT
Start: 2024-11-29 | End: 2025-11-29

## 2024-12-02 ENCOUNTER — CLINICAL SUPPORT (OUTPATIENT)
Dept: REHABILITATION | Facility: HOSPITAL | Age: 49
End: 2024-12-02
Payer: COMMERCIAL

## 2024-12-02 DIAGNOSIS — Z74.09 IMPAIRED FUNCTIONAL MOBILITY, BALANCE, GAIT, AND ENDURANCE: Primary | ICD-10-CM

## 2024-12-02 PROCEDURE — 97530 THERAPEUTIC ACTIVITIES: CPT | Mod: PO

## 2024-12-02 NOTE — PROGRESS NOTES
"  [facility]   Physical Therapy  Treatment Note      Name: Shanthi Escoto  Clinic Number: [id number]    Therapy Diagnosis:   Encounter Diagnosis   Name Primary?    Impaired functional mobility, balance, gait, and endurance Yes       Physician: Arin Albarran MD    Visit Date: 12/2/2024    Physician Orders: PT Eval and Treat  " Neuro PT"  Medical Diagnosis from Referral:   Diagnosis   G35 (ICD-10-CM) - Multiple sclerosis      Evaluation Date: 7/16/2024  Authorization Period Expiration: 12/31/24  Plan of Care Expiration: 1/13/25   Progress Note Due: 8/17/24     Visit # / Visits authorized: 26/ 40 (27)   FOTO: 1/5    Time In:  4:46 pm         Time Out: 5:30 pm                Total Billable Time: 44 minutes    Precautions: Standard and Fall Orthopedic: Lumbar Fusion precautions  (unclear if these are current or have been lifted)     PTA visit 0/5      Subjective     Pt reports:  " I go for my first infusion on Wednesday."    She was compliant with home exercise program.    Response to previous treatment: good  Functional change: ongoing    Pain: 6/10           Location: low back pain       Objective      Objective Measures updated at progress report unless specified.     Treatment     Shanthi received the treatments listed below:        therapeutic activities to improve functional performance for 44  minutes, including:       - Pt received Manual electrical stimulation for 44 minutes after being cleared for all contradictions as follows: Pt received Functional Electrical stimulation with  FES lower extremity bike to elicit  sequential muscle contraction of the left tibialis anterior and gastroc/ soleus muscle groups.  Activity time includes skilled set-up for proper placement of electrodes and "muscle test" to determine optimal intensity to elicit a muscular contraction,  adjustment of pedal lever arms to promote forced use of left  lower extremity and securing of "Q-Straints" in " kenyon/cross pattern to stabilize chair. Training set-up for intensity training. Results as follows:     Distance travelled: 3.18  miles  Energy Expended: 2.2  kCal  Energy per hour 6.2  kCal/ hour  Average power: 7.2  W  Average Asymmetry: right  7%  Total therapy time:  23.08 min     Time in active (off motor) 22.52  min   Time in passive: (on motor): 0.16 min       Pt ambulated 120 ft with supervision with single point cane       Request for WC transport back to car.       Home Exercises Provided and Patient Education Provided     Education provided:       - educated about  FES bike and purpose. Reports understanding.     Assessment            Pt tolerated session well. FES bike to left gastroc/soleus/ tibialis anterior todayfor 23 min. Pt able to stay in active mode 23 min today  Pt still awaiting custom WC.Pt reports she is getting first infusion on Wednesday for MS. Pt also reporting she is feeling a little worse lately with the weather changes.       Shanthi Is progressing well towards her goals.   Pt prognosis is Good.     Pt will continue to benefit from skilled outpatient physical therapy to address the deficits listed in the problem list box on initial evaluation, provide pt/family education and to maximize pt's level of independence in the home and community environment.     Pt's spiritual, cultural and educational needs considered and pt agreeable to plan of care and goals.     Anticipated barriers to physical therapy: progressive disease process     Goals:        Short Term Goals: 5 weeks Date last assessed:  Status:    1. Patient will be provided with an HEP for strength, endurance and balance.  7/16/2024    MET   2.  Patient will demonstrate improve endurance and activity tolerance as evidenced by ability to perform Nu-step x 15 minutes without rests breaks. 11/4/24 ongoing            Long Term Goals: 10 weeks  Date last assessed: Status:    1. Patient will be independent and compliant with updated  HEP. 9/9/24    ongoing   2.  Patient will increase her   gait speed as assessed by the timed 10MWT from 0.44 m/s to 0.58 m/s to increase her safety and (I) with gait at a community level. (MDC for CVA= 0.14 m/s)     11/4/24    Progressing       3. The patient will demonstrate improved efficiency with functional mobility and decreased risk for falls as evidenced by an increase in her score on the Timed up and Go from 17.9 to 15.0. (Minimal detectable change in chronic stroke = 2.9 seconds)  11/4/24 ongoing   4.Patient will improve her FOTO score from 42  to at least 57 for improved self perception of functional mobility.(score 0-100, high score indicates greater level of function) 11/4/24 ongoing   5. Pt will improve 5x sit to stand score from 13.5 sec to less than 12 seconds to decrease fall risk and improve safety.  11/4/24    ongoing   6. Pt will acquire custom manual WC to assist with independence and safety with long distance mobility.  11/1/24 ongoing   7. Pt will acquire left Bioness L300 to assist with ambulation and decrease foot drop to lower fall risk.  11/1/24 ongoing   8. Patient will increase his/her distance achieved on the 2 Minute Walk Test from 172 feet to 209 feet to demonstrate improved endurance and efficiency with community level gait. (MDC for CVA = 112 feet) 11/1/24 ongoing           Plan   Updated Plan of care Certification: 11/4/24- 1/13/25       Outpatient Physical Therapy 2 times weekly for 10 weeks to include the following interventions: Electrical Stimulation as indicated, Gait Training, Manual Therapy, Moist Heat/ Ice, Neuromuscular Re-ed, Orthotic Management and Training, Patient Education, Therapeutic Activities, and Therapeutic Exercise.     Recommendations for next treatment session:   - standing exercises    Dianna Rosas, PT

## 2024-12-04 ENCOUNTER — INFUSION (OUTPATIENT)
Dept: INFUSION THERAPY | Facility: HOSPITAL | Age: 49
End: 2024-12-04
Attending: PSYCHIATRY & NEUROLOGY
Payer: COMMERCIAL

## 2024-12-04 VITALS
DIASTOLIC BLOOD PRESSURE: 77 MMHG | HEIGHT: 64 IN | TEMPERATURE: 98 F | SYSTOLIC BLOOD PRESSURE: 121 MMHG | HEART RATE: 92 BPM | OXYGEN SATURATION: 98 % | RESPIRATION RATE: 18 BRPM | WEIGHT: 211.19 LBS | BODY MASS INDEX: 36.05 KG/M2

## 2024-12-04 DIAGNOSIS — G35 MULTIPLE SCLEROSIS: Primary | ICD-10-CM

## 2024-12-04 PROCEDURE — 25000003 PHARM REV CODE 250: Mod: PN | Performed by: PSYCHIATRY & NEUROLOGY

## 2024-12-04 PROCEDURE — 96367 TX/PROPH/DG ADDL SEQ IV INF: CPT | Mod: PN

## 2024-12-04 PROCEDURE — 96366 THER/PROPH/DIAG IV INF ADDON: CPT | Mod: PN

## 2024-12-04 PROCEDURE — A4216 STERILE WATER/SALINE, 10 ML: HCPCS | Mod: PN | Performed by: PSYCHIATRY & NEUROLOGY

## 2024-12-04 PROCEDURE — 63600175 PHARM REV CODE 636 W HCPCS: Mod: PN | Performed by: PSYCHIATRY & NEUROLOGY

## 2024-12-04 PROCEDURE — 96375 TX/PRO/DX INJ NEW DRUG ADDON: CPT | Mod: PN

## 2024-12-04 PROCEDURE — 96365 THER/PROPH/DIAG IV INF INIT: CPT | Mod: PN

## 2024-12-04 RX ORDER — ACETAMINOPHEN 500 MG
1000 TABLET ORAL
Status: COMPLETED | OUTPATIENT
Start: 2024-12-04 | End: 2024-12-04

## 2024-12-04 RX ORDER — DIPHENHYDRAMINE HYDROCHLORIDE 50 MG/ML
50 INJECTION INTRAMUSCULAR; INTRAVENOUS
OUTPATIENT
Start: 2024-12-18

## 2024-12-04 RX ORDER — METHYLPREDNISOLONE SOD SUCC 125 MG
100 VIAL (EA) INJECTION
Status: COMPLETED | OUTPATIENT
Start: 2024-12-04 | End: 2024-12-04

## 2024-12-04 RX ORDER — METHYLPREDNISOLONE SOD SUCC 125 MG
100 VIAL (EA) INJECTION
Start: 2024-12-18

## 2024-12-04 RX ORDER — EPINEPHRINE 0.3 MG/.3ML
0.3 INJECTION SUBCUTANEOUS
OUTPATIENT
Start: 2024-12-18

## 2024-12-04 RX ORDER — ACETAMINOPHEN 500 MG
1000 TABLET ORAL
OUTPATIENT
Start: 2024-12-18

## 2024-12-04 RX ORDER — SODIUM CHLORIDE 0.9 % (FLUSH) 0.9 %
10 SYRINGE (ML) INJECTION
Status: DISCONTINUED | OUTPATIENT
Start: 2024-12-04 | End: 2024-12-04 | Stop reason: HOSPADM

## 2024-12-04 RX ORDER — HEPARIN 100 UNIT/ML
500 SYRINGE INTRAVENOUS
OUTPATIENT
Start: 2024-12-18

## 2024-12-04 RX ORDER — EPINEPHRINE 0.3 MG/.3ML
0.3 INJECTION SUBCUTANEOUS
Status: DISCONTINUED | OUTPATIENT
Start: 2024-12-04 | End: 2024-12-04 | Stop reason: HOSPADM

## 2024-12-04 RX ORDER — SODIUM CHLORIDE 0.9 % (FLUSH) 0.9 %
10 SYRINGE (ML) INJECTION
OUTPATIENT
Start: 2024-12-18

## 2024-12-04 RX ORDER — DIPHENHYDRAMINE HYDROCHLORIDE 50 MG/ML
50 INJECTION INTRAMUSCULAR; INTRAVENOUS
Status: DISCONTINUED | OUTPATIENT
Start: 2024-12-04 | End: 2024-12-04 | Stop reason: HOSPADM

## 2024-12-04 RX ORDER — FAMOTIDINE 10 MG/ML
20 INJECTION INTRAVENOUS
OUTPATIENT
Start: 2024-12-18

## 2024-12-04 RX ORDER — FAMOTIDINE 10 MG/ML
20 INJECTION INTRAVENOUS
Status: COMPLETED | OUTPATIENT
Start: 2024-12-04 | End: 2024-12-04

## 2024-12-04 RX ADMIN — SODIUM CHLORIDE: 9 INJECTION, SOLUTION INTRAVENOUS at 10:12

## 2024-12-04 RX ADMIN — METHYLPREDNISOLONE SODIUM SUCCINATE 100 MG: 125 INJECTION, POWDER, FOR SOLUTION INTRAMUSCULAR; INTRAVENOUS at 10:12

## 2024-12-04 RX ADMIN — DIPHENHYDRAMINE HYDROCHLORIDE 50 MG: 50 INJECTION, SOLUTION INTRAMUSCULAR; INTRAVENOUS at 11:12

## 2024-12-04 RX ADMIN — Medication 10 ML: at 10:12

## 2024-12-04 RX ADMIN — SODIUM CHLORIDE 150 MG: 9 INJECTION, SOLUTION INTRAVENOUS at 11:12

## 2024-12-04 RX ADMIN — ACETAMINOPHEN 1000 MG: 500 TABLET ORAL at 10:12

## 2024-12-04 RX ADMIN — FAMOTIDINE 20 MG: 10 INJECTION INTRAVENOUS at 11:12

## 2024-12-04 NOTE — PLAN OF CARE
Problem: Fatigue  Goal: Improved Activity Tolerance  Intervention: Promote Improved Energy  Flowsheets (Taken 12/4/2024 1121)  Fatigue Management:   activity schedule adjusted   activity assistance provided   fatigue-related activity identified   frequent rest breaks encouraged   paced activity encouraged  Sleep/Rest Enhancement:   relaxation techniques promoted   regular sleep/rest pattern promoted   natural light exposure provided  Activity Management:   Ambulated -L4   Ambulated to bathroom - L4   Ambulated in grimm - L4   Up in stretcher chair - L1  Environmental Support:   calm environment promoted   personal routine supported   rest periods encouraged     Problem: Adult Inpatient Plan of Care  Goal: Patient-Specific Goal (Individualized)  Outcome: Progressing  Flowsheets (Taken 12/4/2024 1121)  Individualized Care Needs: recliner, warm blanket, 2 pillow, conversation, dim lights, tv, boyfriend at chairside  Anxieties, Fears or Concerns: First Briumvi treatment  Patient/Family-Specific Goals (Include Timeframe): no s/s of reaction during treatment

## 2024-12-04 NOTE — PLAN OF CARE
Problem: Adult Inpatient Plan of Care  Goal: Plan of Care Review  12/4/2024 1655 by Pallavi Clement RN  Outcome: Progressing  Flowsheets (Taken 12/4/2024 1600)  Plan of Care Reviewed With:   patient   spouse   Pt tolerated her Briumvi infusion well, NAD. No new c/o voiced. Pt given a schedule and reviewed, pt verbalized understanding. Pt wheeled out of the clinic via WC by her spouse.

## 2024-12-09 ENCOUNTER — CLINICAL SUPPORT (OUTPATIENT)
Dept: REHABILITATION | Facility: HOSPITAL | Age: 49
End: 2024-12-09
Payer: COMMERCIAL

## 2024-12-09 DIAGNOSIS — Z74.09 IMPAIRED FUNCTIONAL MOBILITY, BALANCE, GAIT, AND ENDURANCE: Primary | ICD-10-CM

## 2024-12-09 PROCEDURE — 97530 THERAPEUTIC ACTIVITIES: CPT | Mod: PO

## 2024-12-09 NOTE — PROGRESS NOTES
"  [facility]   Physical Therapy  Treatment Note      Name: Shanthi Escoto  Clinic Number: [id number]    Therapy Diagnosis:   Encounter Diagnosis   Name Primary?    Impaired functional mobility, balance, gait, and endurance Yes       Physician: Arin Albarran MD    Visit Date: 12/9/2024    Physician Orders: PT Eval and Treat  " Neuro PT"  Medical Diagnosis from Referral:   Diagnosis   G35 (ICD-10-CM) - Multiple sclerosis      Evaluation Date: 7/16/2024  Authorization Period Expiration: 12/31/24  Plan of Care Expiration: 1/13/25   Progress Note Due: 8/17/24     Visit # / Visits authorized: 27/ 40 (28)   FOTO: 1/5    Time In:  4:45 pm       Time Out: 5:30 am                 Total Billable Time: 45 minutes    Precautions: Standard and Fall Orthopedic: Lumbar Fusion precautions  (unclear if these are current or have been lifted)     PTA visit 0/5      Subjective     Pt reports: "the infusion went really good." Goes back on 18th for 4 hour infusion.     She was compliant with home exercise program.    Response to previous treatment: good  Functional change: ongoing    Pain: 6/10          Location: low back pain       Objective      Objective Measures updated at progress report unless specified.     BP after  129/87 mmhg 84 bpm.     Treatment     Shanthi received the treatments listed below:        therapeutic activities to improve functional performance for 45  minutes, including:      Parallel bars:   - standing marches 3x10   - hip abduction 3x10   - calf raises 3x10   - hamstring curls 3x10     Reports headache with lights. BP taken see above.     Seated skateboard each  lower extremity 3x10       Pt ambulated 120 ft with supervision with single point cane       Request for WC transport back to car.       Home Exercises Provided and Patient Education Provided     Education provided:       - educated about standing exercises. Reports understanding.       Assessment            Pt " tolerated session well. Working on standing exercises in parallel bars. Pt reporting a headache with the lights. BP was checked. See above. Pt continued with sitting exercises. Assisted to car with WC due to fatigue. Remains motivated to improve and a good candidate for PT.       Shanthi Is progressing well towards her goals.   Pt prognosis is Good.     Pt will continue to benefit from skilled outpatient physical therapy to address the deficits listed in the problem list box on initial evaluation, provide pt/family education and to maximize pt's level of independence in the home and community environment.     Pt's spiritual, cultural and educational needs considered and pt agreeable to plan of care and goals.     Anticipated barriers to physical therapy: progressive disease process     Goals:        Short Term Goals: 5 weeks Date last assessed:  Status:    1. Patient will be provided with an HEP for strength, endurance and balance.  7/16/2024    MET   2.  Patient will demonstrate improve endurance and activity tolerance as evidenced by ability to perform Nu-step x 15 minutes without rests breaks. 11/4/24 ongoing            Long Term Goals: 10 weeks  Date last assessed: Status:    1. Patient will be independent and compliant with updated HEP. 9/9/24    ongoing   2.  Patient will increase her   gait speed as assessed by the timed 10MWT from 0.44 m/s to 0.58 m/s to increase her safety and (I) with gait at a community level. (MDC for CVA= 0.14 m/s)     11/4/24    Progressing       3. The patient will demonstrate improved efficiency with functional mobility and decreased risk for falls as evidenced by an increase in her score on the Timed up and Go from 17.9 to 15.0. (Minimal detectable change in chronic stroke = 2.9 seconds)  11/4/24 ongoing   4.Patient will improve her FOTO score from 42  to at least 57 for improved self perception of functional mobility.(score 0-100, high score indicates greater level of function)  11/4/24 ongoing   5. Pt will improve 5x sit to stand score from 13.5 sec to less than 12 seconds to decrease fall risk and improve safety.  11/4/24    ongoing   6. Pt will acquire custom manual WC to assist with independence and safety with long distance mobility.  11/1/24 ongoing   7. Pt will acquire left Bioness L300 to assist with ambulation and decrease foot drop to lower fall risk.  11/1/24 ongoing   8. Patient will increase his/her distance achieved on the 2 Minute Walk Test from 172 feet to 209 feet to demonstrate improved endurance and efficiency with community level gait. (MDC for CVA = 112 feet) 11/1/24 ongoing           Plan   Updated Plan of care Certification: 11/4/24- 1/13/25 .       Outpatient Physical Therapy 2 times weekly for 10 weeks to include the following interventions: Electrical Stimulation as indicated, Gait Training, Manual Therapy, Moist Heat/ Ice, Neuromuscular Re-ed, Orthotic Management and Training, Patient Education, Therapeutic Activities, and Therapeutic Exercise.     Recommendations for next treatment session:     - standing exercises    Dianna Rosas, PT

## 2024-12-13 ENCOUNTER — CLINICAL SUPPORT (OUTPATIENT)
Dept: REHABILITATION | Facility: HOSPITAL | Age: 49
End: 2024-12-13
Payer: COMMERCIAL

## 2024-12-13 DIAGNOSIS — Z74.09 IMPAIRED FUNCTIONAL MOBILITY, BALANCE, GAIT, AND ENDURANCE: Primary | ICD-10-CM

## 2024-12-13 PROCEDURE — 97530 THERAPEUTIC ACTIVITIES: CPT | Mod: PO

## 2024-12-13 NOTE — PROGRESS NOTES
" [facility]   Physical Therapy  Treatment Note      Name: Shanthi Escoto  Clinic Number: [id number]    Therapy Diagnosis:   Encounter Diagnosis   Name Primary?    Impaired functional mobility, balance, gait, and endurance Yes         Physician: Arin Albarran MD    Visit Date: 12/13/2024    Physician Orders: PT Eval and Treat  " Neuro PT"  Medical Diagnosis from Referral:   Diagnosis   G35 (ICD-10-CM) - Multiple sclerosis      Evaluation Date: 7/16/2024  Authorization Period Expiration: 12/31/24  Plan of Care Expiration: 1/13/25   Progress Note Due: 8/17/24     Visit # / Visits authorized: 27/ 40 (28)   FOTO: 1/5    Time In:  2:33 pm       Time Out: 3:15 pm              Total Billable Time: 42 minutes    Precautions: Standard and Fall Orthopedic: Lumbar Fusion precautions  (unclear if these are current or have been lifted)     PTA visit 0/5      Subjective     Pt reports: "the WC got denied again."      She was compliant with home exercise program.    Response to previous treatment: good  Functional change: ongoing    Pain: 5/10          Location: low back pain       Objective      Objective Measures updated at progress report unless specified.     BP after  129/87 mmhg 84 bpm.     Treatment     Shanthi received the treatments listed below:      therapeutic activities to improve functional performance for 42  minutes, including:      Education provided see below.     - Pt received Manual electrical stimulation for 17 minutes after being cleared for all contradictions as follows: Pt received Functional Electrical stimulation with  FES lower extremity bike to elicit  sequential muscle contraction of the left tibialis anterior and gastroc/ soleus muscle groups.  Activity time includes skilled set-up for proper placement of electrodes and "muscle test" to determine optimal intensity to elicit a muscular contraction,  adjustment of pedal lever arms to promote forced use of left  " Case Management Discharge Note    Final Note: Discharged home     Discharge Placement     No information found        Other:  (Private Car )    Discharge Codes: 01  Discharge to home   "lower extremity and securing of "Q-Straints" in kenyon/cross pattern to stabilize chair. Training set-up for intensity training. Results as follows:      Distance travelled: 2.18   miles  Energy Expended: 1.4   kCal  Energy per hour 5.6   kCal/ hour  Average power: 6.5   W  Average Asymmetry:  right 9%   Total therapy time:  16.29  min     Time in active (off motor) 14.33   min   Time in passive: (on motor): 1.56  min     Pt ambulated 100 ft with supervision with single point cane       required for WC transport back to car due to fatigue       Home Exercises Provided and Patient Education Provided     Education provided:       - educated about reaching out to EC vendor to check on denial and next steps. Reports understanding.       Assessment             Pt tolerated session well. FES bike performed today for 17 min. Pt able to increase resistance on FES bike today. Pt does endorse feeling tired after FES bike. Pt reporting custom WC got denied. Therapist reached out to WC vendor [employer] to discuss next steps. Pt remains motivated to improve and a good candidate for PT       Shanthi Is progressing well towards her goals.   Pt prognosis is Good.     Pt will continue to benefit from skilled outpatient physical therapy to address the deficits listed in the problem list box on initial evaluation, provide pt/family education and to maximize pt's level of independence in the home and community environment.     Pt's spiritual, cultural and educational needs considered and pt agreeable to plan of care and goals.     Anticipated barriers to physical therapy: progressive disease process     Goals:        Short Term Goals: 5 weeks Date last assessed:  Status:    1. Patient will be provided with an HEP for strength, endurance and balance.  7/16/2024    MET   2.  Patient will demonstrate improve endurance and activity tolerance as evidenced by ability to perform Nu-step x 15 minutes without rests breaks. 11/4/24 ongoing          "   Long Term Goals: 10 weeks  Date last assessed: Status:    1. Patient will be independent and compliant with updated HEP. 9/9/24    ongoing   2.  Patient will increase her   gait speed as assessed by the timed 10MWT from 0.44 m/s to 0.58 m/s to increase her safety and (I) with gait at a community level. (MDC for CVA= 0.14 m/s)     11/4/24    Progressing       3. The patient will demonstrate improved efficiency with functional mobility and decreased risk for falls as evidenced by an increase in her score on the Timed up and Go from 17.9 to 15.0. (Minimal detectable change in chronic stroke = 2.9 seconds)  11/4/24 ongoing   4.Patient will improve her FOTO score from 42  to at least 57 for improved self perception of functional mobility.(score 0-100, high score indicates greater level of function) 11/4/24 ongoing   5. Pt will improve 5x sit to stand score from 13.5 sec to less than 12 seconds to decrease fall risk and improve safety.  11/4/24    ongoing   6. Pt will acquire custom manual WC to assist with independence and safety with long distance mobility.  11/1/24 ongoing   7. Pt will acquire left Bioness L300 to assist with ambulation and decrease foot drop to lower fall risk.  11/1/24 ongoing   8. Patient will increase his/her distance achieved on the 2 Minute Walk Test from 172 feet to 209 feet to demonstrate improved endurance and efficiency with community level gait. (MDC for CVA = 112 feet) 11/1/24 ongoing           Plan   Updated Plan of care Certification: 11/4/24- 1/13/25 .       Outpatient Physical Therapy 2 times weekly for 10 weeks to include the following interventions: Electrical Stimulation as indicated, Gait Training, Manual Therapy, Moist Heat/ Ice, Neuromuscular Re-ed, Orthotic Management and Training, Patient Education, Therapeutic Activities, and Therapeutic Exercise.     Recommendations for next treatment session:     - standing exercises      Dianna Rosas, PT

## 2024-12-16 ENCOUNTER — CLINICAL SUPPORT (OUTPATIENT)
Dept: REHABILITATION | Facility: HOSPITAL | Age: 49
End: 2024-12-16
Payer: COMMERCIAL

## 2024-12-16 ENCOUNTER — PATIENT MESSAGE (OUTPATIENT)
Dept: PSYCHIATRY | Facility: CLINIC | Age: 49
End: 2024-12-16
Payer: COMMERCIAL

## 2024-12-16 DIAGNOSIS — Z74.09 IMPAIRED FUNCTIONAL MOBILITY, BALANCE, GAIT, AND ENDURANCE: Primary | ICD-10-CM

## 2024-12-16 PROCEDURE — 97110 THERAPEUTIC EXERCISES: CPT | Mod: PO

## 2024-12-16 NOTE — PROGRESS NOTES
"  [facility]   Physical Therapy  Treatment Note      Name: Shanthi Escoto  Clinic Number: [id number]    Therapy Diagnosis:   Encounter Diagnosis   Name Primary?    Impaired functional mobility, balance, gait, and endurance Yes       Physician: Arin Albarran MD    Visit Date: 12/16/2024    Physician Orders: PT Eval and Treat  " Neuro PT"  Medical Diagnosis from Referral:   Diagnosis   G35 (ICD-10-CM) - Multiple sclerosis      Evaluation Date: 7/16/2024  Authorization Period Expiration: 12/31/24  Plan of Care Expiration: 1/13/25   Progress Note Due: 8/17/24     Visit # / Visits authorized: 29/ 40 (30)   FOTO: 1/5    Time In: 5:32 pm        Time Out: 6:10 pm                Total Billable Time: 38 minutes    Precautions: Standard and Fall Orthopedic: Lumbar Fusion precautions  (unclear if these are current or have been lifted)     PTA visit 0/5      Subjective     Pt reports: "I cut my finger by accident today."    She was compliant with home exercise program.    Response to previous treatment: good  Functional change: ongoing    Pain: 5/10          Location: low back pain       Objective      Objective Measures updated at progress report unless specified.       Treatment     Shanthi received the treatments listed below:        Shanthi received therapeutic exercises to develop strength, endurance, and ROM for 38 minutes including:     Shuttle leg press bilateral lower extremity 50 # 3x10   Shuttle single leg press 31# 3x10 each lower extremity     Pt ambulated 100 ft with supervision with single point cane       required for WC transport back to car due to fatigue       Home Exercises Provided and Patient Education Provided     Education provided:       - educated about shuttle leg press. Reports understanding.     Assessment              Pt tolerated session well.Focusing on bilateral lower extremity strength on shuttle leg press. Able to tolerate 50# with bilateral lower extremity  " and 31# with single leg press with rest breaks in between. Pt requiring WC assist to car due to fatigue.       Shanthi Is progressing well towards her goals.   Pt prognosis is Good.     Pt will continue to benefit from skilled outpatient physical therapy to address the deficits listed in the problem list box on initial evaluation, provide pt/family education and to maximize pt's level of independence in the home and community environment.     Pt's spiritual, cultural and educational needs considered and pt agreeable to plan of care and goals.     Anticipated barriers to physical therapy: progressive disease process     Goals:        Short Term Goals: 5 weeks Date last assessed:  Status:    1. Patient will be provided with an HEP for strength, endurance and balance.  7/16/2024    MET   2.  Patient will demonstrate improve endurance and activity tolerance as evidenced by ability to perform Nu-step x 15 minutes without rests breaks. 11/4/24 ongoing            Long Term Goals: 10 weeks  Date last assessed: Status:    1. Patient will be independent and compliant with updated HEP. 9/9/24    ongoing   2.  Patient will increase her   gait speed as assessed by the timed 10MWT from 0.44 m/s to 0.58 m/s to increase her safety and (I) with gait at a community level. (MDC for CVA= 0.14 m/s)     11/4/24    Progressing       3. The patient will demonstrate improved efficiency with functional mobility and decreased risk for falls as evidenced by an increase in her score on the Timed up and Go from 17.9 to 15.0. (Minimal detectable change in chronic stroke = 2.9 seconds)  11/4/24 ongoing   4.Patient will improve her FOTO score from 42  to at least 57 for improved self perception of functional mobility.(score 0-100, high score indicates greater level of function) 11/4/24 ongoing   5. Pt will improve 5x sit to stand score from 13.5 sec to less than 12 seconds to decrease fall risk and improve safety.  11/4/24    ongoing   6. Pt will  acquire custom manual WC to assist with independence and safety with long distance mobility.  11/1/24 ongoing   7. Pt will acquire left Bioness L300 to assist with ambulation and decrease foot drop to lower fall risk.  11/1/24 ongoing   8. Patient will increase his/her distance achieved on the 2 Minute Walk Test from 172 feet to 209 feet to demonstrate improved endurance and efficiency with community level gait. (MDC for CVA = 112 feet) 11/1/24 ongoing           Plan   Updated Plan of care Certification: 11/4/24- 1/13/25 .       Outpatient Physical Therapy 2 times weekly for 10 weeks to include the following interventions: Electrical Stimulation as indicated, Gait Training, Manual Therapy, Moist Heat/ Ice, Neuromuscular Re-ed, Orthotic Management and Training, Patient Education, Therapeutic Activities, and Therapeutic Exercise.     Recommendations for next treatment session:     - standing exercises        Dianna Rosas, PT

## 2024-12-18 ENCOUNTER — INFUSION (OUTPATIENT)
Dept: INFUSION THERAPY | Facility: HOSPITAL | Age: 49
End: 2024-12-18
Attending: PSYCHIATRY & NEUROLOGY
Payer: COMMERCIAL

## 2024-12-18 VITALS
OXYGEN SATURATION: 97 % | TEMPERATURE: 99 F | SYSTOLIC BLOOD PRESSURE: 127 MMHG | BODY MASS INDEX: 35.64 KG/M2 | WEIGHT: 208.75 LBS | DIASTOLIC BLOOD PRESSURE: 74 MMHG | HEART RATE: 67 BPM | HEIGHT: 64 IN | RESPIRATION RATE: 19 BRPM

## 2024-12-18 DIAGNOSIS — G35 MULTIPLE SCLEROSIS: Primary | ICD-10-CM

## 2024-12-18 PROCEDURE — 96375 TX/PRO/DX INJ NEW DRUG ADDON: CPT | Mod: PN

## 2024-12-18 PROCEDURE — 25000003 PHARM REV CODE 250: Mod: PN | Performed by: PSYCHIATRY & NEUROLOGY

## 2024-12-18 PROCEDURE — 96367 TX/PROPH/DG ADDL SEQ IV INF: CPT | Mod: PN

## 2024-12-18 PROCEDURE — 96365 THER/PROPH/DIAG IV INF INIT: CPT | Mod: PN

## 2024-12-18 PROCEDURE — 63600175 PHARM REV CODE 636 W HCPCS: Mod: JZ,TB,PN | Performed by: PSYCHIATRY & NEUROLOGY

## 2024-12-18 RX ORDER — ACETAMINOPHEN 500 MG
1000 TABLET ORAL
Status: COMPLETED | OUTPATIENT
Start: 2024-12-18 | End: 2024-12-18

## 2024-12-18 RX ORDER — HEPARIN 100 UNIT/ML
500 SYRINGE INTRAVENOUS
OUTPATIENT
Start: 2024-12-18

## 2024-12-18 RX ORDER — DIPHENHYDRAMINE HYDROCHLORIDE 50 MG/ML
50 INJECTION INTRAMUSCULAR; INTRAVENOUS
OUTPATIENT
Start: 2024-12-18

## 2024-12-18 RX ORDER — METHYLPREDNISOLONE SOD SUCC 125 MG
100 VIAL (EA) INJECTION
Start: 2024-12-18

## 2024-12-18 RX ORDER — SODIUM CHLORIDE 0.9 % (FLUSH) 0.9 %
10 SYRINGE (ML) INJECTION
Status: DISCONTINUED | OUTPATIENT
Start: 2024-12-18 | End: 2024-12-18 | Stop reason: HOSPADM

## 2024-12-18 RX ORDER — METHYLPREDNISOLONE SOD SUCC 125 MG
100 VIAL (EA) INJECTION
Status: COMPLETED | OUTPATIENT
Start: 2024-12-18 | End: 2024-12-18

## 2024-12-18 RX ORDER — HEPARIN 100 UNIT/ML
500 SYRINGE INTRAVENOUS
Status: DISCONTINUED | OUTPATIENT
Start: 2024-12-18 | End: 2024-12-18 | Stop reason: HOSPADM

## 2024-12-18 RX ORDER — SODIUM CHLORIDE 0.9 % (FLUSH) 0.9 %
10 SYRINGE (ML) INJECTION
OUTPATIENT
Start: 2024-12-18

## 2024-12-18 RX ORDER — EPINEPHRINE 0.3 MG/.3ML
0.3 INJECTION SUBCUTANEOUS
OUTPATIENT
Start: 2024-12-18

## 2024-12-18 RX ORDER — FAMOTIDINE 10 MG/ML
20 INJECTION INTRAVENOUS
OUTPATIENT
Start: 2024-12-18

## 2024-12-18 RX ORDER — ACETAMINOPHEN 500 MG
1000 TABLET ORAL
OUTPATIENT
Start: 2024-12-18

## 2024-12-18 RX ORDER — FAMOTIDINE 10 MG/ML
20 INJECTION INTRAVENOUS
Status: COMPLETED | OUTPATIENT
Start: 2024-12-18 | End: 2024-12-18

## 2024-12-18 RX ADMIN — DIPHENHYDRAMINE HYDROCHLORIDE 50 MG: 50 INJECTION, SOLUTION INTRAMUSCULAR; INTRAVENOUS at 11:12

## 2024-12-18 RX ADMIN — FAMOTIDINE 20 MG: 10 INJECTION INTRAVENOUS at 11:12

## 2024-12-18 RX ADMIN — UBLITUXIMAB 450 MG: 150 INJECTION, SOLUTION, CONCENTRATE INTRAVENOUS at 12:12

## 2024-12-18 RX ADMIN — ACETAMINOPHEN 1000 MG: 500 TABLET ORAL at 11:12

## 2024-12-18 RX ADMIN — METHYLPREDNISOLONE SODIUM SUCCINATE 100 MG: 125 INJECTION, POWDER, FOR SOLUTION INTRAMUSCULAR; INTRAVENOUS at 11:12

## 2024-12-18 NOTE — PLAN OF CARE
Problem: Adult Inpatient Plan of Care  Goal: Plan of Care Review  Outcome: Progressing  Flowsheets (Taken 12/18/2024 1348)  Plan of Care Reviewed With: patient  Goal: Patient-Specific Goal (Individualized)  Outcome: Progressing  Flowsheets (Taken 12/18/2024 1348)  Individualized Care Needs: recliner, snacks, blanket, partner at chairside  Anxieties, Fears or Concerns: none     Problem: Fatigue  Goal: Improved Activity Tolerance  Outcome: Progressing  Intervention: Promote Improved Energy  Flowsheets (Taken 12/18/2024 1348)  Fatigue Management: paced activity encouraged  Sleep/Rest Enhancement:   awakenings minimized   natural light exposure provided   relaxation techniques promoted  Activity Management:   Ambulated -L4   Up in chair - L3   Ambulated to bathroom - L4  Environmental Support:   calm environment promoted   environmental consistency promoted   distractions minimized   Pt. tolerated infusion well. No adverse reactions noted. VS stable, NAD noted. Discussed future appointments. Pt. discharged to home via wheelchair, accompanied by partner.

## 2024-12-20 ENCOUNTER — CLINICAL SUPPORT (OUTPATIENT)
Dept: REHABILITATION | Facility: HOSPITAL | Age: 49
End: 2024-12-20
Payer: COMMERCIAL

## 2024-12-20 DIAGNOSIS — Z74.09 IMPAIRED FUNCTIONAL MOBILITY, BALANCE, GAIT, AND ENDURANCE: Primary | ICD-10-CM

## 2024-12-20 PROCEDURE — 97530 THERAPEUTIC ACTIVITIES: CPT | Mod: PO

## 2024-12-20 NOTE — PROGRESS NOTES
"  [facility]   Physical Therapy  Treatment Note      Name: Shanthi Escoto  Clinic Number: [id number]    Therapy Diagnosis:   Encounter Diagnosis   Name Primary?    Impaired functional mobility, balance, gait, and endurance Yes       Physician: Arin Albarran MD    Visit Date: 12/20/2024    Physician Orders: PT Eval and Treat  " Neuro PT"  Medical Diagnosis from Referral:   Diagnosis   G35 (ICD-10-CM) - Multiple sclerosis      Evaluation Date: 7/16/2024  Authorization Period Expiration: 12/31/24  Plan of Care Expiration: 1/13/25   Progress Note Due: 8/17/24     Visit # / Visits authorized: 29/ 40 (30)   FOTO: 1/5    Time In: 2:30 pm          Time Out:  3:13 pm              Total Billable Time: 43  minutes    Precautions: Standard and Fall Orthopedic: Lumbar Fusion precautions  (unclear if these are current or have been lifted)     PTA visit 0/5      Subjective     Pt reports: "I am doing ok. Anxious about my trip tomorrow."    She was compliant with home exercise program.    Response to previous treatment: good  Functional change: ongoing    Pain: 4/10         Location: low back pain       Objective      Objective Measures updated at progress report unless specified.       Treatment     Shanthi received the treatments listed below:      therapeutic activities to improve functional performance for 43 minutes, including:    Parallel bars:   - alternating toe tap on 4 inch step 2x10 each lower extremity bilateral upper extremity support  - lateral toe taps on 4 inch step 2x10 each lower extremity bilateral upper extremity support   - standing marches 2x10 each lower extremity   - standing hamstring curls 2x10 each lower extremity   - calf raises 2x10 bilateral lower extremity     Pt ambulated 100 ft with supervision with single point cane     required for WC transport back to car due to fatigue     Home Exercises Provided and Patient Education Provided     Education provided:       - " educated about importance of HEP . Reports understanding.     Assessment       Pt tolerated session well. Working on bilateral lower extremity strength and endurance in parallel bars . Pt requires rest breaks throughout. Required WC transport back to car for fatigue. Remains motivated to improve and a good candidate for PT.         Shanthi Is progressing well towards her goals.   Pt prognosis is Good.     Pt will continue to benefit from skilled outpatient physical therapy to address the deficits listed in the problem list box on initial evaluation, provide pt/family education and to maximize pt's level of independence in the home and community environment.     Pt's spiritual, cultural and educational needs considered and pt agreeable to plan of care and goals.     Anticipated barriers to physical therapy: progressive disease process     Goals:        Short Term Goals: 5 weeks Date last assessed:  Status:    1. Patient will be provided with an HEP for strength, endurance and balance.  7/16/2024    MET   2.  Patient will demonstrate improve endurance and activity tolerance as evidenced by ability to perform Nu-step x 15 minutes without rests breaks. 11/4/24 ongoing            Long Term Goals: 10 weeks  Date last assessed: Status:    1. Patient will be independent and compliant with updated HEP. 9/9/24    ongoing   2.  Patient will increase her   gait speed as assessed by the timed 10MWT from 0.44 m/s to 0.58 m/s to increase her safety and (I) with gait at a community level. (MDC for CVA= 0.14 m/s)     11/4/24    Progressing       3. The patient will demonstrate improved efficiency with functional mobility and decreased risk for falls as evidenced by an increase in her score on the Timed up and Go from 17.9 to 15.0. (Minimal detectable change in chronic stroke = 2.9 seconds)  11/4/24 ongoing   4.Patient will improve her FOTO score from 42  to at least 57 for improved self perception of functional mobility.(score  0-100, high score indicates greater level of function) 11/4/24 ongoing   5. Pt will improve 5x sit to stand score from 13.5 sec to less than 12 seconds to decrease fall risk and improve safety.  11/4/24    ongoing   6. Pt will acquire custom manual WC to assist with independence and safety with long distance mobility.  11/1/24 ongoing   7. Pt will acquire left Bioness L300 to assist with ambulation and decrease foot drop to lower fall risk.  11/1/24 ongoing   8. Patient will increase his/her distance achieved on the 2 Minute Walk Test from 172 feet to 209 feet to demonstrate improved endurance and efficiency with community level gait. (MDC for CVA = 112 feet) 11/1/24 ongoing           Plan   Updated Plan of care Certification: 11/4/24- 1/13/25 .       Outpatient Physical Therapy 2 times weekly for 10 weeks to include the following interventions: Electrical Stimulation as indicated, Gait Training, Manual Therapy, Moist Heat/ Ice, Neuromuscular Re-ed, Orthotic Management and Training, Patient Education, Therapeutic Activities, and Therapeutic Exercise.     Recommendations for next treatment session:     - standing exercises        Dianna Rosas, PT

## 2025-01-03 NOTE — PROGRESS NOTES
"  [facility]   Physical Therapy  Treatment Note / progress note     Name: Shanthi Escoto  Clinic Number: [id number]    Therapy Diagnosis:   Encounter Diagnosis   Name Primary?    Impaired functional mobility, balance, gait, and endurance Yes       Physician: Arin Albarran MD    Visit Date: 1/6/2025    Physician Orders: PT Eval and Treat  " Neuro PT"  Medical Diagnosis from Referral:   Diagnosis   G35 (ICD-10-CM) - Multiple sclerosis      Evaluation Date: 7/16/2024  Authorization Period Expiration: 12/31/24  Plan of Care Expiration: 1/13/25   Progress Note Due: 1/13/25      Visit # / Visits authorized: 29/ 40 (30)   FOTO: 1/5    Time In: 4:50 pm           Time Out:  5:20 pm ( request to end early with fatigue)               Total Billable Time: 30 minutes    Precautions: Standard and Fall Orthopedic: Lumbar Fusion precautions  (unclear if these are current or have been lifted)     PTA visit 0/5      Subjective     Pt reports: "I am good. We had a good trip but I am tired." Pt reports she wants to trial stairs today    She was compliant with home exercise program.    Response to previous treatment: good  Functional change: ongoing    Pain: 4/10         Location: low back pain       Objective      Objective Measures updated at progress report unless specified.       Treatment     Shanthi received the treatments listed below:      therapeutic activities to improve functional performance for 30 minutes, including:    Pt ascended descended 12, 4 inch steps bilateral handrails with reciprocal/ step to pattern ascending and step to pattern descending x 3 trials with rest breaks in between.         Evaluation 9/9/24 11/4/24 1/6/25- progress note   5 times sit to stand 13.5 seconds minimal upper extremity support 17 sec bilateral upper extremity support.  15.8 sec bilateral upper extremity  Not assessed at this time.    TUG 17.9 seconds single point cane 24.1 sec with single point cane  17.9 " seconds with single point cane  Not assessed at this time   Self selected walking speed (10MWT) 0.44 m/sec (6m/13.7s)  single point cane  0.30 m/s ( 6m/ 19.8 sec) single point cane  0.50 m/s (6m/ 11.96 sec) single point cane  0.44 m/s (6m/ 13.6 sec)   2MWT  Not assessed at this time Not assessed at this time.  172 ft single point cane   164 ft with single point cane        Pt ambulated 120 ft with supervision with single point cane       Required for WC transport back to car due to fatigue     Home Exercises Provided and Patient Education Provided     Education provided:       - educated about progress with therapy. Reports understanding.     Assessment          Pt tolerated session well. Progress note performed today. Patient met 1/2 short term goals and 0/8 long term goals. Patient's gait speed and 2MWT went slightly down since last POC however could be due to fatigue from just getting back from a trip to see family. Pt was able to perform 12, 4 inch stairs x 3 trials today with supervision for first time. Patient's wheelchair and bioness have been denied by insurance so working on appeals process. Pt remains motivated to improve and a good candidate for PT.         Shanthi Is progressing well towards her goals.   Pt prognosis is Good.     Pt will continue to benefit from skilled outpatient physical therapy to address the deficits listed in the problem list box on initial evaluation, provide pt/family education and to maximize pt's level of independence in the home and community environment.     Pt's spiritual, cultural and educational needs considered and pt agreeable to plan of care and goals.     Anticipated barriers to physical therapy: progressive disease process     Goals:        Short Term Goals: 5 weeks Date last assessed:  Status:    1. Patient will be provided with an HEP for strength, endurance and balance.  7/16/2024    MET   2.  Patient will demonstrate improve endurance and activity tolerance as  evidenced by ability to perform Nu-step x 15 minutes without rests breaks. 11/4/24 ongoing            Long Term Goals: 10 weeks  Date last assessed: Status:    1. Patient will be independent and compliant with updated HEP. 9/9/24    ongoing   2.  Patient will increase her   gait speed as assessed by the timed 10MWT from 0.44 m/s to 0.58 m/s to increase her safety and (I) with gait at a community level. (MDC for CVA= 0.14 m/s)     1/6/25    Progressing       3. The patient will demonstrate improved efficiency with functional mobility and decreased risk for falls as evidenced by an increase in her score on the Timed up and Go from 17.9 to 15.0. (Minimal detectable change in chronic stroke = 2.9 seconds)  11/4/24 ongoing   4.Patient will improve her FOTO score from 42  to at least 57 for improved self perception of functional mobility.(score 0-100, high score indicates greater level of function) 11/4/24 ongoing   5. Pt will improve 5x sit to stand score from 13.5 sec to less than 12 seconds to decrease fall risk and improve safety.  11/4/24    ongoing   6. Pt will acquire custom manual WC to assist with independence and safety with long distance mobility.  1/6/25 ongoing   7. Pt will acquire left Bioness L300 to assist with ambulation and decrease foot drop to lower fall risk.  1/6/25 ongoing   8. Patient will increase his/her distance achieved on the 2 Minute Walk Test from 172 feet to 209 feet to demonstrate improved endurance and efficiency with community level gait. (MDC for CVA = 112 feet) 1/6/25 ongoing           Plan   Updated Plan of care Certification: 11/4/24- 1/13/25.       Outpatient Physical Therapy 2 times weekly for 10 weeks to include the following interventions: Electrical Stimulation as indicated, Gait Training, Manual Therapy, Moist Heat/ Ice, Neuromuscular Re-ed, Orthotic Management and Training, Patient Education, Therapeutic Activities, and Therapeutic Exercise.     Recommendations for next  treatment session:     - standing exercises        Dianna Rosas, PT

## 2025-01-06 ENCOUNTER — CLINICAL SUPPORT (OUTPATIENT)
Dept: REHABILITATION | Facility: HOSPITAL | Age: 50
End: 2025-01-06
Payer: COMMERCIAL

## 2025-01-06 DIAGNOSIS — Z74.09 IMPAIRED FUNCTIONAL MOBILITY, BALANCE, GAIT, AND ENDURANCE: Primary | ICD-10-CM

## 2025-01-06 PROCEDURE — 97530 THERAPEUTIC ACTIVITIES: CPT | Mod: PO

## 2025-01-09 NOTE — PROGRESS NOTES
"  [facility]   Physical Therapy  Treatment Note      Name: Shanthi Escoto  Clinic Number: [id number]    Therapy Diagnosis:   Encounter Diagnosis   Name Primary?    Impaired functional mobility, balance, gait, and endurance Yes       Physician: Arin Albarran MD    Visit Date: 1/10/2025    Physician Orders: PT Eval and Treat  " Neuro PT"  Medical Diagnosis from Referral:   Diagnosis   G35 (ICD-10-CM) - Multiple sclerosis      Evaluation Date: 7/16/2024  Authorization Period Expiration: 12/31/24  Plan of Care Expiration: 1/13/25   Progress Note Due: 1/13/25      Visit # / Visits authorized: 29/ 40 (30)   FOTO: 1/5    Time In: 1:03 pm    Time Out:  1:45 pm      Total Billable Time: 42  minutes    Precautions: Standard and Fall Orthopedic: Lumbar Fusion precautions  (unclear if these are current or have been lifted)     PTA visit 0/5      Subjective     Pt reports:  " I am doing good."    She was compliant with home exercise program.    Response to previous treatment: good  Functional change: ongoing    Pain: 5/10          Location: low back pain       Objective      Objective Measures updated at progress report unless specified.       Treatment     Shanthi received the treatments listed below:      therapeutic activities to improve functional performance for 42  minutes, including:    Pt received Manual electrical stimulation for 30 minutes after being cleared for all contradictions as follows: Pt received Functional Electrical stimulation with  FES lower extremity bike to elicit  sequential muscle contraction of the left tibialis anterior and gastroc/ soleus muscle groups. Activity time includes skilled set-up for proper placement of electrodes and "muscle test" to determine optimal intensity to elicit a muscular contraction,  adjustment of pedal lever arms to promote forced use of left  lower extremity and securing of "Q-Straints" in kenyon/cross pattern to stabilize chair. " Training set-up for intensity training. Results as follows:     Distance travelled: 4.05 miles  Energy Expended: 1.8 kCal  Energy per hour 3.8 kCal/ hour  Average power: 4.5 W  Average Asymmetry: right 7%   Total therapy time: 30.14 min   Time in active (off motor) 25.20 min   Time in passive: (on motor): 4.54 min      Pt ambulated 120 ft with supervision with single point cane       Required for WC transport back to car due to fatigue     Home Exercises Provided and Patient Education Provided     Education provided:          - educated about increased time and resistance with FES bike reports understanding.     Assessment           Pt tolerated session well. Pt tolerated 30 min  on FES bike today with left lower extremity anterior tib and gastroc stimulation. Pt tolerated increased time and increased resistance on FES bike today. Pt reporting not too much fatigue after stairs last session. Pt remains motivated to improve and a good candidate for PT.       Shanthi Is progressing well towards her goals.   Pt prognosis is Good.     Pt will continue to benefit from skilled outpatient physical therapy to address the deficits listed in the problem list box on initial evaluation, provide pt/family education and to maximize pt's level of independence in the home and community environment.     Pt's spiritual, cultural and educational needs considered and pt agreeable to plan of care and goals.     Anticipated barriers to physical therapy: progressive disease process     Goals:        Short Term Goals: 5 weeks Date last assessed:  Status:    1. Patient will be provided with an HEP for strength, endurance and balance.  7/16/2024    MET   2.  Patient will demonstrate improve endurance and activity tolerance as evidenced by ability to perform Nu-step x 15 minutes without rests breaks. 11/4/24 ongoing            Long Term Goals: 10 weeks  Date last assessed: Status:    1. Patient will be independent and compliant with updated  HEP. 9/9/24    ongoing   2.  Patient will increase her   gait speed as assessed by the timed 10MWT from 0.44 m/s to 0.58 m/s to increase her safety and (I) with gait at a community level. (MDC for CVA= 0.14 m/s)     1/6/25    Progressing       3. The patient will demonstrate improved efficiency with functional mobility and decreased risk for falls as evidenced by an increase in her score on the Timed up and Go from 17.9 to 15.0. (Minimal detectable change in chronic stroke = 2.9 seconds)  11/4/24 ongoing   4.Patient will improve her FOTO score from 42  to at least 57 for improved self perception of functional mobility.(score 0-100, high score indicates greater level of function) 11/4/24 ongoing   5. Pt will improve 5x sit to stand score from 13.5 sec to less than 12 seconds to decrease fall risk and improve safety.  11/4/24    ongoing   6. Pt will acquire custom manual WC to assist with independence and safety with long distance mobility.  1/6/25 ongoing   7. Pt will acquire left Bioness L300 to assist with ambulation and decrease foot drop to lower fall risk.  1/6/25 ongoing   8. Patient will increase his/her distance achieved on the 2 Minute Walk Test from 172 feet to 209 feet to demonstrate improved endurance and efficiency with community level gait. (MDC for CVA = 112 feet) 1/6/25 ongoing           Plan   Updated Plan of care Certification: 11/4/24- 1/13/25.       Outpatient Physical Therapy 2 times weekly for 10 weeks to include the following interventions: Electrical Stimulation as indicated, Gait Training, Manual Therapy, Moist Heat/ Ice, Neuromuscular Re-ed, Orthotic Management and Training, Patient Education, Therapeutic Activities, and Therapeutic Exercise.     Recommendations for next treatment session:     - standing exercises          Dianna Rosas, PT

## 2025-01-10 ENCOUNTER — CLINICAL SUPPORT (OUTPATIENT)
Dept: REHABILITATION | Facility: HOSPITAL | Age: 50
End: 2025-01-10
Payer: COMMERCIAL

## 2025-01-10 DIAGNOSIS — Z74.09 IMPAIRED FUNCTIONAL MOBILITY, BALANCE, GAIT, AND ENDURANCE: Primary | ICD-10-CM

## 2025-01-10 PROCEDURE — 97530 THERAPEUTIC ACTIVITIES: CPT | Mod: PO

## 2025-01-13 ENCOUNTER — CLINICAL SUPPORT (OUTPATIENT)
Dept: REHABILITATION | Facility: HOSPITAL | Age: 50
End: 2025-01-13
Payer: COMMERCIAL

## 2025-01-13 DIAGNOSIS — Z74.09 IMPAIRED FUNCTIONAL MOBILITY, BALANCE, GAIT, AND ENDURANCE: Primary | ICD-10-CM

## 2025-01-13 PROCEDURE — 97530 THERAPEUTIC ACTIVITIES: CPT | Mod: PO

## 2025-01-14 NOTE — PLAN OF CARE
"  [facility]   Physical Therapy  Treatment Note      Name: Shanthi Escoto  Clinic Number: [id number]    Therapy Diagnosis:   Encounter Diagnosis   Name Primary?    Impaired functional mobility, balance, gait, and endurance Yes       Physician: Arin Albarran MD    Visit Date: 1/13/2025    Physician Orders: PT Eval and Treat  " Neuro PT"  Medical Diagnosis from Referral:   Diagnosis   G35 (ICD-10-CM) - Multiple sclerosis      Evaluation Date: 7/16/2024  Authorization Period Expiration: 12/31/24  Plan of Care Expiration: 3/24/25  Progress Note Due: 1/13/25      Visit # / Visits authorized: 3/10 (34)   FOTO: 1/5    Time In: 4:50 pm      Time Out5:30 pm    Total Billable Time: 40 minutes    Precautions: Standard and Fall Orthopedic: Lumbar Fusion precautions  (unclear if these are current or have been lifted)     PTA visit 0/5      Subjective     Pt reports:  " I am doing good."  Pt reports she was told the appeal is in for the WC eval but didn't know anything else about it.      She was compliant with home exercise program.    Response to previous treatment: good  Functional change: ongoing    Pain: 6/10       Location: low back pain       Objective      Objective Measures updated at progress report unless specified.       Treatment     Shanthi received the treatments listed below:      therapeutic activities to improve functional performance for 40 minutes, including:        Evaluation 9/9/24 11/4/24 1/13/25   5 times sit to stand 13.5 seconds minimal upper extremity support 17 sec bilateral upper extremity support.  15.8 sec bilateral upper extremity  17.2 sec bilateral upper extremity    TUG 17.9 seconds single point cane 24.1 sec with single point cane  17.9 seconds with single point cane  25.7 sec with single point cane    Self selected walking speed (10MWT) 0.44 m/sec (6m/13.7s)  single point cane  0.30 m/s ( 6m/ 19.8 sec) single point cane  0.50 m/s (6m/ 11.96 sec) single point " cane  0.31m/s (6m/19.5 sec single point cane    2MWT  Not assessed at this time Not assessed at this time.  172 ft single point cane   102 ft single point cane          Pt ambulated 120 ft with supervision with single point cane     Trialled small based quad cane  x 10 ft supervision     Required for WC transport back to car due to fatigue     Home Exercises Provided and Patient Education Provided     Education provided:        - educated about decline in scores with poc update today. Pt reports understanding.     Assessment         Pt tolerated session well. Plan of Care Update today to reassess progress towards goals. Pt has met 1/2 short term goals and 0/8 long term goals. Patients scores today have declined since last reassessment. However, pt is reporting more fatigue, extensor tone and clonus today which could be contributing to decline in scores. Still assisting with appeal process for custom manual WC. Therapist continuing with education about other assistive devices that could better assist pt. Pt does not wish to switch to a more supportive assistive device at this time. Pt remains motivated to improve and a good candidate for PT.         Shanthi Is progressing well towards her goals.   Pt prognosis is Good.     Pt will continue to benefit from skilled outpatient physical therapy to address the deficits listed in the problem list box on initial evaluation, provide pt/family education and to maximize pt's level of independence in the home and community environment.     Pt's spiritual, cultural and educational needs considered and pt agreeable to plan of care and goals.     Anticipated barriers to physical therapy: progressive disease process     Goals:        Short Term Goals: 5 weeks Date last assessed:  Status:    1. Patient will be provided with an HEP for strength, endurance and balance.  7/16/2024    MET   2.  Patient will demonstrate improve endurance and activity tolerance as evidenced by ability to  perform Nu-step x 15 minutes without rests breaks. 11/4/24 ongoing            Long Term Goals: 10 weeks  Date last assessed: Status:    1. Patient will be independent and compliant with updated HEP. 9/9/24    ongoing   2.  Patient will increase her   gait speed as assessed by the timed 10MWT from 0.44 m/s to 0.58 m/s to increase her safety and (I) with gait at a community level. (MDC for CVA= 0.14 m/s)     1/6/25    Progressing       3. The patient will demonstrate improved efficiency with functional mobility and decreased risk for falls as evidenced by an increase in her score on the Timed up and Go from 17.9 to 15.0. (Minimal detectable change in chronic stroke = 2.9 seconds)  11/4/24 ongoing   4.Patient will improve her FOTO score from 42  to at least 57 for improved self perception of functional mobility.(score 0-100, high score indicates greater level of function) 11/4/24 ongoing   5. Pt will improve 5x sit to stand score from 13.5 sec to less than 12 seconds to decrease fall risk and improve safety.  11/4/24    ongoing   6. Pt will acquire custom manual WC to assist with independence and safety with long distance mobility.  1/6/25 ongoing   7. Pt will acquire left Bioness L300 to assist with ambulation and decrease foot drop to lower fall risk.  1/6/25 ongoing   8. Patient will increase his/her distance achieved on the 2 Minute Walk Test from 172 feet to 209 feet to demonstrate improved endurance and efficiency with community level gait. (MDC for CVA = 112 feet) 1/6/25 ongoing           Plan   Updated Plan of care Certification: 1/13/25-  3/24/25       Outpatient Physical Therapy 2 times weekly for 10 weeks to include the following interventions: Electrical Stimulation as indicated, Gait Training, Manual Therapy, Moist Heat/ Ice, Neuromuscular Re-ed, Orthotic Management and Training, Patient Education, Therapeutic Activities, and Therapeutic Exercise.     Recommendations for next treatment session:     -  standing exercises           Dianna Rosas, PT

## 2025-01-28 NOTE — PROGRESS NOTES
"  [facility]   Physical Therapy  Treatment Note      Name: Shanthi Escoto  Clinic Number: [id number]    Therapy Diagnosis:   Encounter Diagnosis   Name Primary?    Impaired functional mobility, balance, gait, and endurance Yes       Physician: Arin Albarran MD    Visit Date: 1/29/2025    Physician Orders: PT Eval and Treat  " Neuro PT"  Medical Diagnosis from Referral:   Diagnosis   G35 (ICD-10-CM) - Multiple sclerosis      Evaluation Date: 7/16/2024  Authorization Period Expiration: 12/31/24  Plan of Care Expiration: 3/24/25    Progress Note Due: 1/13/25      Visit # / Visits authorized: 4/10 (35)   FOTO: 1/5    Time In: 5:32 pm    Time Out: 6:14 pm    Total Billable Time: 42  minutes    Precautions: Standard and Fall Orthopedic: Lumbar Fusion precautions  (unclear if these are current or have been lifted)     PTA visit 0/5      Subjective     Pt reports:  " I am doing  good nothing new."    She was compliant with home exercise program.    Response to previous treatment: good  Functional change: ongoing    Pain: 5/10           Location: low back pain       Objective      Objective Measures updated at progress report unless specified.       Treatment     Shanthi received the treatments listed below:      therapeutic activities to improve functional performance for 42  minutes, including:    Pt received Manual electrical stimulation for 30 minutes after being cleared for all contradictions as follows: Pt received Functional Electrical stimulation with  FES lower extremity bike to elicit  sequential muscle contraction of the left tibialis anterior and gastroc/ soleus muscle groups. Activity time includes skilled set-up for proper placement of electrodes and "muscle test" to determine optimal intensity to elicit a muscular contraction,  adjustment of pedal lever arms to promote forced use of left  lower extremity and securing of "Q-Straints" in kenyon/cross pattern to stabilize " chair. Training set-up for intensity training. Results as follows:     Distance travelled: 4.13  miles  Energy Expended: 1.7  kCal  Energy per hour 3.5  kCal/ hour  Average power: 4.0 W  Average Asymmetry: right 2 %   Total therapy time: 30.37  min   Time in active (off motor) 27.51   min   Time in passive: (on motor): 2.46  min      Pt ambulated 120 ft with supervision with single point cane       Required for WC transport back to car due to fatigue     Home Exercises Provided and Patient Education Provided     Education provided:          - educated about  with FES bike reports understanding.     Assessment           Pt tolerated session well. Pt tolerated 30 min  on FES bike today with left lower extremity anterior tib and gastroc stimulation. Pt hasn't been able to attend therapy for 2 weeks so we did not add resistance today.Pt remains motivated to improve and a good candidate for PT.       Shanthi Is progressing well towards her goals.   Pt prognosis is Good.     Pt will continue to benefit from skilled outpatient physical therapy to address the deficits listed in the problem list box on initial evaluation, provide pt/family education and to maximize pt's level of independence in the home and community environment.     Pt's spiritual, cultural and educational needs considered and pt agreeable to plan of care and goals.     Anticipated barriers to physical therapy: progressive disease process     Goals:          Short Term Goals: 5 weeks Date last assessed:  Status:    1. Patient will be provided with an HEP for strength, endurance and balance.  7/16/2024    MET   2.  Patient will demonstrate improve endurance and activity tolerance as evidenced by ability to perform Nu-step x 15 minutes without rests breaks. 11/4/24 ongoing            Long Term Goals: 10 weeks  Date last assessed: Status:    1. Patient will be independent and compliant with updated HEP. 9/9/24    ongoing   2.  Patient will increase her   gait  speed as assessed by the timed 10MWT from 0.44 m/s to 0.58 m/s to increase her safety and (I) with gait at a community level. (MDC for CVA= 0.14 m/s)     1/6/25    Progressing       3. The patient will demonstrate improved efficiency with functional mobility and decreased risk for falls as evidenced by an increase in her score on the Timed up and Go from 17.9 to 15.0. (Minimal detectable change in chronic stroke = 2.9 seconds)  11/4/24 ongoing   4.Patient will improve her FOTO score from 42  to at least 57 for improved self perception of functional mobility.(score 0-100, high score indicates greater level of function) 11/4/24 ongoing   5. Pt will improve 5x sit to stand score from 13.5 sec to less than 12 seconds to decrease fall risk and improve safety.  11/4/24    ongoing   6. Pt will acquire custom manual WC to assist with independence and safety with long distance mobility.  1/6/25 ongoing   7. Pt will acquire left Bioness L300 to assist with ambulation and decrease foot drop to lower fall risk.  1/6/25 ongoing   8. Patient will increase his/her distance achieved on the 2 Minute Walk Test from 172 feet to 209 feet to demonstrate improved endurance and efficiency with community level gait. (MDC for CVA = 112 feet) 1/6/25 ongoing              Plan      Updated Plan of care Certification: 1/13/25-  3/24/25       Outpatient Physical Therapy 2 times weekly for 10 weeks to include the following interventions: Electrical Stimulation as indicated, Gait Training, Manual Therapy, Moist Heat/ Ice, Neuromuscular Re-ed, Orthotic Management and Training, Patient Education, Therapeutic Activities, and Therapeutic Exercise.        Recommendations for next treatment session:     - standing exercises          Dianna Rosas, PT

## 2025-01-29 ENCOUNTER — CLINICAL SUPPORT (OUTPATIENT)
Dept: REHABILITATION | Facility: HOSPITAL | Age: 50
End: 2025-01-29
Payer: COMMERCIAL

## 2025-01-29 DIAGNOSIS — Z74.09 IMPAIRED FUNCTIONAL MOBILITY, BALANCE, GAIT, AND ENDURANCE: Primary | ICD-10-CM

## 2025-01-29 PROCEDURE — 97530 THERAPEUTIC ACTIVITIES: CPT | Mod: PO

## 2025-01-31 NOTE — PROGRESS NOTES
"  [facility]   Physical Therapy Treatment Note      Name: Shanthi Escoto  Clinic Number: [id number]    Therapy Diagnosis:   Encounter Diagnosis   Name Primary?    Impaired functional mobility, balance, gait, and endurance Yes       Physician: Arin Albarran MD    Visit Date: 2/3/2025    Physician Orders: PT Eval and Treat  " Neuro PT"  Medical Diagnosis from Referral:   Diagnosis   G35 (ICD-10-CM) - Multiple sclerosis      Evaluation Date: 7/16/2024  Authorization Period Expiration: 12/31/24  Plan of Care Expiration: 3/24/25    Progress Note Due: 1/13/25      Visit # / Visits authorized: 5/10 (36)   FOTO: 1/5    Time In: 4:03 pm    Time Out:  4:45 pm  Total Billable Time: 42 minutes    Precautions: Standard and Fall Orthopedic: Lumbar Fusion precautions  (unclear if these are current or have been lifted)     PTA visit 0/5      Subjective     Pt reports:  " I am doing  good." Pt reports she had a virtual visit today with her neurologist. Pt reports neurology team would like her to reconsider the AFO for walking.      She was compliant with home exercise program.    Response to previous treatment: good  Functional change: ongoing    Pain: 5/10            Location: low back pain       Objective      Ambulating to clinic with hurry-cane.   Requires WC transfer back to car at end of session.        Treatment     Shanthi received the treatments listed below:      therapeutic activities to improve functional performance for 42  minutes, including:    Trialled posterior leaf spring AFO on left foot. Pt donned AFO with supervision verbal cues. Had pt try underneath show insert today to see if more comfortable for pt.    Pt ambulated 25 ft with hurry-cane with supervision with left AFO. Pt with more foot clearance with ambulating with AFO. Reports AFO "feels weird" but is more comfortable under her shoe insert.     Trialled rollator as potential future assistive device. Pt able to ambulate 25 " ft with rollator with supervision. Discussed safety with rollator and pros/ cons with this versus single point cane versus rolling walker. See education below.        Pt ambulated 120 ft with supervision with single point cane       Required for WC transport back to car due to fatigue     Home Exercises Provided and Patient Education Provided     Education provided:      - educated about rollator and purpose. Educated about option to sit with rollator. Educated about brakes and locks. Educated that locks on rollator aren't very secure so rollator can slide sometimes to be aware for safety. Educated about how to fold/ unfold rollator. Educated about rolling walker and provides more stability but has no seat component. Easier to fold up and lighter weight. Educated about AFO. Reports understanding.     Assessment          Pt tolerated session well. Discussed AFO and trialled AFO again as pt is having more foot catching on left. Trialled AFO underneath shoe insert today and pt reports more comfortable. Pt reports she would like to get an order for custom AFO for the maximal comfort. Therapist messaged MD for order. Trialled rollator versus single point cane as she continues to heavily lean on single point cane causing safety concerns. Pt reports she will consider walker options for future use. Pt would benefit from custom WC to allow her safety and independence at home and in community as she is currently only leaving her house to come to therapy and not able to go anywhere else due to decreased ambulation endurance. Pt remains motivated to improve and a good candidate for PT.       Shanthi Is progressing well towards her goals.   Pt prognosis is Good.     Pt will continue to benefit from skilled outpatient physical therapy to address the deficits listed in the problem list box on initial evaluation, provide pt/family education and to maximize pt's level of independence in the home and community environment.     Pt's  spiritual, cultural and educational needs considered and pt agreeable to plan of care and goals.     Anticipated barriers to physical therapy: progressive disease process     Goals:          Short Term Goals: 5 weeks Date last assessed:  Status:    1. Patient will be provided with an HEP for strength, endurance and balance.  7/16/2024    MET   2.  Patient will demonstrate improve endurance and activity tolerance as evidenced by ability to perform Nu-step x 15 minutes without rests breaks. 11/4/24 ongoing            Long Term Goals: 10 weeks  Date last assessed: Status:    1. Patient will be independent and compliant with updated HEP. 9/9/24    ongoing   2.  Patient will increase her   gait speed as assessed by the timed 10MWT from 0.44 m/s to 0.58 m/s to increase her safety and (I) with gait at a community level. (MDC for CVA= 0.14 m/s)     1/6/25    Progressing       3. The patient will demonstrate improved efficiency with functional mobility and decreased risk for falls as evidenced by an increase in her score on the Timed up and Go from 17.9 to 15.0. (Minimal detectable change in chronic stroke = 2.9 seconds)  11/4/24 ongoing   4.Patient will improve her FOTO score from 42  to at least 57 for improved self perception of functional mobility.(score 0-100, high score indicates greater level of function) 11/4/24 ongoing   5. Pt will improve 5x sit to stand score from 13.5 sec to less than 12 seconds to decrease fall risk and improve safety.  11/4/24    ongoing   6. Pt will acquire custom manual WC to assist with independence and safety with long distance mobility.  1/6/25 ongoing   7. Pt will acquire left Bioness L300 to assist with ambulation and decrease foot drop to lower fall risk.  1/6/25 ongoing   8. Patient will increase his/her distance achieved on the 2 Minute Walk Test from 172 feet to 209 feet to demonstrate improved endurance and efficiency with community level gait. (MDC for CVA = 112 feet) 1/6/25  ongoing              Plan      Updated Plan of care Certification: 1/13/25-  3/24/25       Outpatient Physical Therapy 2 times weekly for 10 weeks to include the following interventions: Electrical Stimulation as indicated, Gait Training, Manual Therapy, Moist Heat/ Ice, Neuromuscular Re-ed, Orthotic Management and Training, Patient Education, Therapeutic Activities, and Therapeutic Exercise.        Recommendations for next treatment session:     - standing exercises          Dianna Rosas, PT

## 2025-02-03 ENCOUNTER — CLINICAL SUPPORT (OUTPATIENT)
Dept: REHABILITATION | Facility: HOSPITAL | Age: 50
End: 2025-02-03
Payer: COMMERCIAL

## 2025-02-03 ENCOUNTER — OFFICE VISIT (OUTPATIENT)
Dept: NEUROLOGY | Facility: CLINIC | Age: 50
End: 2025-02-03
Payer: COMMERCIAL

## 2025-02-03 DIAGNOSIS — N31.9 NEUROGENIC BLADDER: ICD-10-CM

## 2025-02-03 DIAGNOSIS — Z74.09 IMPAIRED FUNCTIONAL MOBILITY, BALANCE, GAIT, AND ENDURANCE: Primary | ICD-10-CM

## 2025-02-03 DIAGNOSIS — F40.240 CLAUSTROPHOBIA: ICD-10-CM

## 2025-02-03 DIAGNOSIS — G35 MULTIPLE SCLEROSIS: Primary | ICD-10-CM

## 2025-02-03 PROCEDURE — 1160F RVW MEDS BY RX/DR IN RCRD: CPT | Mod: CPTII,95,,

## 2025-02-03 PROCEDURE — 1159F MED LIST DOCD IN RCRD: CPT | Mod: CPTII,95,,

## 2025-02-03 PROCEDURE — 98007 SYNCH AUDIO-VIDEO EST HI 40: CPT | Mod: 95,,,

## 2025-02-03 PROCEDURE — 97530 THERAPEUTIC ACTIVITIES: CPT | Mod: PO

## 2025-02-03 PROCEDURE — G2211 COMPLEX E/M VISIT ADD ON: HCPCS | Mod: 95,,,

## 2025-02-03 RX ORDER — DIAZEPAM 5 MG/1
TABLET ORAL
Qty: 2 TABLET | Refills: 0 | Status: SHIPPED | OUTPATIENT
Start: 2025-02-03

## 2025-02-03 RX ORDER — OXYBUTYNIN CHLORIDE 10 MG/1
10 TABLET, EXTENDED RELEASE ORAL DAILY
Qty: 90 TABLET | Refills: 1 | Status: SHIPPED | OUTPATIENT
Start: 2025-02-03

## 2025-02-03 NOTE — PROGRESS NOTES
Patient ID: Shanthi Escoto is a 49 y.o. female who presents today for a routine clinic visit for MS.  She was last seen by Dr. Sanchez on 10/30/2024.  The history was provided by the patient.     The patient location is: at her home in [place], [place]  The chief complaint leading to consultation is: MS and mobility evaluation     Visit type: audiovisual    Face to Face time with patient: 39 minutes     Each patient to whom he or she provides medical services by telemedicine is:  (1) informed of the relationship between the physician and patient and the respective role of any other health care provider with respect to management of the patient; and (2) notified that he or she may decline to receive medical services by telemedicine and may withdraw from such care at any time.    Principle neurological diagnosis: MS   Date of symptom onset: 4/2021  Date of diagnosis: 12/2021  Disease type at diagnosis: RR  Disease type currently: RR  Previous therapy: Zeposia 1/2022-12/2024 (doesn't like taking pills)  Current therapy: Briumvi 12/2024 - present   Vitamin D Dose: 2000IU daily   Last MRI Brain: 12/5/2023 - stable   Last MRI C-spine: 12/5/2023 - stable   Last MRI T-spine: 12/5/2023 - stable   CSF: 2021 - IgG index - 1.4, 3 OCB, WBC - 13  JCV status: 10/9/2024 - negative 0.11 index  Other relevant labs and tests: 10/9/2024: vitamin D - 78    Subjective:     She did tolerate Briumvi well.  She stated that after the first infusion her cheeks did become red, but she just thinks it was from being tired from being at the infusion center for so long.  She did not have any issues with the 2nd infusion.      She did start baclofen and it does help some, but she does still deal with spasms.  She only takes 10m BID.  She is hesitant to take a dose in the middle of the day because she does not want to be drowsy.     She is continuing to have left foot drop and it is maybe happening a little more lately, but she thinks it is because she  is not doing as much as she once was.  She is in PT right now and she works from home.  Outside of that she is not getting out of the house much or staying active. She is nervous to go out of the house much due to her mobility.  She has recently tried to get a Bioness and a custom wheelchair approved and insurance has denied.  She states that she has tried on an AFO at PT recently and felt that it was uncomfortable.  At home she does have a regular aluminium walker and a cane.  She does use the cane in and out of the house. With extended walking, she does become unstable even with the cane.      She has had a fall recently, but it was due to bending down and losing her balance. She was in her kitchen and did not injure herself.       SOCIAL HISTORY  Living arrangements - the patient lives alone.  Social History     Socioeconomic History    Marital status: Single   Tobacco Use    Smoking status: Never    Smokeless tobacco: Never   Substance and Sexual Activity    Alcohol use: Yes     Comment: rarely    Drug use: Never    Sexual activity: Not Currently     Partners: Male     Social Drivers of Health     Financial Resource Strain: Low Risk  (7/3/2024)    Overall Financial Resource Strain (CARDIA)     Difficulty of Paying Living Expenses: Not hard at all   Food Insecurity: No Food Insecurity (7/3/2024)    Hunger Vital Sign     Worried About Running Out of Food in the Last Year: Never true     Ran Out of Food in the Last Year: Never true   Physical Activity: Unknown (7/3/2024)    Exercise Vital Sign     Days of Exercise per Week: 2 days   Stress: No Stress Concern Present (7/3/2024)    [employer] - Occupational Stress Questionnaire     Feeling of Stress : Not at all   Housing Stability: Unknown (7/3/2024)    Housing Stability Vital Sign     Unable to Pay for Housing in the Last Year: No       Current Outpatient Medications on File Prior to Visit   Medication Sig Dispense Refill    Bacillus  coagulans/inulin (PROBIOTIC WITH PREBIOTIC ORAL) Take 1 tablet by mouth once daily.      baclofen (LIORESAL) 10 MG tablet Take 1 tablet (10 mg total) by mouth 3 (three) times daily. 90 tablet 11    cholecalciferol, vitamin D3, (VITAMIN D3) 50 mcg (2,000 unit) Cap Take 1 capsule by mouth once daily.      gabapentin (NEURONTIN) 600 MG tablet Take 600 mg by mouth once daily.      STEFANIE 0.35 mg tablet Take 1 tablet by mouth.      linaCLOtide (LINZESS) 145 mcg Cap capsule Take 1 capsule (145 mcg total) by mouth before breakfast. Daily as needed for constipation 30 capsule 0    multivitamin (THERAGRAN) per tablet Take 1 tablet by mouth once daily.      ozanimod (ZEPOSIA) 0.92 mg Cap Take 1 capsule by mouth Daily. 90 capsule 0    tamsulosin (FLOMAX) 0.4 mg Cap Take 1 capsule (0.4 mg total) by mouth once daily. 30 capsule 11    varicella-zoster [id number], PF, (SHINGRIX, PF,) 50 mcg/0.5 mL injection Inject 0.5 mL into the muscle at month 0, then inject 0.5 mL into the muscle 2 months later 1 each 1     No current facility-administered medications on file prior to visit.       Objective:     1. 25 foot timed walk:      10/8/2024     2:20 PM   Timed 25 Foot Walk:   Did patient wear an AFO? No   Was assistive device used? Yes   Assistive device used (dulce maria one): Unilateral Assistance   Unilateral device used Cane   Time for 25 Foot Walk (seconds) 23.25           NEURO EXAM    In general, the patient is well nourished and appears to be in no acute distress.    MENTAL STATUS: language is fluent, normal verbal comprehension, short-term and remote memory is intact, attention is normal, patient is alert and oriented x 3, fund of knowlege is appropriate by vocabulary.       Imaging:     Personally reviewed.     MRI Brain W W/O Contrast 12/5/2024:   Impression:   1. Stable gliotic foci in both cerebral hemispheres and in the right cerebellar peduncle, without evidence for progression of disease compared to prior MRI.  2. No abnormal  "intra-axial enhancement to suggest active demyelination.     Electronically signed by:  Bartolome Lr MD  12/05/2023 04:32 PM CST Workstation: [id number]       MRI Cervical Spine W W/O Contrast 12/5/2024:   Impression:   Multiple stable intramedullary lesions within the cervical spinal cord, likely representing demyelination considering the provided history of multiple sclerosis.  No abnormal intramedullary enhancement to suggest active demyelination.  No new lesions.     Electronically signed by:  Viraj Gallegos MD  12/05/2023 04:34 PM CST Workstation: [id number]      MRI Thoracic Spine W W/O Contrast 12/5/2024:   Impression:   1. No significant change of intramedullary increased T2 signal within the thoracic cord as above suggesting sequelae of chronic demyelination in patient with reported known history of multiple sclerosis.  2. No abnormal enhancement to suggest active demyelination throughout the thoracic cord.     Electronically signed by:  Heber Elizondo MD  12/05/2023 04:41 PM CST Workstation: [id number]       Labs:     Lab Results   Component Value Date    NKNZVCHI41OZ 78 10/09/2024     Lab Results   Component Value Date    JCVINDEX 0.11 10/09/2024    JCVANTIBODY NEGATIVE 10/09/2024     No results found for: "KZ7WGEMD", "ABSOLUTECD3", "TM7HELCK", "ABSOLUTECD8", "NE1XDSZO", "ABSOLUTECD4", "LABCD48"  Lab Results   Component Value Date    WBC 5.40 10/09/2024    RBC 4.28 10/09/2024    HGB 12.2 10/09/2024    HCT 37.6 10/09/2024    MCV 88 10/09/2024    MCH 28.5 10/09/2024    MCHC 32.4 10/09/2024    RDW 13.3 10/09/2024     10/09/2024    MPV 10.8 10/09/2024    GRAN 4.0 10/09/2024    GRAN 74.1 (H) 10/09/2024    LYMPH 0.7 (L) 10/09/2024    LYMPH 12.8 (L) 10/09/2024    MONO 0.6 10/09/2024    MONO 11.7 10/09/2024    EOS 0.0 10/09/2024    BASO 0.02 10/09/2024    EOSINOPHIL 0.4 10/09/2024    BASOPHIL 0.4 10/09/2024     Sodium   Date Value Ref Range Status   10/09/2024 139 136 - 145 mmol/L Final     Potassium   Date " Value Ref Range Status   10/09/2024 4.0 3.5 - 5.1 mmol/L Final     Chloride   Date Value Ref Range Status   10/09/2024 107 95 - 110 mmol/L Final     CO2   Date Value Ref Range Status   10/09/2024 26 23 - 29 mmol/L Final     Glucose   Date Value Ref Range Status   10/09/2024 93 70 - 110 mg/dL Final     BUN   Date Value Ref Range Status   10/09/2024 10 6 - 20 mg/dL Final     Creatinine   Date Value Ref Range Status   10/09/2024 0.7 0.5 - 1.4 mg/dL Final     Calcium   Date Value Ref Range Status   10/09/2024 9.1 8.7 - 10.5 mg/dL Final     Total Protein   Date Value Ref Range Status   10/09/2024 6.8 6.0 - 8.4 g/dL Final     Albumin   Date Value Ref Range Status   10/09/2024 3.4 (L) 3.5 - 5.2 g/dL Final     Total Bilirubin   Date Value Ref Range Status   10/09/2024 0.5 0.1 - 1.0 mg/dL Final     Comment:     For infants and newborns, interpretation of results should be based  on gestational age, weight and in agreement with clinical  observations.    Premature Infant recommended reference ranges:  Up to 24 hours.............<8.0 mg/dL  Up to 48 hours............<12.0 mg/dL  3-5 days..................<15.0 mg/dL  6-29 days.................<15.0 mg/dL       Alkaline Phosphatase   Date Value Ref Range Status   10/09/2024 106 55 - 135 U/L Final     AST   Date Value Ref Range Status   10/09/2024 16 10 - 40 U/L Final     ALT   Date Value Ref Range Status   10/09/2024 16 10 - 44 U/L Final     Anion Gap   Date Value Ref Range Status   10/09/2024 6 (L) 8 - 16 mmol/L Final     Lab Results   Component Value Date    HEPBSAG Non-reactive 10/09/2024    HEPBSAB <3.00 10/09/2024    HEPBSAB Non-reactive 10/09/2024    HEPBCAB Non-reactive 10/09/2024           MS Impression and Plan:     NEURO MULTIPLE SCLEROSIS IMPRESSION:   Number of relapses in the past year?:  0  Clinical Progression:  Clinically Stable  MRI Progression:  Stable  MS Classification:  Relapsing-Remitting MS  Current DMT: ublituximab  DMT:  No change in management  DMT  comment:  She is aware of the risks associated with immunosuppressant therapy, including increased risk of infection.   Symptom Management:  Implement change in symptom management  Implement Change in Symptom Management:  Adaptive Needs  Implement Change in Symptom Management comment:     I am recommending a left AFO for left foot drop.  Type of AFO as per recommendation of PT.  The patient's prognosis using the AFO is good    Additional Impressions:   Safety labs in May  Brain MRI in May/June  Follow up in June   Plan discussed and questions were answered to satisfaction.     Problem List Items Addressed This Visit       Multiple sclerosis - Primary    Relevant Medications    oxybutynin (DITROPAN-XL) 10 MG 24 hr tablet    diazePAM (VALIUM) 5 MG tablet    Other Relevant Orders    CBC Auto Differential    Comprehensive Metabolic Panel    Hepatitis B Core Antibody, Total    Hepatitis B Surface Antigen    Immunoglobulins (IgG, IgA, IgM) Quantitative    MRI Brain Demyelinating Without Contrast    MRI Cervical Spine Demyelinating Without Contrast    MRI Thoracic Spine Demyelinating Without Contrast     Other Visit Diagnoses       Neurogenic bladder        Relevant Medications    oxybutynin (DITROPAN-XL) 10 MG 24 hr tablet    Claustrophobia        Relevant Medications    diazePAM (VALIUM) 5 MG tablet            I spent a total of 45 minutes on the day of the visit.This includes face to face time and non-face to face time preparing to see the patient (eg, review of tests), obtaining and/or reviewing separately obtained history, documenting clinical information in the electronic or other health record, independently interpreting results and communicating results to the patient/family/caregiver, or care coordinator.       CANDY Regalado

## 2025-02-03 NOTE — Clinical Note
Can you get her scheduled for labs in May, MRIs in May/June and a follow up with me in June - put her on my schedule in June just to get her in, but we talked about the potential of me being at the [facility] so just let her know we are just going to get her on the schedule for main campus, but will change it if I am in [place] at that time. Thank you!!

## 2025-02-05 ENCOUNTER — TELEPHONE (OUTPATIENT)
Dept: PSYCHIATRY | Facility: CLINIC | Age: 50
End: 2025-02-05
Payer: COMMERCIAL

## 2025-02-05 DIAGNOSIS — R26.9 GAIT ABNORMALITY: ICD-10-CM

## 2025-02-05 DIAGNOSIS — M21.372 LEFT FOOT DROP: ICD-10-CM

## 2025-02-05 DIAGNOSIS — Z74.09 IMPAIRED FUNCTIONAL MOBILITY, BALANCE, GAIT, AND ENDURANCE: ICD-10-CM

## 2025-02-05 DIAGNOSIS — G35 MULTIPLE SCLEROSIS: Primary | ICD-10-CM

## 2025-02-05 NOTE — TELEPHONE ENCOUNTER
ABILIO attempted to contact Pt to discuss insurance denial for wheelchair. ABILIO consulted with ILIANA Bravo NP and ILIANA Rosas, PT and determined that Pt is seeking a lightweight wheelchair for community use. ABILIO explained to providers that insurance will not cover unless Pt requires the device for ADLs.     SW also received referral to assist Pt with AFO order. Pt did not answer, LVM. Will discuss WC options and AFO vendor when Pt returns call.

## 2025-02-06 NOTE — PROGRESS NOTES
"  [facility]   Physical Therapy Treatment Note      Name: Shanthi Escoto  Clinic Number: [id number]    Therapy Diagnosis:   Encounter Diagnosis   Name Primary?    Impaired functional mobility, balance, gait, and endurance Yes       Physician: Arin Albarran MD    Visit Date: 2/7/2025    Physician Orders: PT Eval and Treat  " Neuro PT"  Medical Diagnosis from Referral:   Diagnosis   G35 (ICD-10-CM) - Multiple sclerosis      Evaluation Date: 7/16/2024  Authorization Period Expiration: 12/31/24  Plan of Care Expiration: 3/24/25    Progress Note Due: 2/6/25      Visit # / Visits authorized: 6/10 (37)   FOTO: 1/5    Time In: 1:05 pm   Time Out:  1:50 pm   Total Billable Time: 45 minutes    Precautions: Standard and Fall Orthopedic: Lumbar Fusion precautions  (unclear if these are current or have been lifted)     PTA visit 0/5      Subjective     Pt reports: pt reports  reached out about gave her options for lightweight WC she can go look at to pay out of pocket.  Reports MD said they will put in order for AFO.     She was compliant with home exercise program.    Response to previous treatment: good  Functional change: ongoing    Pain: 5/10            Location: low back pain       Objective      Ambulating to clinic with hurry-cane.   Requires WC transfer back to car at end of session.        Treatment     Shanthi received the treatments listed below:      therapeutic activities to improve functional performance for 45  minutes, including:    Pt received Manual electrical stimulation for 31 minutes after being cleared for all contradictions as follows: Pt received Functional Electrical stimulation with  FES lower extremity bike to elicit  sequential muscle contraction of the left tibialis anterior and gastroc/ soleus muscle groups. Activity time includes skilled set-up for proper placement of electrodes and "muscle test" to determine optimal intensity to elicit a muscular " "contraction,  adjustment of pedal lever arms to promote forced use of left  lower extremity and securing of "Q-Straints" in kenyon/cross pattern to stabilize chair. Training set-up for intensity training. Results as follows:      Distance travelled: 4.19  miles  Energy Expended: 2.2  kCal  Energy per hour 4.5  kCal/ hour  Average power: 5.3 W  Average Asymmetry: right 213 %   Total therapy time: 30.46  min   Time in active (off motor) 28.19   min   Time in passive: (on motor): 2.27  min       Pt ambulated 120 ft with supervision with single point cane    Education provided see below.     Required for WC transport back to car due to fatigue     Home Exercises Provided and Patient Education Provided     Education provided:      - discussed pros/ cons of various wheelchair options. Discussed considering the weight of WC because she will have to get it in trunk of car. Discussed rollator options. Discussed AFO options. Reports understanding and has no questions or concerns.       Assessment          Pt tolerated session well. FES bike to  left lower extremity gastroc./soleus and tib anterior. Able to tolerate 31 minutes with no adverse effects. Discussed various  options with wheelchairs, rollators and AFOs and what things to consider. Pt remains motivated to improve and a good candidate for PT.       Shanthi Is progressing well towards her goals.   Pt prognosis is Good.     Pt will continue to benefit from skilled outpatient physical therapy to address the deficits listed in the problem list box on initial evaluation, provide pt/family education and to maximize pt's level of independence in the home and community environment.     Pt's spiritual, cultural and educational needs considered and pt agreeable to plan of care and goals.     Anticipated barriers to physical therapy: progressive disease process     Goals:          Short Term Goals: 5 weeks Date last assessed:  Status:    1. Patient will be provided with an HEP " for strength, endurance and balance.  7/16/2024    MET   2.  Patient will demonstrate improve endurance and activity tolerance as evidenced by ability to perform Nu-step x 15 minutes without rests breaks. 11/4/24 ongoing            Long Term Goals: 10 weeks  Date last assessed: Status:    1. Patient will be independent and compliant with updated HEP. 9/9/24    ongoing   2.  Patient will increase her   gait speed as assessed by the timed 10MWT from 0.44 m/s to 0.58 m/s to increase her safety and (I) with gait at a community level. (MDC for CVA= 0.14 m/s)     1/6/25    Progressing       3. The patient will demonstrate improved efficiency with functional mobility and decreased risk for falls as evidenced by an increase in her score on the Timed up and Go from 17.9 to 15.0. (Minimal detectable change in chronic stroke = 2.9 seconds)  11/4/24 ongoing   4.Patient will improve her FOTO score from 42  to at least 57 for improved self perception of functional mobility.(score 0-100, high score indicates greater level of function) 11/4/24 ongoing   5. Pt will improve 5x sit to stand score from 13.5 sec to less than 12 seconds to decrease fall risk and improve safety.  11/4/24    ongoing   6. Pt will acquire custom manual WC to assist with independence and safety with long distance mobility.  1/6/25 ongoing   7. Pt will acquire left Bioness L300 to assist with ambulation and decrease foot drop to lower fall risk.  1/6/25 ongoing   8. Patient will increase his/her distance achieved on the 2 Minute Walk Test from 172 feet to 209 feet to demonstrate improved endurance and efficiency with community level gait. (MDC for CVA = 112 feet) 1/6/25 ongoing              Plan      Updated Plan of care Certification: 1/13/25-  3/24/25       Outpatient Physical Therapy 2 times weekly for 10 weeks to include the following interventions: Electrical Stimulation as indicated, Gait Training, Manual Therapy, Moist Heat/ Ice, Neuromuscular Re-ed,  Orthotic Management and Training, Patient Education, Therapeutic Activities, and Therapeutic Exercise.        Recommendations for next treatment session:     - standing exercises          Dianna Rosas, PT

## 2025-02-07 ENCOUNTER — CLINICAL SUPPORT (OUTPATIENT)
Dept: REHABILITATION | Facility: HOSPITAL | Age: 50
End: 2025-02-07
Payer: COMMERCIAL

## 2025-02-07 DIAGNOSIS — Z74.09 IMPAIRED FUNCTIONAL MOBILITY, BALANCE, GAIT, AND ENDURANCE: Primary | ICD-10-CM

## 2025-02-07 PROCEDURE — 97530 THERAPEUTIC ACTIVITIES: CPT | Mod: PO

## 2025-02-07 NOTE — TELEPHONE ENCOUNTER
Late entry from 2/6:  Pt returned SW's call. Relayed understanding why insurance would not cover wheelchair. Pt agreeable to use [facility] in [place] for AFO order. Pt agreeable to plan of visiting durable medical equipment vendors in her area and getting a quote of a lightweight wheelchair. Pt will inform SW of quote to discuss available financial assistance. Vendors sent to Pt via portal.

## 2025-02-11 ENCOUNTER — TELEPHONE (OUTPATIENT)
Dept: NEUROLOGY | Facility: CLINIC | Age: 50
End: 2025-02-11
Payer: COMMERCIAL

## 2025-02-11 NOTE — TELEPHONE ENCOUNTER
----- Message from Whitney Bravo NP sent at 2/3/2025  3:08 PM CST -----  She cannot swallow PO tylenol, can we change her premed to IV, please.

## 2025-02-14 NOTE — PROGRESS NOTES
"  [facility]   Physical Therapy Treatment Note /progress note     Name: Shanthi Escoto  Clinic Number: [id number]    Therapy Diagnosis:   Encounter Diagnosis   Name Primary?    Impaired functional mobility, balance, gait, and endurance Yes       Physician: Arin Albarran MD    Visit Date: 2/17/2025    Physician Orders: PT Eval and Treat  " Neuro PT"  Medical Diagnosis from Referral:   Diagnosis   G35 (ICD-10-CM) - Multiple sclerosis      Evaluation Date: 7/16/2024  Authorization Period Expiration: 12/31/24  Updated Plan of Care Expiration: 3/24/25    Progress Note Due: 3/17/25  Visit # / Visits authorized: 7/10 (38)   FOTO: 1/5    Time In: 4:46 pm     Time Out:  530 pm    Total Billable Time: 44 minutes    Precautions: Standard and Fall Orthopedic: Lumbar Fusion precautions  (unclear if these are current or have been lifted)     PTA visit 0/5      Subjective     Pt reports:  Pt reports she had a fall last week and was feeling sore so did not come to therapy Friday. " I didn't hit my head but it scared me." Pt reports she was reaching for soap in the bathroom and lost her balance. WC rep reports resubmitting for custom WC tomorrow as there was a time period to wait before resubmission. Pt reports she has been looking at light weight chair options. Reports the custom AFO order is in and needs to make an appt. Pt reports she was not using an assistive device in the house prior to this recent fall but is now more interested in the rollator.         She was compliant with home exercise program.    Response to previous treatment: good  Functional change: ongoing      Pain: 6/10      Location: low back pain       Objective      Ambulating to clinic with hurry-cane.   Requires WC transfer back to car at end of session.        Treatment     Shanthi received the treatments listed below:      therapeutic activities to improve functional performance for 44   minutes, including:          " Evaluation 9/9/24 11/4/24 1/13/25 2/17/25   5 times sit to stand 13.5 seconds minimal upper extremity support 17 sec bilateral upper extremity support.  15.8 sec bilateral upper extremity  17.2 sec bilateral upper extremity  Not tested at this time   TUG 17.9 seconds single point cane 24.1 sec with single point cane  17.9 seconds with single point cane  25.7 sec with single point cane  Not tested at this time   Self selected walking speed (10MWT) 0.44 m/sec (6m/13.7s)  single point cane  0.30 m/s ( 6m/ 19.8 sec) single point cane  0.50 m/s (6m/ 11.96 sec) single point cane  0.31m/s (6m/19.5 sec single point cane  0.36 m/s (6m/ 16.6 sec) rollator    2MWT  Not assessed at this time Not assessed at this time.  172 ft single point cane   102 ft single point cane  Not tested at this time      Weight of clinic rollator is 14 pounds      Pt ambulated 120 ft with supervision with single point cane    Education provided see below.     Required for WC transport back to car due to fatigue     Home Exercises Provided and Patient Education Provided     Education provided:         - further discussed pros/ cons of various wheelchair options. Discussed considering the weight of WC because she will have to get it in trunk of car. Discussed rollator options. Discussed AFO options. Discussed various features on various pieces of equipment. Reports understanding and has no questions or concerns.       Assessment        Pt tolerated session well. Pt performed gait speed reassessment with rollator today versus single point cane. Pt is faster and more safe when ambulating with rollator. Therapist continues to recommend rollator for community ambulation at this time as she is a high fall risk. Pt reports she is considering rollator more now that she has had a fall. Pt to get custom AFO fitting soon. Pt remains motivated to improve and a good candidate for PT.         Shanthi Is progressing well towards her goals.   Pt prognosis is Good.      Pt will continue to benefit from skilled outpatient physical therapy to address the deficits listed in the problem list box on initial evaluation, provide pt/family education and to maximize pt's level of independence in the home and community environment.     Pt's spiritual, cultural and educational needs considered and pt agreeable to plan of care and goals.     Anticipated barriers to physical therapy: progressive disease process     Goals:          Short Term Goals: 5 weeks Date last assessed:  Status:    1. Patient will be provided with an HEP for strength, endurance and balance.  7/16/2024    MET   2.  Patient will demonstrate improve endurance and activity tolerance as evidenced by ability to perform Nu-step x 15 minutes without rests breaks. 11/4/24 ongoing            Long Term Goals: 10 weeks  Date last assessed: Status:    1. Patient will be independent and compliant with updated HEP. 9/9/24    ongoing   2.  Patient will increase her   gait speed as assessed by the timed 10MWT from 0.44 m/s to 0.58 m/s to increase her safety and (I) with gait at a community level. (MDC for CVA= 0.14 m/s)     1/6/25    Progressing       3. The patient will demonstrate improved efficiency with functional mobility and decreased risk for falls as evidenced by an increase in her score on the Timed up and Go from 17.9 to 15.0. (Minimal detectable change in chronic stroke = 2.9 seconds)  11/4/24 ongoing   4.Patient will improve her FOTO score from 42  to at least 57 for improved self perception of functional mobility.(score 0-100, high score indicates greater level of function) 11/4/24 ongoing   5. Pt will improve 5x sit to stand score from 13.5 sec to less than 12 seconds to decrease fall risk and improve safety.  11/4/24    ongoing   6. Pt will acquire custom manual WC to assist with independence and safety with long distance mobility.  1/6/25 ongoing   7. Pt will acquire left Bioness L300 to assist with ambulation and  decrease foot drop to lower fall risk.  1/6/25 ongoing   8. Patient will increase his/her distance achieved on the 2 Minute Walk Test from 172 feet to 209 feet to demonstrate improved endurance and efficiency with community level gait. (MDC for CVA = 112 feet) 1/6/25 ongoing              Plan      Updated Plan of care Certification: 1/13/25-  3/24/25       Outpatient Physical Therapy 2 times weekly for 10 weeks to include the following interventions: Electrical Stimulation as indicated, Gait Training, Manual Therapy, Moist Heat/ Ice, Neuromuscular Re-ed, Orthotic Management and Training, Patient Education, Therapeutic Activities, and Therapeutic Exercise.        Recommendations for next treatment session:     - standing exercises          Dianna Rosas, PT

## 2025-02-17 ENCOUNTER — CLINICAL SUPPORT (OUTPATIENT)
Dept: REHABILITATION | Facility: HOSPITAL | Age: 50
End: 2025-02-17
Payer: COMMERCIAL

## 2025-02-17 DIAGNOSIS — Z74.09 IMPAIRED FUNCTIONAL MOBILITY, BALANCE, GAIT, AND ENDURANCE: Primary | ICD-10-CM

## 2025-02-17 PROCEDURE — 97530 THERAPEUTIC ACTIVITIES: CPT | Mod: PO

## 2025-02-21 NOTE — PROGRESS NOTES
"  [facility]   Physical Therapy Treatment Note      Name: Shanthi Escoto  Clinic Number: [id number]    Therapy Diagnosis:   Encounter Diagnosis   Name Primary?    Impaired functional mobility, balance, gait, and endurance Yes       Physician: Arin Albarran MD    Visit Date: 2/24/2025    Physician Orders: PT Eval and Treat  " Neuro PT"  Medical Diagnosis from Referral:   Diagnosis   G35 (ICD-10-CM) - Multiple sclerosis      Evaluation Date: 7/16/2024  Authorization Period Expiration: 12/31/24  Updated Plan of Care Expiration: 3/24/25    Progress Note Due: 3/17/25  Visit # / Visits authorized: 8/10 (39)   FOTO: 1/5    Time In: 5:32 pm      Time Out: 6:15 pm   Total Billable Time: 43 minutes    Precautions: Standard and Fall Orthopedic: Lumbar Fusion precautions  (unclear if these are current or have been lifted)     PTA visit 0/5      Subjective     Pt reports: "I am good. Nothing new."     She was compliant with home exercise program.    Response to previous treatment: good  Functional change: ongoing      Pain: 5/10      Location: low back pain       Objective      Ambulating to clinic with hurry-cane.   Requires WC transfer back to car at end of session.        Treatment     Shanthi received the treatments listed below:      therapeutic activities to improve functional performance for 43   minutes, including:    Pt received Manual electrical stimulation for 30 minutes after being cleared for all contradictions as follows: Pt received Functional Electrical stimulation with  FES lower extremity bike to elicit  sequential muscle contraction of the left tibialis anterior and gastroc/ soleus muscle groups.   Activity time includes skilled set-up for proper placement of electrodes and "muscle test" to determine optimal intensity to elicit a muscular contraction,  adjustment of pedal lever arms to promote forced use of left  lower extremity and securing of "Q-Straints" in " kenyon/cross pattern to stabilize chair. Training set-up for intensity training. Results as follows:       Distance travelled: 3.87 miles  Energy Expended: 1.5 kCal  Energy per hour 3.2 kCal/ hour  Average power: 3.8 W  Average Asymmetry: right 6 %  Total therapy time: 29.16 min   Time in active (off motor) 24.23 min   Time in passive: (on motor): 4.53 min       Pt ambulated 120 ft with supervision with single point cane    Education provided see below.     Required for WC transport back to car due to fatigue     Home Exercises Provided and Patient Education Provided     Education provided:      - educated about FES bike. Reports understanding.       Assessment          Pt tolerated session well. FES bike performed to assist with neuro re-ed to left lower extremity. Tibialis anterior and gastroc performed only due to clothing limitations with quad and hamstring. Pt remains motivated to improve and a good candidate for PT.         Shanthi Is progressing well towards her goals.   Pt prognosis is Good.     Pt will continue to benefit from skilled outpatient physical therapy to address the deficits listed in the problem list box on initial evaluation, provide pt/family education and to maximize pt's level of independence in the home and community environment.     Pt's spiritual, cultural and educational needs considered and pt agreeable to plan of care and goals.     Anticipated barriers to physical therapy: progressive disease process     Goals:         Short Term Goals: 5 weeks Date last assessed:  Status:    1. Patient will be provided with an HEP for strength, endurance and balance.  7/16/2024    MET   2.  Patient will demonstrate improve endurance and activity tolerance as evidenced by ability to perform Nu-step x 15 minutes without rests breaks. 11/4/24 ongoing            Long Term Goals: 10 weeks  Date last assessed: Status:    1. Patient will be independent and compliant with updated HEP. 9/9/24    ongoing   2.   Patient will increase her   gait speed as assessed by the timed 10MWT from 0.44 m/s to 0.58 m/s to increase her safety and (I) with gait at a community level. (MDC for CVA= 0.14 m/s)     1/6/25    Progressing       3. The patient will demonstrate improved efficiency with functional mobility and decreased risk for falls as evidenced by an increase in her score on the Timed up and Go from 17.9 to 15.0. (Minimal detectable change in chronic stroke = 2.9 seconds)  11/4/24 ongoing   4.Patient will improve her FOTO score from 42  to at least 57 for improved self perception of functional mobility.(score 0-100, high score indicates greater level of function) 11/4/24 ongoing   5. Pt will improve 5x sit to stand score from 13.5 sec to less than 12 seconds to decrease fall risk and improve safety.  11/4/24    ongoing   6. Pt will acquire custom manual WC to assist with independence and safety with long distance mobility.  1/6/25 ongoing   7. Pt will acquire left Bioness L300 to assist with ambulation and decrease foot drop to lower fall risk.  1/6/25 ongoing   8. Patient will increase his/her distance achieved on the 2 Minute Walk Test from 172 feet to 209 feet to demonstrate improved endurance and efficiency with community level gait. (MDC for CVA = 112 feet) 1/6/25 ongoing              Plan      Updated Plan of care Certification: 1/13/25-  3/24/25       Outpatient Physical Therapy 2 times weekly for 10 weeks to include the following interventions: Electrical Stimulation as indicated, Gait Training, Manual Therapy, Moist Heat/ Ice, Neuromuscular Re-ed, Orthotic Management and Training, Patient Education, Therapeutic Activities, and Therapeutic Exercise.        Recommendations for next treatment session:     - standing exercises          Dianna Rosas, PT

## 2025-02-24 ENCOUNTER — CLINICAL SUPPORT (OUTPATIENT)
Dept: REHABILITATION | Facility: HOSPITAL | Age: 50
End: 2025-02-24
Payer: COMMERCIAL

## 2025-02-24 DIAGNOSIS — Z74.09 IMPAIRED FUNCTIONAL MOBILITY, BALANCE, GAIT, AND ENDURANCE: Primary | ICD-10-CM

## 2025-02-24 PROCEDURE — 97530 THERAPEUTIC ACTIVITIES: CPT | Mod: PO

## 2025-02-27 NOTE — PROGRESS NOTES
"  [facility]   Physical Therapy Treatment Note      Name: Shanthi Escoto  Clinic Number: [id number]    Therapy Diagnosis:   Encounter Diagnosis   Name Primary?    Impaired functional mobility, balance, gait, and endurance Yes       Physician: Arin Albarran MD    Visit Date: 2/28/2025    Physician Orders: PT Eval and Treat  " Neuro PT"  Medical Diagnosis from Referral:   Diagnosis   G35 (ICD-10-CM) - Multiple sclerosis      Evaluation Date: 7/16/2024  Authorization Period Expiration: 12/31/24  Updated Plan of Care Expiration: 3/24/25    Progress Note Due: 3/17/25  Visit # / Visits authorized: 9/10 (40)   FOTO: 1/5    Time In: 1:01 pm     Time Out: 1:45 pm    Total Billable Time: 44  minutes    Precautions: Standard and Fall Orthopedic: Lumbar Fusion precautions  (unclear if these are current or have been lifted)     PTA visit 0/5      Subjective     Pt reports: " I haven't heard about the custom WC yet".     She was compliant with home exercise program.    Response to previous treatment: good  Functional change: ongoing      Pain: 4/10       Location: low back pain       Objective      Ambulating to clinic with hurry-cane.   Requires WC transfer back to car at end of session.        Treatment     Shanthi received the treatments listed below:      therapeutic activities to improve functional performance for 44    minutes, including:       Parallel bars:   - standing marches 2x10  - standing hamstring curls 2x10 each lower extremity   - standing hip abduction 2x10 each lower extremity     Seated skateboard knee flexion/ extension 2x10 each lower extremity      Pt ambulated 120 ft, 30 ft  with supervision with single point cane    Education provided see below.     Required for WC transport back to car due to fatigue     Home Exercises Provided and Patient Education Provided     Education provided:      Educated about resting when needed with energy conservation. Reports understanding. "     Assessment         Pt tolerated session well. Performing standing exercises in parallel bars today. Pt reports she has not yet heard any updates about custom WC. Remains motivated to improve and a good candidate for PT.       Shanthi Is progressing well towards her goals.   Pt prognosis is Good.     Pt will continue to benefit from skilled outpatient physical therapy to address the deficits listed in the problem list box on initial evaluation, provide pt/family education and to maximize pt's level of independence in the home and community environment.     Pt's spiritual, cultural and educational needs considered and pt agreeable to plan of care and goals.     Anticipated barriers to physical therapy: progressive disease process     Goals:         Short Term Goals: 5 weeks Date last assessed:  Status:    1. Patient will be provided with an HEP for strength, endurance and balance.  7/16/2024    MET   2.  Patient will demonstrate improve endurance and activity tolerance as evidenced by ability to perform Nu-step x 15 minutes without rests breaks. 11/4/24 ongoing            Long Term Goals: 10 weeks  Date last assessed: Status:    1. Patient will be independent and compliant with updated HEP. 9/9/24    ongoing   2.  Patient will increase her   gait speed as assessed by the timed 10MWT from 0.44 m/s to 0.58 m/s to increase her safety and (I) with gait at a community level. (MDC for CVA= 0.14 m/s)     1/6/25    Progressing       3. The patient will demonstrate improved efficiency with functional mobility and decreased risk for falls as evidenced by an increase in her score on the Timed up and Go from 17.9 to 15.0. (Minimal detectable change in chronic stroke = 2.9 seconds)  11/4/24 ongoing   4.Patient will improve her FOTO score from 42  to at least 57 for improved self perception of functional mobility.(score 0-100, high score indicates greater level of function) 11/4/24 ongoing   5. Pt will improve 5x sit to stand  score from 13.5 sec to less than 12 seconds to decrease fall risk and improve safety.  11/4/24    ongoing   6. Pt will acquire custom manual WC to assist with independence and safety with long distance mobility.  1/6/25 ongoing   7. Pt will acquire left Bioness L300 to assist with ambulation and decrease foot drop to lower fall risk.  1/6/25 ongoing   8. Patient will increase his/her distance achieved on the 2 Minute Walk Test from 172 feet to 209 feet to demonstrate improved endurance and efficiency with community level gait. (MDC for CVA = 112 feet) 1/6/25 ongoing              Plan      Updated Plan of care Certification: 1/13/25-  3/24/25       Outpatient Physical Therapy 2 times weekly for 10 weeks to include the following interventions: Electrical Stimulation as indicated, Gait Training, Manual Therapy, Moist Heat/ Ice, Neuromuscular Re-ed, Orthotic Management and Training, Patient Education, Therapeutic Activities, and Therapeutic Exercise.        Recommendations for next treatment session:     - standing exercises           Dianna Rosas, PT

## 2025-02-28 ENCOUNTER — CLINICAL SUPPORT (OUTPATIENT)
Dept: REHABILITATION | Facility: HOSPITAL | Age: 50
End: 2025-02-28
Payer: COMMERCIAL

## 2025-02-28 ENCOUNTER — TELEPHONE (OUTPATIENT)
Dept: NEUROLOGY | Facility: CLINIC | Age: 50
End: 2025-02-28
Payer: COMMERCIAL

## 2025-02-28 DIAGNOSIS — Z74.09 IMPAIRED FUNCTIONAL MOBILITY, BALANCE, GAIT, AND ENDURANCE: Primary | ICD-10-CM

## 2025-02-28 PROCEDURE — 97530 THERAPEUTIC ACTIVITIES: CPT | Mod: PO

## 2025-03-03 ENCOUNTER — CLINICAL SUPPORT (OUTPATIENT)
Dept: REHABILITATION | Facility: HOSPITAL | Age: 50
End: 2025-03-03
Payer: COMMERCIAL

## 2025-03-03 DIAGNOSIS — Z74.09 IMPAIRED FUNCTIONAL MOBILITY, BALANCE, GAIT, AND ENDURANCE: Primary | ICD-10-CM

## 2025-03-03 PROCEDURE — 97530 THERAPEUTIC ACTIVITIES: CPT | Mod: PO

## 2025-03-03 NOTE — PROGRESS NOTES
"  [facility]   Physical Therapy Treatment Note      Name: Shanthi Escoto  Clinic Number: [id number]    Therapy Diagnosis:   Encounter Diagnosis   Name Primary?    Impaired functional mobility, balance, gait, and endurance Yes       Physician: Arin Albarran MD    Visit Date: 3/3/2025    Physician Orders: PT Eval and Treat  " Neuro PT"  Medical Diagnosis from Referral:   Diagnosis   G35 (ICD-10-CM) - Multiple sclerosis      Evaluation Date: 7/16/2024  Authorization Period Expiration: 12/31/24  Updated Plan of Care Expiration: 3/24/25    Progress Note Due: 3/17/25  Visit # / Visits authorized: 10/30 (41)   FOTO: 1/5    Time In: 5:31 pm    Time Out: 6:15 pm    Total Billable Time: 44   minutes    Precautions: Standard and Fall Orthopedic: Lumbar Fusion precautions  (unclear if these are current or have been lifted)     PTA visit 0/5      Subjective     Pt reports:  " I am ok."  reports  she is getting the light weight WC soon.     She was compliant with home exercise program.    Response to previous treatment: good  Functional change: ongoing      Pain: 5/10    Location: low back pain       Objective      Ambulating to clinic with hurry-cane.   Requires WC transfer back to car at end of session.        Treatment     Shanthi received the treatments listed below:      therapeutic activities to improve functional performance for 44 minutes, including:    Pt ascended / descended 12 4 inch stairs x 2 trials with bilateral upper extremity support and standby assist. Reciprocal / step to pattern up and step to pattern down.    Sit to supine supervision   Supine double knee to chest with red theaball 2x10   Supine to sit supervision     Seated hamstring stretch 30 sec x 3 trials left lower extremity      Pt ambulated 120 ft, with supervision with single point cane    Education provided see below.     Required for WC transport back to car due to fatigue     Home Exercises Provided and Patient " Education Provided     Education provided:      - educated about stair safety. Reports understanding.     Assessment          Pt tolerated session well. Working on stair negotiation. Pt able to complete 12 4 inch stairs 2 trials with standby assist. Pt was fatigued after stairs. Able to complete stretching and supine exercises after. Remains motivated to improve and a good candidate for PT.     Shanthi Is progressing well towards her goals.   Pt prognosis is Good.     Pt will continue to benefit from skilled outpatient physical therapy to address the deficits listed in the problem list box on initial evaluation, provide pt/family education and to maximize pt's level of independence in the home and community environment.     Pt's spiritual, cultural and educational needs considered and pt agreeable to plan of care and goals.     Anticipated barriers to physical therapy: progressive disease process     Goals:         Short Term Goals: 5 weeks Date last assessed:  Status:    1. Patient will be provided with an HEP for strength, endurance and balance.  7/16/2024    MET   2.  Patient will demonstrate improve endurance and activity tolerance as evidenced by ability to perform Nu-step x 15 minutes without rests breaks. 11/4/24 ongoing            Long Term Goals: 10 weeks  Date last assessed: Status:    1. Patient will be independent and compliant with updated HEP. 9/9/24    ongoing   2.  Patient will increase her   gait speed as assessed by the timed 10MWT from 0.44 m/s to 0.58 m/s to increase her safety and (I) with gait at a community level. (MDC for CVA= 0.14 m/s)     1/6/25    Progressing       3. The patient will demonstrate improved efficiency with functional mobility and decreased risk for falls as evidenced by an increase in her score on the Timed up and Go from 17.9 to 15.0. (Minimal detectable change in chronic stroke = 2.9 seconds)  11/4/24 ongoing   4.Patient will improve her FOTO score from 42  to at least 57  for improved self perception of functional mobility.(score 0-100, high score indicates greater level of function) 11/4/24 ongoing   5. Pt will improve 5x sit to stand score from 13.5 sec to less than 12 seconds to decrease fall risk and improve safety.  11/4/24    ongoing   6. Pt will acquire custom manual WC to assist with independence and safety with long distance mobility.  1/6/25 ongoing   7. Pt will acquire left Bioness L300 to assist with ambulation and decrease foot drop to lower fall risk.  1/6/25 ongoing   8. Patient will increase his/her distance achieved on the 2 Minute Walk Test from 172 feet to 209 feet to demonstrate improved endurance and efficiency with community level gait. (MDC for CVA = 112 feet) 1/6/25 ongoing              Plan      Updated Plan of care Certification: 1/13/25-  3/24/25       Outpatient Physical Therapy 2 times weekly for 10 weeks to include the following interventions: Electrical Stimulation as indicated, Gait Training, Manual Therapy, Moist Heat/ Ice, Neuromuscular Re-ed, Orthotic Management and Training, Patient Education, Therapeutic Activities, and Therapeutic Exercise.        Recommendations for next treatment session:     - stretching     Dianna Rosas, PT

## 2025-03-06 NOTE — PROGRESS NOTES
"  [facility]   Physical Therapy Treatment Note      Name: Shanthi Escoto  Clinic Number: [id number]    Therapy Diagnosis:   Encounter Diagnosis   Name Primary?    Impaired functional mobility, balance, gait, and endurance Yes         Physician: Arin Albarran MD    Visit Date: 3/7/2025    Physician Orders: PT Eval and Treat  " Neuro PT"  Medical Diagnosis from Referral:   Diagnosis   G35 (ICD-10-CM) - Multiple sclerosis      Evaluation Date: 7/16/2024  Authorization Period Expiration: 12/31/24  Updated Plan of Care Expiration: 3/24/25    Progress Note Due: 3/17/25  Visit # / Visits authorized: 11/30 (42)   FOTO: 1/5    Time In: 12:54 pm ( early start to 1:00 pm appt)  Time Out: 1:35 pm     Total Billable Time: 41   minutes    Precautions: Standard and Fall Orthopedic: Lumbar Fusion precautions  (unclear if these are current or have been lifted)     PTA visit 0/5      Subjective     Pt reports:  " I am ok."  [facility] appt confirmed for next Friday for pt. Thinking about getting rollator.    She was compliant with home exercise program.    Response to previous treatment: good  Functional change: ongoing      Pain: 5/10    Location: low back pain       Objective      Ambulating to clinic with hurry-cane.   Requires WC transfer back to car at end of session.        Treatment     Shanthi received the treatments listed below:      therapeutic activities to improve functional performance for 41 minutes, including:    Seated hamstring stretch 30 sec x 3 trials left lower extremity over stool    Prone quadriceps stretch with strap 30 sec x 3 trials each lower extremity     Sit to supine supervision  Supine to sit supervision  Supine to prone supervision     Pt ascended/ descended 18 4 inch stairs bilateral handrails supervision        Required for WC transport back to car due to fatigue     Home Exercises Provided and Patient Education Provided     Education provided:      - educated about stair " safety. Reports understanding.     Assessment            Pt tolerated session well. Working on stair negotiation. Pt able to complete 18 4 inch stairs today for first time without needed sitting break. Went over various stretches for pt to improve ROM. Pt remains motivated to improve and a good candidate for PT.     Shanthi Is progressing well towards her goals.   Pt prognosis is Good.     Pt will continue to benefit from skilled outpatient physical therapy to address the deficits listed in the problem list box on initial evaluation, provide pt/family education and to maximize pt's level of independence in the home and community environment.     Pt's spiritual, cultural and educational needs considered and pt agreeable to plan of care and goals.     Anticipated barriers to physical therapy: progressive disease process     Goals:         Short Term Goals: 5 weeks Date last assessed:  Status:    1. Patient will be provided with an HEP for strength, endurance and balance.  7/16/2024    MET   2.  Patient will demonstrate improve endurance and activity tolerance as evidenced by ability to perform Nu-step x 15 minutes without rests breaks. 11/4/24 ongoing            Long Term Goals: 10 weeks  Date last assessed: Status:    1. Patient will be independent and compliant with updated HEP. 9/9/24    ongoing   2.  Patient will increase her   gait speed as assessed by the timed 10MWT from 0.44 m/s to 0.58 m/s to increase her safety and (I) with gait at a community level. (MDC for CVA= 0.14 m/s)     1/6/25    Progressing       3. The patient will demonstrate improved efficiency with functional mobility and decreased risk for falls as evidenced by an increase in her score on the Timed up and Go from 17.9 to 15.0. (Minimal detectable change in chronic stroke = 2.9 seconds)  11/4/24 ongoing   4.Patient will improve her FOTO score from 42  to at least 57 for improved self perception of functional mobility.(score 0-100, high score  indicates greater level of function) 11/4/24 ongoing   5. Pt will improve 5x sit to stand score from 13.5 sec to less than 12 seconds to decrease fall risk and improve safety.  11/4/24    ongoing   6. Pt will acquire custom manual WC to assist with independence and safety with long distance mobility.  1/6/25 ongoing   7. Pt will acquire left Bioness L300 to assist with ambulation and decrease foot drop to lower fall risk.  1/6/25 ongoing   8. Patient will increase his/her distance achieved on the 2 Minute Walk Test from 172 feet to 209 feet to demonstrate improved endurance and efficiency with community level gait. (MDC for CVA = 112 feet) 1/6/25 ongoing              Plan      Updated Plan of care Certification: 1/13/25-  3/24/25       Outpatient Physical Therapy 2 times weekly for 10 weeks to include the following interventions: Electrical Stimulation as indicated, Gait Training, Manual Therapy, Moist Heat/ Ice, Neuromuscular Re-ed, Orthotic Management and Training, Patient Education, Therapeutic Activities, and Therapeutic Exercise.        Recommendations for next treatment session:     - stretching      Dianna Rosas, PT

## 2025-03-07 ENCOUNTER — CLINICAL SUPPORT (OUTPATIENT)
Dept: REHABILITATION | Facility: HOSPITAL | Age: 50
End: 2025-03-07
Payer: COMMERCIAL

## 2025-03-07 ENCOUNTER — PATIENT MESSAGE (OUTPATIENT)
Dept: PSYCHIATRY | Facility: CLINIC | Age: 50
End: 2025-03-07
Payer: COMMERCIAL

## 2025-03-07 DIAGNOSIS — Z74.09 IMPAIRED FUNCTIONAL MOBILITY, BALANCE, GAIT, AND ENDURANCE: Primary | ICD-10-CM

## 2025-03-07 PROCEDURE — 97530 THERAPEUTIC ACTIVITIES: CPT | Mod: PO

## 2025-03-10 ENCOUNTER — CLINICAL SUPPORT (OUTPATIENT)
Dept: REHABILITATION | Facility: HOSPITAL | Age: 50
End: 2025-03-10
Payer: COMMERCIAL

## 2025-03-10 DIAGNOSIS — Z74.09 IMPAIRED FUNCTIONAL MOBILITY, BALANCE, GAIT, AND ENDURANCE: Primary | ICD-10-CM

## 2025-03-10 PROCEDURE — 97530 THERAPEUTIC ACTIVITIES: CPT | Mod: PO

## 2025-03-10 NOTE — PROGRESS NOTES
"  [facility]   Physical Therapy Treatment Note      Name: Shanthi Escoto  Clinic Number: [id number]    Therapy Diagnosis:   Encounter Diagnosis   Name Primary?    Impaired functional mobility, balance, gait, and endurance Yes       Physician: Arin Albarran MD    Visit Date: 3/10/2025    Physician Orders: PT Eval and Treat  " Neuro PT"  Medical Diagnosis from Referral:   Diagnosis   G35 (ICD-10-CM) - Multiple sclerosis      Evaluation Date: 7/16/2024  Authorization Period Expiration: 12/31/24  Updated Plan of Care Expiration: 3/24/25    Progress Note Due: 3/17/25  Visit # / Visits authorized: 12/30 (43)   FOTO: 1/5    Time In: 5:31 pm   Time Out: 6:18 pm   Total Billable Time: 47 minutes    Precautions: Standard and Fall Orthopedic: Lumbar Fusion precautions  (unclear if these are current or have been lifted)     PTA visit 0/5      Subjective     Pt reports: " I am ok. Just one of those days."      She was compliant with home exercise program.    Response to previous treatment: good  Functional change: ongoing      Pain: 6/10     Location: low back pain       Objective      Ambulating to clinic with hurry-cane.   Requires WC transfer back to car at end of session.        Treatment     Shanthi received the treatments listed below:      therapeutic activities to improve functional performance for 47 minutes, including:    - seated skateboard knee flexion/ extension 3x10 each lower extremity   - seated hip external rotation with orange theraband 3x10   - seated march 3x10 each lower extremity   - seated LAQ 3x10 each lower extremity   - seated ball squeezes 3x10     Required for WC transport back to car due to fatigue     Home Exercises Provided and Patient Education Provided     Education provided:      - educated about exercises. Reports understanding.     Assessment            Pt tolerated session well.Pt requesting to perform sitting exercises today with fatigue. Pt able to " complete with rest breaks. Remains motivated to improve and a good candidate for PT.     Shanthi Is progressing well towards her goals.   Pt prognosis is Good.     Pt will continue to benefit from skilled outpatient physical therapy to address the deficits listed in the problem list box on initial evaluation, provide pt/family education and to maximize pt's level of independence in the home and community environment.     Pt's spiritual, cultural and educational needs considered and pt agreeable to plan of care and goals.     Anticipated barriers to physical therapy: progressive disease process     Goals:         Short Term Goals: 5 weeks Date last assessed:  Status:    1. Patient will be provided with an HEP for strength, endurance and balance.  7/16/2024    MET   2.  Patient will demonstrate improve endurance and activity tolerance as evidenced by ability to perform Nu-step x 15 minutes without rests breaks. 11/4/24 ongoing            Long Term Goals: 10 weeks  Date last assessed: Status:    1. Patient will be independent and compliant with updated HEP. 9/9/24    ongoing   2.  Patient will increase her   gait speed as assessed by the timed 10MWT from 0.44 m/s to 0.58 m/s to increase her safety and (I) with gait at a community level. (MDC for CVA= 0.14 m/s)     1/6/25    Progressing       3. The patient will demonstrate improved efficiency with functional mobility and decreased risk for falls as evidenced by an increase in her score on the Timed up and Go from 17.9 to 15.0. (Minimal detectable change in chronic stroke = 2.9 seconds)  11/4/24 ongoing   4.Patient will improve her FOTO score from 42  to at least 57 for improved self perception of functional mobility.(score 0-100, high score indicates greater level of function) 11/4/24 ongoing   5. Pt will improve 5x sit to stand score from 13.5 sec to less than 12 seconds to decrease fall risk and improve safety.  11/4/24    ongoing   6. Pt will acquire custom manual  WC to assist with independence and safety with long distance mobility.  1/6/25 ongoing   7. Pt will acquire left Bioness L300 to assist with ambulation and decrease foot drop to lower fall risk.  1/6/25 ongoing   8. Patient will increase his/her distance achieved on the 2 Minute Walk Test from 172 feet to 209 feet to demonstrate improved endurance and efficiency with community level gait. (MDC for CVA = 112 feet) 1/6/25 ongoing              Plan      Updated Plan of care Certification: 1/13/25-  3/24/25       Outpatient Physical Therapy 2 times weekly for 10 weeks to include the following interventions: Electrical Stimulation as indicated, Gait Training, Manual Therapy, Moist Heat/ Ice, Neuromuscular Re-ed, Orthotic Management and Training, Patient Education, Therapeutic Activities, and Therapeutic Exercise.        Recommendations for next treatment session:     - stretching      Dianna Rosas, PT

## 2025-03-11 ENCOUNTER — TELEPHONE (OUTPATIENT)
Dept: NEUROLOGY | Facility: CLINIC | Age: 50
End: 2025-03-11
Payer: COMMERCIAL

## 2025-03-24 ENCOUNTER — CLINICAL SUPPORT (OUTPATIENT)
Dept: REHABILITATION | Facility: HOSPITAL | Age: 50
End: 2025-03-24
Payer: COMMERCIAL

## 2025-03-24 DIAGNOSIS — Z74.09 IMPAIRED FUNCTIONAL MOBILITY, BALANCE, GAIT, AND ENDURANCE: Primary | ICD-10-CM

## 2025-03-24 PROCEDURE — 97530 THERAPEUTIC ACTIVITIES: CPT | Mod: PO

## 2025-03-24 NOTE — LETTER
"              March 24, 2025  Arin Albarran MD  [address] [facility]  [address]  [place] [address]    To whom it may concern,     The attached plan of care is being sent to you for review and reference.    You may indicate your approval by signing the document electronically, or by faxing/mailing a signed copy of the final page of this document back to the attention of Dianna Rosas, PT:         Plan of Care 3/24/25   Effective from: 3/24/2025  Effective to: 6/2/2025    Plan ID: [id number]            Participants as of Finalize on 3/24/2025    Name Type Comments Contact Info    Arin Albarran MD Consulting Physician  [phone]       Last Plan Note     Author: Dianna Rosas, PT Status: Incomplete Last edited: 3/24/2025  5:30 PM         [facility]   Physical Therapy Plan of Care Update     Name: Shanthi HATCH Escoto  Clinic Number: [id number]    Therapy Diagnosis:   Encounter Diagnosis   Name Primary?    Impaired functional mobility, balance, gait, and endurance Yes         Physician: Arin Albarran MD    Visit Date: 3/24/2025    Physician Orders: PT Eval and Treat  " Neuro PT"  Medical Diagnosis from Referral:   Diagnosis   G35 (ICD-10-CM) - Multiple sclerosis      Evaluation Date: 7/16/2024  Authorization Period Expiration: 12/31/24  Updated Plan of Care Expiration: 6/2/25  Progress Note Due:4/24/25  Visit # / Visits authorized: 12/30 (43)   FOTO: 1/5    Time In: 5:30 pm   Time Out: 6:15 pm   Total Billable Time: 45 minutes    Precautions: Standard and Fall Orthopedic: Lumbar Fusion precautions  (unclear if these are current or have been lifted)     PTA visit 0/5      Subjective     Pt reports:   " I am ok." Pt with new custom Left AFO. Reports she lose her balance with it today and fell slowly to ground. Reporst she did not hurt herself.       She was compliant with home exercise program.    Response to previous treatment: good  Functional change: ongoing      Pain: 5/10   "   Location: low back pain       Objective      Ambulating to clinic with rubin-cane.   Requires WC transfer back to car at end of session.        Treatment     Shanthi received the treatments listed below:      therapeutic activities to improve functional performance for 45 minutes, including:     Sitting:   -Bicep curls with dowel 3# 3x10   - chest press with 3 # dowel 3x10  - Overhead press with 3# dowel 3x10          Evaluation 9/9/24 11/4/24 1/13/25 3/24/25   5 times sit to stand 13.5 seconds minimal upper extremity support 17 sec bilateral upper extremity support.  15.8 sec bilateral upper extremity  17.2 sec bilateral upper extremity  17.8 sec no upper extremity support   TUG 17.9 seconds single point cane 24.1 sec with single point cane  17.9 seconds with single point cane  25.7 sec with single point cane  26.9 sec with single point cane left AFO    Self selected walking speed (10MWT) 0.44 m/sec (6m/13.7s)  single point cane  0.30 m/s ( 6m/ 19.8 sec) single point cane  0.50 m/s (6m/ 11.96 sec) single point cane  0.31m/s (6m/19.5 sec single point cane   0.37 m/s ( 6m/ 16.3 sec) single point cane left AFO    2MWT  Not assessed at this time Not assessed at this time.  172 ft single point cane   102 ft single point cane  138 ft single point cane with left AFO           Required for WC transport back to car due to fatigue     Home Exercises Provided and Patient Education Provided     Education provided:        - educated about  POC update and progress. Reports understanding.     Assessment         Pt tolerated session well. Plan of Care Update today to reassess progress towards goals. Pt has met 1/2 short term goals and 1/8 long term goals. Pt has progressed in her walking speed and her 2MWT since last POC update. With patient's MS diagnosis, pt will continue to have days where she feels stronger and days where she is more fatigued which can skew scores depending on which days are testing days. Pt recently got her  Left AFO. First session with AFO on. Pt would benefit from additional PT to get custom manual WC, discuss other ambulation assistive devices for safety and to continue to improve function and maintain independence. Pt remains motivated to improve and a good candidate for PT.        Shanthi Is progressing well towards her goals.   Pt prognosis is Good.     Pt will continue to benefit from skilled outpatient physical therapy to address the deficits listed in the problem list box on initial evaluation, provide pt/family education and to maximize pt's level of independence in the home and community environment.     Pt's spiritual, cultural and educational needs considered and pt agreeable to plan of care and goals.     Anticipated barriers to physical therapy: progressive disease process     Goals:           Short Term Goals: 5 weeks Date last assessed:  Status:    1. Patient will be provided with an HEP for strength, endurance and balance.  7/16/2024    MET   2.  Patient will demonstrate improve endurance and activity tolerance as evidenced by ability to perform Nu-step x 15 minutes without rests breaks. 11/4/24 ongoing            Long Term Goals: 10 weeks  Date last assessed: Status:    1. Patient will be independent and compliant with updated HEP. 3/24/25    ongoing   2.  Patient will increase her   gait speed as assessed by the timed 10MWT from 0.37 m/s to 0.51 m/s to increase her safety and (I) with gait at a community level. (MDC for CVA= 0.14 m/s)     3/24/25    Progressing       3. The patient will demonstrate improved efficiency with functional mobility and decreased risk for falls as evidenced by an increase in her score on the Timed up and Go from 26.9 to 24.0 sec. (Minimal detectable change in chronic stroke = 2.9 seconds)  3/24/25 ongoing   4.Patient will improve her FOTO score from 42  to at least 57 for improved self perception of functional mobility.(score 0-100, high score indicates greater level of  function) 3/24/25 ongoing   5. Pt will improve 5x sit to stand score from 13.5 sec to less than 12 seconds to decrease fall risk and improve safety.  3/24/25    ongoing   6. Pt will acquire custom manual WC to assist with independence and safety with long distance mobility.  3/24/25 ongoing   7. Pt will acquire left Bioness L300 to assist with ambulation and decrease foot drop to lower fall risk.  3/24/25 MET. Got AFO. Bioness denied.    8. Patient will increase his/her distance achieved on the 2 Minute Walk Test from 138 feet to 175 feet to demonstrate improved endurance and efficiency with community level gait. (MDC for CVA = 112 feet) 3/24/25 ongoing              Plan      Updated Plan of care Certification:3/24/25- 6/2/25    Outpatient Physical Therapy 2 times weekly for 10 weeks to include the following interventions: Electrical Stimulation as indicated, Gait Training, Manual Therapy, Moist Heat/ Ice, Neuromuscular Re-ed, Orthotic Management and Training, Patient Education, Therapeutic Activities, and Therapeutic Exercise.        Recommendations for next treatment session:       - stretching      Dianna Rosas PT                     Current Participants as of 3/24/2025    Name Type Comments Contact Info    Arin Albarran MD Consulting Physician  [phone]    Signature pending            Sincerely,      Dianna Rosas PT  [facility]                                                            Dear Dianna Rosas PT,    RE: Ms. Shanthi Escoto, MRN: [id number]    I certify that I have reviewed the attached plan of care and agree to the details within.        ___________________________  ___________________________  Provider Printed Name   Provider Signed Name      ___________________________  Date and Time

## 2025-03-24 NOTE — PROGRESS NOTES
"  [facility]   Physical Therapy Plan of Care Update     Name: Shanthi Escoto  Clinic Number: [id number]    Therapy Diagnosis:   Encounter Diagnosis   Name Primary?    Impaired functional mobility, balance, gait, and endurance Yes         Physician: Arin Albarran MD    Visit Date: 3/24/2025    Physician Orders: PT Eval and Treat  " Neuro PT"  Medical Diagnosis from Referral:   Diagnosis   G35 (ICD-10-CM) - Multiple sclerosis      Evaluation Date: 7/16/2024  Authorization Period Expiration: 12/31/24  Updated Plan of Care Expiration: 6/2/25  Progress Note Due:4/24/25  Visit # / Visits authorized: 12/30 (43)   FOTO: 1/5    Time In: 5:30 pm   Time Out: 6:15 pm   Total Billable Time: 45 minutes    Precautions: Standard and Fall Orthopedic: Lumbar Fusion precautions  (unclear if these are current or have been lifted)     PTA visit 0/5      Subjective     Pt reports:   " I am ok." Pt with new custom Left AFO. Reports she lose her balance with it today and fell slowly to ground. Reporst she did not hurt herself.       She was compliant with home exercise program.    Response to previous treatment: good  Functional change: ongoing      Pain: 5/10     Location: low back pain       Objective      Ambulating to clinic with hurry-cane.   Requires WC transfer back to car at end of session.        Treatment     Shanthi received the treatments listed below:      therapeutic activities to improve functional performance for 45 minutes, including:     Sitting:   -Bicep curls with dowel 3# 3x10   - chest press with 3 # dowel 3x10  - Overhead press with 3# dowel 3x10          Evaluation 9/9/24 11/4/24 1/13/25 3/24/25   5 times sit to stand 13.5 seconds minimal upper extremity support 17 sec bilateral upper extremity support.  15.8 sec bilateral upper extremity  17.2 sec bilateral upper extremity  17.8 sec no upper extremity support   TUG 17.9 seconds single point cane 24.1 sec with single point cane  " 17.9 seconds with single point cane  25.7 sec with single point cane  26.9 sec with single point cane left AFO    Self selected walking speed (10MWT) 0.44 m/sec (6m/13.7s)  single point cane  0.30 m/s ( 6m/ 19.8 sec) single point cane  0.50 m/s (6m/ 11.96 sec) single point cane  0.31m/s (6m/19.5 sec single point cane   0.37 m/s ( 6m/ 16.3 sec) single point cane left AFO    2MWT  Not assessed at this time Not assessed at this time.  172 ft single point cane   102 ft single point cane  138 ft single point cane with left AFO           Required for WC transport back to car due to fatigue     Home Exercises Provided and Patient Education Provided     Education provided:        - educated about  POC update and progress. Reports understanding.     Assessment         Pt tolerated session well. Plan of Care Update today to reassess progress towards goals. Pt has met 1/2 short term goals and 1/8 long term goals. Pt has progressed in her walking speed and her 2MWT since last POC update. With patient's MS diagnosis, pt will continue to have days where she feels stronger and days where she is more fatigued which can skew scores depending on which days are testing days. Pt recently got her Left AFO. First session with AFO on. Pt would benefit from additional PT to get custom manual WC, discuss other ambulation assistive devices for safety and to continue to improve function and maintain independence. Pt remains motivated to improve and a good candidate for PT.        Shanthi Is progressing well towards her goals.   Pt prognosis is Good.     Pt will continue to benefit from skilled outpatient physical therapy to address the deficits listed in the problem list box on initial evaluation, provide pt/family education and to maximize pt's level of independence in the home and community environment.     Pt's spiritual, cultural and educational needs considered and pt agreeable to plan of care and goals.     Anticipated barriers to  physical therapy: progressive disease process     Goals:           Short Term Goals: 5 weeks Date last assessed:  Status:    1. Patient will be provided with an HEP for strength, endurance and balance.  7/16/2024    MET   2.  Patient will demonstrate improve endurance and activity tolerance as evidenced by ability to perform Nu-step x 15 minutes without rests breaks. 11/4/24 ongoing            Long Term Goals: 10 weeks  Date last assessed: Status:    1. Patient will be independent and compliant with updated HEP. 3/24/25    ongoing   2.  Patient will increase her   gait speed as assessed by the timed 10MWT from 0.37 m/s to 0.51 m/s to increase her safety and (I) with gait at a community level. (MDC for CVA= 0.14 m/s)     3/24/25    Progressing       3. The patient will demonstrate improved efficiency with functional mobility and decreased risk for falls as evidenced by an increase in her score on the Timed up and Go from 26.9 to 24.0 sec. (Minimal detectable change in chronic stroke = 2.9 seconds)  3/24/25 ongoing   4.Patient will improve her FOTO score from 42  to at least 57 for improved self perception of functional mobility.(score 0-100, high score indicates greater level of function) 3/24/25 ongoing   5. Pt will improve 5x sit to stand score from 13.5 sec to less than 12 seconds to decrease fall risk and improve safety.  3/24/25    ongoing   6. Pt will acquire custom manual WC to assist with independence and safety with long distance mobility.  3/24/25 ongoing   7. Pt will acquire left Bioness L300 to assist with ambulation and decrease foot drop to lower fall risk.  3/24/25 MET. Got AFO. Bioness denied.    8. Patient will increase his/her distance achieved on the 2 Minute Walk Test from 138 feet to 175 feet to demonstrate improved endurance and efficiency with community level gait. (MDC for CVA = 112 feet) 3/24/25 ongoing              Plan      Updated Plan of care Certification:3/24/25- 6/2/25    Outpatient  Physical Therapy 2 times weekly for 10 weeks to include the following interventions: Electrical Stimulation as indicated, Gait Training, Manual Therapy, Moist Heat/ Ice, Neuromuscular Re-ed, Orthotic Management and Training, Patient Education, Therapeutic Activities, and Therapeutic Exercise.        Recommendations for next treatment session:       - stretching      Dianna Rosas, PT

## 2025-03-25 ENCOUNTER — TELEPHONE (OUTPATIENT)
Dept: NEUROLOGY | Facility: CLINIC | Age: 50
End: 2025-03-25
Payer: COMMERCIAL

## 2025-03-31 ENCOUNTER — CLINICAL SUPPORT (OUTPATIENT)
Dept: REHABILITATION | Facility: HOSPITAL | Age: 50
End: 2025-03-31
Payer: COMMERCIAL

## 2025-03-31 DIAGNOSIS — Z74.09 IMPAIRED FUNCTIONAL MOBILITY, BALANCE, GAIT, AND ENDURANCE: Primary | ICD-10-CM

## 2025-03-31 PROCEDURE — 97530 THERAPEUTIC ACTIVITIES: CPT | Mod: PO

## 2025-03-31 NOTE — PROGRESS NOTES
"  [facility]   Physical Therapy Daily Treatment note     Name: Shanthi Escoto  Clinic Number: [id number]    Therapy Diagnosis:   Encounter Diagnosis   Name Primary?    Impaired functional mobility, balance, gait, and endurance Yes       Physician: Arin Albarran MD    Visit Date: 3/31/2025    Physician Orders: PT Eval and Treat  " Neuro PT"  Medical Diagnosis from Referral:   Diagnosis   G35 (ICD-10-CM) - Multiple sclerosis      Evaluation Date: 7/16/2024  Authorization Period Expiration: 12/31/24  Updated Plan of Care Expiration: 6/2/25  Progress Note Due:4/24/25  Visit # / Visits authorized: 14/30 (45)   FOTO: 1/5    Time In: 5:31 pm   Time Out: 6:15 pm  Total Billable Time: 44 minutes    Precautions: Standard and Fall Orthopedic: Lumbar Fusion precautions  (unclear if these are current or have been lifted)     PTA visit 0/5      Subjective     Pt reports: " I almost needed a WC to get into clinic today."    She was compliant with home exercise program.    Response to previous treatment: good  Functional change: ongoing      Pain: 5/10     Location: low back pain       Objective      Ambulating to clinic with hurry-cane.   Requires WC transfer back to car at end of session.        Treatment     Shanthi received the treatments listed below:      therapeutic activities to improve functional performance for 44 minutes, including:     Pt received Manual electrical stimulation for 17 minutes after being cleared for all contradictions as follows: Pt received Functional Electrical stimulation with  FES lower extremity bike to elicit  sequential muscle contraction of the left tibialis anterior and gastroc/ soleus muscle groups Activity time includes skilled set-up for proper placement of electrodes and "muscle test" to determine optimal intensity to elicit a muscular contraction,  adjustment of pedal lever arms to promote forced use of left  lower extremity and securing of " ""Q-Straints" in kenyon/cross pattern to stabilize chair. Training set-up for intensity training. Results as follows:       Distance travelled: 2.97 miles  Energy Expended: 0.3 kCal  Energy per hour 0.8 kCal/ hour  Average power: 0.9 W  Average Asymmetry: 0 %  Total therapy time: 25.02 min   Time in active (off motor) 8.47 min   Time in passive: (on motor): 16.15 min       Required for WC transport back to car due to fatigue     Home Exercises Provided and Patient Education Provided     Education provided:      - educated about FES bike and purpose. Reports understanding       Assessment           Pt tolerated session well.  FES bike performed today  to left gastroc and tib anterior. Unable to do additional muscle groups with clothing restrictions. Pt actively pushed about 9 minutes today. First time performing FES bike in a few sessions. Pt remains motivated to improve and a good candidate for PT        Shanthi Is progressing well towards her goals.   Pt prognosis is Good.     Pt will continue to benefit from skilled outpatient physical therapy to address the deficits listed in the problem list box on initial evaluation, provide pt/family education and to maximize pt's level of independence in the home and community environment.     Pt's spiritual, cultural and educational needs considered and pt agreeable to plan of care and goals.     Anticipated barriers to physical therapy: progressive disease process     Goals:           Short Term Goals: 5 weeks Date last assessed:  Status:    1. Patient will be provided with an HEP for strength, endurance and balance.  7/16/2024    MET   2.  Patient will demonstrate improve endurance and activity tolerance as evidenced by ability to perform Nu-step x 15 minutes without rests breaks. 11/4/24 ongoing            Long Term Goals: 10 weeks  Date last assessed: Status:    1. Patient will be independent and compliant with updated HEP. 3/24/25    ongoing   2.  Patient will increase " her   gait speed as assessed by the timed 10MWT from 0.37 m/s to 0.51 m/s to increase her safety and (I) with gait at a community level. (MDC for CVA= 0.14 m/s)     3/24/25    Progressing       3. The patient will demonstrate improved efficiency with functional mobility and decreased risk for falls as evidenced by an increase in her score on the Timed up and Go from 26.9 to 24.0 sec. (Minimal detectable change in chronic stroke = 2.9 seconds)  3/24/25 ongoing   4.Patient will improve her FOTO score from 42  to at least 57 for improved self perception of functional mobility.(score 0-100, high score indicates greater level of function) 3/24/25 ongoing   5. Pt will improve 5x sit to stand score from 13.5 sec to less than 12 seconds to decrease fall risk and improve safety.  3/24/25    ongoing   6. Pt will acquire custom manual WC to assist with independence and safety with long distance mobility.  3/24/25 ongoing   7. Pt will acquire left Bioness L300 to assist with ambulation and decrease foot drop to lower fall risk.  3/24/25 MET. Got AFO. Bioness denied.    8. Patient will increase his/her distance achieved on the 2 Minute Walk Test from 138 feet to 175 feet to demonstrate improved endurance and efficiency with community level gait. (MDC for CVA = 112 feet) 3/24/25 ongoing              Plan      Updated Plan of care Certification:3/24/25- 6/2/25    Outpatient Physical Therapy 2 times weekly for 10 weeks to include the following interventions: Electrical Stimulation as indicated, Gait Training, Manual Therapy, Moist Heat/ Ice, Neuromuscular Re-ed, Orthotic Management and Training, Patient Education, Therapeutic Activities, and Therapeutic Exercise.        Recommendations for next treatment session:       - stretching      Dianna Rosas, PT

## 2025-04-01 ENCOUNTER — TELEPHONE (OUTPATIENT)
Dept: NEUROLOGY | Facility: CLINIC | Age: 50
End: 2025-04-01
Payer: COMMERCIAL

## 2025-04-01 NOTE — TELEPHONE ENCOUNTER
[facility]  Infusion due / scheduled: 5/21/25  Authorization: pended pre-service to work auth  Labs: will message radha to schedule  Orders signed: pending labs

## 2025-04-04 ENCOUNTER — TELEPHONE (OUTPATIENT)
Dept: NEUROLOGY | Facility: CLINIC | Age: 50
End: 2025-04-04
Payer: COMMERCIAL

## 2025-04-04 NOTE — PROGRESS NOTES
"  [facility]   Physical Therapy Daily Treatment note     Name: Shanthi Escoto  Clinic Number: [id number]    Therapy Diagnosis:   Encounter Diagnosis   Name Primary?    Impaired functional mobility, balance, gait, and endurance Yes       Physician: Arin Albarran MD    Visit Date: 4/7/2025    Physician Orders: PT Eval and Treat  " Neuro PT"  Medical Diagnosis from Referral:   Diagnosis   G35 (ICD-10-CM) - Multiple sclerosis      Evaluation Date: 7/16/2024  Authorization Period Expiration: 12/31/24  Updated Plan of Care Expiration: 6/2/25  Progress Note Due:4/24/25  Visit # / Visits authorized: 15/30 (46)   FOTO: 1/5    Time In:  5:32 pm    Time Out: 6:15 pm    Total Billable Time: 43 minutes    Precautions: Standard and Fall Orthopedic: Lumbar Fusion precautions  (unclear if these are current or have been lifted)     PTA visit 0/5      Subjective     Pt reports:   " I am off this whole week for my birthday." Pt reports her custom WC finally got approved.     She was compliant with home exercise program.    Response to previous treatment: good  Functional change: ongoing      Pain: 5/10      Location: low back pain       Objective      Ambulating to clinic with hurry-cane.   Requires WC transfer back to car at end of session.        Treatment     Shanthi received the treatments listed below:        Shanthi received therapeutic exercises to develop strength, endurance, and ROM for 43 minutes including:    Parallel bars:   Marches 3x10 each lower extremity   Hip abduction 2x10 each lower extremity   Hamstrig curls  2x10 each lower extremity   Alternating toe tap 3 inch step 2x10 each lower extremity      (Therapeutic rest breaks throughout as needed).     Education provided see below.     Required for WC transport back to car due to fatigue     Home Exercises Provided and Patient Education Provided     Education provided:     - educated about Cadense shoes that may benefit her foot " drop. Pt educated about custom chair and rollator.          Assessment            Pt tolerated session well.  Working on standing exercises in parallel bars. Pt requires many rest breaks throughout session. Pt educated about Cadense shoes that may help with her foot drop. Educated about rollator and custom WC. Pt remains motivated to improve and a good candidate for PT.       Shanthi Is progressing well towards her goals.   Pt prognosis is Good.     Pt will continue to benefit from skilled outpatient physical therapy to address the deficits listed in the problem list box on initial evaluation, provide pt/family education and to maximize pt's level of independence in the home and community environment.     Pt's spiritual, cultural and educational needs considered and pt agreeable to plan of care and goals.     Anticipated barriers to physical therapy: progressive disease process     Goals:           Short Term Goals: 5 weeks Date last assessed:  Status:    1. Patient will be provided with an HEP for strength, endurance and balance.  7/16/2024    MET   2.  Patient will demonstrate improve endurance and activity tolerance as evidenced by ability to perform Nu-step x 15 minutes without rests breaks. 11/4/24 ongoing            Long Term Goals: 10 weeks  Date last assessed: Status:    1. Patient will be independent and compliant with updated HEP. 3/24/25    ongoing   2.  Patient will increase her   gait speed as assessed by the timed 10MWT from 0.37 m/s to 0.51 m/s to increase her safety and (I) with gait at a community level. (MDC for CVA= 0.14 m/s)     3/24/25    Progressing       3. The patient will demonstrate improved efficiency with functional mobility and decreased risk for falls as evidenced by an increase in her score on the Timed up and Go from 26.9 to 24.0 sec. (Minimal detectable change in chronic stroke = 2.9 seconds)  3/24/25 ongoing   4.Patient will improve her FOTO score from 42  to at least 57 for  improved self perception of functional mobility.(score 0-100, high score indicates greater level of function) 3/24/25 ongoing   5. Pt will improve 5x sit to stand score from 13.5 sec to less than 12 seconds to decrease fall risk and improve safety.  3/24/25    ongoing   6. Pt will acquire custom manual WC to assist with independence and safety with long distance mobility.  3/24/25 ongoing   7. Pt will acquire left Bioness L300 to assist with ambulation and decrease foot drop to lower fall risk.  3/24/25 MET. Got AFO. Bioness denied.    8. Patient will increase his/her distance achieved on the 2 Minute Walk Test from 138 feet to 175 feet to demonstrate improved endurance and efficiency with community level gait. (MDC for CVA = 112 feet) 3/24/25 ongoing              Plan      Updated Plan of care Certification:3/24/25- 6/2/25    Outpatient Physical Therapy 2 times weekly for 10 weeks to include the following interventions: Electrical Stimulation as indicated, Gait Training, Manual Therapy, Moist Heat/ Ice, Neuromuscular Re-ed, Orthotic Management and Training, Patient Education, Therapeutic Activities, and Therapeutic Exercise.        Recommendations for next treatment session:        - stretching      Dianna Rosas, PT

## 2025-04-07 ENCOUNTER — CLINICAL SUPPORT (OUTPATIENT)
Dept: REHABILITATION | Facility: HOSPITAL | Age: 50
End: 2025-04-07
Payer: COMMERCIAL

## 2025-04-07 DIAGNOSIS — Z74.09 IMPAIRED FUNCTIONAL MOBILITY, BALANCE, GAIT, AND ENDURANCE: Primary | ICD-10-CM

## 2025-04-07 PROCEDURE — 97110 THERAPEUTIC EXERCISES: CPT | Mod: PO

## 2025-04-08 ENCOUNTER — PATIENT MESSAGE (OUTPATIENT)
Dept: NEUROLOGY | Facility: CLINIC | Age: 50
End: 2025-04-08
Payer: COMMERCIAL

## 2025-04-08 ENCOUNTER — TELEPHONE (OUTPATIENT)
Dept: NEUROLOGY | Facility: CLINIC | Age: 50
End: 2025-04-08
Payer: COMMERCIAL

## 2025-04-08 DIAGNOSIS — G35 MULTIPLE SCLEROSIS: ICD-10-CM

## 2025-04-08 DIAGNOSIS — G37.9 DEMYELINATING DISEASE OF CENTRAL NERVOUS SYSTEM, UNSPECIFIED: Primary | ICD-10-CM

## 2025-04-08 NOTE — TELEPHONE ENCOUNTER
Spoke with pt to schedule labs,MRI's and follow up. Pt labs are scheduled on 4/25. Pt will jeanette to schedule MRI's herself. Follow up will be scheduled once NS schedule for June opens.

## 2025-04-11 NOTE — PROGRESS NOTES
"  [facility]   Physical Therapy Daily Treatment note     Name: Shanthi Escoto  Clinic Number: [id number]    Therapy Diagnosis:   Encounter Diagnosis   Name Primary?    Impaired functional mobility, balance, gait, and endurance Yes       Physician: Arin Albarran MD    Visit Date: 4/14/2025    Physician Orders: PT Eval and Treat  " Neuro PT"  Medical Diagnosis from Referral:   Diagnosis   G35 (ICD-10-CM) - Multiple sclerosis      Evaluation Date: 7/16/2024  Authorization Period Expiration: 12/31/24  Updated Plan of Care Expiration: 6/2/25  Progress Note Due:4/24/25  Visit # / Visits authorized: 16/30 (47)   FOTO: 1/5    Time In:  5:32 pm    Time Out: 6:19 pm    Total Billable Time: 47  minutes    Precautions: Standard and Fall Orthopedic: Lumbar Fusion precautions  (unclear if these are current or have been lifted)     PTA visit 0/5      Subjective     Pt reports:  " I forgot my cane today." Pt called  and therapist picked up pt from car via WC today due to no cane.     She was compliant with home exercise program.    Response to previous treatment: good  Functional change: ongoing      Pain: 5/10      Location: low back pain       Objective      Ambulating to clinic with hurry-cane.   Requires WC transfer back to car at end of session.        Treatment     Shanthi received the treatments listed below:        therapeutic activities to improve functional performance for 47 minutes, including:    Pt received Manual electrical stimulation for 25 minutes after being cleared for all contradictions as follows: Pt received Functional Electrical stimulation with  FES lower extremity bike to elicit  sequential muscle contraction of the left tibialis anterior and gastroc/ soleus muscle groups.   Activity time includes skilled set-up for proper placement of electrodes and "muscle test" to determine optimal intensity to elicit a muscular contraction,  adjustment of pedal lever " "arms to promote forced use of left  lower extremity and securing of "Q-Straints" in kenyon/cross pattern to stabilize chair. Training set-up for intensity training. Results as follows:       Distance travelled: 2.88 miles  Energy Expended: 0.2 kCal  Energy per hour 0.6 kCal/ hour  Average power: 0.7 W  Average Asymmetry: left 6 %  Total therapy time: 24.58 min  Time in active (off motor) 1.43 min   Time in passive: (on motor): 23.15 min       Home Exercises Provided and Patient Education Provided     Education provided:     - educated about FES and purpose. Reports understanding.     Assessment             Pt tolerated session well.  Pt wanting to do FES bike today. Pt minimally performing active mode on bike today. Pt remains motivated to improve and a good candidate for PT.       Shanthi Is progressing well towards her goals.   Pt prognosis is Good.     Pt will continue to benefit from skilled outpatient physical therapy to address the deficits listed in the problem list box on initial evaluation, provide pt/family education and to maximize pt's level of independence in the home and community environment.     Pt's spiritual, cultural and educational needs considered and pt agreeable to plan of care and goals.     Anticipated barriers to physical therapy: progressive disease process     Goals:           Short Term Goals: 5 weeks Date last assessed:  Status:    1. Patient will be provided with an HEP for strength, endurance and balance.  7/16/2024    MET   2.  Patient will demonstrate improve endurance and activity tolerance as evidenced by ability to perform Nu-step x 15 minutes without rests breaks. 11/4/24 ongoing            Long Term Goals: 10 weeks  Date last assessed: Status:    1. Patient will be independent and compliant with updated HEP. 3/24/25    ongoing   2.  Patient will increase her   gait speed as assessed by the timed 10MWT from 0.37 m/s to 0.51 m/s to increase her safety and (I) with gait at a " community level. (MDC for CVA= 0.14 m/s)     3/24/25    Progressing       3. The patient will demonstrate improved efficiency with functional mobility and decreased risk for falls as evidenced by an increase in her score on the Timed up and Go from 26.9 to 24.0 sec. (Minimal detectable change in chronic stroke = 2.9 seconds)  3/24/25 ongoing   4.Patient will improve her FOTO score from 42  to at least 57 for improved self perception of functional mobility.(score 0-100, high score indicates greater level of function) 3/24/25 ongoing   5. Pt will improve 5x sit to stand score from 13.5 sec to less than 12 seconds to decrease fall risk and improve safety.  3/24/25    ongoing   6. Pt will acquire custom manual WC to assist with independence and safety with long distance mobility.  3/24/25 ongoing   7. Pt will acquire left Bioness L300 to assist with ambulation and decrease foot drop to lower fall risk.  3/24/25 MET. Got AFO. Bioness denied.    8. Patient will increase his/her distance achieved on the 2 Minute Walk Test from 138 feet to 175 feet to demonstrate improved endurance and efficiency with community level gait. (MDC for CVA = 112 feet) 3/24/25 ongoing              Plan      Updated Plan of care Certification:3/24/25- 6/2/25    Outpatient Physical Therapy 2 times weekly for 10 weeks to include the following interventions: Electrical Stimulation as indicated, Gait Training, Manual Therapy, Moist Heat/ Ice, Neuromuscular Re-ed, Orthotic Management and Training, Patient Education, Therapeutic Activities, and Therapeutic Exercise.        Recommendations for next treatment session:         - stretching      Dianna Rosas, PT

## 2025-04-14 ENCOUNTER — CLINICAL SUPPORT (OUTPATIENT)
Dept: REHABILITATION | Facility: HOSPITAL | Age: 50
End: 2025-04-14
Payer: COMMERCIAL

## 2025-04-14 DIAGNOSIS — Z74.09 IMPAIRED FUNCTIONAL MOBILITY, BALANCE, GAIT, AND ENDURANCE: Primary | ICD-10-CM

## 2025-04-14 PROCEDURE — 97530 THERAPEUTIC ACTIVITIES: CPT | Mod: PO

## 2025-04-21 ENCOUNTER — CLINICAL SUPPORT (OUTPATIENT)
Dept: REHABILITATION | Facility: HOSPITAL | Age: 50
End: 2025-04-21
Payer: COMMERCIAL

## 2025-04-21 DIAGNOSIS — Z74.09 IMPAIRED FUNCTIONAL MOBILITY, BALANCE, GAIT, AND ENDURANCE: Primary | ICD-10-CM

## 2025-04-21 PROCEDURE — 97530 THERAPEUTIC ACTIVITIES: CPT | Mod: PO

## 2025-04-21 NOTE — PROGRESS NOTES
"  [facility]   Physical Therapy Daily Treatment note/ progress note     Name: Shanthi Escoto  Clinic Number: [id number]    Therapy Diagnosis:   Encounter Diagnosis   Name Primary?    Impaired functional mobility, balance, gait, and endurance Yes       Physician: Arin Albarran MD    Visit Date: 4/21/2025    Physician Orders: PT Eval and Treat  " Neuro PT"  Medical Diagnosis from Referral:   Diagnosis   G35 (ICD-10-CM) - Multiple sclerosis      Evaluation Date: 7/16/2024  Authorization Period Expiration: 12/31/24  Updated Plan of Care Expiration: 6/2/25  Progress Note Due:5/21/25  Visit # / Visits authorized: 16/30 (47)   FOTO: 1/5    Time In:  5:32 pm   Time Out: 6:15 pm   Total Billable Time: 43  minutes    Precautions: Standard and Fall Orthopedic: Lumbar Fusion precautions  (unclear if these are current or have been lifted)     PTA visit 0/5      Subjective     Pt reports:  " I am doing ok." Said Walker with [employer] ordered WC so should be in soon. Pt reports she did not wear AFO today         She was compliant with home exercise program.    Response to previous treatment: good  Functional change: ongoing      Pain: 6/10      Location: low back pain       Objective      Ambulating to clinic with hurry-cane. No AFO today  Requires WC transfer back to car at end of session.        Treatment     Shanthi received the treatments listed below:        therapeutic activities to improve functional performance for 43 minutes, including:      LAQ 3x10 each lower extremity   Seated march 3x10 each lower extremity    Pt ascended/ descended 12 4 inch stairs  x 2 trials with bilateral handrails with standby assist with reciprocal up and step to pattern down. Seated rest break in between.          Evaluation 9/9/24 11/4/24 1/13/25 3/24/25 4/21/25   5 times sit to stand 13.5 seconds minimal upper extremity support 17 sec bilateral upper extremity support.  15.8 sec bilateral " upper extremity  17.2 sec bilateral upper extremity  17.8 sec no upper extremity support 20.4 sec bilateral upper extremity support.    TUG 17.9 seconds single point cane 24.1 sec with single point cane  17.9 seconds with single point cane  25.7 sec with single point cane  26.9 sec with single point cane left AFO   31.6 sec with single point cane without AFO    Self selected walking speed (10MWT) 0.44 m/sec (6m/13.7s)  single point cane  0.30 m/s ( 6m/ 19.8 sec) single point cane  0.50 m/s (6m/ 11.96 sec) single point cane  0.31m/s (6m/19.5 sec single point cane   0.37 m/s ( 6m/ 16.3 sec) single point cane left AFO  NT today   2MWT  Not assessed at this time Not assessed at this time.  172 ft single point cane   102 ft single point cane  138 ft single point cane with left AFO  NT today        Home Exercises Provided and Patient Education Provided     Education provided:     - educated about decline in scores. Reports understanding.     Assessment           Pt tolerated session well. Progress note performed today. Pt scores decreased on 5x sit to stand score sand TUG score today, however, this is without her AFO on which last time she was wearing AFO. She also reports feeling achy and pain today due to weather. Pt able to ascend/ descend 12 four inch stairs x 2 trials with standby assist . Remains motivated to improve and a good candidate for PT.       Shanthi Is progressing well towards her goals.   Pt prognosis is Good.     Pt will continue to benefit from skilled outpatient physical therapy to address the deficits listed in the problem list box on initial evaluation, provide pt/family education and to maximize pt's level of independence in the home and community environment.     Pt's spiritual, cultural and educational needs considered and pt agreeable to plan of care and goals.     Anticipated barriers to physical therapy: progressive disease process     Goals:           Short Term Goals: 5 weeks Date last  assessed:  Status:    1. Patient will be provided with an HEP for strength, endurance and balance.  7/16/2024    MET   2.  Patient will demonstrate improve endurance and activity tolerance as evidenced by ability to perform Nu-step x 15 minutes without rests breaks. 11/4/24 ongoing            Long Term Goals: 10 weeks  Date last assessed: Status:    1. Patient will be independent and compliant with updated HEP. 3/24/25    ongoing   2.  Patient will increase her   gait speed as assessed by the timed 10MWT from 0.37 m/s to 0.51 m/s to increase her safety and (I) with gait at a community level. (MDC for CVA= 0.14 m/s)     3/24/25    Progressing       3. The patient will demonstrate improved efficiency with functional mobility and decreased risk for falls as evidenced by an increase in her score on the Timed up and Go from 26.9 to 24.0 sec. (Minimal detectable change in chronic stroke = 2.9 seconds)  3/24/25 ongoing   4.Patient will improve her FOTO score from 42  to at least 57 for improved self perception of functional mobility.(score 0-100, high score indicates greater level of function) 3/24/25 ongoing   5. Pt will improve 5x sit to stand score from 13.5 sec to less than 12 seconds to decrease fall risk and improve safety.  3/24/25    ongoing   6. Pt will acquire custom manual WC to assist with independence and safety with long distance mobility.  3/24/25 ongoing   7. Pt will acquire left Bioness L300 to assist with ambulation and decrease foot drop to lower fall risk.  3/24/25 MET. Got AFO. Bioness denied.    8. Patient will increase his/her distance achieved on the 2 Minute Walk Test from 138 feet to 175 feet to demonstrate improved endurance and efficiency with community level gait. (MDC for CVA = 112 feet) 3/24/25 ongoing              Plan      Updated Plan of care Certification:3/24/25- 6/2/25    Outpatient Physical Therapy 2 times weekly for 10 weeks to include the following interventions: Electrical  Stimulation as indicated, Gait Training, Manual Therapy, Moist Heat/ Ice, Neuromuscular Re-ed, Orthotic Management and Training, Patient Education, Therapeutic Activities, and Therapeutic Exercise.        Recommendations for next treatment session:         - stretching      Dianna Rosas, PT

## 2025-04-25 ENCOUNTER — LAB VISIT (OUTPATIENT)
Dept: LAB | Facility: HOSPITAL | Age: 50
End: 2025-04-25
Payer: COMMERCIAL

## 2025-04-25 DIAGNOSIS — G35 MULTIPLE SCLEROSIS: ICD-10-CM

## 2025-04-25 LAB
ABSOLUTE EOSINOPHIL (OHS): 0.02 K/UL
ABSOLUTE MONOCYTE (OHS): 0.57 K/UL (ref 0.3–1)
ABSOLUTE NEUTROPHIL COUNT (OHS): 3.68 K/UL (ref 1.8–7.7)
ALBUMIN SERPL BCP-MCNC: 3.8 G/DL (ref 3.5–5.2)
ALP SERPL-CCNC: 73 UNIT/L (ref 40–150)
ALT SERPL W/O P-5'-P-CCNC: 11 UNIT/L (ref 10–44)
ANION GAP (OHS): 8 MMOL/L (ref 8–16)
AST SERPL-CCNC: 19 UNIT/L (ref 11–45)
BASOPHILS # BLD AUTO: 0.03 K/UL
BASOPHILS NFR BLD AUTO: 0.6 %
BILIRUB SERPL-MCNC: 0.4 MG/DL (ref 0.1–1)
BUN SERPL-MCNC: 7 MG/DL (ref 6–20)
CALCIUM SERPL-MCNC: 9.4 MG/DL (ref 8.7–10.5)
CHLORIDE SERPL-SCNC: 104 MMOL/L (ref 95–110)
CO2 SERPL-SCNC: 25 MMOL/L (ref 23–29)
CREAT SERPL-MCNC: 0.6 MG/DL (ref 0.5–1.4)
ERYTHROCYTE [DISTWIDTH] IN BLOOD BY AUTOMATED COUNT: 13 % (ref 11.5–14.5)
GFR SERPLBLD CREATININE-BSD FMLA CKD-EPI: >60 ML/MIN/1.73/M2
GLUCOSE SERPL-MCNC: 82 MG/DL (ref 70–110)
HBV CORE AB SERPL QL IA: NORMAL
HBV SURFACE AG SERPL QL IA: NORMAL
HCT VFR BLD AUTO: 39.5 % (ref 37–48.5)
HGB BLD-MCNC: 12.6 GM/DL (ref 12–16)
IGA SERPL-MCNC: 196 MG/DL (ref 40–350)
IGG SERPL-MCNC: 1294 MG/DL (ref 650–1600)
IGM SERPL-MCNC: 64 MG/DL (ref 50–300)
IMM GRANULOCYTES # BLD AUTO: 0.02 K/UL (ref 0–0.04)
IMM GRANULOCYTES NFR BLD AUTO: 0.4 % (ref 0–0.5)
LYMPHOCYTES # BLD AUTO: 1.13 K/UL (ref 1–4.8)
MCH RBC QN AUTO: 27.8 PG (ref 27–31)
MCHC RBC AUTO-ENTMCNC: 31.9 G/DL (ref 32–36)
MCV RBC AUTO: 87 FL (ref 82–98)
NUCLEATED RBC (/100WBC) (OHS): 0 /100 WBC
PLATELET # BLD AUTO: 378 K/UL (ref 150–450)
PMV BLD AUTO: 10.5 FL (ref 9.2–12.9)
POTASSIUM SERPL-SCNC: 3.9 MMOL/L (ref 3.5–5.1)
PROT SERPL-MCNC: 7.6 GM/DL (ref 6–8.4)
RBC # BLD AUTO: 4.53 M/UL (ref 4–5.4)
RELATIVE EOSINOPHIL (OHS): 0.4 %
RELATIVE LYMPHOCYTE (OHS): 20.7 % (ref 18–48)
RELATIVE MONOCYTE (OHS): 10.5 % (ref 4–15)
RELATIVE NEUTROPHIL (OHS): 67.4 % (ref 38–73)
SODIUM SERPL-SCNC: 137 MMOL/L (ref 136–145)
WBC # BLD AUTO: 5.45 K/UL (ref 3.9–12.7)

## 2025-04-25 PROCEDURE — 87340 HEPATITIS B SURFACE AG IA: CPT

## 2025-04-25 PROCEDURE — 86704 HEP B CORE ANTIBODY TOTAL: CPT

## 2025-04-25 PROCEDURE — 85025 COMPLETE CBC W/AUTO DIFF WBC: CPT

## 2025-04-25 PROCEDURE — 80053 COMPREHEN METABOLIC PANEL: CPT

## 2025-04-25 PROCEDURE — 36415 COLL VENOUS BLD VENIPUNCTURE: CPT | Mod: PO

## 2025-04-25 PROCEDURE — 82784 ASSAY IGA/IGD/IGG/IGM EACH: CPT

## 2025-04-25 NOTE — PROGRESS NOTES
"  [facility]   Physical Therapy Daily Treatment note     Name: Shanthi Escoto  Clinic Number: [id number]    Therapy Diagnosis:   Encounter Diagnosis   Name Primary?    Impaired functional mobility, balance, gait, and endurance Yes       Physician: Arin Ablarran MD    Visit Date: 4/28/2025    Physician Orders: PT Eval and Treat  " Neuro PT"  Medical Diagnosis from Referral:   Diagnosis   G35 (ICD-10-CM) - Multiple sclerosis      Evaluation Date: 7/16/2024  Authorization Period Expiration: 12/31/24  Updated Plan of Care Expiration: 6/2/25  Progress Note Due:5/21/25  Visit # / Visits authorized: 18/30 (49)   FOTO: 1/5    Time In:  5:33 pm    Time Out: 6:15 pm   Total Billable Time: 42  minutes    Precautions: Standard and Fall Orthopedic: Lumbar Fusion precautions  (unclear if these are current or have been lifted)     PTA visit 0/5      Subjective     Pt reports: " I am moving really slow today." Pt reports she feels she is ready to order rollator soon as " I need it more and more." Pt not wearing left AFO again today. Reports it is difficult to get on. Therapist recommending pt bring AFO next session so we can assist       She was compliant with home exercise program.    Response to previous treatment: good  Functional change: ongoing      Pain: 6/10      Location: low back pain       Objective      Ambulating to clinic with hurry-cane. No AFO today  Requires WC transfer back to car at end of session.        Treatment     Shanthi received the treatments listed below:      therapeutic activities to improve functional performance for 42 minutes, including:    Parallel bars:   - forward walking in parallel bars x 5 laps bilateral upper extremity support supervision   - side stepping in parallel bars x 4 laps each way supervision with cues for upright posture  - step ups on  3 inch step 1x10 leading with each lower extremity supervision   - alternating toe taps on  3 inch step 1x10 each " lower extremity   - lateral toe taps on  3 inch step 1x10 each lower extremity        Home Exercises Provided and Patient Education Provided     Education provided:     - educated about standing exercises. Reports understanding.     Assessment             Pt tolerated session well. Working on standing exercises in parallel bars. Pt has not been wearing left AFO as she reports she is having difficulty getting it on. Therapist educated pt about bringing AFO next session to practice donning and doffing AFO. Pt continues to requires a WC for transport back to car. She is also reporting she is feeling like she is ready to use and order a rollator as she reports the cane isn't giving her the support she needs. Therapist agrees. Remains motivated to improve and a good candidate for PT.     Shanthi Is progressing well towards her goals.   Pt prognosis is Good.     Pt will continue to benefit from skilled outpatient physical therapy to address the deficits listed in the problem list box on initial evaluation, provide pt/family education and to maximize pt's level of independence in the home and community environment.     Pt's spiritual, cultural and educational needs considered and pt agreeable to plan of care and goals.     Anticipated barriers to physical therapy: progressive disease process     Goals:           Short Term Goals: 5 weeks Date last assessed:  Status:    1. Patient will be provided with an HEP for strength, endurance and balance.  7/16/2024    MET   2.  Patient will demonstrate improve endurance and activity tolerance as evidenced by ability to perform Nu-step x 15 minutes without rests breaks. 11/4/24 ongoing            Long Term Goals: 10 weeks  Date last assessed: Status:    1. Patient will be independent and compliant with updated HEP. 3/24/25    ongoing   2.  Patient will increase her   gait speed as assessed by the timed 10MWT from 0.37 m/s to 0.51 m/s to increase her safety and (I) with gait at a  community level. (MDC for CVA= 0.14 m/s)     3/24/25    Progressing       3. The patient will demonstrate improved efficiency with functional mobility and decreased risk for falls as evidenced by an increase in her score on the Timed up and Go from 26.9 to 24.0 sec. (Minimal detectable change in chronic stroke = 2.9 seconds)  3/24/25 ongoing   4.Patient will improve her FOTO score from 42  to at least 57 for improved self perception of functional mobility.(score 0-100, high score indicates greater level of function) 3/24/25 ongoing   5. Pt will improve 5x sit to stand score from 13.5 sec to less than 12 seconds to decrease fall risk and improve safety.  3/24/25    ongoing   6. Pt will acquire custom manual WC to assist with independence and safety with long distance mobility.  3/24/25 ongoing   7. Pt will acquire left Bioness L300 to assist with ambulation and decrease foot drop to lower fall risk.  3/24/25 MET. Got AFO. Bioness denied.    8. Patient will increase his/her distance achieved on the 2 Minute Walk Test from 138 feet to 175 feet to demonstrate improved endurance and efficiency with community level gait. (MDC for CVA = 112 feet) 3/24/25 ongoing              Plan      Updated Plan of care Certification:3/24/25- 6/2/25    Outpatient Physical Therapy 2 times weekly for 10 weeks to include the following interventions: Electrical Stimulation as indicated, Gait Training, Manual Therapy, Moist Heat/ Ice, Neuromuscular Re-ed, Orthotic Management and Training, Patient Education, Therapeutic Activities, and Therapeutic Exercise.        Recommendations for next treatment session:         - stretching      Dianna Rosas, PT

## 2025-04-28 ENCOUNTER — CLINICAL SUPPORT (OUTPATIENT)
Dept: REHABILITATION | Facility: HOSPITAL | Age: 50
End: 2025-04-28
Payer: COMMERCIAL

## 2025-04-28 DIAGNOSIS — Z74.09 IMPAIRED FUNCTIONAL MOBILITY, BALANCE, GAIT, AND ENDURANCE: Primary | ICD-10-CM

## 2025-04-28 PROCEDURE — 97530 THERAPEUTIC ACTIVITIES: CPT | Mod: PO

## 2025-05-01 NOTE — PROGRESS NOTES
"  [facility]   Physical Therapy Daily Treatment note     Name: Shanthi Escoto  Clinic Number: [id number]    Therapy Diagnosis:   Encounter Diagnosis   Name Primary?    Impaired functional mobility, balance, gait, and endurance Yes         Physician: Arin Albarran MD    Visit Date: 5/5/2025    Physician Orders: PT Eval and Treat  " Neuro PT"  Medical Diagnosis from Referral:   Diagnosis   G35 (ICD-10-CM) - Multiple sclerosis      Evaluation Date: 7/16/2024  Authorization Period Expiration: 12/31/24  Updated Plan of Care Expiration: 6/2/25  Progress Note Due:5/21/25  Visit # / Visits authorized: 19/30 (50)   FOTO: 1/5    Time In:  5:31     Time Out: 6:14 pm    Total Billable Time: 43   minutes    Precautions: Standard and Fall Orthopedic: Lumbar Fusion precautions  (unclear if these are current or have been lifted)     PTA visit 0/5      Subjective     Pt reports:  " I am hurting bad today." Pt arrived carrying left AFO. " Can never get this thing on." Pt reports she is frustrated with the scheduling of her next infusion because the date that is scheduled has to be moved up about a month which is challenging with her work schedule. Pt also reports that she researched and was able to book a flight with  accommodations and feels more confident with the process.         She was compliant with home exercise program.    Response to previous treatment: good  Functional change: ongoing       Pain: 7/10        Location: low back pain       Objective      Ambulating to clinic with hurry-cane. No AFO today  Requires WC transfer back to car at end of session.        Treatment     Shanthi received the treatments listed below:      therapeutic activities to improve functional performance for 43  minutes, including:     pt ambulated 120 ft with single point cane with standby assist no AFO into clinic.     - Pt tried donning left AFO without therapist assist. Was unable. Therapist mod- max a to " assist getting AFO on.   - trialled second time with shoe horn. Did assist with getting AFO on.       - pt ascended/ descended 12 4 inch stairs with bilateral handrails reciprocal and step to pattern with standby assist       Extensive education provided see below.      Home Exercises Provided and Patient Education Provided     Education provided:     - educated about options about how to use shoe horn and acquire show horn. Pt reporting she was looking up different type of rollators online. Therapist assisted with discussing pros and cons about types of rollators, weight, wheels, etc. Pt reporting she is planning to have a rollator and WC at her moms house and one here and then when she flies she wont have to bring her own equipment.         Assessment           Pt tolerated session well. Pt reporting difficulty getting left AFO on at home and even trouble with her boyfriend assisting her. Trialled with a shoe horn today and pt reports she feels like it helped. Discussed how to acquire one. Also discussed various types of rollators and weights, wheel types etc and what may benefit her. Pt remains motivated to improve and a good candidate for PT.           Shanthi Is progressing well towards her goals.   Pt prognosis is Good.     Pt will continue to benefit from skilled outpatient physical therapy to address the deficits listed in the problem list box on initial evaluation, provide pt/family education and to maximize pt's level of independence in the home and community environment.     Pt's spiritual, cultural and educational needs considered and pt agreeable to plan of care and goals.     Anticipated barriers to physical therapy: progressive disease process     Goals:           Short Term Goals: 5 weeks Date last assessed:  Status:    1. Patient will be provided with an HEP for strength, endurance and balance.  7/16/2024    MET   2.  Patient will demonstrate improve endurance and activity tolerance as evidenced by  ability to perform Nu-step x 15 minutes without rests breaks. 11/4/24 ongoing            Long Term Goals: 10 weeks  Date last assessed: Status:    1. Patient will be independent and compliant with updated HEP. 3/24/25    ongoing   2.  Patient will increase her   gait speed as assessed by the timed 10MWT from 0.37 m/s to 0.51 m/s to increase her safety and (I) with gait at a community level. (MDC for CVA= 0.14 m/s)     3/24/25    Progressing       3. The patient will demonstrate improved efficiency with functional mobility and decreased risk for falls as evidenced by an increase in her score on the Timed up and Go from 26.9 to 24.0 sec. (Minimal detectable change in chronic stroke = 2.9 seconds)  3/24/25 ongoing   4.Patient will improve her FOTO score from 42  to at least 57 for improved self perception of functional mobility.(score 0-100, high score indicates greater level of function) 3/24/25 ongoing   5. Pt will improve 5x sit to stand score from 13.5 sec to less than 12 seconds to decrease fall risk and improve safety.  3/24/25    ongoing   6. Pt will acquire custom manual WC to assist with independence and safety with long distance mobility.  3/24/25 ongoing   7. Pt will acquire left Bioness L300 to assist with ambulation and decrease foot drop to lower fall risk.  3/24/25 MET. Got AFO. Bioness denied.    8. Patient will increase his/her distance achieved on the 2 Minute Walk Test from 138 feet to 175 feet to demonstrate improved endurance and efficiency with community level gait. (MDC for CVA = 112 feet) 3/24/25 ongoing              Plan      Updated Plan of care Certification:3/24/25- 6/2/25.     Outpatient Physical Therapy 2 times weekly for 10 weeks to include the following interventions: Electrical Stimulation as indicated, Gait Training, Manual Therapy, Moist Heat/ Ice, Neuromuscular Re-ed, Orthotic Management and Training, Patient Education, Therapeutic Activities, and Therapeutic Exercise.         Recommendations for next treatment session:          - stretching      Dianna Rosas, PT

## 2025-05-02 RX ORDER — ACETAMINOPHEN 160 MG/5ML
1000 SOLUTION ORAL ONCE
Start: 2025-09-14 | End: 2025-09-14

## 2025-05-05 ENCOUNTER — CLINICAL SUPPORT (OUTPATIENT)
Dept: REHABILITATION | Facility: HOSPITAL | Age: 50
End: 2025-05-05
Payer: COMMERCIAL

## 2025-05-05 DIAGNOSIS — Z74.09 IMPAIRED FUNCTIONAL MOBILITY, BALANCE, GAIT, AND ENDURANCE: Primary | ICD-10-CM

## 2025-05-05 PROCEDURE — 97530 THERAPEUTIC ACTIVITIES: CPT | Mod: PO

## 2025-05-09 ENCOUNTER — PATIENT MESSAGE (OUTPATIENT)
Dept: INFUSION THERAPY | Facility: HOSPITAL | Age: 50
End: 2025-05-09
Payer: COMMERCIAL

## 2025-05-12 ENCOUNTER — CLINICAL SUPPORT (OUTPATIENT)
Dept: REHABILITATION | Facility: HOSPITAL | Age: 50
End: 2025-05-12
Payer: COMMERCIAL

## 2025-05-12 DIAGNOSIS — Z74.09 IMPAIRED FUNCTIONAL MOBILITY, BALANCE, GAIT, AND ENDURANCE: Primary | ICD-10-CM

## 2025-05-12 PROCEDURE — 97530 THERAPEUTIC ACTIVITIES: CPT | Mod: PO

## 2025-05-12 NOTE — PROGRESS NOTES
"  [facility]   Physical Therapy Daily Treatment note     Name: Shanthi Escoto  Clinic Number: [id number]    Therapy Diagnosis:   Encounter Diagnosis   Name Primary?    Impaired functional mobility, balance, gait, and endurance Yes       Physician: Arin Albarran MD    Visit Date: 5/12/2025    Physician Orders: PT Eval and Treat  " Neuro PT"  Medical Diagnosis from Referral:   Diagnosis   G35 (ICD-10-CM) - Multiple sclerosis      Evaluation Date: 7/16/2024  Authorization Period Expiration: 12/31/24  Updated Plan of Care Expiration: 6/2/25  Progress Note Due:5/21/25  Visit # / Visits authorized: 20/30 (51)   FOTO: 1/5    Time In:  5:30 pm      Time Out: 6:10 pm   Total Billable Time: 40 minutes    Precautions: Standard and Fall Orthopedic: Lumbar Fusion precautions  (unclear if these are current or have been lifted)     PTA visit 0/5      Subjective     Pt reports:     " I fell yesterday. I really need to order that rollator." Pt reports her left leg gave out while reaching under cabinet for something. Reports did not hit her head or hurt herself. Did not seek medical treatment.  Did not have AFO on when fell at home. Reports custom WC is estimated to be in on 6/17/25. Pt reports she is going to be home alone from Thursday through Monday. Reports her family knows and she will keep phone on her in case she needs something.         She was compliant with home exercise program.    Response to previous treatment: good  Functional change: ongoing       Pain: 7/10        Location: low back pain       Objective      Ambulating to lobby with single point cane with Left AFO. Required WC transfer into gym today for first time.   Requires WC transfer back to car at end of session.        Treatment     Shanthi received the treatments listed below:      therapeutic activities to improve functional performance for 40   minutes, including:     Pt received Manual electrical stimulation for 20 minutes " "after being cleared for all contradictions as follows: Pt received Functional Electrical stimulation with  FES lower extremity bike to elicit  sequential muscle contraction of the left tibialis anterior and gastroc/ soleus muscle groups.   Activity time includes skilled set-up for proper placement of electrodes and "muscle test" to determine optimal intensity to elicit a muscular contraction,  adjustment of pedal lever arms to promote forced use of left  lower extremity and securing of "Q-Straints" in kenyon/cross pattern to stabilize chair. Training set-up for intensity training. Results as follows:       Distance travelled: 2.34 miles  Energy Expended: 0.3 kCal  Energy per hour 1.0 kCal/ hour  Average power: 1.2 W  Average Asymmetry: left 11 %  Total therapy time: 20.01 min   Time in active (off motor)  7.52 min  Time in passive: (on motor): 12.09 min          Extensive education provided see below.      Home Exercises Provided and Patient Education Provided     Education provided:     - educated about pt needing to order rollator for safety. Educated about types of rollators and pros/ cons.  Educated about importance of asking for help at home when needed to avoid falling as she can hurt herself. Educated about resting when needed. Pt reports understanding.      Assessment              Pt tolerated session fair. Pt reports falling yesterday when trying to reach something under a cabinet. Pt reports feeling sore and tired today. Agreed to FES bike. Pt reports she is going to order her rollator this week as she needs it for safety. Pt required a WC transfer from lobby to gym today for first time and her usual WC transfer back to car. Pt reports custom WC should be in in June. Remains motivated to improve and a good candidate for PT.         Shanthi Is progressing well towards her goals.   Pt prognosis is Good.     Pt will continue to benefit from skilled outpatient physical therapy to address the deficits " listed in the problem list box on initial evaluation, provide pt/family education and to maximize pt's level of independence in the home and community environment.     Pt's spiritual, cultural and educational needs considered and pt agreeable to plan of care and goals.     Anticipated barriers to physical therapy: progressive disease process     Goals:           Short Term Goals: 5 weeks Date last assessed:  Status:    1. Patient will be provided with an HEP for strength, endurance and balance.  7/16/2024    MET   2.  Patient will demonstrate improve endurance and activity tolerance as evidenced by ability to perform Nu-step x 15 minutes without rests breaks. 11/4/24 ongoing            Long Term Goals: 10 weeks  Date last assessed: Status:    1. Patient will be independent and compliant with updated HEP. 3/24/25    ongoing   2.  Patient will increase her   gait speed as assessed by the timed 10MWT from 0.37 m/s to 0.51 m/s to increase her safety and (I) with gait at a community level. (MDC for CVA= 0.14 m/s)     3/24/25    Progressing       3. The patient will demonstrate improved efficiency with functional mobility and decreased risk for falls as evidenced by an increase in her score on the Timed up and Go from 26.9 to 24.0 sec. (Minimal detectable change in chronic stroke = 2.9 seconds)  3/24/25 ongoing   4.Patient will improve her FOTO score from 42  to at least 57 for improved self perception of functional mobility.(score 0-100, high score indicates greater level of function) 3/24/25 ongoing   5. Pt will improve 5x sit to stand score from 13.5 sec to less than 12 seconds to decrease fall risk and improve safety.  3/24/25    ongoing   6. Pt will acquire custom manual WC to assist with independence and safety with long distance mobility.  3/24/25 ongoing   7. Pt will acquire left Bioness L300 to assist with ambulation and decrease foot drop to lower fall risk.  3/24/25 MET. Got AFO. Bioness denied.    8. Patient  will increase his/her distance achieved on the 2 Minute Walk Test from 138 feet to 175 feet to demonstrate improved endurance and efficiency with community level gait. (MDC for CVA = 112 feet) 3/24/25 ongoing              Plan      Updated Plan of care Certification:3/24/25- 6/2/25.     Outpatient Physical Therapy 2 times weekly for 10 weeks to include the following interventions: Electrical Stimulation as indicated, Gait Training, Manual Therapy, Moist Heat/ Ice, Neuromuscular Re-ed, Orthotic Management and Training, Patient Education, Therapeutic Activities, and Therapeutic Exercise.        Recommendations for next treatment session:           - stretching      Dianna Rosas, PT

## 2025-05-13 ENCOUNTER — PATIENT MESSAGE (OUTPATIENT)
Dept: PSYCHIATRY | Facility: CLINIC | Age: 50
End: 2025-05-13
Payer: COMMERCIAL

## 2025-05-13 ENCOUNTER — TELEPHONE (OUTPATIENT)
Dept: NEUROLOGY | Facility: CLINIC | Age: 50
End: 2025-05-13
Payer: COMMERCIAL

## 2025-05-13 NOTE — TELEPHONE ENCOUNTER
LVM for pt to contact our office in regards to getting pt scheduled for a f/u with CB on the [facility].

## 2025-05-15 ENCOUNTER — TELEPHONE (OUTPATIENT)
Dept: NEUROLOGY | Facility: CLINIC | Age: 50
End: 2025-05-15
Payer: COMMERCIAL

## 2025-05-15 NOTE — TELEPHONE ENCOUNTER
LVM for pt to contact our office in regards to getting pt scheduled for a f/u with CB on the [facility].

## 2025-05-21 ENCOUNTER — PATIENT MESSAGE (OUTPATIENT)
Dept: NEUROLOGY | Facility: CLINIC | Age: 50
End: 2025-05-21
Payer: COMMERCIAL

## 2025-05-22 ENCOUNTER — TELEPHONE (OUTPATIENT)
Dept: NEUROLOGY | Facility: CLINIC | Age: 50
End: 2025-05-22
Payer: COMMERCIAL

## 2025-05-23 ENCOUNTER — HOSPITAL ENCOUNTER (OUTPATIENT)
Dept: RADIOLOGY | Facility: HOSPITAL | Age: 50
Discharge: HOME | End: 2025-05-23
Payer: COMMERCIAL

## 2025-05-23 DIAGNOSIS — G37.9 DEMYELINATING DISEASE OF CENTRAL NERVOUS SYSTEM, UNSPECIFIED: ICD-10-CM

## 2025-05-23 DIAGNOSIS — G35 MULTIPLE SCLEROSIS: ICD-10-CM

## 2025-05-23 PROCEDURE — 72141 MRI NECK SPINE W/O DYE: CPT | Mod: 26,,, | Performed by: RADIOLOGY

## 2025-05-23 PROCEDURE — 72146 MRI CHEST SPINE W/O DYE: CPT | Mod: 26,,, | Performed by: RADIOLOGY

## 2025-05-23 PROCEDURE — 72146 MRI CHEST SPINE W/O DYE: CPT | Mod: TC,PO

## 2025-05-23 PROCEDURE — 70551 MRI BRAIN STEM W/O DYE: CPT | Mod: 26,,, | Performed by: RADIOLOGY

## 2025-05-23 PROCEDURE — 72141 MRI NECK SPINE W/O DYE: CPT | Mod: TC,PO

## 2025-05-23 PROCEDURE — 70551 MRI BRAIN STEM W/O DYE: CPT | Mod: TC,PO

## 2025-05-27 ENCOUNTER — RESULTS FOLLOW-UP (OUTPATIENT)
Dept: NEUROLOGY | Facility: CLINIC | Age: 50
End: 2025-05-27

## 2025-05-30 ENCOUNTER — INFUSION (OUTPATIENT)
Dept: INFUSION THERAPY | Facility: HOSPITAL | Age: 50
End: 2025-05-30
Attending: PSYCHIATRY & NEUROLOGY
Payer: COMMERCIAL

## 2025-05-30 VITALS
SYSTOLIC BLOOD PRESSURE: 127 MMHG | RESPIRATION RATE: 16 BRPM | WEIGHT: 215.81 LBS | TEMPERATURE: 98 F | OXYGEN SATURATION: 96 % | HEIGHT: 64 IN | DIASTOLIC BLOOD PRESSURE: 83 MMHG | HEART RATE: 87 BPM | BODY MASS INDEX: 36.84 KG/M2

## 2025-05-30 DIAGNOSIS — G35 MULTIPLE SCLEROSIS: Primary | ICD-10-CM

## 2025-05-30 PROCEDURE — 96375 TX/PRO/DX INJ NEW DRUG ADDON: CPT | Mod: PN

## 2025-05-30 PROCEDURE — 96365 THER/PROPH/DIAG IV INF INIT: CPT | Mod: PN

## 2025-05-30 PROCEDURE — 25000003 PHARM REV CODE 250: Mod: PN

## 2025-05-30 PROCEDURE — 63600175 PHARM REV CODE 636 W HCPCS: Mod: PN

## 2025-05-30 PROCEDURE — 96367 TX/PROPH/DG ADDL SEQ IV INF: CPT | Mod: PN

## 2025-05-30 RX ORDER — SODIUM CHLORIDE 0.9 % (FLUSH) 0.9 %
10 SYRINGE (ML) INJECTION
Status: DISCONTINUED | OUTPATIENT
Start: 2025-05-30 | End: 2025-05-30 | Stop reason: HOSPADM

## 2025-05-30 RX ORDER — METHYLPREDNISOLONE SOD SUCC 125 MG
100 VIAL (EA) INJECTION
Start: 2025-09-19

## 2025-05-30 RX ORDER — METHYLPREDNISOLONE SOD SUCC 125 MG
100 VIAL (EA) INJECTION
Status: COMPLETED | OUTPATIENT
Start: 2025-05-30 | End: 2025-05-30

## 2025-05-30 RX ORDER — ACETAMINOPHEN 160 MG/5ML
1000 SOLUTION ORAL ONCE
Start: 2025-09-19 | End: 2025-09-19

## 2025-05-30 RX ORDER — DIPHENHYDRAMINE HYDROCHLORIDE 50 MG/ML
50 INJECTION, SOLUTION INTRAMUSCULAR; INTRAVENOUS
Status: DISCONTINUED | OUTPATIENT
Start: 2025-05-30 | End: 2025-05-30 | Stop reason: HOSPADM

## 2025-05-30 RX ORDER — EPINEPHRINE 0.3 MG/.3ML
0.3 INJECTION SUBCUTANEOUS
OUTPATIENT
Start: 2025-09-19

## 2025-05-30 RX ORDER — FAMOTIDINE 10 MG/ML
20 INJECTION, SOLUTION INTRAVENOUS
Status: COMPLETED | OUTPATIENT
Start: 2025-05-30 | End: 2025-05-30

## 2025-05-30 RX ORDER — FAMOTIDINE 10 MG/ML
20 INJECTION, SOLUTION INTRAVENOUS
OUTPATIENT
Start: 2025-09-19

## 2025-05-30 RX ORDER — HEPARIN 100 UNIT/ML
500 SYRINGE INTRAVENOUS
OUTPATIENT
Start: 2025-09-19

## 2025-05-30 RX ORDER — ACETAMINOPHEN 650 MG/20.3ML
1000 LIQUID ORAL ONCE
Status: COMPLETED | OUTPATIENT
Start: 2025-05-30 | End: 2025-05-30

## 2025-05-30 RX ORDER — EPINEPHRINE 0.3 MG/.3ML
0.3 INJECTION SUBCUTANEOUS
Status: DISCONTINUED | OUTPATIENT
Start: 2025-05-30 | End: 2025-05-30 | Stop reason: HOSPADM

## 2025-05-30 RX ORDER — SODIUM CHLORIDE 0.9 % (FLUSH) 0.9 %
10 SYRINGE (ML) INJECTION
OUTPATIENT
Start: 2025-09-19

## 2025-05-30 RX ORDER — DIPHENHYDRAMINE HYDROCHLORIDE 50 MG/ML
50 INJECTION, SOLUTION INTRAMUSCULAR; INTRAVENOUS
OUTPATIENT
Start: 2025-09-19

## 2025-05-30 RX ADMIN — ACETAMINOPHEN 999.01 MG: 160 SOLUTION ORAL at 10:05

## 2025-05-30 RX ADMIN — SODIUM CHLORIDE: 9 INJECTION, SOLUTION INTRAVENOUS at 10:05

## 2025-05-30 RX ADMIN — DIPHENHYDRAMINE HYDROCHLORIDE 50 MG: 50 INJECTION INTRAMUSCULAR; INTRAVENOUS at 11:05

## 2025-05-30 RX ADMIN — METHYLPREDNISOLONE SODIUM SUCCINATE 100 MG: 125 INJECTION, POWDER, FOR SOLUTION INTRAMUSCULAR; INTRAVENOUS at 10:05

## 2025-05-30 RX ADMIN — FAMOTIDINE 20 MG: 10 INJECTION INTRAVENOUS at 10:05

## 2025-05-30 RX ADMIN — SODIUM CHLORIDE 450 MG: 9 INJECTION, SOLUTION INTRAVENOUS at 11:05

## 2025-05-30 NOTE — PLAN OF CARE
Problem: Adult Inpatient Plan of Care  Goal: Plan of Care Review  Outcome: Progressing  Flowsheets (Taken 5/30/2025 1114)  Plan of Care Reviewed With:   patient   family  Goal: Patient-Specific Goal (Individualized)  Outcome: Progressing  Flowsheets (Taken 5/30/2025 1114)  Individualized Care Needs: recliner/warm blanket/snacks/family at chairside/education  Anxieties, Fears or Concerns: none     Problem: Fatigue  Goal: Improved Activity Tolerance  Outcome: Progressing  Intervention: Promote Improved Energy  Flowsheets (Taken 5/30/2025 1114)  Fatigue Management:   activity schedule adjusted   fatigue-related activity identified   frequent rest breaks encouraged   paced activity encouraged  Sleep/Rest Enhancement:   noise level reduced   reading promoted   regular sleep/rest pattern promoted   relaxation techniques promoted   room darkened   family presence promoted  Activity Management: Up in stretcher chair - L1  Environmental Support: calm environment promoted   Pt tolerated Briumvi well today. NAD/no concerns voiced. Reviewed follow-up appointments. All questions were answered, pushed downstairs in wheelchair for discharge by family.

## 2025-06-02 ENCOUNTER — CLINICAL SUPPORT (OUTPATIENT)
Dept: REHABILITATION | Facility: HOSPITAL | Age: 50
End: 2025-06-02
Payer: COMMERCIAL

## 2025-06-02 DIAGNOSIS — Z74.09 IMPAIRED FUNCTIONAL MOBILITY, BALANCE, GAIT, AND ENDURANCE: Primary | ICD-10-CM

## 2025-06-02 PROCEDURE — 97530 THERAPEUTIC ACTIVITIES: CPT | Mod: PO

## 2025-06-13 NOTE — PROGRESS NOTES
"  [facility]   Physical Therapy daily treatment note     Name: Shanthi Escoto  Clinic Number: [id number]    Therapy Diagnosis:   Encounter Diagnosis   Name Primary?    Impaired functional mobility, balance, gait, and endurance Yes       Physician: Arin Albarran MD    Visit Date: 6/2/2025    Physician Orders: PT Eval and Treat  " Neuro PT"  Medical Diagnosis from Referral:   Diagnosis   G35 (ICD-10-CM) - Multiple sclerosis      Evaluation Date: 7/16/2024  Authorization Period Expiration: 12/31/24  Updated Plan of Care Expiration: 8/11/25.   Progress Note Due: 7/3/25.   Visit # / Visits authorized: 22//30 (53)   FOTO: 1/5    Time In: 5:32 pm     Time Out: 6:15 pm     Total Billable Time: 43 minutes    Precautions: Standard and Fall Orthopedic: Lumbar Fusion precautions  (unclear if these are current or have been lifted)     PTA visit 0/5      Subjective     Pt reports:    " I am doing ok." Pt reports her rollator and WC have not yet arrived yet. Pt reports she has felt " off" the last efw days with the weather changes.     She was compliant with home exercise program.    Response to previous treatment: good  Functional change: ongoing       Pain: 6/10         Location: low back pain       Objective      Ambulating to lobby with single point cane with Left AFO.   Requires WC transfer back to car at end of session.        Treatment     Shanthi received the treatments listed below:        Shanthi received therapeutic exercises to develop strength, endurance, and ROM for 43 minutes including:    - seated Ball squeezes 3x10 with 3 sec hold  - Seated hip ER with blue theraband 2x10   - Seated skateboard knee flexion/ extension 2x10  AAROM with left and AROM with right  - Seated hamstring with leg on mat 30 sec x 3  trials with stretch strap assisting  - Seated march 3x10      (Therapeutic rest breaks throughout as needed).        Home Exercises Provided and Patient Education Provided "     Education provided:     - educated about exercises and HEP. Reports understanding.       Assessment         Pt tolerated session fair. Pt requiring AAROM with knee flexion/ extension skateboard on left today. Pt did not get rollator or WC yet. Pt remains motivated to improve and a good candidate for PT.         Shanthi Is progressing well towards her goals.   Pt prognosis is Good.     Pt will continue to benefit from skilled outpatient physical therapy to address the deficits listed in the problem list box on initial evaluation, provide pt/family education and to maximize pt's level of independence in the home and community environment.     Pt's spiritual, cultural and educational needs considered and pt agreeable to plan of care and goals.     Anticipated barriers to physical therapy: progressive disease process     Goals:           Short Term Goals: 5 weeks Date last assessed:  Status:    1. Patient will be provided with an HEP for strength, endurance and balance.  7/16/2024    MET   2.  Patient will demonstrate improve endurance and activity tolerance as evidenced by ability to perform Nu-step x 15 minutes without rests breaks. 11/4/24 ongoing            Long Term Goals: 10 weeks  Date last assessed: Status:    1. Patient will be independent and compliant with updated HEP. 6/2/25    ongoing   2.  Patient will increase her   gait speed as assessed by the timed 10MWT from 0.37 m/s to 0.51 m/s to increase her safety and (I) with gait at a community level. (MDC for CVA= 0.14 m/s)     6/2/25    Progressing       3. The patient will demonstrate improved efficiency with functional mobility and decreased risk for falls as evidenced by an increase in her score on the Timed up and Go from 26.9 to 24.0 sec. (Minimal detectable change in chronic stroke = 2.9 seconds)  6/2/25 ongoing   4.Patient will improve her FOTO score from 42  to at least 57 for improved self perception of functional mobility.(score 0-100, high score  indicates greater level of function) 6/2/25 ongoing   5. Pt will improve 5x sit to stand score from 13.5 sec to less than 12 seconds to decrease fall risk and improve safety.  6/2/25    ongoing   6. Pt will acquire custom manual WC to assist with independence and safety with long distance mobility.  6/2/25 Ongoing. Ordred but did not come in yet.    7. Pt will acquire left Bioness L300 to assist with ambulation and decrease foot drop to lower fall risk.  3/24/25 MET. Got AFO. Bioness denied.    8. Patient will increase his/her distance achieved on the 2 Minute Walk Test from 138 feet to 175 feet to demonstrate improved endurance and efficiency with community level gait. (MDC for CVA = 112 feet) 6/2/25 ongoing              Plan      Updated Plan of care Certification 6/3/25- 8/11/25.     Outpatient Physical Therapy 2 times weekly for 10 weeks to include the following interventions: Electrical Stimulation as indicated, Gait Training, Manual Therapy, Moist Heat/ Ice, Neuromuscular Re-ed, Orthotic Management and Training, Patient Education, Therapeutic Activities, and Therapeutic Exercise.        Recommendations for next treatment session:            - stretching      Dianna Rosas, PT

## 2025-06-16 ENCOUNTER — CLINICAL SUPPORT (OUTPATIENT)
Dept: REHABILITATION | Facility: HOSPITAL | Age: 50
End: 2025-06-16
Payer: COMMERCIAL

## 2025-06-16 DIAGNOSIS — Z74.09 IMPAIRED FUNCTIONAL MOBILITY, BALANCE, GAIT, AND ENDURANCE: Primary | ICD-10-CM

## 2025-06-16 PROCEDURE — 97110 THERAPEUTIC EXERCISES: CPT | Mod: PO

## 2025-06-19 ENCOUNTER — PATIENT MESSAGE (OUTPATIENT)
Dept: PSYCHIATRY | Facility: CLINIC | Age: 50
End: 2025-06-19
Payer: COMMERCIAL

## 2025-06-20 ENCOUNTER — OFFICE VISIT (OUTPATIENT)
Dept: NEUROLOGY | Facility: CLINIC | Age: 50
End: 2025-06-20
Payer: COMMERCIAL

## 2025-06-20 VITALS
WEIGHT: 216.25 LBS | HEIGHT: 64 IN | SYSTOLIC BLOOD PRESSURE: 131 MMHG | BODY MASS INDEX: 36.92 KG/M2 | HEART RATE: 74 BPM | DIASTOLIC BLOOD PRESSURE: 86 MMHG

## 2025-06-20 DIAGNOSIS — F32.A ANXIETY AND DEPRESSION: ICD-10-CM

## 2025-06-20 DIAGNOSIS — G35 MULTIPLE SCLEROSIS: Primary | ICD-10-CM

## 2025-06-20 DIAGNOSIS — F41.9 ANXIETY AND DEPRESSION: ICD-10-CM

## 2025-06-20 PROCEDURE — 99999 PR PBB SHADOW E&M-EST. PATIENT-LVL IV: CPT | Mod: PBBFAC,,,

## 2025-06-20 RX ORDER — PREDNISONE 10 MG/1
10 TABLET ORAL DAILY
COMMUNITY
Start: 2025-05-29

## 2025-06-20 NOTE — PROGRESS NOTES
"  [facility]   Physical Therapy daily treatment note     Name: Shanthi Escoto  Clinic Number: [id number]    Therapy Diagnosis:   Encounter Diagnosis   Name Primary?    Impaired functional mobility, balance, gait, and endurance Yes       Physician: Arin Albarran MD    Visit Date: 6/2/2025    Physician Orders: PT Eval and Treat  " Neuro PT"  Medical Diagnosis from Referral:   Diagnosis   G35 (ICD-10-CM) - Multiple sclerosis      Evaluation Date: 7/16/2024  Authorization Period Expiration: 12/31/24  Updated Plan of Care Expiration: 8/11/25  Progress Note Due: 7/3/25  Visit # / Visits authorized: 23/30 (54)   FOTO: 1/5    Time In: 5:35 pm     Time Out: 6:15 pm     Total Billable Time: 40 minutes    Precautions: Standard and Fall Orthopedic: Lumbar Fusion precautions  (unclear if these are current or have been lifted)     PTA visit 0/5      Subjective     Pt reports:    "pt has not gotten her rollator yet that she ordered weeks ago. Reports her custom WC is being delivered on Friday to therapy.       She was compliant with home exercise program.    Response to previous treatment: good  Functional change: ongoing       Pain: 5/10         Location: low back pain       Objective      Ambulating to lobby with single point cane with Left AFO. Requires WC transfer back to car at end of session.        Treatment     Shanthi received the treatments listed below:      therapeutic activities to improve functional performance for 40   minutes, including:    Pt received Manual electrical stimulation for 27 minutes after being cleared for all contradictions as follows: Pt received Functional Electrical stimulation with  FES lower extremity bike to elicit  sequential muscle contraction of the left tibialis anterior and gastroc/ soleus muscle groups.  Activity time includes skilled set-up for proper placement of electrodes and "muscle test" to determine optimal intensity to elicit a muscular " "contraction,  adjustment of pedal lever arms to promote forced use of left  lower extremity and securing of "Q-Straints" in kenyon/cross pattern to stabilize chair. Training set-up for intensity training. Results as follows:       Distance travelled: 3.28 miles  Energy Expended: 0.5 kCal  Energy per hour 1.2 kCal/ hour  Average power: 1.4 W  Average Asymmetry: left 5 %  Total therapy time: 27.08 min   Time in active (off motor) 11.43 min    Time in passive: (on motor): 15.25 min       (Therapeutic rest breaks throughout as needed).        Home Exercises Provided and Patient Education Provided     Education provided:     Educated about adaptive activities that are local that pt may be interested in. Information given about [employer], adaptive yoga and adaptive water skiing with [employer]. Pt reports understanding.         Assessment            Pt tolerated session well. FES bike performed today . Pt able to do ~12 min active today. Pt getting her custom WC this week and is waiting on her rollator. Pt remains motivated to improve and a good candidate for PT.         Shanthi Is progressing well towards her goals.   Pt prognosis is Good.     Pt will continue to benefit from skilled outpatient physical therapy to address the deficits listed in the problem list box on initial evaluation, provide pt/family education and to maximize pt's level of independence in the home and community environment.     Pt's spiritual, cultural and educational needs considered and pt agreeable to plan of care and goals.     Anticipated barriers to physical therapy: progressive disease process     Goals:           Short Term Goals: 5 weeks Date last assessed:  Status:    1. Patient will be provided with an HEP for strength, endurance and balance.  7/16/2024    MET   2.  Patient will demonstrate improve endurance and activity tolerance as evidenced by ability to perform Nu-step x 15 minutes without rests breaks. 11/4/24 ongoing       "      Long Term Goals: 10 weeks  Date last assessed: Status:    1. Patient will be independent and compliant with updated HEP. 6/2/25    ongoing   2.  Patient will increase her   gait speed as assessed by the timed 10MWT from 0.37 m/s to 0.51 m/s to increase her safety and (I) with gait at a community level. (MDC for CVA= 0.14 m/s)     6/2/25    Progressing       3. The patient will demonstrate improved efficiency with functional mobility and decreased risk for falls as evidenced by an increase in her score on the Timed up and Go from 26.9 to 24.0 sec. (Minimal detectable change in chronic stroke = 2.9 seconds)  6/2/25 ongoing   4.Patient will improve her FOTO score from 42  to at least 57 for improved self perception of functional mobility.(score 0-100, high score indicates greater level of function) 6/2/25 ongoing   5. Pt will improve 5x sit to stand score from 13.5 sec to less than 12 seconds to decrease fall risk and improve safety.  6/2/25    ongoing   6. Pt will acquire custom manual WC to assist with independence and safety with long distance mobility.  6/2/25 Ongoing. Ordred but did not come in yet.    7. Pt will acquire left Bioness L300 to assist with ambulation and decrease foot drop to lower fall risk.  3/24/25 MET. Got AFO. Bioness denied.    8. Patient will increase his/her distance achieved on the 2 Minute Walk Test from 138 feet to 175 feet to demonstrate improved endurance and efficiency with community level gait. (MDC for CVA = 112 feet) 6/2/25 ongoing              Plan      Updated Plan of care Certification 6/3/25- 8/11/25.     Outpatient Physical Therapy 2 times weekly for 10 weeks to include the following interventions: Electrical Stimulation as indicated, Gait Training, Manual Therapy, Moist Heat/ Ice, Neuromuscular Re-ed, Orthotic Management and Training, Patient Education, Therapeutic Activities, and Therapeutic Exercise.        Recommendations for next treatment session:            -  stretching      Dianna Rosas, PT

## 2025-06-20 NOTE — PATIENT INSTRUCTIONS
Magnesium glycinate 400mg nightly for sleep and spasms     Ok to take an additional 10mg of baclofen when you do experience the MS hu    [email or web address]     Bupropion (Wellbutrin)

## 2025-06-20 NOTE — PROGRESS NOTES
Patient ID: Shanthi Escoto is a 50 y.o. female who presents today for a routine clinic visit for MS.  She was last seen on 2/3/2025.  The history was provided by the patient.     NEURO MULTIPLE SCLEROISIS SUMMARY:   Principle neurological diagnosis:  MS  Date of Symptom Onset:  4/2021  Date of Diagnosis:  12/2021  Disease type at diagnosis:  Relapsing-Remitting MS  Disease type currently:  Relapsing-Remitting MS  Previous Therapy:  First DMT:  Ozanimod - 1/2022-12/2024 (did not want to take pills anymore)  Current Therapy:  Ublituximab - 12/2024 - present   Last MRI Brain:  5/23/2025 - stable  Last MRI C-Spine:  5/23/2025 - stable  Last MRI T-Spine:  5/23/2025 - stable  Date CSF Completed:  12/16/2021  WBC:  13   RBC:  2000  Protein:  46  Glucose:  51  Oligoclonal Bands:  3  Date JCV Completed:  10/9/2024  JCV Index:  0.11  JCV Antibody:  Negative  Lab Notes:  CSF IgG index - 1.4, NMO and MOG - negative   Other relevant labs and studies:    10/9/2024: vitamin D - 78      Subjective:     She did get an AFO.  She says that it works will for the foot drop, but it is very difficult to get on so she does not like to wear it.     She has ordered a new rollator and is just waiting for it to come in.  She has also finally gotten approved for a custom wheelchair, and just waiting for that to be delivered.     She is using a cane for the most part now, and will use a wheelchair for longer distances.     She does feel like PT is helping for her strength, but she still does not feel comfortable with her walking.      Her confidence in her gait is really decreasing her quality of life as she does not get out of the house unless it is for appointments.  She states that this is starting to increase depression and anxiety. She states that she stay in bed a lot.  It is difficult for her to know if it is fatigue that is keeping her in bed or depression or both.     She says she is experiencing MS hug about once a week.  This is  not really new, but has just realized exactly what it is.     She says she is continuing to tolerate Briumvi well.     She says the oxybutynin is helping with her, but she does feel that she may be having some hesitancy and/or retention.  She is still on Flomax as well. She states that the hesitancy is much more manageable than having the over active bladder/leakage.       SOCIAL HISTORY  Living arrangements - the patient lives with their spouse.  Social History     Socioeconomic History    Marital status: Single   Tobacco Use    Smoking status: Never    Smokeless tobacco: Never   Substance and Sexual Activity    Alcohol use: Yes     Comment: rarely    Drug use: Never    Sexual activity: Not Currently     Partners: Male     Social Drivers of Health     Financial Resource Strain: Low Risk  (6/15/2025)    Overall Financial Resource Strain (CARDIA)     Difficulty of Paying Living Expenses: Not hard at all   Food Insecurity: No Food Insecurity (6/15/2025)    Hunger Vital Sign     Worried About Running Out of Food in the Last Year: Never true     Ran Out of Food in the Last Year: Never true   Transportation Needs: No Transportation Needs (6/15/2025)    PRAPARE - Transportation     Lack of Transportation (Medical): No     Lack of Transportation (Non-Medical): No   Physical Activity: Insufficiently Active (6/15/2025)    Exercise Vital Sign     Days of Exercise per Week: 2 days     Minutes of Exercise per Session: 30 min   Stress: Stress Concern Present (6/15/2025)    [employer] [place] [employer] - Occupational Stress Questionnaire     Feeling of Stress : Rather much   Housing Stability: Low Risk  (6/15/2025)    Housing Stability Vital Sign     Unable to Pay for Housing in the Last Year: No     Number of Times Moved in the Last Year: 0     Homeless in the Last Year: No       Medications Ordered Prior to Encounter[1]    Objective:     1. 25 foot timed walk:      10/8/2024     2:20 PM   Timed 25 Foot Walk:   Did  patient wear an AFO? No   Was assistive device used? Yes   Assistive device used (dulce maria one): Unilateral Assistance   Unilateral device used Cane   Time for 25 Foot Walk (seconds) 23.25       2. SDMT       No data to display                       NEURO EXAM    In general, the patient is well nourished and appears to be in no acute distress.    MENTAL STATUS: language is fluent, normal verbal comprehension, short-term and remote memory is intact, attention is normal, patient is alert and oriented x 3, fund of knowlege is appropriate by vocabulary.     CRANIAL NERVE EXAM:  There is no internuclear ophthalmoplegia.  Extraocular muscles are intact. No facial asymmetry. Facial sensation is intact bilaterally. There is no dysarthria. Uvula is midline, and palate moves symmetrically. Shoulder shrug intact bilaterally. Tongue protrusion is midline. Hearing is intact to finger rub bilaterally. Neck is supple with full ROM    MOTOR EXAM: Normal bulk and tone throughout UE and LE bilaterally. Rapid sequential movements are normal;     Strength:  R deltoid 5/5, L deltoid 5/5  R biceps 5/5, L biceps 5/5  R triceps 5/5, L triceps 5/5  R wrist flexors 5/5, L wrist flexors 5/5  R wrist extensors 5/5, L wrist extensors 5/5  R finger abductors 5/5, L finger abductors 5/5  R hip flexors 4+/5, L hip flexors 4+/5  R knee extensors 5/5, L knee extensors 5/5  R knee flexors 5/5, L knee flexors 5/5  R ankle dorsiflexors 5/5, L ankle dorsiflexors 3/5  R ankle plantarflexors 5/5, L ankle plantarflexors 5/5    COORDINATION: dysmetric finger-to-nose exam bilaterally. Normal heel-to-shin exam on right and slightly dysmetric on left    Imaging: personally reviewed     Brain, cervical and thoracic MRIs on 5/23/2025 reviewed.       Labs: personally reviewed      Lab Results   Component Value Date    MHXPQIDE72OS 78 10/09/2024     Lab Results   Component Value Date    JCVINDEX 0.11 10/09/2024    JCVANTIBODY NEGATIVE 10/09/2024     No results found  "for: "JT2VAZWQ", "ABSOLUTECD3", "ZT2IEOXQ", "ABSOLUTECD8", "SV5RJTTB", "ABSOLUTECD4", "LABCD48"  Lab Results   Component Value Date    WBC 5.45 04/25/2025    RBC 4.53 04/25/2025    HGB 12.6 04/25/2025    HCT 39.5 04/25/2025    MCV 87 04/25/2025    MCH 27.8 04/25/2025    MCHC 31.9 (L) 04/25/2025    RDW 13.0 04/25/2025     04/25/2025    MPV 10.5 04/25/2025    GRAN 4.0 10/09/2024    GRAN 74.1 (H) 10/09/2024    LYMPH 20.7 04/25/2025    LYMPH 1.13 04/25/2025    MONO 10.5 04/25/2025    MONO 0.57 04/25/2025    EOS 0.4 04/25/2025    EOS 0.02 04/25/2025    BASO 0.02 10/09/2024    EOSINOPHIL 0.4 10/09/2024    BASOPHIL 0.6 04/25/2025    BASOPHIL 0.03 04/25/2025     Sodium   Date Value Ref Range Status   04/25/2025 137 136 - 145 mmol/L Final   10/09/2024 139 136 - 145 mmol/L Final     Potassium   Date Value Ref Range Status   04/25/2025 3.9 3.5 - 5.1 mmol/L Final   10/09/2024 4.0 3.5 - 5.1 mmol/L Final     Chloride   Date Value Ref Range Status   04/25/2025 104 95 - 110 mmol/L Final   10/09/2024 107 95 - 110 mmol/L Final     CO2   Date Value Ref Range Status   04/25/2025 25 23 - 29 mmol/L Final   10/09/2024 26 23 - 29 mmol/L Final     Glucose   Date Value Ref Range Status   04/25/2025 82 70 - 110 mg/dL Final   10/09/2024 93 70 - 110 mg/dL Final     BUN   Date Value Ref Range Status   04/25/2025 7 6 - 20 mg/dL Final     Creatinine   Date Value Ref Range Status   04/25/2025 0.6 0.5 - 1.4 mg/dL Final     Calcium   Date Value Ref Range Status   04/25/2025 9.4 8.7 - 10.5 mg/dL Final   10/09/2024 9.1 8.7 - 10.5 mg/dL Final     Protein Total   Date Value Ref Range Status   04/25/2025 7.6 6.0 - 8.4 gm/dL Final     Total Protein   Date Value Ref Range Status   10/09/2024 6.8 6.0 - 8.4 g/dL Final     Albumin   Date Value Ref Range Status   04/25/2025 3.8 3.5 - 5.2 g/dL Final   10/09/2024 3.4 (L) 3.5 - 5.2 g/dL Final     Total Bilirubin   Date Value Ref Range Status   10/09/2024 0.5 0.1 - 1.0 mg/dL Final     Comment:     For " infants and newborns, interpretation of results should be based  on gestational age, weight and in agreement with clinical  observations.    Premature Infant recommended reference ranges:  Up to 24 hours.............<8.0 mg/dL  Up to 48 hours............<12.0 mg/dL  3-5 days..................<15.0 mg/dL  6-29 days.................<15.0 mg/dL       Bilirubin Total   Date Value Ref Range Status   04/25/2025 0.4 0.1 - 1.0 mg/dL Final     Comment:     For infants and newborns, interpretation of results should be based   on gestational age, weight and in agreement with clinical   observations.    Premature Infant recommended reference ranges:   0-24 hours:  <8.0 mg/dL   24-48 hours: <12.0 mg/dL   3-5 days:    <15.0 mg/dL   6-29 days:   <15.0 mg/dL     Alkaline Phosphatase   Date Value Ref Range Status   10/09/2024 106 55 - 135 U/L Final     ALP   Date Value Ref Range Status   04/25/2025 73 40 - 150 unit/L Final     AST   Date Value Ref Range Status   04/25/2025 19 11 - 45 unit/L Final   10/09/2024 16 10 - 40 U/L Final     ALT   Date Value Ref Range Status   04/25/2025 11 10 - 44 unit/L Final   10/09/2024 16 10 - 44 U/L Final     Anion Gap   Date Value Ref Range Status   04/25/2025 8 8 - 16 mmol/L Final     Lab Results   Component Value Date    HEPBSAG Non-Reactive 04/25/2025    HEPBSAB <3.00 10/09/2024    HEPBSAB Non-reactive 10/09/2024    HEPBCAB Non-Reactive 04/25/2025           MS Impression and Plan:     NEURO MULTIPLE SCLEROSIS IMPRESSION:   Number of relapses in the past year?:  0  Clinical Progression:  Clinically Stable  MRI Progression:  Stable  MS Classification:  Relapsing-Remitting MS  Current DMT: ublituximab  DMT:  No change in management  DMT comment:  She is aware of the risks associated with immunosuppressant therapy, including increased risk of infection.   Symptom Management:  Implement change in symptom management  Implement Change in Symptom Management:  Spasticity and Mood  Implement Change in  Symptom Management comment:   SPASTICITY: will start nightly magnesium.  Recommended to take an additional baclofen when she does experience MS hug if needed.   MOOD: [email or web address] to start talk therapy.  Did recommend starting Wellbutrin, she will do research before making the decision to start.  I do believe once patient gets mobility devices, it will increase her independence and help improve her depression  Additional Impressions:   Patient with RRMS remaining radiologically and clinically stable on Briumvi  Safety labs in October  MRIs yearly 5/2026  Follow up with Dr. Sanchez in 6 months - or earlier if she would like to start Wellbutrin  Plan discussed and questions were answered to satisfaction.     Problem List Items Addressed This Visit       Multiple sclerosis - Primary    Relevant Orders    CBC Auto Differential    Comprehensive Metabolic Panel    Hepatitis B Core Antibody, Total    Hepatitis B Surface Antigen    Immunoglobulins (IgG, IgA, IgM) Quantitative          I spent a total of 90 minutes on the day of the visit.This includes face to face time and non-face to face time preparing to see the patient (eg, review of tests), obtaining and/or reviewing separately obtained history, documenting clinical information in the electronic or other health record, independently interpreting results and communicating results to the patient/family/caregiver, or care coordinator.       hWitney Bravo, SARAH-C         [1]   Current Outpatient Medications on File Prior to Visit   Medication Sig Dispense Refill    Bacillus coagulans/inulin (PROBIOTIC WITH PREBIOTIC ORAL) Take 1 tablet by mouth once daily.      baclofen (LIORESAL) 10 MG tablet Take 1 tablet (10 mg total) by mouth 3 (three) times daily. 90 tablet 11    cholecalciferol, vitamin D3, (VITAMIN D3) 50 mcg (2,000 unit) Cap Take 1 capsule by mouth once daily.      diazePAM (VALIUM) 5 MG tablet Take 1 tablet (5mg) by mouth an hour before MRI can  take another tablet (5mg) at time of MRI if needed 2 tablet 0    gabapentin (NEURONTIN) 600 MG tablet Take 600 mg by mouth once daily.      STEFANIE 0.35 mg tablet Take 1 tablet by mouth.      multivitamin (THERAGRAN) per tablet Take 1 tablet by mouth once daily.      oxybutynin (DITROPAN-XL) 10 MG 24 hr tablet Take 1 tablet (10 mg total) by mouth once daily. 90 tablet 1    predniSONE (DELTASONE) 10 MG tablet Take 10 mg by mouth once daily.      tamsulosin (FLOMAX) 0.4 mg Cap Take 1 capsule (0.4 mg total) by mouth once daily. 30 capsule 11    varicella-zoster [id number], PF, (SHINGRIX, PF,) 50 mcg/0.5 mL injection Inject 0.5 mL into the muscle at month 0, then inject 0.5 mL into the muscle 2 months later 1 each 1     No current facility-administered medications on file prior to visit.

## 2025-06-23 ENCOUNTER — PATIENT MESSAGE (OUTPATIENT)
Dept: NEUROLOGY | Facility: CLINIC | Age: 50
End: 2025-06-23
Payer: COMMERCIAL

## 2025-06-23 ENCOUNTER — CLINICAL SUPPORT (OUTPATIENT)
Dept: REHABILITATION | Facility: HOSPITAL | Age: 50
End: 2025-06-23
Payer: COMMERCIAL

## 2025-06-23 DIAGNOSIS — Z74.09 IMPAIRED FUNCTIONAL MOBILITY, BALANCE, GAIT, AND ENDURANCE: Primary | ICD-10-CM

## 2025-06-23 PROCEDURE — 97530 THERAPEUTIC ACTIVITIES: CPT | Mod: PO

## 2025-06-27 ENCOUNTER — CLINICAL SUPPORT (OUTPATIENT)
Dept: REHABILITATION | Facility: HOSPITAL | Age: 50
End: 2025-06-27
Payer: COMMERCIAL

## 2025-06-27 DIAGNOSIS — Z74.09 IMPAIRED FUNCTIONAL MOBILITY, BALANCE, GAIT, AND ENDURANCE: Primary | ICD-10-CM

## 2025-06-27 PROCEDURE — 97530 THERAPEUTIC ACTIVITIES: CPT | Mod: PO

## 2025-06-27 NOTE — PROGRESS NOTES
"  [facility]   Physical Therapy daily treatment note     Name: Shanthi Escoto  Clinic Number: [id number]    Therapy Diagnosis:   Encounter Diagnosis   Name Primary?    Impaired functional mobility, balance, gait, and endurance Yes       Physician: Arin Albarran MD    Visit Date: 6/2/2025    Physician Orders: PT Eval and Treat  " Neuro PT"  Medical Diagnosis from Referral:   Diagnosis   G35 (ICD-10-CM) - Multiple sclerosis      Evaluation Date: 7/16/2024  Authorization Period Expiration: 12/31/24  Updated Plan of Care Expiration: 8/11/25  Progress Note Due: 7/3/25  Visit # / Visits authorized: 23/30 (54)   FOTO: 1/5    Time In: 12:52 pm (early start to 1 pm appt)     Time Out: 1:47 pm      Total Billable Time: 55 minutes    Precautions: Standard and Fall Orthopedic: Lumbar Fusion precautions  (unclear if these are current or have been lifted)     PTA visit 0/5      Subjective     Pt reports:   " I am ok." Walker with [employer] present with her new custom WC.       She was compliant with home exercise program.    Response to previous treatment: good  Functional change: ongoing       Pain: 5/10         Location: low back pain       Objective      Ambulating to lobby with single point cane with Left AFO. Requires WC transfer back to car at end of session.        Treatment     Shanthi received the treatments listed below:      therapeutic activities to improve functional performance for 55  minutes, including:    - PT, WC vendor Walker and therapist present and assisting with adjustments to custom chair to best fit pt. Went over how to break down and put back together WC. Pt able to propel WC 50 ft with turns to practice. Answered all questions and educaiton provided at this time.      (Therapeutic rest breaks throughout as needed).        Home Exercises Provided and Patient Education Provided     Education provided:     - educated about all features of WC, how to " work all parts and pieces and how to propel and navigate. Reports understanding and feels comfortable at this time.     Assessment           Pt tolerated session well. Custom WC delivered today with [employer] rep Walker Morrell. WC adjusted to fit pt appropriately. Pt reports feeling comfortable with all adjustments and is happy with WC at this time. Remains motivated to improve and a good candidate for PT.         Shanthi Is progressing well towards her goals.   Pt prognosis is Good.     Pt will continue to benefit from skilled outpatient physical therapy to address the deficits listed in the problem list box on initial evaluation, provide pt/family education and to maximize pt's level of independence in the home and community environment.     Pt's spiritual, cultural and educational needs considered and pt agreeable to plan of care and goals.     Anticipated barriers to physical therapy: progressive disease process     Goals:           Short Term Goals: 5 weeks Date last assessed:  Status:    1. Patient will be provided with an HEP for strength, endurance and balance.  7/16/2024    MET   2.  Patient will demonstrate improve endurance and activity tolerance as evidenced by ability to perform Nu-step x 15 minutes without rests breaks. 11/4/24 ongoing            Long Term Goals: 10 weeks  Date last assessed: Status:    1. Patient will be independent and compliant with updated HEP. 6/2/25    ongoing   2.  Patient will increase her   gait speed as assessed by the timed 10MWT from 0.37 m/s to 0.51 m/s to increase her safety and (I) with gait at a community level. (MDC for CVA= 0.14 m/s)     6/2/25    Progressing       3. The patient will demonstrate improved efficiency with functional mobility and decreased risk for falls as evidenced by an increase in her score on the Timed up and Go from 26.9 to 24.0 sec. (Minimal detectable change in chronic stroke = 2.9 seconds)  6/2/25 ongoing   4.Patient will  improve her FOTO score from 42  to at least 57 for improved self perception of functional mobility.(score 0-100, high score indicates greater level of function) 6/2/25 ongoing   5. Pt will improve 5x sit to stand score from 13.5 sec to less than 12 seconds to decrease fall risk and improve safety.  6/2/25    ongoing   6. Pt will acquire custom manual WC to assist with independence and safety with long distance mobility.  6/2/25 Ongoing. Ordred but did not come in yet.    7. Pt will acquire left Bioness L300 to assist with ambulation and decrease foot drop to lower fall risk.  3/24/25 MET. Got AFO. Bioness denied.    8. Patient will increase his/her distance achieved on the 2 Minute Walk Test from 138 feet to 175 feet to demonstrate improved endurance and efficiency with community level gait. (MDC for CVA = 112 feet) 6/2/25 ongoing              Plan      Updated Plan of care Certification 6/3/25- 8/11/25.     Outpatient Physical Therapy 2 times weekly for 10 weeks to include the following interventions: Electrical Stimulation as indicated, Gait Training, Manual Therapy, Moist Heat/ Ice, Neuromuscular Re-ed, Orthotic Management and Training, Patient Education, Therapeutic Activities, and Therapeutic Exercise.        Recommendations for next treatment session:            - stretching      Dianna Rosas, PT

## 2025-06-27 NOTE — PROGRESS NOTES
"  [facility]   Physical Therapy daily treatment note     Name: Shanthi Escoto  Clinic Number: [id number]    Therapy Diagnosis:   Encounter Diagnosis   Name Primary?    Impaired functional mobility, balance, gait, and endurance Yes       Physician: Arin Albarran MD    Visit Date: 6/2/2025    Physician Orders: PT Eval and Treat  " Neuro PT"  Medical Diagnosis from Referral:   Diagnosis   G35 (ICD-10-CM) - Multiple sclerosis      Evaluation Date: 7/16/2024  Authorization Period Expiration: 12/31/24  Updated Plan of Care Expiration: 8/11/25  Progress Note Due: 7/3/25  Visit # / Visits authorized: 25/30 (56)   FOTO: 1/5    Time In: 5:30 pm      Time Out: 6:15 pm      Total Billable Time: 45 minutes    Precautions: Standard and Fall Orthopedic: Lumbar Fusion precautions  (unclear if these are current or have been lifted)     PTA visit 0/5      Subjective     Pt reports:   " I am ok." Pt reports she tried to practice breaking down her WC twice. Report she feels it'll be a learning curve but will be ok. Reports she will have to use her SUV to transport her WC. Pt still has not gotten rollator.       She was compliant with home exercise program.    Response to previous treatment: good  Functional change: ongoing       Pain: 5/10         Location: low back pain       Objective      Ambulating to lobby with single point cane with Left AFO. Requires WC transfer back to car at end of session.        Treatment     Shanthi received the treatments listed below:      therapeutic activities to improve functional performance for 45   minutes, including:    Shuttle:   - bilateral lower extremity leg press 62 # 3x10   - single leg press 25 # left 3x10 each lower extremity   - single leg press 25 # 3x10 each lower extremity     Seated rows with pulleys 20# 3x10      (Therapeutic rest breaks throughout as needed).        Home Exercises Provided and Patient Education Provided     Education provided:     - " educated about shuttle and purpose. Reports understanding.     Assessment         Pt tolerated session well.  Working on bilateral lower extremity strength on shuttle leg press as well as scapular strength for posture with rows. Pt reports she is practicing breaking down her WC at home but has not taken it out yet. Remains motivated to improve and a good candidate for PT.       Shanthi Is progressing well towards her goals.   Pt prognosis is Good.     Pt will continue to benefit from skilled outpatient physical therapy to address the deficits listed in the problem list box on initial evaluation, provide pt/family education and to maximize pt's level of independence in the home and community environment.     Pt's spiritual, cultural and educational needs considered and pt agreeable to plan of care and goals.     Anticipated barriers to physical therapy: progressive disease process     Goals:           Short Term Goals: 5 weeks Date last assessed:  Status:    1. Patient will be provided with an HEP for strength, endurance and balance.  7/16/2024    MET   2.  Patient will demonstrate improve endurance and activity tolerance as evidenced by ability to perform Nu-step x 15 minutes without rests breaks. 11/4/24 ongoing            Long Term Goals: 10 weeks  Date last assessed: Status:    1. Patient will be independent and compliant with updated HEP. 6/2/25    ongoing   2.  Patient will increase her   gait speed as assessed by the timed 10MWT from 0.37 m/s to 0.51 m/s to increase her safety and (I) with gait at a community level. (MDC for CVA= 0.14 m/s)     6/2/25    Progressing       3. The patient will demonstrate improved efficiency with functional mobility and decreased risk for falls as evidenced by an increase in her score on the Timed up and Go from 26.9 to 24.0 sec. (Minimal detectable change in chronic stroke = 2.9 seconds)  6/2/25 ongoing   4.Patient will improve her FOTO score from 42  to at least 57 for improved  self perception of functional mobility.(score 0-100, high score indicates greater level of function) 6/2/25 ongoing   5. Pt will improve 5x sit to stand score from 13.5 sec to less than 12 seconds to decrease fall risk and improve safety.  6/2/25    ongoing   6. Pt will acquire custom manual WC to assist with independence and safety with long distance mobility.  6/2/25 Ongoing. Ordred but did not come in yet.    7. Pt will acquire left Bioness L300 to assist with ambulation and decrease foot drop to lower fall risk.  3/24/25 MET. Got AFO. Bioness denied.    8. Patient will increase his/her distance achieved on the 2 Minute Walk Test from 138 feet to 175 feet to demonstrate improved endurance and efficiency with community level gait. (MDC for CVA = 112 feet) 6/2/25 ongoing              Plan      Updated Plan of care Certification 6/3/25- 8/11/25.     Outpatient Physical Therapy 2 times weekly for 10 weeks to include the following interventions: Electrical Stimulation as indicated, Gait Training, Manual Therapy, Moist Heat/ Ice, Neuromuscular Re-ed, Orthotic Management and Training, Patient Education, Therapeutic Activities, and Therapeutic Exercise.        Recommendations for next treatment session:            - stretching      Dianna Rosas, PT

## 2025-06-30 ENCOUNTER — PATIENT MESSAGE (OUTPATIENT)
Dept: NEUROLOGY | Facility: CLINIC | Age: 50
End: 2025-06-30
Payer: COMMERCIAL

## 2025-06-30 ENCOUNTER — CLINICAL SUPPORT (OUTPATIENT)
Dept: REHABILITATION | Facility: HOSPITAL | Age: 50
End: 2025-06-30
Payer: COMMERCIAL

## 2025-06-30 DIAGNOSIS — Z74.09 IMPAIRED FUNCTIONAL MOBILITY, BALANCE, GAIT, AND ENDURANCE: Primary | ICD-10-CM

## 2025-06-30 PROCEDURE — 97530 THERAPEUTIC ACTIVITIES: CPT | Mod: PO

## 2025-07-02 NOTE — PROGRESS NOTES
"  [facility]   Physical Therapy daily treatment note / progress note      Name: Shanthi Escoto  Clinic Number: [id number]    Therapy Diagnosis:   Encounter Diagnosis   Name Primary?    Impaired functional mobility, balance, gait, and endurance Yes       Physician: Arin Albarran MD    Visit Date: 6/2/2025    Physician Orders: PT Eval and Treat  " Neuro PT"  Medical Diagnosis from Referral:   Diagnosis   G35 (ICD-10-CM) - Multiple sclerosis      Evaluation Date: 7/16/2024  Authorization Period Expiration: 12/31/24  Updated Plan of Care Expiration: 8/11/25  Progress Note Due: 8/3/25  Visit # / Visits authorized: 25/30 (56)   FOTO: 1/5    Time In: 4:49 pm   Time Out: 5:34 pm    Total Billable Time: 45 minutes    Precautions: Standard and Fall Orthopedic: Lumbar Fusion precautions  (unclear if these are current or have been lifted)     PTA visit 0/5      Subjective     Pt reports: " I am doing ok." Pt reports she has been practicing breaking her WC down more.      She was compliant with home exercise program.    Response to previous treatment: good  Functional change: ongoing       Pain: 5/10         Location: low back pain       Objective      Ambulating to lobby with single point cane with Left AFO. Requires WC transfer back to car at end of session.        Treatment     Shanthi received the treatments listed below:      therapeutic activities to improve functional performance for 45   minutes, including:    Pt received Manual electrical stimulation for 20 minutes after being cleared for all contradictions as follows: Pt received Functional Electrical stimulation with  FES lower extremity bike to elicit  sequential muscle contraction of the left tibialis anterior and gastroc/ soleus muscle groups.   Activity time includes skilled set-up for proper placement of electrodes and "muscle test" to determine optimal intensity to elicit a muscular contraction,  adjustment of pedal lever " "arms to promote forced use of left lower extremity and securing of "Q-Straints" in kenyon/cross pattern to stabilize chair. Training set-up for intensity training. Results as follows:     Distance travelled: 2.36 miles  Energy Expended: 0.3 kCal  Energy per hour 1.0 kCal/ hour  Average power: 1.1 W  Average Asymmetry: left 1 %  Total therapy time: 20. 07 min   Time in active (off motor) 6.17 min    Time in passive: (on motor): 13.50 min         (Therapeutic rest breaks throughout as needed).        Home Exercises Provided and Patient Education Provided     Education provided:     - educated about progress with therapy. Reports understanding.     Assessment         Pt tolerated session well. Pt performed 6 min in active mode today on FES bike, which is less that the 12 min she was able to perform last time on FES bike. Pt recently got her custom WC and is still waiting on her rollator. Pt remains motivated to improve and a good candidate for PT.       Shanthi Is progressing well towards her goals.   Pt prognosis is Good.     Pt will continue to benefit from skilled outpatient physical therapy to address the deficits listed in the problem list box on initial evaluation, provide pt/family education and to maximize pt's level of independence in the home and community environment.     Pt's spiritual, cultural and educational needs considered and pt agreeable to plan of care and goals.     Anticipated barriers to physical therapy: progressive disease process     Goals:           Short Term Goals: 5 weeks Date last assessed:  Status:    1. Patient will be provided with an HEP for strength, endurance and balance.  7/16/2024    MET   2.  Patient will demonstrate improve endurance and activity tolerance as evidenced by ability to perform Nu-step x 15 minutes without rests breaks. 11/4/24 ongoing            Long Term Goals: 10 weeks  Date last assessed: Status:    1. Patient will be independent and compliant with updated HEP. " 6/2/25    ongoing   2.  Patient will increase her   gait speed as assessed by the timed 10MWT from 0.37 m/s to 0.51 m/s to increase her safety and (I) with gait at a community level. (MDC for CVA= 0.14 m/s)     6/2/25    Progressing       3. The patient will demonstrate improved efficiency with functional mobility and decreased risk for falls as evidenced by an increase in her score on the Timed up and Go from 26.9 to 24.0 sec. (Minimal detectable change in chronic stroke = 2.9 seconds)  6/2/25 ongoing   4.Patient will improve her FOTO score from 42  to at least 57 for improved self perception of functional mobility.(score 0-100, high score indicates greater level of function) 6/2/25 ongoing   5. Pt will improve 5x sit to stand score from 13.5 sec to less than 12 seconds to decrease fall risk and improve safety.  6/2/25    ongoing   6. Pt will acquire custom manual WC to assist with independence and safety with long distance mobility.  6/2/25 Ongoing. Ordred but did not come in yet.    7. Pt will acquire left Bioness L300 to assist with ambulation and decrease foot drop to lower fall risk.  3/24/25 MET. Got AFO. Bioness denied.    8. Patient will increase his/her distance achieved on the 2 Minute Walk Test from 138 feet to 175 feet to demonstrate improved endurance and efficiency with community level gait. (MDC for CVA = 112 feet) 6/2/25 ongoing              Plan      Updated Plan of care Certification 6/3/25- 8/11/25.     Outpatient Physical Therapy 2 times weekly for 10 weeks to include the following interventions: Electrical Stimulation as indicated, Gait Training, Manual Therapy, Moist Heat/ Ice, Neuromuscular Re-ed, Orthotic Management and Training, Patient Education, Therapeutic Activities, and Therapeutic Exercise.        Recommendations for next treatment session:            - stretching      Dianna Rosas, PT

## 2025-07-03 ENCOUNTER — CLINICAL SUPPORT (OUTPATIENT)
Dept: REHABILITATION | Facility: HOSPITAL | Age: 50
End: 2025-07-03
Payer: COMMERCIAL

## 2025-07-03 DIAGNOSIS — Z74.09 IMPAIRED FUNCTIONAL MOBILITY, BALANCE, GAIT, AND ENDURANCE: Primary | ICD-10-CM

## 2025-07-03 PROCEDURE — 97530 THERAPEUTIC ACTIVITIES: CPT | Mod: PO

## 2025-07-03 NOTE — PROGRESS NOTES
"  [facility]   Physical Therapy daily treatment note     Name: Shanthi Esctoo  Clinic Number: [id number]    Therapy Diagnosis:   Encounter Diagnosis   Name Primary?    Impaired functional mobility, balance, gait, and endurance Yes       Physician: Arin Albarran MD    Visit Date: 6/2/2025    Physician Orders: PT Eval and Treat  " Neuro PT"  Medical Diagnosis from Referral:   Diagnosis   G35 (ICD-10-CM) - Multiple sclerosis      Evaluation Date: 7/16/2024  Authorization Period Expiration: 12/31/24  Updated Plan of Care Expiration: 8/11/25  Progress Note Due: 7/3/25  Visit # / Visits authorized: 25/30 (56)   FOTO: 1/5    Time In: 5:35 pm   Time Out: 6:15 pm    Total Billable Time: 40 minutes    Precautions: Standard and Fall Orthopedic: Lumbar Fusion precautions  (unclear if these are current or have been lifted)     PTA visit 0/5      Subjective     Pt reports: " I am ok." Pt reports rollator is still not here. Calling customer service tomorrow.       She was compliant with home exercise program.    Response to previous treatment: good  Functional change: ongoing       Pain: 7/10         Location: low back pain       Objective      Ambulating to lobby with single point cane.  Requires WC transfer back to car at end of session.        Treatment     Shanthi received the treatments listed below:      therapeutic activities to improve functional performance for 40   minutes, including:    - Pt ambulated 120 ft x 2 trials with rollator with supervision with slow pace.     - pt ascended/ descended 12 4 inch steps with bilateral handrails with step to pattern with supervision.      (Therapeutic rest breaks throughout as needed).        Home Exercises Provided and Patient Education Provided     Education provided:     - educated about rollator safety with locking and unlocking rollator.     Assessment           Pt tolerated session well.  Working on practicing walking with rollator. Able to " walk 120 ft x 2 trials with seated rest break in between. Pt requires cueing for safety with rollator locks and when to unlock. Pt able to perform 12 4 inch steps with standby assist with increased time.requires rest breaks in between. Pt requires WC transport back to car due to fatigue. Remains motivated to improve and a good candidate for PT.       Shanthi Is progressing well towards her goals.   Pt prognosis is Good.     Pt will continue to benefit from skilled outpatient physical therapy to address the deficits listed in the problem list box on initial evaluation, provide pt/family education and to maximize pt's level of independence in the home and community environment.     Pt's spiritual, cultural and educational needs considered and pt agreeable to plan of care and goals.     Anticipated barriers to physical therapy: progressive disease process     Goals:           Short Term Goals: 5 weeks Date last assessed:  Status:    1. Patient will be provided with an HEP for strength, endurance and balance.  7/16/2024    MET   2.  Patient will demonstrate improve endurance and activity tolerance as evidenced by ability to perform Nu-step x 15 minutes without rests breaks. 11/4/24 ongoing            Long Term Goals: 10 weeks  Date last assessed: Status:    1. Patient will be independent and compliant with updated HEP. 6/2/25    ongoing   2.  Patient will increase her   gait speed as assessed by the timed 10MWT from 0.37 m/s to 0.51 m/s to increase her safety and (I) with gait at a community level. (MDC for CVA= 0.14 m/s)     6/2/25    Progressing       3. The patient will demonstrate improved efficiency with functional mobility and decreased risk for falls as evidenced by an increase in her score on the Timed up and Go from 26.9 to 24.0 sec. (Minimal detectable change in chronic stroke = 2.9 seconds)  6/2/25 ongoing   4.Patient will improve her FOTO score from 42  to at least 57 for improved self perception of  functional mobility.(score 0-100, high score indicates greater level of function) 6/2/25 ongoing   5. Pt will improve 5x sit to stand score from 13.5 sec to less than 12 seconds to decrease fall risk and improve safety.  6/2/25    ongoing   6. Pt will acquire custom manual WC to assist with independence and safety with long distance mobility.  6/2/25 Ongoing. Ordred but did not come in yet.    7. Pt will acquire left Bioness L300 to assist with ambulation and decrease foot drop to lower fall risk.  3/24/25 MET. Got AFO. Bioness denied.    8. Patient will increase his/her distance achieved on the 2 Minute Walk Test from 138 feet to 175 feet to demonstrate improved endurance and efficiency with community level gait. (MDC for CVA = 112 feet) 6/2/25 ongoing              Plan      Updated Plan of care Certification 6/3/25- 8/11/25.     Outpatient Physical Therapy 2 times weekly for 10 weeks to include the following interventions: Electrical Stimulation as indicated, Gait Training, Manual Therapy, Moist Heat/ Ice, Neuromuscular Re-ed, Orthotic Management and Training, Patient Education, Therapeutic Activities, and Therapeutic Exercise.        Recommendations for next treatment session:            - stretching      Dianna Rosas, PT

## 2025-07-07 ENCOUNTER — CLINICAL SUPPORT (OUTPATIENT)
Dept: REHABILITATION | Facility: HOSPITAL | Age: 50
End: 2025-07-07
Payer: COMMERCIAL

## 2025-07-07 DIAGNOSIS — Z74.09 IMPAIRED FUNCTIONAL MOBILITY, BALANCE, GAIT, AND ENDURANCE: Primary | ICD-10-CM

## 2025-07-07 PROCEDURE — 97530 THERAPEUTIC ACTIVITIES: CPT | Mod: PO

## 2025-07-15 ENCOUNTER — DOCUMENTATION ONLY (OUTPATIENT)
Dept: NEUROLOGY | Facility: CLINIC | Age: 50
End: 2025-07-15
Payer: COMMERCIAL

## 2025-07-15 NOTE — PROGRESS NOTES
Successfully faxed previous lab results to PCP (Dr.Sonia Albarran) as pt requested to [phone].

## 2025-07-17 ENCOUNTER — PATIENT MESSAGE (OUTPATIENT)
Dept: NEUROLOGY | Facility: CLINIC | Age: 50
End: 2025-07-17
Payer: COMMERCIAL

## 2025-07-27 DIAGNOSIS — G35 MULTIPLE SCLEROSIS: ICD-10-CM

## 2025-07-27 DIAGNOSIS — N31.9 NEUROGENIC BLADDER: ICD-10-CM

## 2025-07-28 RX ORDER — OXYBUTYNIN CHLORIDE 10 MG/1
10 TABLET, EXTENDED RELEASE ORAL
Qty: 90 TABLET | Refills: 1 | Status: SHIPPED | OUTPATIENT
Start: 2025-07-28

## 2025-07-30 ENCOUNTER — PATIENT MESSAGE (OUTPATIENT)
Dept: PSYCHIATRY | Facility: CLINIC | Age: 50
End: 2025-07-30
Payer: COMMERCIAL

## 2025-07-31 ENCOUNTER — CLINICAL SUPPORT (OUTPATIENT)
Dept: REHABILITATION | Facility: HOSPITAL | Age: 50
End: 2025-07-31
Payer: COMMERCIAL

## 2025-07-31 DIAGNOSIS — Z74.09 IMPAIRED FUNCTIONAL MOBILITY, BALANCE, GAIT, AND ENDURANCE: Primary | ICD-10-CM

## 2025-07-31 PROCEDURE — 97530 THERAPEUTIC ACTIVITIES: CPT | Mod: PO

## 2025-07-31 NOTE — PROGRESS NOTES
" [facility]   Physical Therapy daily treatment note     Name: Shanthi Escoto  Clinic Number: [id number]    Therapy Diagnosis:   Encounter Diagnosis   Name Primary?    Impaired functional mobility, balance, gait, and endurance Yes       Physician: Arin Albarran MD    Visit Date: 6/2/2025    Physician Orders: PT Eval and Treat  " Neuro PT"  Medical Diagnosis from Referral:   Diagnosis   G35 (ICD-10-CM) - Multiple sclerosis      Evaluation Date: 7/16/2024  Authorization Period Expiration: 12/31/24  Updated Plan of Care Expiration: 8/11/25  Progress Note Due: 7/3/25  Visit # / Visits authorized: 28/30 (59)   FOTO: 1/5    Time In: 4:48  pm  Time Out: 5:26 pm    Total Billable Time: minutes    Precautions: Standard and Fall Orthopedic: Lumbar Fusion precautions  (unclear if these are current or have been lifted)     PTA visit 0/5      Subjective     Pt reports: Patient ambulates in with new rollator. Reports she just got back from [place] and did a lot while she was there. She went to the store with her mom.      She was compliant with home exercise program.    Response to previous treatment: good  Functional change: ongoing       Pain: 7/10         Location: low back pain       Objective      Ambulating to lobby with single point cane.  Requires WC transfer back to car at end of session.        Treatment     Shanthi received the treatments listed below:      therapeutic activities to improve functional performance for 38   minutes, including:    - Pt ambulated 134ft, 50ft with rollator with supervision demonstrating slow donnell, decreased left foot clearance, left foot drag at times, decreased left hip and knee flexion during swing  -sit to stand with rollator with supervision  -transfer to sit on rollator with supervision   -stepping over hurdles with support of parallel bars 4 hurdles x 2 trials to improve foot clearance, hip and knee flexion, obstacle clearance (demonstrates " circumduction, posterior lurch, and decreased heel strike and inversion of left leg)     (Therapeutic rest breaks throughout as needed).        Home Exercises Provided and Patient Education Provided     Education provided:     - educated about rollator safety with locking and unlocking rollator.     Assessment         Pt tolerated session well.  She reported that she would like to try to get bioness again. She ambulated into clinic with her new rollator and wanted to practice walking with it. She demonstrates decreased donnell, decreased left foot clearance and hip/knee flexion during swing. She is easily fatigued and requires restbreaks in between activities. At end of session, she was brought to car via wheelchair. She will continue to benefit from therapy to improve left sided strength, balance, endurance, and safety.      Shanthi Is progressing well towards her goals.   Pt prognosis is Good.     Pt will continue to benefit from skilled outpatient physical therapy to address the deficits listed in the problem list box on initial evaluation, provide pt/family education and to maximize pt's level of independence in the home and community environment.     Pt's spiritual, cultural and educational needs considered and pt agreeable to plan of care and goals.     Anticipated barriers to physical therapy: progressive disease process     Goals:           Short Term Goals: 5 weeks Date last assessed:  Status:    1. Patient will be provided with an HEP for strength, endurance and balance.  7/16/2024    MET   2.  Patient will demonstrate improve endurance and activity tolerance as evidenced by ability to perform Nu-step x 15 minutes without rests breaks. 11/4/24 ongoing            Long Term Goals: 10 weeks  Date last assessed: Status:    1. Patient will be independent and compliant with updated HEP. 6/2/25    ongoing   2.  Patient will increase her   gait speed as assessed by the timed 10MWT from 0.37 m/s to 0.51 m/s to  increase her safety and (I) with gait at a community level. (MDC for CVA= 0.14 m/s)     6/2/25    Progressing       3. The patient will demonstrate improved efficiency with functional mobility and decreased risk for falls as evidenced by an increase in her score on the Timed up and Go from 26.9 to 24.0 sec. (Minimal detectable change in chronic stroke = 2.9 seconds)  6/2/25 ongoing   4.Patient will improve her FOTO score from 42  to at least 57 for improved self perception of functional mobility.(score 0-100, high score indicates greater level of function) 6/2/25 ongoing   5. Pt will improve 5x sit to stand score from 13.5 sec to less than 12 seconds to decrease fall risk and improve safety.  6/2/25    ongoing   6. Pt will acquire custom manual WC to assist with independence and safety with long distance mobility.  6/2/25 Ongoing. Ordred but did not come in yet.    7. Pt will acquire left Bioness L300 to assist with ambulation and decrease foot drop to lower fall risk.  3/24/25 MET. Got AFO. Bioness denied.    8. Patient will increase his/her distance achieved on the 2 Minute Walk Test from 138 feet to 175 feet to demonstrate improved endurance and efficiency with community level gait. (MDC for CVA = 112 feet) 6/2/25 ongoing              Plan      Updated Plan of care Certification 6/3/25- 8/11/25.     Outpatient Physical Therapy 2 times weekly for 10 weeks to include the following interventions: Electrical Stimulation as indicated, Gait Training, Manual Therapy, Moist Heat/ Ice, Neuromuscular Re-ed, Orthotic Management and Training, Patient Education, Therapeutic Activities, and Therapeutic Exercise.        Recommendations for next treatment session:            - stretching, getting bioness     Dianna Rosas, PT

## 2025-08-01 ENCOUNTER — PATIENT MESSAGE (OUTPATIENT)
Dept: PSYCHIATRY | Facility: CLINIC | Age: 50
End: 2025-08-01
Payer: COMMERCIAL

## 2025-08-04 ENCOUNTER — CLINICAL SUPPORT (OUTPATIENT)
Dept: REHABILITATION | Facility: HOSPITAL | Age: 50
End: 2025-08-04
Payer: COMMERCIAL

## 2025-08-04 DIAGNOSIS — Z74.09 IMPAIRED FUNCTIONAL MOBILITY, BALANCE, GAIT, AND ENDURANCE: Primary | ICD-10-CM

## 2025-08-04 PROCEDURE — 97112 NEUROMUSCULAR REEDUCATION: CPT | Mod: PO

## 2025-08-04 NOTE — PROGRESS NOTES
"  [facility]   Physical Therapy daily treatment note     Name: Shanthi Escoto  Clinic Number: [id number]    Therapy Diagnosis:   Encounter Diagnosis   Name Primary?    Impaired functional mobility, balance, gait, and endurance Yes       Physician: Arin Albarran MD    Visit Date: 6/2/2025    Physician Orders: PT Eval and Treat  " Neuro PT"  Medical Diagnosis from Referral:   Diagnosis   G35 (ICD-10-CM) - Multiple sclerosis      Evaluation Date: 7/16/2024  Authorization Period Expiration: 12/31/24  Updated Plan of Care Expiration: 8/11/25  Progress Note Due: 7/3/25  Visit # / Visits authorized: 28/30 (59)   FOTO: 1/5    Time In:  1601pm  Time Out:  1645 pm    Total Billable Time:44 minutes    Precautions: Standard and Fall Orthopedic: Lumbar Fusion precautions  (unclear if these are current or have been lifted)     PTA visit 0/5      Subjective     Pt reports: I just rested.       She was compliant with home exercise program.    Response to previous treatment: good  Functional change: ongoing       Pain: 5/10         Location: low back pain       Objective      Ambulating to lobby with single point cane.  Requires WC transfer back to car at end of session.        Treatment     Shanthi received the treatments listed below:      Neuromuscular reeducation to improve functional performance for 44  minutes, including:  .Pt received Manual electrical stimulation for 20 minutes after being cleared for all contradictions as follows: Pt received Functional Electrical stimulation with  FES lower extremity bike to elicit  sequential muscle contraction of the left tibialis anterior and gastroc/ soleus muscle groups.  \ Activity time includes skilled set-up for proper placement of electrodes and "muscle test" to determine optimal intensity to elicit a muscular contraction,  adjustment of pedal lever arms to promote forced use of left  lower extremity and securing of "Q-Straints" in " kenyon/cross pattern to stabilize chair. Training set-up for intensity training. Results as follows:         Distance travelled: 2.56 miles  Energy Expended: 0.7 kCal  Energy per hour 2.3 kCal/ hour  Average power: 2.7 W  Average Asymmetry: 51 %  Total therapy time: 21:29   Time in active (off motor) 12:54   Time in passive: (on motor): 08:35          (Therapeutic rest breaks throughout as needed and setup).        Home Exercises Provided and Patient Education Provided     Education provided:     - educated about rollator safety with locking and unlocking rollator.     Assessment         Pt tolerated session well.  She still wants to try bioness for left foot clearance with ambulation. Agreeable to FES bike. She will continue to benefit from Physical Therapy to improve left lower extremity strength, balance, endurance, and gait.       Shanthi Is progressing well towards her goals.   Pt prognosis is Good.     Pt will continue to benefit from skilled outpatient physical therapy to address the deficits listed in the problem list box on initial evaluation, provide pt/family education and to maximize pt's level of independence in the home and community environment.     Pt's spiritual, cultural and educational needs considered and pt agreeable to plan of care and goals.     Anticipated barriers to physical therapy: progressive disease process     Goals:           Short Term Goals: 5 weeks Date last assessed:  Status:    1. Patient will be provided with an HEP for strength, endurance and balance.  7/16/2024    MET   2.  Patient will demonstrate improve endurance and activity tolerance as evidenced by ability to perform Nu-step x 15 minutes without rests breaks. 11/4/24 ongoing            Long Term Goals: 10 weeks  Date last assessed: Status:    1. Patient will be independent and compliant with updated HEP. 6/2/25    ongoing   2.  Patient will increase her   gait speed as assessed by the timed 10MWT from 0.37 m/s to 0.51 m/s  to increase her safety and (I) with gait at a community level. (MDC for CVA= 0.14 m/s)     6/2/25    Progressing       3. The patient will demonstrate improved efficiency with functional mobility and decreased risk for falls as evidenced by an increase in her score on the Timed up and Go from 26.9 to 24.0 sec. (Minimal detectable change in chronic stroke = 2.9 seconds)  6/2/25 ongoing   4.Patient will improve her FOTO score from 42  to at least 57 for improved self perception of functional mobility.(score 0-100, high score indicates greater level of function) 6/2/25 ongoing   5. Pt will improve 5x sit to stand score from 13.5 sec to less than 12 seconds to decrease fall risk and improve safety.  6/2/25    ongoing   6. Pt will acquire custom manual WC to assist with independence and safety with long distance mobility.  6/2/25 Ongoing. Ordred but did not come in yet.    7. Pt will acquire left Bioness L300 to assist with ambulation and decrease foot drop to lower fall risk.  3/24/25 MET. Got AFO. Bioness denied.    8. Patient will increase his/her distance achieved on the 2 Minute Walk Test from 138 feet to 175 feet to demonstrate improved endurance and efficiency with community level gait. (MDC for CVA = 112 feet) 6/2/25 ongoing              Plan      Updated Plan of care Certification 6/3/25- 8/11/25.     Outpatient Physical Therapy 2 times weekly for 10 weeks to include the following interventions: Electrical Stimulation as indicated, Gait Training, Manual Therapy, Moist Heat/ Ice, Neuromuscular Re-ed, Orthotic Management and Training, Patient Education, Therapeutic Activities, and Therapeutic Exercise.        Recommendations for next treatment session:            - stretching, getting bioness       Akanksha Lechuga, Physical Therapist

## 2025-08-07 ENCOUNTER — CLINICAL SUPPORT (OUTPATIENT)
Dept: REHABILITATION | Facility: HOSPITAL | Age: 50
End: 2025-08-07
Payer: COMMERCIAL

## 2025-08-07 DIAGNOSIS — Z74.09 IMPAIRED FUNCTIONAL MOBILITY, BALANCE, GAIT, AND ENDURANCE: Primary | ICD-10-CM

## 2025-08-07 PROCEDURE — 97112 NEUROMUSCULAR REEDUCATION: CPT | Mod: PO

## 2025-08-07 PROCEDURE — 97530 THERAPEUTIC ACTIVITIES: CPT | Mod: PO

## 2025-08-07 NOTE — PROGRESS NOTES
" [facility]   Physical Therapy daily treatment note     Name: Shanthi Escoto  Clinic Number: [id number]    Therapy Diagnosis:   Encounter Diagnosis   Name Primary?    Impaired functional mobility, balance, gait, and endurance Yes       Physician: Arin Albarran MD    Visit Date: 6/2/2025    Physician Orders: PT Eval and Treat  " Neuro PT"  Medical Diagnosis from Referral:   Diagnosis   G35 (ICD-10-CM) - Multiple sclerosis      Evaluation Date: 7/16/2024  Authorization Period Expiration: 12/31/24  Updated Plan of Care Expiration: 8/11/25  Progress Note Due: 7/3/25  Visit # / Visits authorized: 28/30 (59)   FOTO: 1/5    Time In: 440 pm  Time Out:  523pm    Total Billable Time: 43 minutes    Precautions: Standard and Fall Orthopedic: Lumbar Fusion precautions  (unclear if these are current or have been lifted)     PTA visit 0/5      Subjective     Pt reports: Physical Therapist went to get patient out of the car with wheelchair. She reports she was trying to get her strength up to get out of the car.       She was compliant with home exercise program.    Response to previous treatment: good  Functional change: ongoing       Pain: 5/10         Location: low back pain       Objective      Ambulating to lobby with single point cane.  Requires WC transfer back to car at end of session.        Treatment     Shanthi received the treatments listed below:      Therapeutic activity  -side steps in bars with gray band above knees 2 reps each way to improve lower extremity strength and foot clearance  -[id number] mini squats without upper extremity support and supervision   -attempted to don bioness to left anterior tib for motor and sensory response but had difficulty with motor response despite repositioning bioness will benefit from different electrodes to help target motor response     Neuromuscular reeducation/balance for 10 minutes to improve balance, motor response and recovery:   -static  " romberg standing on stable surface, no upper extremity support 30 seconds eyes open supervision  -static romberg standing on stable surface no upper extremity support 30seconds eyes closed contact min sway  static standing on foam no upper extremity support 30seconds eyes open min -mod sway supervision  -static standing on foam no upper extremity support 30seconds eyes closed 10 seconds contact guard mod sway loss of balance  -anterior/posterior weightshift with upper extremity support on bars 2mins to improve balance strategies     (Therapeutic rest breaks throughout as needed and setup).        Home Exercises Provided and Patient Education Provided     Education provided:     - educated about balance strategies     Assessment         Pt tolerated session fair-well. Attempted bioness to left anterior tib but had no motor response and would benefit from different electrodes to target motor response. She still is fearful with most tasks and educated on balance strategies. She will benefit from physical therapy to improve balance, strength, endurance, ambulation, and use of bioness for home use to improve left foot clearance.       Shanthi Is progressing well towards her goals.   Pt prognosis is Good.     Pt will continue to benefit from skilled outpatient physical therapy to address the deficits listed in the problem list box on initial evaluation, provide pt/family education and to maximize pt's level of independence in the home and community environment.     Pt's spiritual, cultural and educational needs considered and pt agreeable to plan of care and goals.     Anticipated barriers to physical therapy: progressive disease process     Goals:           Short Term Goals: 5 weeks Date last assessed:  Status:    1. Patient will be provided with an HEP for strength, endurance and balance.  7/16/2024    MET   2.  Patient will demonstrate improve endurance and activity tolerance as evidenced by ability to perform Nu-step  x 15 minutes without rests breaks. 11/4/24 ongoing            Long Term Goals: 10 weeks  Date last assessed: Status:    1. Patient will be independent and compliant with updated HEP. 6/2/25    ongoing   2.  Patient will increase her   gait speed as assessed by the timed 10MWT from 0.37 m/s to 0.51 m/s to increase her safety and (I) with gait at a community level. (MDC for CVA= 0.14 m/s)     6/2/25    Progressing       3. The patient will demonstrate improved efficiency with functional mobility and decreased risk for falls as evidenced by an increase in her score on the Timed up and Go from 26.9 to 24.0 sec. (Minimal detectable change in chronic stroke = 2.9 seconds)  6/2/25 ongoing   4.Patient will improve her FOTO score from 42  to at least 57 for improved self perception of functional mobility.(score 0-100, high score indicates greater level of function) 6/2/25 ongoing   5. Pt will improve 5x sit to stand score from 13.5 sec to less than 12 seconds to decrease fall risk and improve safety.  6/2/25    ongoing   6. Pt will acquire custom manual WC to assist with independence and safety with long distance mobility.  6/2/25 Ongoing. Ordred but did not come in yet.    7. Pt will acquire left Bioness L300 to assist with ambulation and decrease foot drop to lower fall risk.  3/24/25 MET. Got AFO. Bioness denied.    8. Patient will increase his/her distance achieved on the 2 Minute Walk Test from 138 feet to 175 feet to demonstrate improved endurance and efficiency with community level gait. (MDC for CVA = 112 feet) 6/2/25 ongoing              Plan      Updated Plan of care Certification 6/3/25- 8/11/25.     Outpatient Physical Therapy 2 times weekly for 10 weeks to include the following interventions: Electrical Stimulation as indicated, Gait Training, Manual Therapy, Moist Heat/ Ice, Neuromuscular Re-ed, Orthotic Management and Training, Patient Education, Therapeutic Activities, and Therapeutic Exercise.         Recommendations for next treatment session:            - stretching, getting bioness       Akanksha Lechuga, Physical Therapist

## 2025-08-11 ENCOUNTER — CLINICAL SUPPORT (OUTPATIENT)
Dept: REHABILITATION | Facility: HOSPITAL | Age: 50
End: 2025-08-11
Payer: COMMERCIAL

## 2025-08-11 DIAGNOSIS — Z74.09 IMPAIRED FUNCTIONAL MOBILITY, BALANCE, GAIT, AND ENDURANCE: Primary | ICD-10-CM

## 2025-08-11 PROCEDURE — 97530 THERAPEUTIC ACTIVITIES: CPT | Mod: PO

## 2025-08-11 PROCEDURE — 97112 NEUROMUSCULAR REEDUCATION: CPT | Mod: PO

## 2025-08-18 ENCOUNTER — PATIENT MESSAGE (OUTPATIENT)
Dept: PSYCHIATRY | Facility: CLINIC | Age: 50
End: 2025-08-18
Payer: COMMERCIAL

## 2025-08-18 ENCOUNTER — CLINICAL SUPPORT (OUTPATIENT)
Dept: REHABILITATION | Facility: HOSPITAL | Age: 50
End: 2025-08-18
Payer: COMMERCIAL

## 2025-08-18 DIAGNOSIS — Z74.09 IMPAIRED FUNCTIONAL MOBILITY, BALANCE, GAIT, AND ENDURANCE: Primary | ICD-10-CM

## 2025-08-18 PROCEDURE — 97530 THERAPEUTIC ACTIVITIES: CPT | Mod: PO

## 2025-08-25 ENCOUNTER — CLINICAL SUPPORT (OUTPATIENT)
Dept: REHABILITATION | Facility: HOSPITAL | Age: 50
End: 2025-08-25
Payer: COMMERCIAL

## 2025-08-25 DIAGNOSIS — Z74.09 IMPAIRED FUNCTIONAL MOBILITY, BALANCE, GAIT, AND ENDURANCE: Primary | ICD-10-CM

## 2025-08-25 PROCEDURE — 97530 THERAPEUTIC ACTIVITIES: CPT | Mod: PO

## (undated) DEVICE — SKINMARKER & RULER REGULAR X-F

## (undated) DEVICE — SUT VICRYL+ 27 UR-6 VIOL

## (undated) DEVICE — DRAPE OPMI STERILE

## (undated) DEVICE — SYR IRRIGATION BULB STER 60ML

## (undated) DEVICE — DURAPREP SURG SCRUB 26ML

## (undated) DEVICE — DRAPE C-ARM FOR MOBILE XRAY

## (undated) DEVICE — SEE MEDLINE ITEM 156905

## (undated) DEVICE — ELECTRODE REM PLYHSV RETURN 9

## (undated) DEVICE — SPONGE GAUZE 16PLY 4X4

## (undated) DEVICE — TUBE FRAZIER 5MM 2FT SOFT TIP

## (undated) DEVICE — SPONGE PATTY SURGICAL .5X3IN

## (undated) DEVICE — SOL NACL IRR 1000ML BTL

## (undated) DEVICE — SYR ONLY LUER LOCK 20CC

## (undated) DEVICE — PULSAVAC ZIMMER

## (undated) DEVICE — GOWN NONREINF SET-IN SLV XL

## (undated) DEVICE — SEE MEDLINE ITEM 157150

## (undated) DEVICE — UNDERGLOVE BIOGEL PI SZ 6.5 LF

## (undated) DEVICE — DRESSING TRANS 4X4 TEGADERM

## (undated) DEVICE — SEE MEDLINE ITEM 157110

## (undated) DEVICE — CARTRIDGE OIL

## (undated) DEVICE — GLOVE BIOGEL PI MICRO SZ 7.5

## (undated) DEVICE — CORD FOR BIPOLAR FORCEPS 12

## (undated) DEVICE — UNDERGLOVES BIOGEL PI SIZE 8

## (undated) DEVICE — BLADE ELECTRO EDGE INSULATED

## (undated) DEVICE — TRAY NEURO OMC

## (undated) DEVICE — GWIRE SPINAL BLNT TIP 1.6X19
Type: IMPLANTABLE DEVICE | Site: SPINE LUMBAR | Status: NON-FUNCTIONAL
Removed: 2023-10-16

## (undated) DEVICE — SEE MEDLINE ITEM 157194

## (undated) DEVICE — SUT VICRYL+ 2-0 SH 18IN

## (undated) DEVICE — BURR MIS CURVED 3.0MM

## (undated) DEVICE — GLOVE RADIATION SIZE 7 1/2

## (undated) DEVICE — PROBE RHYTHMLINK MPLR 1.5X280
Type: IMPLANTABLE DEVICE | Site: SPINE LUMBAR | Status: NON-FUNCTIONAL
Removed: 2023-10-16

## (undated) DEVICE — NDL 18GA X1 1/2 REG BEVEL

## (undated) DEVICE — CUSHION PRONE VIEW SMALL

## (undated) DEVICE — PACK ENDOSCOPY GENERAL

## (undated) DEVICE — POSITIONER IV ARMBOARD FOAM

## (undated) DEVICE — NDL HYPO REG 25G X 1 1/2

## (undated) DEVICE — COVER OVERHEAD SURG LT BLUE

## (undated) DEVICE — DRAPE STERI INSTRUMENT 1018

## (undated) DEVICE — DIFFUSER

## (undated) DEVICE — DRESSING TELFA N ADH 3X8

## (undated) DEVICE — KIT SPINAL PATIENT CARE JACK

## (undated) DEVICE — GELATIN SURGIPOWDER ABSORBABLE